# Patient Record
Sex: MALE | Race: OTHER | NOT HISPANIC OR LATINO | URBAN - METROPOLITAN AREA
[De-identification: names, ages, dates, MRNs, and addresses within clinical notes are randomized per-mention and may not be internally consistent; named-entity substitution may affect disease eponyms.]

---

## 2017-12-11 ENCOUNTER — EMERGENCY (EMERGENCY)
Facility: HOSPITAL | Age: 67
LOS: 1 days | Discharge: HOME | End: 2017-12-11
Attending: EMERGENCY MEDICINE | Admitting: EMERGENCY MEDICINE
Payer: COMMERCIAL

## 2017-12-11 VITALS
WEIGHT: 179.9 LBS | RESPIRATION RATE: 16 BRPM | TEMPERATURE: 98 F | OXYGEN SATURATION: 100 % | HEART RATE: 88 BPM | DIASTOLIC BLOOD PRESSURE: 91 MMHG | SYSTOLIC BLOOD PRESSURE: 200 MMHG

## 2017-12-11 VITALS
HEART RATE: 78 BPM | OXYGEN SATURATION: 100 % | SYSTOLIC BLOOD PRESSURE: 174 MMHG | RESPIRATION RATE: 16 BRPM | TEMPERATURE: 98 F | DIASTOLIC BLOOD PRESSURE: 88 MMHG

## 2017-12-11 DIAGNOSIS — I10 ESSENTIAL (PRIMARY) HYPERTENSION: ICD-10-CM

## 2017-12-11 DIAGNOSIS — E78.5 HYPERLIPIDEMIA, UNSPECIFIED: ICD-10-CM

## 2017-12-11 DIAGNOSIS — E78.00 PURE HYPERCHOLESTEROLEMIA, UNSPECIFIED: ICD-10-CM

## 2017-12-11 DIAGNOSIS — E11.649 TYPE 2 DIABETES MELLITUS WITH HYPOGLYCEMIA WITHOUT COMA: ICD-10-CM

## 2017-12-11 DIAGNOSIS — Z79.899 OTHER LONG TERM (CURRENT) DRUG THERAPY: ICD-10-CM

## 2017-12-11 DIAGNOSIS — Z79.82 LONG TERM (CURRENT) USE OF ASPIRIN: ICD-10-CM

## 2017-12-11 LAB
ALBUMIN SERPL ELPH-MCNC: 4.2 G/DL — SIGNIFICANT CHANGE UP (ref 3.3–5)
ALP SERPL-CCNC: 70 U/L — SIGNIFICANT CHANGE UP (ref 40–120)
ALT FLD-CCNC: 16 U/L — SIGNIFICANT CHANGE UP (ref 10–45)
ANION GAP SERPL CALC-SCNC: 12 MMOL/L — SIGNIFICANT CHANGE UP (ref 5–17)
APPEARANCE UR: CLEAR — SIGNIFICANT CHANGE UP
AST SERPL-CCNC: 18 U/L — SIGNIFICANT CHANGE UP (ref 10–40)
B-OH-BUTYR SERPL-SCNC: 0 MMOL/L — SIGNIFICANT CHANGE UP
BASOPHILS NFR BLD AUTO: 0.5 % — SIGNIFICANT CHANGE UP (ref 0–2)
BILIRUB SERPL-MCNC: 0.3 MG/DL — SIGNIFICANT CHANGE UP (ref 0.2–1.2)
BILIRUB UR-MCNC: NEGATIVE — SIGNIFICANT CHANGE UP
BUN SERPL-MCNC: 16 MG/DL — SIGNIFICANT CHANGE UP (ref 7–23)
CALCIUM SERPL-MCNC: 9.5 MG/DL — SIGNIFICANT CHANGE UP (ref 8.4–10.5)
CHLORIDE SERPL-SCNC: 103 MMOL/L — SIGNIFICANT CHANGE UP (ref 96–108)
CO2 SERPL-SCNC: 25 MMOL/L — SIGNIFICANT CHANGE UP (ref 22–31)
COLOR SPEC: YELLOW — SIGNIFICANT CHANGE UP
CREAT SERPL-MCNC: 0.66 MG/DL — SIGNIFICANT CHANGE UP (ref 0.5–1.3)
DIFF PNL FLD: (no result)
EOSINOPHIL NFR BLD AUTO: 1.9 % — SIGNIFICANT CHANGE UP (ref 0–6)
GLUCOSE SERPL-MCNC: 141 MG/DL — HIGH (ref 70–99)
GLUCOSE UR QL: 250
HCT VFR BLD CALC: 41.8 % — SIGNIFICANT CHANGE UP (ref 39–50)
HGB BLD-MCNC: 13.7 G/DL — SIGNIFICANT CHANGE UP (ref 13–17)
KETONES UR-MCNC: NEGATIVE — SIGNIFICANT CHANGE UP
LEUKOCYTE ESTERASE UR-ACNC: NEGATIVE — SIGNIFICANT CHANGE UP
LYMPHOCYTES # BLD AUTO: 28 % — SIGNIFICANT CHANGE UP (ref 13–44)
MCHC RBC-ENTMCNC: 26.7 PG — LOW (ref 27–34)
MCHC RBC-ENTMCNC: 32.8 G/DL — SIGNIFICANT CHANGE UP (ref 32–36)
MCV RBC AUTO: 81.3 FL — SIGNIFICANT CHANGE UP (ref 80–100)
MONOCYTES NFR BLD AUTO: 6.9 % — SIGNIFICANT CHANGE UP (ref 2–14)
NEUTROPHILS NFR BLD AUTO: 62.7 % — SIGNIFICANT CHANGE UP (ref 43–77)
NITRITE UR-MCNC: NEGATIVE — SIGNIFICANT CHANGE UP
PH UR: 6 — SIGNIFICANT CHANGE UP (ref 5–8)
PLATELET # BLD AUTO: 175 K/UL — SIGNIFICANT CHANGE UP (ref 150–400)
POTASSIUM SERPL-MCNC: 4.1 MMOL/L — SIGNIFICANT CHANGE UP (ref 3.5–5.3)
POTASSIUM SERPL-SCNC: 4.1 MMOL/L — SIGNIFICANT CHANGE UP (ref 3.5–5.3)
PROT SERPL-MCNC: 8.3 G/DL — SIGNIFICANT CHANGE UP (ref 6–8.3)
PROT UR-MCNC: (no result) MG/DL
RBC # BLD: 5.14 M/UL — SIGNIFICANT CHANGE UP (ref 4.2–5.8)
RBC # FLD: 13.4 % — SIGNIFICANT CHANGE UP (ref 10.3–16.9)
SODIUM SERPL-SCNC: 140 MMOL/L — SIGNIFICANT CHANGE UP (ref 135–145)
SP GR SPEC: 1.01 — SIGNIFICANT CHANGE UP (ref 1–1.03)
UROBILINOGEN FLD QL: 0.2 E.U./DL — SIGNIFICANT CHANGE UP
WBC # BLD: 4.2 K/UL — SIGNIFICANT CHANGE UP (ref 3.8–10.5)
WBC # FLD AUTO: 4.2 K/UL — SIGNIFICANT CHANGE UP (ref 3.8–10.5)

## 2017-12-11 PROCEDURE — 99284 EMERGENCY DEPT VISIT MOD MDM: CPT | Mod: 25

## 2017-12-11 PROCEDURE — 93010 ELECTROCARDIOGRAM REPORT: CPT

## 2017-12-11 PROCEDURE — 87086 URINE CULTURE/COLONY COUNT: CPT

## 2017-12-11 PROCEDURE — 99285 EMERGENCY DEPT VISIT HI MDM: CPT | Mod: 25

## 2017-12-11 PROCEDURE — 80053 COMPREHEN METABOLIC PANEL: CPT

## 2017-12-11 PROCEDURE — 85025 COMPLETE CBC W/AUTO DIFF WBC: CPT

## 2017-12-11 PROCEDURE — 82010 KETONE BODYS QUAN: CPT

## 2017-12-11 PROCEDURE — 81001 URINALYSIS AUTO W/SCOPE: CPT

## 2017-12-11 PROCEDURE — 70450 CT HEAD/BRAIN W/O DYE: CPT | Mod: 26

## 2017-12-11 PROCEDURE — 93005 ELECTROCARDIOGRAM TRACING: CPT

## 2017-12-11 PROCEDURE — 70450 CT HEAD/BRAIN W/O DYE: CPT

## 2017-12-11 PROCEDURE — 82962 GLUCOSE BLOOD TEST: CPT

## 2017-12-11 RX ORDER — SODIUM CHLORIDE 9 MG/ML
1000 INJECTION INTRAMUSCULAR; INTRAVENOUS; SUBCUTANEOUS
Qty: 0 | Refills: 0 | Status: DISCONTINUED | OUTPATIENT
Start: 2017-12-11 | End: 2017-12-15

## 2017-12-11 RX ADMIN — SODIUM CHLORIDE 125 MILLILITER(S): 9 INJECTION INTRAMUSCULAR; INTRAVENOUS; SUBCUTANEOUS at 07:56

## 2017-12-11 NOTE — ED PROVIDER NOTE - MEDICAL DECISION MAKING DETAILS
Patient with nl labs, ekg and ct scan with a hypoglycemic event. Well appearing, nAD and VSS. Recommend stop metformin based on FS and f/u with pmd for medication adjustment.

## 2017-12-11 NOTE — ED ADULT TRIAGE NOTE - CHIEF COMPLAINT QUOTE
my BP is high; my digital BP machine has been reading elevated results (211/100s yesterday, this morning 179/101);   also I woke up this morning my Glucose reading was 23  had some sugar and repeated  test (196);  I feel tingling in lip, discomfort on my neck

## 2017-12-11 NOTE — ED ADULT NURSE NOTE - OBJECTIVE STATEMENT
Pt with history of hypertension, DM, with lip numbness and headache.  Pt took antihypertensive medications at around 6 am this morning presents hypertensive at 7:30 am.  Pt denies chest pain or shortness of breath.  No vision disturbance.

## 2017-12-11 NOTE — ED PROVIDER NOTE - OBJECTIVE STATEMENT
66 yo M w/ hx of HTN (taking Atenolol 50 mg, Verapamil 120 mg, & Lisinopril 20 mg) & type 2 DM presents to the ED for hypoglycemia & high BP x2 days. Pt reports pre-prandial BG of 23 this morning, which improved to 196 after drinking coffee w/ sugar in it, and BP of 179/101. He states that these levels were similar yesterday, but prior to this his levels were stable. Pt reports intermittent dizziness & lip tinging since yesterday afternoon, and posterior head "pressure." He states that the dizziness has currently resolved, and the lip tingling has improved, but is still present. Pt explains that he was diagnosed w/ DM by his PCP in January 2017, and was prescribed Metformin 1000 mg, but did not start taking it because he wanted to try to control it with diet & weight loss. Pt reports that he lost weight & was successfully controlling his DM without meds up until recently, so he self-started taking his Metformin prescription 2 weeks ago. He has not been seen by his PCP since January. Pt denie any numbness, weakness, vision changes, or recent illness. 68 yo M w/ hx of HTN (taking Atenolol 50 mg, Verapamil 120 mg, & Lisinopril 20 mg) type 2 DM presents to the ED for hypoglycemia & high BP x2 days. Pt reports pre-prandial BG of 23 this morning, which improved to 196 after drinking coffee w/ sugar  and BP of 179/101. He states that these levels were similar yesterday, but prior to this his levels were stable. Pt reports intermittent dizziness & lip tinging since yesterday afternoon, and posterior head "pressure." He states that the dizziness has currently resolved, and the lip tingling has improved, but is still present. Pt explains that he was diagnosed w/ DM by his PCP in January 2017, and was prescribed Metformin 1000 mg, but did not start taking it because he wanted to try to control it with diet & weight loss. Pt reports that he lost weight & was successfully controlling his DM without meds up until recently, so he self-started taking his Metformin prescription 2 weeks ago. He has not been seen by his PCP since January. FS have been under 200 range but he has been taking Metformin 1000mg. Pt denies chest pain, sob,n, v, numbness, weakness, vision changes, or recent illness.

## 2017-12-11 NOTE — ED PROVIDER NOTE - ATTENDING CONTRIBUTION TO CARE
66yo M hx of DM on oral meds, HTN, HLD, here w/ complaint of 2 days of hypoglycemic events and hypertension. +parasthesias around lips and occipital pressure. FS 23 this am, drank coffee with sugar, came up to 120s. was not taking meds until 2 weeks ago. pt has log with him. has also lost significant weight. regimen was for before, likely needs less meds. CT head done to make sure no tumor/mass effect. no concern for SAH, meningitis. Pt to call his doctor today to manage medications.   currently just on metformin 1000mg. Did not take this am. can be observed in ED but no sulfonylureas on board. meds confirmed with pts list. , on labs 141. anticipate dc home w/ holding the metformin, continue to check sugars, and f/u PCP about medication needs.

## 2017-12-11 NOTE — ED ADULT NURSE REASSESSMENT NOTE - NS ED NURSE REASSESS COMMENT FT1
Transfer of care acknowledged with bedside rounding. Patient appears comfortable with family at bedside. Patient states "I had some numbness in my face the past few days" but denies any numbness or tingling at this time.

## 2017-12-12 LAB
CULTURE RESULTS: NO GROWTH — SIGNIFICANT CHANGE UP
SPECIMEN SOURCE: SIGNIFICANT CHANGE UP

## 2023-01-19 ENCOUNTER — EMERGENCY (EMERGENCY)
Facility: HOSPITAL | Age: 73
LOS: 1 days | Discharge: HOME | End: 2023-01-19
Attending: STUDENT IN AN ORGANIZED HEALTH CARE EDUCATION/TRAINING PROGRAM | Admitting: STUDENT IN AN ORGANIZED HEALTH CARE EDUCATION/TRAINING PROGRAM
Payer: COMMERCIAL

## 2023-01-19 VITALS
HEIGHT: 68 IN | WEIGHT: 199.96 LBS | HEART RATE: 110 BPM | TEMPERATURE: 99 F | SYSTOLIC BLOOD PRESSURE: 165 MMHG | DIASTOLIC BLOOD PRESSURE: 89 MMHG | RESPIRATION RATE: 16 BRPM | OXYGEN SATURATION: 97 %

## 2023-01-19 VITALS
HEART RATE: 88 BPM | RESPIRATION RATE: 16 BRPM | TEMPERATURE: 98 F | DIASTOLIC BLOOD PRESSURE: 85 MMHG | SYSTOLIC BLOOD PRESSURE: 171 MMHG | OXYGEN SATURATION: 97 %

## 2023-01-19 LAB
ALBUMIN SERPL ELPH-MCNC: 3.8 G/DL — SIGNIFICANT CHANGE UP (ref 3.3–5)
ALP SERPL-CCNC: 83 U/L — SIGNIFICANT CHANGE UP (ref 40–120)
ALT FLD-CCNC: 64 U/L — HIGH (ref 10–45)
ANION GAP SERPL CALC-SCNC: 8 MMOL/L — SIGNIFICANT CHANGE UP (ref 5–17)
APPEARANCE UR: CLEAR — SIGNIFICANT CHANGE UP
AST SERPL-CCNC: 118 U/L — HIGH (ref 10–40)
BACTERIA # UR AUTO: PRESENT /HPF
BASOPHILS # BLD AUTO: 0.01 K/UL — SIGNIFICANT CHANGE UP (ref 0–0.2)
BASOPHILS NFR BLD AUTO: 0.2 % — SIGNIFICANT CHANGE UP (ref 0–2)
BILIRUB SERPL-MCNC: 0.2 MG/DL — SIGNIFICANT CHANGE UP (ref 0.2–1.2)
BILIRUB UR-MCNC: ABNORMAL
BUN SERPL-MCNC: 10 MG/DL — SIGNIFICANT CHANGE UP (ref 7–23)
CALCIUM SERPL-MCNC: 9 MG/DL — SIGNIFICANT CHANGE UP (ref 8.4–10.5)
CHLORIDE SERPL-SCNC: 107 MMOL/L — SIGNIFICANT CHANGE UP (ref 96–108)
CK SERPL-CCNC: 3097 U/L — HIGH (ref 30–200)
CO2 SERPL-SCNC: 27 MMOL/L — SIGNIFICANT CHANGE UP (ref 22–31)
COLOR SPEC: YELLOW — SIGNIFICANT CHANGE UP
COMMENT - URINE: SIGNIFICANT CHANGE UP
CREAT SERPL-MCNC: 0.56 MG/DL — SIGNIFICANT CHANGE UP (ref 0.5–1.3)
DIFF PNL FLD: ABNORMAL
EGFR: 105 ML/MIN/1.73M2 — SIGNIFICANT CHANGE UP
EOSINOPHIL # BLD AUTO: 0.04 K/UL — SIGNIFICANT CHANGE UP (ref 0–0.5)
EOSINOPHIL NFR BLD AUTO: 0.7 % — SIGNIFICANT CHANGE UP (ref 0–6)
EPI CELLS # UR: SIGNIFICANT CHANGE UP /HPF (ref 0–5)
GLUCOSE SERPL-MCNC: 101 MG/DL — HIGH (ref 70–99)
GLUCOSE UR QL: NEGATIVE — SIGNIFICANT CHANGE UP
GRAN CASTS # UR COMP ASSIST: ABNORMAL /LPF
HCT VFR BLD CALC: 43.1 % — SIGNIFICANT CHANGE UP (ref 39–50)
HGB BLD-MCNC: 13.5 G/DL — SIGNIFICANT CHANGE UP (ref 13–17)
HYALINE CASTS # UR AUTO: SIGNIFICANT CHANGE UP /LPF (ref 0–2)
IMM GRANULOCYTES NFR BLD AUTO: 0.3 % — SIGNIFICANT CHANGE UP (ref 0–0.9)
KETONES UR-MCNC: NEGATIVE — SIGNIFICANT CHANGE UP
LEUKOCYTE ESTERASE UR-ACNC: NEGATIVE — SIGNIFICANT CHANGE UP
LYMPHOCYTES # BLD AUTO: 0.86 K/UL — LOW (ref 1–3.3)
LYMPHOCYTES # BLD AUTO: 15 % — SIGNIFICANT CHANGE UP (ref 13–44)
MCHC RBC-ENTMCNC: 27.1 PG — SIGNIFICANT CHANGE UP (ref 27–34)
MCHC RBC-ENTMCNC: 31.3 GM/DL — LOW (ref 32–36)
MCV RBC AUTO: 86.4 FL — SIGNIFICANT CHANGE UP (ref 80–100)
MONOCYTES # BLD AUTO: 0.78 K/UL — SIGNIFICANT CHANGE UP (ref 0–0.9)
MONOCYTES NFR BLD AUTO: 13.6 % — SIGNIFICANT CHANGE UP (ref 2–14)
NEUTROPHILS # BLD AUTO: 4.02 K/UL — SIGNIFICANT CHANGE UP (ref 1.8–7.4)
NEUTROPHILS NFR BLD AUTO: 70.2 % — SIGNIFICANT CHANGE UP (ref 43–77)
NITRITE UR-MCNC: NEGATIVE — SIGNIFICANT CHANGE UP
NRBC # BLD: 0 /100 WBCS — SIGNIFICANT CHANGE UP (ref 0–0)
PH UR: 6 — SIGNIFICANT CHANGE UP (ref 5–8)
PLATELET # BLD AUTO: 302 K/UL — SIGNIFICANT CHANGE UP (ref 150–400)
POTASSIUM SERPL-MCNC: 4.2 MMOL/L — SIGNIFICANT CHANGE UP (ref 3.5–5.3)
POTASSIUM SERPL-SCNC: 4.2 MMOL/L — SIGNIFICANT CHANGE UP (ref 3.5–5.3)
PROT SERPL-MCNC: 7.1 G/DL — SIGNIFICANT CHANGE UP (ref 6–8.3)
PROT UR-MCNC: 30 MG/DL
RBC # BLD: 4.99 M/UL — SIGNIFICANT CHANGE UP (ref 4.2–5.8)
RBC # FLD: 13.4 % — SIGNIFICANT CHANGE UP (ref 10.3–14.5)
RBC CASTS # UR COMP ASSIST: < 5 /HPF — SIGNIFICANT CHANGE UP
SARS-COV-2 RNA SPEC QL NAA+PROBE: NEGATIVE — SIGNIFICANT CHANGE UP
SODIUM SERPL-SCNC: 142 MMOL/L — SIGNIFICANT CHANGE UP (ref 135–145)
SP GR SPEC: >=1.03 — SIGNIFICANT CHANGE UP (ref 1–1.03)
UROBILINOGEN FLD QL: 0.2 E.U./DL — SIGNIFICANT CHANGE UP
WBC # BLD: 5.73 K/UL — SIGNIFICANT CHANGE UP (ref 3.8–10.5)
WBC # FLD AUTO: 5.73 K/UL — SIGNIFICANT CHANGE UP (ref 3.8–10.5)
WBC UR QL: < 5 /HPF — SIGNIFICANT CHANGE UP

## 2023-01-19 PROCEDURE — 99284 EMERGENCY DEPT VISIT MOD MDM: CPT

## 2023-01-19 PROCEDURE — 81001 URINALYSIS AUTO W/SCOPE: CPT

## 2023-01-19 PROCEDURE — 87635 SARS-COV-2 COVID-19 AMP PRB: CPT

## 2023-01-19 PROCEDURE — 36415 COLL VENOUS BLD VENIPUNCTURE: CPT

## 2023-01-19 PROCEDURE — 99283 EMERGENCY DEPT VISIT LOW MDM: CPT

## 2023-01-19 PROCEDURE — 80053 COMPREHEN METABOLIC PANEL: CPT

## 2023-01-19 PROCEDURE — 82550 ASSAY OF CK (CPK): CPT

## 2023-01-19 PROCEDURE — 85025 COMPLETE CBC W/AUTO DIFF WBC: CPT

## 2023-01-19 RX ORDER — SODIUM CHLORIDE 9 MG/ML
1000 INJECTION, SOLUTION INTRAVENOUS ONCE
Refills: 0 | Status: COMPLETED | OUTPATIENT
Start: 2023-01-19 | End: 2023-01-19

## 2023-01-19 RX ADMIN — SODIUM CHLORIDE 1000 MILLILITER(S): 9 INJECTION, SOLUTION INTRAVENOUS at 22:48

## 2023-01-19 NOTE — ED PROVIDER NOTE - CLINICAL SUMMARY MEDICAL DECISION MAKING FREE TEXT BOX
71 yo male with a hx of HTN, DM p/w difficulty getting up after falls. Clinical picture concerning for polymyositis vs dermatomyositis?  No CVA based on normal neuro exam. No pain anywhere and no ttp on exam- no concern for any traumatic injuries. Will check CPK. Will hold off ESR/CRP since confirmed elevated as outpatient. If CPK high, plan to discharge home on prednisone and with rheumatology follow up.

## 2023-01-19 NOTE — ED ADULT NURSE NOTE - OBJECTIVE STATEMENT
Pt presenting s/p mechanical fall. States he was picking something up when he lost his balance and fell backwards. States he has been feeling weak for 2 weeks (B/L upper and lower extremities) and when he fell he was unable to pick himself up. States he was on the floor for 45 minutes before someone was able to help him up. Pt also endorsing a rash x2 months. Pt states he is taking oral prednisone for the rash. Denies LOC/pain from fall. No trauma/injury noted. No SOB/CP.

## 2023-01-19 NOTE — ED PROVIDER NOTE - PROGRESS NOTE DETAILS
Transaminitis AST>ALT, elevated CPK >3000. Normal kidney function and able to walk without any body pains. Unlikely rhabdomyolysis and more likely autoimmune disease. Will give IV hydration and discharge home on prednisone with rheumatology follow up. Transaminitis AST>ALT, elevated CPK >3000. Normal kidney function and able to walk without any body pains. Unlikely exertional rhabdomyolysis and more likely autoimmune disease. Will give IV hydration and discharge home on prednisone with rheumatology follow up.

## 2023-01-19 NOTE — ED PROVIDER NOTE - PHYSICAL EXAMINATION
VITAL SIGNS: I have reviewed nursing notes and confirm.  CONSTITUTIONAL: Well appearing, in no acute distress.   SKIN:  warm and dry, erythematous rash diffuse over face and chest  HEAD:  normocephalic, atraumatic.  EYES: EOM intact; conjunctiva and sclera clear.  ENT: No nasal discharge; airway clear.   NECK: Supple; non tender.  CARD: S1, S2 normal; no murmurs, gallops, or rubs. Regular rate and rhythm.   RESP:  Clear to auscultation b/l, no wheezes, rales or rhonchi.  ABD: Normal bowel sounds; soft; non-distended; non-tender; no guarding/ rebound.  EXT: Normal ROM. No clubbing, cyanosis or edema. 2+ pulses to b/l ue/le.  NEURO: Alert, oriented, strength 5/5 in all extremities, no sensory deficits, CNs intact, normal gait and coordination   PSYCH: Cooperative, mood and affect appropriate.

## 2023-01-19 NOTE — ED ADULT NURSE NOTE - NSSUHOSCREENINGYN_ED_ALL_ED
Addended by: Pradeep Leon on: 4/2/2019 12:43 PM     Modules accepted: Orders Yes - the patient is able to be screened

## 2023-01-19 NOTE — ED PROVIDER NOTE - OBJECTIVE STATEMENT
73 yo male with a hx of HTN, DM p/w difficulty getting up after falls. The patient is receiving an outpatient work up for a diffuse rash with dermatology- reports he had elevated ESR and CRP and was referred to immunology. Denies weight loss, numbness, tingling, head trauma, syncope, palpitations, chest pain, sob, abdominal pain, n/v/d/c, gait problem. Denies any joint pains. Says he tripped after bending down to grab something and fell, landed on bottom at low impact. Denies pelvic pain. Says he had trouble getting himself up due to weakness in his shoulder. Otherwise no weakness in the arm. Did not hit his head. No hx of CVA. Not on blood thinners. Says this happened to him several times over the past month.

## 2023-01-19 NOTE — ED PROVIDER NOTE - PATIENT PORTAL LINK FT
You can access the FollowMyHealth Patient Portal offered by [facility] by registering at the following website: [email or web address]. By joining [employer]’s FollowMyHealth portal, you will also be able to view your health information using other applications (apps) compatible with our system.

## 2023-01-19 NOTE — ED ADULT TRIAGE NOTE - CHIEF COMPLAINT QUOTE
x 2 mos of facial, bilateral arm swelling and redness. Pt is currently being worked up for an autoimmune disease by Agustina SANCEHZ. PT reports falling this morning, was unable to get off the ground without daughter help. reports bilateral arm weakness. did not hit head.

## 2023-01-19 NOTE — ED ADULT NURSE NOTE - CHIEF COMPLAINT QUOTE
x 2 mos of facial, bilateral arm swelling and redness. Pt is currently being worked up for an autoimmune disease by Agustina SANCHEZ. PT reports falling this morning, was unable to get off the ground without daughter help. reports bilateral arm weakness. did not hit head.

## 2023-01-19 NOTE — ED PROVIDER NOTE - NSFOLLOWUPINSTRUCTIONS_ED_ALL_ED_FT
[facility]                                                                                                     Dermatomyositis      Dermatomyositis is a rare muscle disease that causes muscle weakness and a rash. It usually affects muscles that are closest to the trunk of your body (torso). You may have a combination of muscle weakness, pain, and swelling. This condition can make it hard to rise from a sitting position, climb stairs, lift objects, or reach over your head.      What are the causes?    The cause of this condition is not known.      What increases the risk?    You are more likely to develop this condition if:  •You are female.      •You have cancer.      •You have lung disease.        What are the signs or symptoms?    Symptoms of this condition may include:•Skin changes, such as:  •A bluish-purple rash that may be itchy. This often develops before the muscle weakness. It appears on the face, neck, shoulders, upper chest, elbows, knees, knuckles, and back.      •Hardened bumps of calcium deposits under the skin (especially in children).        •Muscle weakness, swelling, and pain.      •Swelling of the face and eyelids.      •Fatigue.      •Weight loss.      •Low fever.      •Heartburn due to changes in muscles of the esophagus.      •Trouble swallowing, talking, and breathing.        How is this diagnosed?    This condition may be diagnosed based on:  •Blood tests.      •A test to check whether your muscles are responding properly to electrical signals from your nerves (electromyogram).      •Removal of a small tissue sample from a weakened muscle to be examined under a microscope (muscle biopsy).      •Removal of a small skin sample from an area with a rash to be examined under a microscope (skin biopsy).      •Chest X-ray.      •MRI.        How is this treated?    This condition may be treated with:  •Immunosuppressants such as steroids. These are medicines that weaken and decrease the activity of your body's disease-fighting system (immune system) and reduce inflammation.      •Medicine containing antibodies to help your body fight infections (immunoglobulin).      •Physical therapy and exercise to prevent muscle loss.        Follow these instructions at home:  A person sprays sunscreen on the arm.   •Wear protective clothing and high-protection sunscreen when you go outside. Areas that are affected by a rash are more sensitive to the sun.      •Use skin creams or ointments as told by your health care provider.      •Take over-the-counter and prescription medicines only as told by your health care provider.      •If physical therapy was prescribed, do exercises as told.      • Do not eat right before bedtime. Raise (elevate) the head of your bed to decrease heartburn.      •If told by your health care provider, eat more protein in your diet.        Where to find more information    •National Organization for Rare Disorders: rarediseases.org        Contact a health care provider if:    •Your condition gets worse.      •You have unexplained muscle weakness.      •You develop a new rash.      •You have a fever and cough.        Get help right away if:    •You have difficulty swallowing.      •You develop breathing problems.      These symptoms may be an emergency. Get help right away. Call 911.   • Do not wait to see if the symptoms will go away.        • Do not drive yourself to the hospital.         Summary    •Dermatomyositis is a rare muscle disease that causes muscle weakness and a rash.      •You may have a combination of muscle weakness, pain, and swelling.      •Treatment may include medicines and physical therapy.      •Wear protective clothing and high-protection sunscreen when you go outside. Areas that are affected by a rash are more sensitive to the sun.      This information is not intended to replace advice given to you by your health care provider. Make sure you discuss any questions you have with your health care provider.      Document Revised: 06/23/2022 Document Reviewed: 06/23/2022    Elsevier Patient Education © 2022 [employer]

## 2023-01-19 NOTE — ED PROVIDER NOTE - NSFOLLOWUPCLINICS_GEN_ALL_ED_FT
A Rheumatologist  Rheumatology  .  [place]   Phone:   Fax:     [facility]  Rheumatology  [address]  [place] [address]  Phone: [phone]  Fax:     [facility]  Rheumatology  [address] [place] [address]  Phone: [phone]  Fax: [phone]

## 2023-01-20 PROBLEM — E78.00 PURE HYPERCHOLESTEROLEMIA, UNSPECIFIED: Chronic | Status: ACTIVE | Noted: 2017-12-11

## 2023-01-20 PROBLEM — E11.9 TYPE 2 DIABETES MELLITUS WITHOUT COMPLICATIONS: Chronic | Status: ACTIVE | Noted: 2017-12-11

## 2023-01-20 PROBLEM — Z00.00 ENCOUNTER FOR PREVENTIVE HEALTH EXAMINATION: Status: ACTIVE | Noted: 2023-01-20

## 2023-01-20 PROBLEM — I10 ESSENTIAL (PRIMARY) HYPERTENSION: Chronic | Status: ACTIVE | Noted: 2017-12-11

## 2023-01-23 ENCOUNTER — LABORATORY RESULT (OUTPATIENT)
Age: 73
End: 2023-01-23

## 2023-01-23 ENCOUNTER — APPOINTMENT (OUTPATIENT)
Dept: RADIOLOGY | Facility: CLINIC | Age: 73
End: 2023-01-23
Payer: COMMERCIAL

## 2023-01-23 ENCOUNTER — OUTPATIENT (OUTPATIENT)
Dept: OUTPATIENT SERVICES | Facility: HOSPITAL | Age: 73
LOS: 1 days | End: 2023-01-23

## 2023-01-23 ENCOUNTER — APPOINTMENT (OUTPATIENT)
Dept: RHEUMATOLOGY | Facility: CLINIC | Age: 73
End: 2023-01-23
Payer: COMMERCIAL

## 2023-01-23 VITALS
SYSTOLIC BLOOD PRESSURE: 148 MMHG | DIASTOLIC BLOOD PRESSURE: 77 MMHG | HEART RATE: 118 BPM | TEMPERATURE: 98.5 F | BODY MASS INDEX: 33.15 KG/M2 | OXYGEN SATURATION: 96 % | WEIGHT: 199 LBS | HEIGHT: 65 IN

## 2023-01-23 DIAGNOSIS — R74.01 ELEVATION OF LEVELS OF LIVER TRANSAMINASE LEVELS: ICD-10-CM

## 2023-01-23 DIAGNOSIS — Z20.822 CONTACT WITH AND (SUSPECTED) EXPOSURE TO COVID-19: ICD-10-CM

## 2023-01-23 DIAGNOSIS — R74.8 ABNORMAL LEVELS OF OTHER SERUM ENZYMES: ICD-10-CM

## 2023-01-23 DIAGNOSIS — R29.6 REPEATED FALLS: ICD-10-CM

## 2023-01-23 DIAGNOSIS — R21 RASH AND OTHER NONSPECIFIC SKIN ERUPTION: ICD-10-CM
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
## 2023-01-23 DIAGNOSIS — Z79.82 LONG TERM (CURRENT) USE OF ASPIRIN: ICD-10-CM

## 2023-01-23 DIAGNOSIS — R70.0 ELEVATED ERYTHROCYTE SEDIMENTATION RATE: ICD-10-CM

## 2023-01-23 DIAGNOSIS — E11.9 TYPE 2 DIABETES MELLITUS WITHOUT COMPLICATIONS: ICD-10-CM

## 2023-01-23 DIAGNOSIS — R79.82 ELEVATED C-REACTIVE PROTEIN (CRP): ICD-10-CM

## 2023-01-23 DIAGNOSIS — I10 ESSENTIAL (PRIMARY) HYPERTENSION: ICD-10-CM

## 2023-01-23 DIAGNOSIS — Z79.84 LONG TERM (CURRENT) USE OF ORAL HYPOGLYCEMIC DRUGS: ICD-10-CM
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
## 2023-01-23 PROCEDURE — 36415 COLL VENOUS BLD VENIPUNCTURE: CPT

## 2023-01-23 PROCEDURE — 71046 X-RAY EXAM CHEST 2 VIEWS: CPT | Mod: 26

## 2023-01-23 PROCEDURE — 99205 OFFICE O/P NEW HI 60 MIN: CPT | Mod: 25

## 2023-01-24 RX ORDER — AMLODIPINE BESYLATE 10 MG/1
10 TABLET ORAL
Qty: 30 | Refills: 0 | Status: ACTIVE | COMMUNITY
Start: 2022-12-14

## 2023-01-24 RX ORDER — PREDNISONE 20 MG/1
20 TABLET ORAL
Qty: 21 | Refills: 0 | Status: COMPLETED | COMMUNITY
Start: 2023-01-17

## 2023-01-24 RX ORDER — VERAPAMIL HYDROCHLORIDE 180 MG/1
180 CAPSULE, DELAYED RELEASE PELLETS ORAL
Qty: 30 | Refills: 0 | Status: COMPLETED | COMMUNITY
Start: 2022-09-21

## 2023-01-24 RX ORDER — LISINOPRIL 20 MG/1
20 TABLET ORAL
Qty: 30 | Refills: 0 | Status: COMPLETED | COMMUNITY
Start: 2022-08-14

## 2023-01-24 RX ORDER — HYDROXYZINE HYDROCHLORIDE 25 MG/1
25 TABLET ORAL
Qty: 30 | Refills: 0 | Status: COMPLETED | COMMUNITY
Start: 2023-01-17

## 2023-01-24 RX ORDER — DOLUTEGRAVIR SODIUM AND LAMIVUDINE 50; 300 MG/1; MG/1
50-300 TABLET, FILM COATED ORAL
Qty: 30 | Refills: 0 | Status: COMPLETED | COMMUNITY
Start: 2022-07-07

## 2023-01-24 RX ORDER — ATORVASTATIN CALCIUM 20 MG/1
20 TABLET, FILM COATED ORAL
Qty: 30 | Refills: 0 | Status: COMPLETED | COMMUNITY
Start: 2022-08-14

## 2023-01-24 RX ORDER — DARUNAVIR, COBICISTAT, EMTRICITABINE, AND TENOFOVIR ALAFENAMIDE 800; 150; 200; 10 MG/1; MG/1; MG/1; MG/1
800-150-200-10 TABLET, FILM COATED ORAL
Qty: 30 | Refills: 0 | Status: COMPLETED | COMMUNITY
Start: 2022-10-11

## 2023-01-24 RX ORDER — FEXOFENADINE HYDROCHLORIDE 180 MG/1
180 TABLET ORAL
Qty: 30 | Refills: 0 | Status: COMPLETED | COMMUNITY
Start: 2022-12-21

## 2023-01-24 RX ORDER — DOLUTEGRAVIR SODIUM AND RILPIVIRINE HYDROCHLORIDE 50; 25 MG/1; MG/1
50-25 TABLET, FILM COATED ORAL
Qty: 30 | Refills: 0 | Status: ACTIVE | COMMUNITY
Start: 2022-11-30

## 2023-01-24 NOTE — ASSESSMENT
[FreeTextEntry1] : 72-year-old man with recent onset of rash and facial edema referred by emergency room for evaluation after recent visit for muscle weakness, noted to have elevated CPK at 3000.  Patient with heliotrope rash, shawl sign, as well as Butrans papules, with proximal weakness more predominant of the upper extremities bilaterally, with presentation most consistent with dermatomyositis.  Patient with recent biopsy of the skin by dermatologist, will reach out to dermatology for preliminary results.  Spoke with dermatologist, rash most consistent with dermatomyositis as well.  Will obtain labs today including CBC, CMP, ESR, CRP, dermatologist checked DANILO which was negative, biomarker panel, anti-Letitia 1, HMG CR antibody, SSA and SSB, SPEP, as well as rheumatoid factor and CCP antibody.  We will also check chest x-ray today.  Discussed need for age-appropriate malignancy work-up, patient will discuss with primary care doctor.  Will continue prednisone 60 mg daily for now, discussed possible IVIG treatment, further management pending results

## 2023-01-24 NOTE — DATA REVIEWED
[FreeTextEntry1] : 12/14/2022\par CBC: \par WBC 5.4\par hgb 13.6\par hct 41.8\par plt 248\par \par AST 86\par ALt 46\par Creatine 0.69\par \par ESR 34\par hep b sag neg\par Hep B s ab positive\par hep c nr\par hiv viral load neg\par \par

## 2023-01-24 NOTE — HISTORY OF PRESENT ILLNESS
[FreeTextEntry1] : 72-year-old man referred for rheumatology evaluation\par \par Patient was recently evaluated in the emergency room at [facility], noted to have a rash on the face and chest in addition to elevated CPK.\par \par Patient following with primary care provider, developed rash about 3 months ago\par Rash primarily on the face as well as chest wall and little bit on the neck\par Rash is red on the face associated with edema\par Patient has been started on prednisone for the rash with some mild improvement\par Review of pictures on patient's phone rashes not present in September 2022\par Over the past month has also felt increase in weakness of the upper extremities\par Was seen in the emergency room as well at home, and was unable to stand up from position on the floor for more than 1 hour, patient was worried and went to the emergency room\par Also about 1 month ago was traveling, and felt unable to lift case into the overhead compartment which was new\par \par Prednisone on and off for two months \par Helps with the rash\par \par No chest pain or shortness of breath\par No dysphagia\par \par In the emergency room, CPK 3000\par \par Patient is not currently taking a statin\par Changed haart medicine to se eif may have been side effect, no benefit with change in medication\par \par Was seen by dermatologist Dr. Gem ZAYAS, [phone]\par Had skin biopsy of the right forearm, discussed with dermatologist at time of visit, consistent with dermatomyositis\par Patient is currently taking \par amlodipine 10 mg \par Julluca \par Stopped statin, verapamil, dovato, and lisinopril\par \par \par \par \par \par

## 2023-01-24 NOTE — REVIEW OF SYSTEMS
[Feeling Poorly] : feeling poorly [Feeling Tired] : feeling tired [Eyes Itch] : itching of the eyes [Arthralgias] : arthralgias [Skin Lesions] : skin lesion [Itching] : itching [Dry Skin] : dry skin [Negative] : Heme/Lymph [FreeTextEntry9] : weakness

## 2023-01-24 NOTE — PHYSICAL EXAM
[General Appearance - Alert] : alert [General Appearance - In No Acute Distress] : in no acute distress [Outer Ear] : the ears and nose were normal in appearance [Examination Of The Oral Cavity] : the lips and gums were normal [Oropharynx] : the oropharynx was normal [] : no respiratory distress [Respiration, Rhythm And Depth] : normal respiratory rhythm and effort [Exaggerated Use Of Accessory Muscles For Inspiration] : no accessory muscle use [Auscultation Breath Sounds / Voice Sounds] : lungs were clear to auscultation bilaterally [Abnormal Walk] : normal gait [Oriented To Time, Place, And Person] : oriented to person, place, and time [Impaired Insight] : insight and judgment were intact [Affect] : the affect was normal [FreeTextEntry1] : hyperpigmented erythematous maculopapular skin lesions of the chest wall and back of the neck., arms improving.

## 2023-01-25 ENCOUNTER — NON-APPOINTMENT (OUTPATIENT)
Age: 73
End: 2023-01-25

## 2023-01-25 LAB
ALBUMIN MFR SERPL ELPH: 53.8 %
ALBUMIN SERPL ELPH-MCNC: 3.8 G/DL
ALBUMIN SERPL-MCNC: 3.6 G/DL
ALBUMIN/GLOB SERPL: 1.2 RATIO
ALP BLD-CCNC: 87 U/L
ALPHA1 GLOB MFR SERPL ELPH: 4.4 %
ALPHA1 GLOB SERPL ELPH-MCNC: 0.3 G/DL
ALPHA2 GLOB MFR SERPL ELPH: 15.1 %
ALPHA2 GLOB SERPL ELPH-MCNC: 1 G/DL
ALT SERPL-CCNC: 91 U/L
ANION GAP SERPL CALC-SCNC: 14 MMOL/L
AST SERPL-CCNC: 153 U/L
B-GLOBULIN MFR SERPL ELPH: 11.1 %
B-GLOBULIN SERPL ELPH-MCNC: 0.7 G/DL
BASOPHILS # BLD AUTO: 0.02 K/UL
BASOPHILS NFR BLD AUTO: 0.2 %
BILIRUB SERPL-MCNC: 0.2 MG/DL
BUN SERPL-MCNC: 12 MG/DL
CALCIUM SERPL-MCNC: 9.8 MG/DL
CCP AB SER IA-ACNC: <8 UNITS
CHLORIDE SERPL-SCNC: 102 MMOL/L
CK SERPL-CCNC: 3599 U/L
CO2 SERPL-SCNC: 24 MMOL/L
CREAT SERPL-MCNC: 0.55 MG/DL
CRP SERPL-MCNC: <3 MG/L
EGFR: 106 ML/MIN/1.73M2
ENA JO1 AB SER IA-ACNC: <0.2 AL
ENA RNP AB SER IA-ACNC: <0.2 AL
ENA SM AB SER IA-ACNC: <0.2 AL
ENA SS-A AB SER IA-ACNC: <0.2 AL
ENA SS-B AB SER IA-ACNC: <0.2 AL
EOSINOPHIL # BLD AUTO: 0 K/UL
EOSINOPHIL NFR BLD AUTO: 0 %
ERYTHROCYTE [SEDIMENTATION RATE] IN BLOOD BY WESTERGREN METHOD: 97 MM/HR
GAMMA GLOB FLD ELPH-MCNC: 1 G/DL
GAMMA GLOB MFR SERPL ELPH: 15.6 %
GLUCOSE SERPL-MCNC: 178 MG/DL
HCT VFR BLD CALC: 46.3 %
HGB BLD-MCNC: 14.4 G/DL
IGA SER QL IEP: 162 MG/DL
IMM GRANULOCYTES NFR BLD AUTO: 0.6 %
INTERPRETATION SERPL IEP-IMP: NORMAL
LYMPHOCYTES # BLD AUTO: 0.37 K/UL
LYMPHOCYTES NFR BLD AUTO: 4.3 %
MAN DIFF?: NORMAL
MCHC RBC-ENTMCNC: 26.7 PG
MCHC RBC-ENTMCNC: 31.1 GM/DL
MCV RBC AUTO: 85.7 FL
MONOCYTES # BLD AUTO: 0.19 K/UL
MONOCYTES NFR BLD AUTO: 2.2 %
NEUTROPHILS # BLD AUTO: 7.94 K/UL
NEUTROPHILS NFR BLD AUTO: 92.7 %
PLATELET # BLD AUTO: 348 K/UL
POTASSIUM SERPL-SCNC: 4.4 MMOL/L
PROT SERPL-MCNC: 6.7 G/DL
RBC # BLD: 5.4 M/UL
RBC # FLD: 13.6 %
RF+CCP IGG SER-IMP: NEGATIVE
RHEUMATOID FACT SER QL: <10 IU/ML
SODIUM SERPL-SCNC: 139 MMOL/L
WBC # FLD AUTO: 8.57 K/UL

## 2023-01-28 LAB — HMGCR ANTIBODY, IGG: <3 UNITS

## 2023-02-01 ENCOUNTER — TRANSCRIPTION ENCOUNTER (OUTPATIENT)
Age: 73
End: 2023-02-01

## 2023-02-01 ENCOUNTER — INPATIENT (INPATIENT)
Facility: HOSPITAL | Age: 73
LOS: 4 days | Discharge: HOME | DRG: 501 | End: 2023-02-06
Attending: PSYCHIATRY & NEUROLOGY | Admitting: PSYCHIATRY & NEUROLOGY
Payer: COMMERCIAL

## 2023-02-01 VITALS
SYSTOLIC BLOOD PRESSURE: 165 MMHG | HEART RATE: 106 BPM | HEIGHT: 68 IN | WEIGHT: 197.98 LBS | TEMPERATURE: 98 F | OXYGEN SATURATION: 97 % | DIASTOLIC BLOOD PRESSURE: 84 MMHG | RESPIRATION RATE: 18 BRPM

## 2023-02-01 LAB
ALBUMIN SERPL ELPH-MCNC: 3.9 G/DL — SIGNIFICANT CHANGE UP (ref 3.3–5)
ALP SERPL-CCNC: 72 U/L — SIGNIFICANT CHANGE UP (ref 40–120)
ALT FLD-CCNC: 94 U/L — HIGH (ref 10–45)
ANION GAP SERPL CALC-SCNC: 10 MMOL/L — SIGNIFICANT CHANGE UP (ref 5–17)
APPEARANCE UR: CLEAR — SIGNIFICANT CHANGE UP
AST SERPL-CCNC: 181 U/L — HIGH (ref 10–40)
BACTERIA # UR AUTO: PRESENT /HPF
BASE EXCESS BLDV CALC-SCNC: 4 MMOL/L — HIGH (ref -2–3)
BASOPHILS # BLD AUTO: 0 K/UL — SIGNIFICANT CHANGE UP (ref 0–0.2)
BASOPHILS NFR BLD AUTO: 0 % — SIGNIFICANT CHANGE UP (ref 0–2)
BILIRUB SERPL-MCNC: 0.2 MG/DL — SIGNIFICANT CHANGE UP (ref 0.2–1.2)
BILIRUB UR-MCNC: NEGATIVE — SIGNIFICANT CHANGE UP
BUN SERPL-MCNC: 14 MG/DL — SIGNIFICANT CHANGE UP (ref 7–23)
CA-I SERPL-SCNC: 1.23 MMOL/L — SIGNIFICANT CHANGE UP (ref 1.15–1.33)
CALCIUM SERPL-MCNC: 9.5 MG/DL — SIGNIFICANT CHANGE UP (ref 8.4–10.5)
CHLORIDE SERPL-SCNC: 101 MMOL/L — SIGNIFICANT CHANGE UP (ref 96–108)
CK SERPL-CCNC: 3539 U/L — HIGH (ref 30–200)
CO2 BLDV-SCNC: 31.2 MMOL/L — HIGH (ref 22–26)
CO2 SERPL-SCNC: 27 MMOL/L — SIGNIFICANT CHANGE UP (ref 22–31)
COLOR SPEC: YELLOW — SIGNIFICANT CHANGE UP
COMMENT - URINE: SIGNIFICANT CHANGE UP
CREAT SERPL-MCNC: 0.52 MG/DL — SIGNIFICANT CHANGE UP (ref 0.5–1.3)
DIFF PNL FLD: ABNORMAL
EGFR: 108 ML/MIN/1.73M2 — SIGNIFICANT CHANGE UP
EOSINOPHIL # BLD AUTO: 0 K/UL — SIGNIFICANT CHANGE UP (ref 0–0.5)
EOSINOPHIL NFR BLD AUTO: 0 % — SIGNIFICANT CHANGE UP (ref 0–6)
EPI CELLS # UR: SIGNIFICANT CHANGE UP /HPF (ref 0–5)
GAS PNL BLDV: 135 MMOL/L — LOW (ref 136–145)
GAS PNL BLDV: SIGNIFICANT CHANGE UP
GAS PNL BLDV: SIGNIFICANT CHANGE UP
GLUCOSE SERPL-MCNC: 143 MG/DL — HIGH (ref 70–99)
GLUCOSE UR QL: NEGATIVE — SIGNIFICANT CHANGE UP
HCO3 BLDV-SCNC: 30 MMOL/L — HIGH (ref 22–29)
HCT VFR BLD CALC: 44.7 % — SIGNIFICANT CHANGE UP (ref 39–50)
HGB BLD-MCNC: 14 G/DL — SIGNIFICANT CHANGE UP (ref 13–17)
IMM GRANULOCYTES NFR BLD AUTO: 0.5 % — SIGNIFICANT CHANGE UP (ref 0–0.9)
KETONES UR-MCNC: NEGATIVE — SIGNIFICANT CHANGE UP
LEUKOCYTE ESTERASE UR-ACNC: NEGATIVE — SIGNIFICANT CHANGE UP
LYMPHOCYTES # BLD AUTO: 0.64 K/UL — LOW (ref 1–3.3)
LYMPHOCYTES # BLD AUTO: 6.7 % — LOW (ref 13–44)
MAGNESIUM SERPL-MCNC: 2.3 MG/DL — SIGNIFICANT CHANGE UP (ref 1.6–2.6)
MCHC RBC-ENTMCNC: 26.9 PG — LOW (ref 27–34)
MCHC RBC-ENTMCNC: 31.3 GM/DL — LOW (ref 32–36)
MCV RBC AUTO: 86 FL — SIGNIFICANT CHANGE UP (ref 80–100)
MONOCYTES # BLD AUTO: 0.6 K/UL — SIGNIFICANT CHANGE UP (ref 0–0.9)
MONOCYTES NFR BLD AUTO: 6.3 % — SIGNIFICANT CHANGE UP (ref 2–14)
NEUTROPHILS # BLD AUTO: 8.2 K/UL — HIGH (ref 1.8–7.4)
NEUTROPHILS NFR BLD AUTO: 86.5 % — HIGH (ref 43–77)
NITRITE UR-MCNC: NEGATIVE — SIGNIFICANT CHANGE UP
NRBC # BLD: 0 /100 WBCS — SIGNIFICANT CHANGE UP (ref 0–0)
PCO2 BLDV: 48 MMHG — SIGNIFICANT CHANGE UP (ref 42–55)
PH BLDV: 7.4 — SIGNIFICANT CHANGE UP (ref 7.32–7.43)
PH UR: 5.5 — SIGNIFICANT CHANGE UP (ref 5–8)
PLATELET # BLD AUTO: 271 K/UL — SIGNIFICANT CHANGE UP (ref 150–400)
PO2 BLDV: <33 MMHG — SIGNIFICANT CHANGE UP (ref 25–45)
POTASSIUM BLDV-SCNC: 4.6 MMOL/L — SIGNIFICANT CHANGE UP (ref 3.5–5.1)
POTASSIUM SERPL-MCNC: 4.6 MMOL/L — SIGNIFICANT CHANGE UP (ref 3.5–5.3)
POTASSIUM SERPL-SCNC: 4.6 MMOL/L — SIGNIFICANT CHANGE UP (ref 3.5–5.3)
PROT SERPL-MCNC: 7 G/DL — SIGNIFICANT CHANGE UP (ref 6–8.3)
PROT UR-MCNC: 100 MG/DL
RBC # BLD: 5.2 M/UL — SIGNIFICANT CHANGE UP (ref 4.2–5.8)
RBC # FLD: 13.5 % — SIGNIFICANT CHANGE UP (ref 10.3–14.5)
RBC CASTS # UR COMP ASSIST: < 5 /HPF — SIGNIFICANT CHANGE UP
SAO2 % BLDV: 49.6 % — LOW (ref 67–88)
SARS-COV-2 RNA SPEC QL NAA+PROBE: NEGATIVE — SIGNIFICANT CHANGE UP
SODIUM SERPL-SCNC: 138 MMOL/L — SIGNIFICANT CHANGE UP (ref 135–145)
SP GR SPEC: >=1.03 — SIGNIFICANT CHANGE UP (ref 1–1.03)
UROBILINOGEN FLD QL: 0.2 E.U./DL — SIGNIFICANT CHANGE UP
WBC # BLD: 9.49 K/UL — SIGNIFICANT CHANGE UP (ref 3.8–10.5)
WBC # FLD AUTO: 9.49 K/UL — SIGNIFICANT CHANGE UP (ref 3.8–10.5)
WBC UR QL: < 5 /HPF — SIGNIFICANT CHANGE UP

## 2023-02-01 PROCEDURE — 70450 CT HEAD/BRAIN W/O DYE: CPT | Mod: 26,MA

## 2023-02-01 PROCEDURE — 99285 EMERGENCY DEPT VISIT HI MDM: CPT

## 2023-02-01 NOTE — ED PROVIDER NOTE - NS ED ROS FT
Constitutional: No fever or chills.   Eyes: No pain, blurry vision, or discharge.  ENMT: No hearing changes, pain, discharge or infections. No neck pain or stiffness.  Cardiac: No chest pain, SOB or edema. No chest pain with exertion.  Respiratory: No cough or respiratory distress. No hemoptysis. No history of asthma or RAD.  GI: No nausea, vomiting, diarrhea or abdominal pain.  : No dysuria, frequency or burning.  MS: See HPI  Neuro: No headache. No LOC.  Skin: See HPI  Endocrine: No history of thyroid disease or diabetes.  Except as documented in the HPI, all other systems are negative.

## 2023-02-01 NOTE — ED PROVIDER NOTE - CLINICAL SUMMARY MEDICAL DECISION MAKING FREE TEXT BOX
Pt present for stable to worsening? (given dysphagia) muscle weakness in the setting of likely dermatomyositis, referred to neurology, but not yet seen. Proximal mm weakness more common in DM / PM and likely the cause. No acutely progressive sx to suggest GBM. NO SOB. Check labs, CT, neuro consult. Dispo pending neuro recommendations.

## 2023-02-01 NOTE — ED ADULT NURSE NOTE - CHIEF COMPLAINT QUOTE
73 y/o male c/o bilateral arm weakness, recent diagnosis of dermatomyositis, pt reports his symptoms have not improved since last visit, and has a far appointment with rheumatologist.

## 2023-02-01 NOTE — ED PROVIDER NOTE - AXIS
----- Message from PAULIE Crawford CNP sent at 9/22/2020  8:09 PM EDT -----  Inform patient lumbar xray is good does not show any disc space narrowing or stenosis. Negative lumbar spine series. How is she doing since taking steroid? Also how is bp with med changes?
Pt pharmacy [facility] [email or web address].     Referral pending
Normal

## 2023-02-01 NOTE — ED PROVIDER NOTE - OBJECTIVE STATEMENT
Pt w/ PMHx HTN, DM whom presented to  ED on 1/19 w/ weakness and rash. At that time pt reported he is receiving an outpatient work up for a diffuse rash with dermatology- reports he had elevated ESR and CRP and was referred to immunology. During last ED visit, pt was found to have elevated CPK in addition to rash, and felt to be likely dermatomyositis / polymyositis and was started on steroids (Prednisone) Pt w/ PMHx HTN, DM whom presented to  ED on 1/19 w/ weakness and rash. At that time pt reported he is receiving an outpatient work up for a diffuse rash with dermatology- reports he had elevated ESR and CRP and was referred to immunology. During last ED visit, pt was found to have elevated CPK in addition to rash, and felt to be likely dermatomyositis / polymyositis and was started on steroids (Prednisone) 50 mg QD, which he is still taking. The rash is improved, but the weakness is the same, no better, no worse. In the past 2-3 days he notes difficulty swallowing solids only. He reports some discomfort and feels like he needs to push it, but no regurgitation nor vomiting. He saw a rheumatologist, had labs, and was told to see a neurologist. Given this difficulty swallowing, he decided to come to the ED. No SOB, HA, neck pain, vision changes, numbness/tingling, confusion, difficulty speaking. No neck / back pain. No f/c. Pt w/ PMHx HTN, DM whom presented to  ED on 1/19 w/ weakness and rash. At that time pt reported he is receiving an outpatient work up for a diffuse rash with dermatology- reports he had elevated ESR and CRP and was referred to immunology. During last ED visit, pt was found to have elevated CPK in addition to rash, and felt to be likely dermatomyositis / polymyositis and was started on steroids (Prednisone) 50 mg QD, which he is still taking. The rash is improved, but the weakness is the same, no better, no worse. He notes the weakness more proximally than distally, arms more than legs, and L > R. In the past 2-3 days he notes difficulty swallowing solids only. He reports some discomfort and feels like he needs to push it, but no regurgitation nor vomiting. He saw a rheumatologist, had labs, and was told to see a neurologist. Given this difficulty swallowing, he decided to come to the ED. No SOB, HA, neck pain, vision changes, numbness/tingling, confusion, difficulty speaking. No neck / back pain. No f/c.

## 2023-02-01 NOTE — ED PROVIDER NOTE - PROGRESS NOTE DETAILS
Neuro consulted and will see the pt Pt seen and examined by Neuro, d/w attending - admit to gen neuro, regional bed, Dr Jimenez, for further w/u and eval. No change in steroid dosing at this time.

## 2023-02-01 NOTE — ED ADULT TRIAGE NOTE - CHIEF COMPLAINT QUOTE
71 y/o male c/o bilateral arm weakness, recent diagnosis of dermatomyositis, pt reports his symptoms have not improved since last visit, and has a far appointment with rheumatologist.

## 2023-02-01 NOTE — ED PROVIDER NOTE - PHYSICAL EXAMINATION
Constitutional: Well appearing, awake, alert, oriented to person, place, time/situation and in no apparent distress.  ENMT: Airway patent. Normal MM  Eyes: Clear bilaterally  Cardiac: Normal rate, regular rhythm.  Heart sounds S1, S2.  No murmurs, rubs or gallops.  Respiratory: Breaths sounds equal and clear b/l. No increased WOB, tachypnea, hypoxia, or accessory mm use. Pt speaks in full sentences.   Gastrointestinal: Abd soft, NT, ND, NABS. No guarding, rebound, or rigidity. No pulsatile abdominal masses.   Musculoskeletal: Range of motion is not limited  Neuro: Alert and oriented x 3, face symmetric and speech fluent. Normal sensation, no pronator drift. dec strength in b/l UE, triceps > biceps, L > R, 5/- in quads b/l. no dysdidokinesia or dysmetria.   Skin: Skin normal color for race, warm, dry and intact. scattered violaceous plaques to hands, erythematous rash to face - - pt reports all improved  Psych: Alert and oriented to person, place, time/situation. normal mood and affect. no apparent risk to self or others.

## 2023-02-01 NOTE — ED ADULT NURSE NOTE - NSFALLRSKASSESSDT_ED_ALL_ED
"Chief Complaint   Patient presents with     Physical     Initial Visit Vitals: /87   Pulse 70   Temp 97.7  F (36.5  C) (Temporal)   Resp 18   Ht 1.84 m (6' 0.44\")   Wt 71.4 kg (157 lb 6.4 oz)   SpO2 96%   BMI 21.09 kg/m   Estimated body mass index is 21.09 kg/m  as calculated from the following:    Height as of this encounter: 1.84 m (6' 0.44\").    Weight as of this encounter: 71.4 kg (157 lb 6.4 oz).  BP completed using cuff size: regular.       Health Maintenance that is potentially due pending provider review:  NONE    n/a    Keira Obando RN    " 01-Feb-2023 23:39

## 2023-02-01 NOTE — ED ADULT NURSE NOTE - OBJECTIVE STATEMENT
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 Had followed-up with rheumatologist and referred to neurologist, scheduled for an appointment "but it's too far away". 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 Had followed-up with rheumatologist and referred to neurologist, scheduled for an appointment "but it's too far away". Patient states was informed to return if had difficulty swallowing, patient states "it feels uncomfortable sometimes". IV initiated, labs collected and sent; tolerated well.

## 2023-02-02 ENCOUNTER — TRANSCRIPTION ENCOUNTER (OUTPATIENT)
Age: 73
End: 2023-02-02

## 2023-02-02 DIAGNOSIS — I10 ESSENTIAL (PRIMARY) HYPERTENSION: ICD-10-CM

## 2023-02-02 DIAGNOSIS — R00.0 TACHYCARDIA, UNSPECIFIED: ICD-10-CM

## 2023-02-02 DIAGNOSIS — E78.00 PURE HYPERCHOLESTEROLEMIA, UNSPECIFIED: ICD-10-CM

## 2023-02-02 DIAGNOSIS — Z29.9 ENCOUNTER FOR PROPHYLACTIC MEASURES, UNSPECIFIED: ICD-10-CM

## 2023-02-02 DIAGNOSIS — M33.90 DERMATOPOLYMYOSITIS, UNSPECIFIED, ORGAN INVOLVEMENT UNSPECIFIED: ICD-10-CM

## 2023-02-02 DIAGNOSIS — E11.9 TYPE 2 DIABETES MELLITUS WITHOUT COMPLICATIONS: ICD-10-CM

## 2023-02-02 DIAGNOSIS — B20 HUMAN IMMUNODEFICIENCY VIRUS [HIV] DISEASE: ICD-10-CM

## 2023-02-02 LAB
A1C WITH ESTIMATED AVERAGE GLUCOSE RESULT: 7 % — HIGH (ref 4–5.6)
ALBUMIN SERPL ELPH-MCNC: 2.9 G/DL — LOW (ref 3.3–5)
ALP SERPL-CCNC: 60 U/L — SIGNIFICANT CHANGE UP (ref 40–120)
ALT FLD-CCNC: 83 U/L — HIGH (ref 10–45)
ANION GAP SERPL CALC-SCNC: 10 MMOL/L — SIGNIFICANT CHANGE UP (ref 5–17)
APTT BLD: 28.8 SEC — SIGNIFICANT CHANGE UP (ref 27.5–35.5)
AST SERPL-CCNC: 170 U/L — HIGH (ref 10–40)
BASOPHILS # BLD AUTO: 0.03 K/UL — SIGNIFICANT CHANGE UP (ref 0–0.2)
BASOPHILS NFR BLD AUTO: 0.4 % — SIGNIFICANT CHANGE UP (ref 0–2)
BILIRUB DIRECT SERPL-MCNC: <0.2 MG/DL — SIGNIFICANT CHANGE UP (ref 0–0.3)
BILIRUB INDIRECT FLD-MCNC: SIGNIFICANT CHANGE UP MG/DL (ref 0.2–1)
BILIRUB SERPL-MCNC: 0.3 MG/DL — SIGNIFICANT CHANGE UP (ref 0.2–1.2)
BUN SERPL-MCNC: 14 MG/DL — SIGNIFICANT CHANGE UP (ref 7–23)
CALCIUM SERPL-MCNC: 9 MG/DL — SIGNIFICANT CHANGE UP (ref 8.4–10.5)
CHLORIDE SERPL-SCNC: 104 MMOL/L — SIGNIFICANT CHANGE UP (ref 96–108)
CHOLEST SERPL-MCNC: 250 MG/DL — HIGH
CK MB CFR SERPL CALC: 208.4 NG/ML — HIGH (ref 0–6.7)
CK SERPL-CCNC: 3485 U/L — HIGH (ref 30–200)
CK SERPL-CCNC: 4079 U/L — HIGH (ref 30–200)
CO2 SERPL-SCNC: 26 MMOL/L — SIGNIFICANT CHANGE UP (ref 22–31)
CREAT SERPL-MCNC: 0.64 MG/DL — SIGNIFICANT CHANGE UP (ref 0.5–1.3)
CRP SERPL-MCNC: 3.5 MG/L — SIGNIFICANT CHANGE UP (ref 0–4)
EGFR: 101 ML/MIN/1.73M2 — SIGNIFICANT CHANGE UP
EOSINOPHIL # BLD AUTO: 0.07 K/UL — SIGNIFICANT CHANGE UP (ref 0–0.5)
EOSINOPHIL NFR BLD AUTO: 0.8 % — SIGNIFICANT CHANGE UP (ref 0–6)
ERYTHROCYTE [SEDIMENTATION RATE] IN BLOOD: 25 MM/HR — HIGH
ESTIMATED AVERAGE GLUCOSE: 154 MG/DL — HIGH (ref 68–114)
GLUCOSE BLDC GLUCOMTR-MCNC: 100 MG/DL — HIGH (ref 70–99)
GLUCOSE BLDC GLUCOMTR-MCNC: 206 MG/DL — HIGH (ref 70–99)
GLUCOSE SERPL-MCNC: 99 MG/DL — SIGNIFICANT CHANGE UP (ref 70–99)
HCT VFR BLD CALC: 41.3 % — SIGNIFICANT CHANGE UP (ref 39–50)
HCV AB S/CO SERPL IA: 0.04 S/CO — SIGNIFICANT CHANGE UP
HCV AB SERPL-IMP: SIGNIFICANT CHANGE UP
HDLC SERPL-MCNC: 49 MG/DL — SIGNIFICANT CHANGE UP
HGB BLD-MCNC: 12.8 G/DL — LOW (ref 13–17)
IMM GRANULOCYTES NFR BLD AUTO: 0.8 % — SIGNIFICANT CHANGE UP (ref 0–0.9)
INR BLD: 1.06 — SIGNIFICANT CHANGE UP (ref 0.88–1.16)
LDH SERPL L TO P-CCNC: 561 U/L — HIGH (ref 50–242)
LIPID PNL WITH DIRECT LDL SERPL: 161 MG/DL — HIGH
LYMPHOCYTES # BLD AUTO: 0.87 K/UL — LOW (ref 1–3.3)
LYMPHOCYTES # BLD AUTO: 10.2 % — LOW (ref 13–44)
MAGNESIUM SERPL-MCNC: 2 MG/DL — SIGNIFICANT CHANGE UP (ref 1.6–2.6)
MCHC RBC-ENTMCNC: 26.8 PG — LOW (ref 27–34)
MCHC RBC-ENTMCNC: 31 GM/DL — LOW (ref 32–36)
MCV RBC AUTO: 86.4 FL — SIGNIFICANT CHANGE UP (ref 80–100)
MONOCYTES # BLD AUTO: 0.59 K/UL — SIGNIFICANT CHANGE UP (ref 0–0.9)
MONOCYTES NFR BLD AUTO: 6.9 % — SIGNIFICANT CHANGE UP (ref 2–14)
NEUTROPHILS # BLD AUTO: 6.89 K/UL — SIGNIFICANT CHANGE UP (ref 1.8–7.4)
NEUTROPHILS NFR BLD AUTO: 80.9 % — HIGH (ref 43–77)
NON HDL CHOLESTEROL: 201 MG/DL — HIGH
NRBC # BLD: 0 /100 WBCS — SIGNIFICANT CHANGE UP (ref 0–0)
PHOSPHATE SERPL-MCNC: 4 MG/DL — SIGNIFICANT CHANGE UP (ref 2.5–4.5)
PLATELET # BLD AUTO: 220 K/UL — SIGNIFICANT CHANGE UP (ref 150–400)
POTASSIUM SERPL-MCNC: 3.9 MMOL/L — SIGNIFICANT CHANGE UP (ref 3.5–5.3)
POTASSIUM SERPL-SCNC: 3.9 MMOL/L — SIGNIFICANT CHANGE UP (ref 3.5–5.3)
PROT SERPL-MCNC: 6.2 G/DL — SIGNIFICANT CHANGE UP (ref 6–8.3)
PROTHROM AB SERPL-ACNC: 12.6 SEC — SIGNIFICANT CHANGE UP (ref 10.5–13.4)
RBC # BLD: 4.78 M/UL — SIGNIFICANT CHANGE UP (ref 4.2–5.8)
RBC # FLD: 13.5 % — SIGNIFICANT CHANGE UP (ref 10.3–14.5)
SARS-COV-2 RNA SPEC QL NAA+PROBE: NEGATIVE — SIGNIFICANT CHANGE UP
SODIUM SERPL-SCNC: 140 MMOL/L — SIGNIFICANT CHANGE UP (ref 135–145)
TRIGL SERPL-MCNC: 198 MG/DL — HIGH
TROPONIN T SERPL-MCNC: 0.4 NG/ML — CRITICAL HIGH (ref 0–0.01)
TROPONIN T SERPL-MCNC: 0.5 NG/ML — CRITICAL HIGH (ref 0–0.01)
TSH SERPL-MCNC: 1.51 UIU/ML — SIGNIFICANT CHANGE UP (ref 0.27–4.2)
VIT B12 SERPL-MCNC: 401 PG/ML — SIGNIFICANT CHANGE UP (ref 232–1245)
WBC # BLD: 8.52 K/UL — SIGNIFICANT CHANGE UP (ref 3.8–10.5)
WBC # FLD AUTO: 8.52 K/UL — SIGNIFICANT CHANGE UP (ref 3.8–10.5)

## 2023-02-02 PROCEDURE — 93010 ELECTROCARDIOGRAM REPORT: CPT

## 2023-02-02 PROCEDURE — 93306 TTE W/DOPPLER COMPLETE: CPT | Mod: 26

## 2023-02-02 PROCEDURE — 71260 CT THORAX DX C+: CPT | Mod: 26

## 2023-02-02 PROCEDURE — 74177 CT ABD & PELVIS W/CONTRAST: CPT | Mod: 26

## 2023-02-02 PROCEDURE — 99221 1ST HOSP IP/OBS SF/LOW 40: CPT

## 2023-02-02 RX ORDER — DEXTROSE 50 % IN WATER 50 %
12.5 SYRINGE (ML) INTRAVENOUS ONCE
Refills: 0 | Status: DISCONTINUED | OUTPATIENT
Start: 2023-02-02 | End: 2023-02-06

## 2023-02-02 RX ORDER — DIATRIZOATE MEGLUMINE 180 MG/ML
30 INJECTION, SOLUTION INTRAVESICAL ONCE
Refills: 0 | Status: COMPLETED | OUTPATIENT
Start: 2023-02-02 | End: 2023-02-02

## 2023-02-02 RX ORDER — ATENOLOL 25 MG/1
1 TABLET ORAL
Qty: 0 | Refills: 0 | DISCHARGE

## 2023-02-02 RX ORDER — DEXTROSE 50 % IN WATER 50 %
25 SYRINGE (ML) INTRAVENOUS ONCE
Refills: 0 | Status: DISCONTINUED | OUTPATIENT
Start: 2023-02-02 | End: 2023-02-06

## 2023-02-02 RX ORDER — ASPIRIN/CALCIUM CARB/MAGNESIUM 324 MG
1 TABLET ORAL
Qty: 0 | Refills: 0 | DISCHARGE

## 2023-02-02 RX ORDER — IOHEXOL 300 MG/ML
30 INJECTION, SOLUTION INTRAVENOUS ONCE
Refills: 0 | Status: DISCONTINUED | OUTPATIENT
Start: 2023-02-02 | End: 2023-02-02

## 2023-02-02 RX ORDER — SODIUM CHLORIDE 9 MG/ML
1000 INJECTION, SOLUTION INTRAVENOUS
Refills: 0 | Status: DISCONTINUED | OUTPATIENT
Start: 2023-02-02 | End: 2023-02-06

## 2023-02-02 RX ORDER — VERAPAMIL HCL 240 MG
1 CAPSULE, EXTENDED RELEASE PELLETS 24 HR ORAL
Qty: 0 | Refills: 0 | DISCHARGE

## 2023-02-02 RX ORDER — RILPIVIRINE HYDROCHLORIDE 25 MG/1
25 TABLET, FILM COATED ORAL
Refills: 0 | Status: DISCONTINUED | OUTPATIENT
Start: 2023-02-03 | End: 2023-02-06

## 2023-02-02 RX ORDER — LISINOPRIL 2.5 MG/1
1 TABLET ORAL
Qty: 0 | Refills: 0 | DISCHARGE

## 2023-02-02 RX ORDER — ENOXAPARIN SODIUM 100 MG/ML
40 INJECTION SUBCUTANEOUS EVERY 24 HOURS
Refills: 0 | Status: DISCONTINUED | OUTPATIENT
Start: 2023-02-02 | End: 2023-02-05

## 2023-02-02 RX ORDER — DEXTROSE 50 % IN WATER 50 %
15 SYRINGE (ML) INTRAVENOUS ONCE
Refills: 0 | Status: DISCONTINUED | OUTPATIENT
Start: 2023-02-02 | End: 2023-02-06

## 2023-02-02 RX ORDER — AMLODIPINE BESYLATE 2.5 MG/1
0 TABLET ORAL
Qty: 0 | Refills: 0 | DISCHARGE

## 2023-02-02 RX ORDER — METFORMIN HYDROCHLORIDE 850 MG/1
1 TABLET ORAL
Qty: 0 | Refills: 0 | DISCHARGE

## 2023-02-02 RX ORDER — GLUCAGON INJECTION, SOLUTION 0.5 MG/.1ML
1 INJECTION, SOLUTION SUBCUTANEOUS ONCE
Refills: 0 | Status: DISCONTINUED | OUTPATIENT
Start: 2023-02-02 | End: 2023-02-06

## 2023-02-02 RX ORDER — DOLUTEGRAVIR SODIUM 25 MG/1
50 TABLET, FILM COATED ORAL DAILY
Refills: 0 | Status: DISCONTINUED | OUTPATIENT
Start: 2023-02-03 | End: 2023-02-06

## 2023-02-02 RX ORDER — INSULIN LISPRO 100/ML
VIAL (ML) SUBCUTANEOUS EVERY 6 HOURS
Refills: 0 | Status: DISCONTINUED | OUTPATIENT
Start: 2023-02-02 | End: 2023-02-06

## 2023-02-02 RX ORDER — ATORVASTATIN CALCIUM 80 MG/1
1 TABLET, FILM COATED ORAL
Qty: 0 | Refills: 0 | DISCHARGE

## 2023-02-02 RX ORDER — AMLODIPINE BESYLATE 2.5 MG/1
10 TABLET ORAL DAILY
Refills: 0 | Status: DISCONTINUED | OUTPATIENT
Start: 2023-02-02 | End: 2023-02-06

## 2023-02-02 RX ADMIN — ENOXAPARIN SODIUM 40 MILLIGRAM(S): 100 INJECTION SUBCUTANEOUS at 05:44

## 2023-02-02 RX ADMIN — DIATRIZOATE MEGLUMINE 30 MILLILITER(S): 180 INJECTION, SOLUTION INTRAVESICAL at 13:47

## 2023-02-02 RX ADMIN — AMLODIPINE BESYLATE 10 MILLIGRAM(S): 2.5 TABLET ORAL at 05:44

## 2023-02-02 RX ADMIN — Medication 50 MILLIGRAM(S): at 05:44

## 2023-02-02 NOTE — SWALLOW BEDSIDE ASSESSMENT ADULT - SWALLOW EVAL: DIAGNOSIS
Pt presented with a functional oropharyngeal swallow. Aspiration precautions in place given prior symptoms of dysphagia, now resolved, in setting of dermatomyositis, with education provided to pt. No further SLP intervention is warranted; please re-consult with any concerns or change in status.

## 2023-02-02 NOTE — CONSULT NOTE ADULT - ASSESSMENT
per Internal Medicine     73yo M w/ findings of Dermatomyositis - heliotrope rash, Gluttons' papule, prox muscle weakness currently being w/u by rheumatology and pending EMG 02/07 presented for progression of his disease now  2 episodes of dysphagia to solids and no improvement w/ current dosage of steroid therapy. Will admit work up etiology for Myopathy vs Dermatomyositis    DDx  drug induce (MEANS)  HIV  malignancy  Autoimmune     Problem/Plan - 1:  ·  Problem: Dermatomyositis.   ·  Plan: c/w Prednisone 50mg po QD  Barium Swallow - keep NPO  Aspiration precaution  Consult SLP  CT Chest / Abd/ Pelvis   Consider Muscle Bx  Pending EMG/NCV w/ Dr. Krishna.    Problem/Plan - 2:  ·  Problem: HIV disease.   ·  Plan: Hold Dovato for now  Consider consulting ID or Outpatient HIV specialist  HIV, CD4, VL.    Problem/Plan - 3:  ·  Problem: HTN (hypertension).   ·  Plan: c/w Amlodipine 10mg po QD.    Problem/Plan - 4:  ·  Problem: Hypercholesteremia.   ·  Plan: hold statin for now.    Problem/Plan - 5:  ·  Problem: Diabetes.   ·  Plan: NPO for now - will reinstate POCT with active diet order  POCT AC/HS  Sliding Scale.    Problem/Plan - 6:  ·  Problem: Sinus tachycardia.   ·  Plan: TV/MV region murmur - follows closely w/ outpatient cardiologist  Close monitor.    Problem/Plan - 7:  ·  Problem: Preventive measure.   ·  Plan: DVT ppx: Enoxaparin 40mg SC QD  Diet: NPO for Barium swallow  Activity: OOBw/A.

## 2023-02-02 NOTE — H&P ADULT - HISTORY OF PRESENT ILLNESS
Mr. Hess is a 73yo M w/ HIV (HAART), POA w/ proximal weakness UE > LE, rash and dysphagia.     He reported that around 09/2022 he experienced redness and edema of the face around the time he started Dovato. He was treated over 2 weeks w/ Prednisone 20mg tab which improves his rash. 12/2022 he noticed weakness when trying to lift his luggage to the overhead during his flight as well as difficult sitting up in bed from supine position. He described 1 episode when he fell backwards (no head trauma) but could not get to his feet w/out support. During this time his PCP stopped his Lisinopril, statin, Verapamil as well as make changes to his HAART however, no improvement of weakness. He visited the ED 1/19/23 and was started on Prednisone 50mg QD still with no improvement. He had visited a dermatology s/p skin biopsy and visited a Rheumatologist for further w/u of myositis. Today he c/o 2 episode 1 yesterday and 1 today in which he had discomfort swallowing a piece of bagel.     He is pending EMG w/ Neurologist Dr. Krishna 02/07.     He denies muscle pain/tenderness, and difficulty speaking. He denies h/o cancer or family h/o myositis.

## 2023-02-02 NOTE — H&P ADULT - PROBLEM SELECTOR PLAN 2
c/w   HIV, CD4, VDL Hold Dovato for now  Consider consulting ID or Outpatient HIV specialist  HIV, CD4, VL

## 2023-02-02 NOTE — DISCHARGE NOTE PROVIDER - NSDCFUADDINST_GEN_ALL_CORE_FT
Please continue your current dose of Prednisone 60mg once a day until your follow-up with Dr. Matteo Krishna on February 7, he will instruct you how to adjust your dose.  Keep your arm in a sling for one week.

## 2023-02-02 NOTE — CONSULT NOTE ADULT - SUBJECTIVE AND OBJECTIVE BOX
Cardiology Consult Note    INTERVAL EVENTS:  TELEMETRY:    PAST MEDICAL & SURGICAL HISTORY:  HTN (hypertension)    Diabetes    Hypercholesteremia    HIV disease    No significant past surgical history        MEDICATIONS  (STANDING):  amLODIPine   Tablet 10 milliGRAM(s) Oral daily  dextrose 5%. 1000 milliLiter(s) (50 mL/Hr) IV Continuous <Continuous>  dextrose 5%. 1000 milliLiter(s) (100 mL/Hr) IV Continuous <Continuous>  dextrose 50% Injectable 25 Gram(s) IV Push once  dextrose 50% Injectable 12.5 Gram(s) IV Push once  dextrose 50% Injectable 25 Gram(s) IV Push once  enoxaparin Injectable 40 milliGRAM(s) SubCutaneous every 24 hours  glucagon  Injectable 1 milliGRAM(s) IntraMuscular once  insulin lispro (ADMELOG) corrective regimen sliding scale   SubCutaneous every 6 hours  predniSONE   Tablet 50 milliGRAM(s) Oral daily    MEDICATIONS  (PRN):  dextrose Oral Gel 15 Gram(s) Oral once PRN Blood Glucose LESS THAN 70 milliGRAM(s)/deciliter    Vital Signs Last 24 Hrs  T(C): 37.5 (02 Feb 2023 11:42), Max: 37.5 (02 Feb 2023 11:42)  T(F): 99.5 (02 Feb 2023 11:42), Max: 99.5 (02 Feb 2023 11:42)  HR: 100 (02 Feb 2023 11:42) (87 - 107)  BP: 134/75 (02 Feb 2023 11:42) (134/75 - 165/84)  BP(mean): --  RR: 18 (02 Feb 2023 11:42) (18 - 18)  SpO2: 96% (02 Feb 2023 11:42) (96% - 97%)    Parameters below as of 02 Feb 2023 11:42  Patient On (Oxygen Delivery Method): room air        PHYSICAL EXAM:  GEN: Awake, alert. NAD.   HEENT: NCAT, PERRL, EOMI. Mucosa moist. No JVD.  RESP: CTA b/l  CV: RRR. Normal S1/S2. No m/r/g.  ABD: Soft. NT/ND. BS+  EXT: Warm. No edema, clubbing, or cyanosis.   NEURO: AAOx3. No focal deficits.     LABS:                        12.8   8.52  )-----------( 220      ( 02 Feb 2023 05:30 )             41.3     02-02    140  |  104  |  14  ----------------------------<  99  3.9   |  26  |  0.64    Ca    9.0      02 Feb 2023 05:30  Phos  4.0     02-02  Mg     2.0     02-02    TPro  6.2  /  Alb  2.9<L>  /  TBili  0.3  /  DBili  <0.2  /  AST  170<H>  /  ALT  83<H>  /  AlkPhos  60  02-02    CARDIAC MARKERS ( 02 Feb 2023 11:03 )  x     / 0.40 ng/mL / 4079 U/L / x     / 208.4 ng/mL  CARDIAC MARKERS ( 02 Feb 2023 05:30 )  x     / 0.50 ng/mL / 3485 U/L / x     / x      CARDIAC MARKERS ( 01 Feb 2023 17:51 )  x     / x     / 3539 U/L / x     / x          PT/INR - ( 02 Feb 2023 05:30 )   PT: 12.6 sec;   INR: 1.06          PTT - ( 02 Feb 2023 05:30 )  PTT:28.8 sec  Urinalysis Basic - ( 01 Feb 2023 18:25 )    Color: Yellow / Appearance: Clear / SG: >=1.030 / pH: x  Gluc: x / Ketone: NEGATIVE  / Bili: Negative / Urobili: 0.2 E.U./dL   Blood: x / Protein: 100 mg/dL / Nitrite: NEGATIVE   Leuk Esterase: NEGATIVE / RBC: < 5 /HPF / WBC < 5 /HPF   Sq Epi: x / Non Sq Epi: 0-5 /HPF / Bacteria: Present /HPF      I&O's Summary    RADIOLOGY & ADDITIONAL STUDIES:    EKG:         Cardiology Consult Note    HPI: 72M PMH HIV presented with weakness and difficulty swallowing. Of ntoe patient had recent rash and recently told had dermatomyositis- brought to [facility] for furhter work up of myopathy vs dermatomyositis. Of note patient states he had a work up with cardiologist in [place]. Recently told by PCP to stop verapamil and atenolol. Cardiology consulted due to a troponin elevation.  HE states he had a murmur and was started on these medications. Denies chest pain, no SOB, no palpitations, no LE edema.     PAST MEDICAL & SURGICAL HISTORY:  HTN (hypertension)    Diabetes    Hypercholesteremia    HIV disease    No significant past surgical history        MEDICATIONS  (STANDING):  amLODIPine   Tablet 10 milliGRAM(s) Oral daily  dextrose 5%. 1000 milliLiter(s) (50 mL/Hr) IV Continuous <Continuous>  dextrose 5%. 1000 milliLiter(s) (100 mL/Hr) IV Continuous <Continuous>  dextrose 50% Injectable 25 Gram(s) IV Push once  dextrose 50% Injectable 12.5 Gram(s) IV Push once  dextrose 50% Injectable 25 Gram(s) IV Push once  enoxaparin Injectable 40 milliGRAM(s) SubCutaneous every 24 hours  glucagon  Injectable 1 milliGRAM(s) IntraMuscular once  insulin lispro (ADMELOG) corrective regimen sliding scale   SubCutaneous every 6 hours  predniSONE   Tablet 50 milliGRAM(s) Oral daily    MEDICATIONS  (PRN):  dextrose Oral Gel 15 Gram(s) Oral once PRN Blood Glucose LESS THAN 70 milliGRAM(s)/deciliter    Vital Signs Last 24 Hrs  T(C): 37.5 (02 Feb 2023 11:42), Max: 37.5 (02 Feb 2023 11:42)  T(F): 99.5 (02 Feb 2023 11:42), Max: 99.5 (02 Feb 2023 11:42)  HR: 100 (02 Feb 2023 11:42) (87 - 107)  BP: 134/75 (02 Feb 2023 11:42) (134/75 - 165/84)  BP(mean): --  RR: 18 (02 Feb 2023 11:42) (18 - 18)  SpO2: 96% (02 Feb 2023 11:42) (96% - 97%)    Parameters below as of 02 Feb 2023 11:42  Patient On (Oxygen Delivery Method): room air        PHYSICAL EXAM:  GEN: Awake, alert. NAD.   HEENT: JVP normal  RESP: CTA b/l  CV: RRR. 3/6 systolic murmur along LLSB  EXT: Warm. No edema  NEURO: AAOx3. No focal deficits.     LABS:                        12.8   8.52  )-----------( 220      ( 02 Feb 2023 05:30 )             41.3     02-02    140  |  104  |  14  ----------------------------<  99  3.9   |  26  |  0.64    Ca    9.0      02 Feb 2023 05:30  Phos  4.0     02-02  Mg     2.0     02-02    TPro  6.2  /  Alb  2.9<L>  /  TBili  0.3  /  DBili  <0.2  /  AST  170<H>  /  ALT  83<H>  /  AlkPhos  60  02-02    CARDIAC MARKERS ( 02 Feb 2023 11:03 )  x     / 0.40 ng/mL / 4079 U/L / x     / 208.4 ng/mL  CARDIAC MARKERS ( 02 Feb 2023 05:30 )  x     / 0.50 ng/mL / 3485 U/L / x     / x      CARDIAC MARKERS ( 01 Feb 2023 17:51 )  x     / x     / 3539 U/L / x     / x          PT/INR - ( 02 Feb 2023 05:30 )   PT: 12.6 sec;   INR: 1.06          PTT - ( 02 Feb 2023 05:30 )  PTT:28.8 sec  Urinalysis Basic - ( 01 Feb 2023 18:25 )    Color: Yellow / Appearance: Clear / SG: >=1.030 / pH: x  Gluc: x / Ketone: NEGATIVE  / Bili: Negative / Urobili: 0.2 E.U./dL   Blood: x / Protein: 100 mg/dL / Nitrite: NEGATIVE   Leuk Esterase: NEGATIVE / RBC: < 5 /HPF / WBC < 5 /HPF   Sq Epi: x / Non Sq Epi: 0-5 /HPF / Bacteria: Present /HPF      I&O's Summary    RADIOLOGY & ADDITIONAL STUDIES:    EKG:

## 2023-02-02 NOTE — CONSULT NOTE ADULT - SUBJECTIVE AND OBJECTIVE BOX
HISTORY OF PRESENT ILLNESS:   71 y/o M with pmh HIV on HAART presents with progressive weakness in his b/l upper and lower extremities since 12/2023 and  c/o occasional difficulty swallowing x 2 days. Patient reports that the weakness is worst in his left upper extremity. Patient also had a facial rash in September and was since seen by a dermatologist. Biopsy of the rash showed dermatomyositis. Patient is on 50mg prednisone daily. Patient denies extremity numbness or tingling and gait instability. Neurosurgery consulted for left deltoid muscle biopsy.    PAST MEDICAL & SURGICAL HISTORY:  HTN (hypertension)      Diabetes      Hypercholesteremia      HIV disease      No significant past surgical history        FAMILY HISTORY:  No pertinent family history in first degree relatives        SOCIAL HISTORY:  unknown    Allergies    No Known Allergies    Intolerances        REVIEW OF SYSTEMS      General:	no recent illnesses, no recent wt gain/loss    Skin/Breast:  intact  	  Ophthalmologic:  negative  	  ENMT:	negative    Respiratory and Thorax: no coughing, wheezing, recent URI  	  Cardiovascular: no chest pain, RYAN    Gastrointestinal:	soft, non tender    Genitourinary: no frequency, dysuria    Musculoskeletal:	negative    Neurological:	see HPI    Psychiatric:	negative    Hematology/Lymphatics:	negative    Endocrine:  	negative    Allergic/Immunologic:  Negative      MEDICATIONS:  Antibiotics:    Neuro:    Anticoagulation:  enoxaparin Injectable 40 milliGRAM(s) SubCutaneous every 24 hours    OTHER:  amLODIPine   Tablet 10 milliGRAM(s) Oral daily  dextrose 50% Injectable 25 Gram(s) IV Push once  dextrose 50% Injectable 12.5 Gram(s) IV Push once  dextrose 50% Injectable 25 Gram(s) IV Push once  dextrose Oral Gel 15 Gram(s) Oral once PRN  glucagon  Injectable 1 milliGRAM(s) IntraMuscular once  insulin lispro (ADMELOG) corrective regimen sliding scale   SubCutaneous every 6 hours  predniSONE   Tablet 50 milliGRAM(s) Oral daily    IVF:  dextrose 5%. 1000 milliLiter(s) IV Continuous <Continuous>  dextrose 5%. 1000 milliLiter(s) IV Continuous <Continuous>      Vital Signs Last 24 Hrs  T(C): 37.5 (02 Feb 2023 11:42), Max: 37.5 (02 Feb 2023 11:42)  T(F): 99.5 (02 Feb 2023 11:42), Max: 99.5 (02 Feb 2023 11:42)  HR: 100 (02 Feb 2023 11:42) (87 - 107)  BP: 134/75 (02 Feb 2023 11:42) (134/75 - 165/84)  BP(mean): --  RR: 18 (02 Feb 2023 11:42) (18 - 18)  SpO2: 96% (02 Feb 2023 11:42) (96% - 97%)    Parameters below as of 02 Feb 2023 11:42  Patient On (Oxygen Delivery Method): room air        PHYSICAL EXAM:  General: patient seen laying supine in bed in NAD  Neuro: AAOx3, follows commands, opens eyes spontaneously, speech clear and fluent, CNII-XI grossly intact, face symmetric, no pronator drift,  strength 5/5 b/l upper extremities and lower extremities, sensation intact to light touch throughout  HEENT: PERRL, EOMI  Neck: supple  Cardiac: RRR, S1S2  Pulmonary: chest rise symmetric  Abdomen: soft, nontender, nondistended  Ext: perfusing well  Skin: warm, dry        LABS:                        12.8   8.52  )-----------( 220      ( 02 Feb 2023 05:30 )             41.3     02-02    140  |  104  |  14  ----------------------------<  99  3.9   |  26  |  0.64    Ca    9.0      02 Feb 2023 05:30  Phos  4.0     02-02  Mg     2.0     02-02    TPro  6.2  /  Alb  2.9<L>  /  TBili  0.3  /  DBili  <0.2  /  AST  170<H>  /  ALT  83<H>  /  AlkPhos  60  02-02    PT/INR - ( 02 Feb 2023 05:30 )   PT: 12.6 sec;   INR: 1.06          PTT - ( 02 Feb 2023 05:30 )  PTT:28.8 sec  Urinalysis Basic - ( 01 Feb 2023 18:25 )    Color: Yellow / Appearance: Clear / SG: >=1.030 / pH: x  Gluc: x / Ketone: NEGATIVE  / Bili: Negative / Urobili: 0.2 E.U./dL   Blood: x / Protein: 100 mg/dL / Nitrite: NEGATIVE   Leuk Esterase: NEGATIVE / RBC: < 5 /HPF / WBC < 5 /HPF   Sq Epi: x / Non Sq Epi: 0-5 /HPF / Bacteria: Present /HPF        Assessment:  71 y/o M with pmh HIV on HAART presents with progressive weakness in his b/l upper and lower extremities since 12/2023 and c/o occasional difficulty swallowing x 2 days. Patient reports that the weakness is worst in his left upper extremity. Patient also had a facial rash in September biopsy of the rash showed dermatomyositis.     Plan:  - Preop for left deltoid muscle biopsy on Monday 2/6 with Dr. Moody.  - Please obtain medical clearance.    D/w Dr. Moody

## 2023-02-02 NOTE — DISCHARGE NOTE PROVIDER - HOSPITAL COURSE
#Discharge: do not delete    Patient is __ yo M/F with past medical history of _____ presented with _____, found to have _____ (one liner)    Hospital course (by problem):     Patient was discharged to: (home/[facility]/acute rehab/hospice, etc, and with what services – home health PT/RN? Home O2?)    New medications:   Changes to old medications:  Medications that were stopped:    Items to follow up as outpatient:    Physical exam at the time of discharge:       #Discharge: do not delete    This is a 71yo M w/ findings of Dermatomyositis - heliotrope rash, Gottrons papules, proximal muscle weakness, currently being w/u by rheumatology and pending EMG 02/07 presented for progression of his disease now  2 episodes of dysphagia to solids and no improvement w/ current dosage of steroid therapy. Admitted for workup of etiology, Myopathy vs Dermatomyositis.     Problem/Plan - 1:  ·  Problem: Dermatomyositis.   ·  Plan: Increased Prednisone to 60mg po QD starting 2/3/23.   Barium Swallow performed and resulted as Nasopharyngeal reflux. Mild esophageal dysmotility  Aspiration precaution  SLP eval completed.   CT Chest / Abd/ Pelvis, resulted as: 12 mm air-filled cyst in the right middle lobe, 2.7 cm cyst in segment two of   liver. Subcentimeter hypodensities segment two and six of liver too   small to characterize but likely cysts or biliary hamartomas. Subtle questionable liver contour nodularity. 11 mm radiodensity seen in gallbladder neck could represent calculus or polyp. Nodular thickening bilaterally of the adrenals. Right measures 1.0 x 1.6x 2.2 cm. Left measures 1.5 x 2.1 x 2.1 cm,  1.4 cm cortical cyst interpolar region right kidney. A few bilateral subcentimeter renal cortical hypodensities are too small to characterize    Pending EMG/NCV w/ Dr. Krishna  Neurosurgery biopsied L deltoid muscle on 6/2/23, follow results  PT/OT/PMNR  Monitor CPK  Anti Mi2 Ab, paraneoplastic panel, free cortisol pending  Will need GI outpatient follow up given CTT/A/P findings, and r/o underlying GI malignancy given correlation with Dermatomyositis.     Problem/Plan - 2:  ·  Problem: HIV disease.   ·  Plan: continued home [place]     Problem/Plan - 3:  ·  Problem: HTN (hypertension).   ·  Plan: c/w Amlodipine 10mg po QD.     Problem/Plan - 4:  ·  Problem: Hypercholesteremia.   ·  Plan: hold statin for now.     Problem/Plan - 5:  ·  Problem: Diabetes.   ·  Plan: on consistent carb diet  POCT AC/HS  Sliding Scale.     Problem/Plan - 6:  ·  Problem: Sinus tachycardia.   ·  Plan: TV/MV region murmur - follows closely w/ outpatient cardiologist  Close monitor  Trending troponin, elevated  cardiology consulted:   - re, troponin: Likely related to underlying myopathy. Low suspicion for an acute ACS event.  -consider toprol 25 mg qd  -Will follow with cardiology outpatient.    Patient was discharged to: Home    Physical exam at the time of discharge:  Mental status: Awake, alert and oriented x3.  Recent and remote memory intact.  Naming, repetition and comprehension intact.  Attention/concentration intact.  No dysarthria, no aphasia.  Fund of knowledge appropriate.    Cranial nerves: Pupils equally round and reactive to light, visual fields full, no nystagmus, extraocular muscles intact, V1 through V3 intact bilaterally and symmetric, face symmetric, hearing intact to finger rub, palate elevation symmetric, tongue was midline.  Motor:  MRC grading B/L triceps, deltoid weakness 3+/5. Strength 5/5 in B/L LE   strength 5/5.  Normal tone and bulk.  No abnormal movements.    Sensation: Intact to light touch and vibration  Coordination: No dysmetria on finger-to-nose   Reflexes: 2+ in bilateral UE/LE

## 2023-02-02 NOTE — H&P ADULT - PROBLEM SELECTOR PLAN 6
DVT ppx: Enoxaparin 40mg SC QD  Diet: NPO for Barium swallow  Activity: OOBw/A TV/MV region murmur - follows closely w/ outpatient cardiologist  Close monitor TV/MV region murmur - follows closely w/ outpatient cardiologist  Close monitor  Trend troponin  Consult cardiology given elevated troponin, likely due to increased muscle breakdown however there is a chance for myocarditis.

## 2023-02-02 NOTE — H&P ADULT - NSHPPHYSICALEXAM_GEN_ALL_CORE
Physical exam:  Constitutional: No acute distress, conversant  Eyes: Anicteric sclerae, moist conjunctivae, see below for CNs  Neck: trachea midline, FROM, supple,   Cardiovascular: Tachycardic. Tricuspid and Mitral region murmur. No rubs, or gallops. No carotid bruits.   Pulmonary: posterior breath sounds clear bilaterally, no crackles or wheezing. No use of accessory muscles  GI: Abdomen soft, distended, non-tender  Extremities: Radial and DP pulses +2, no edema  Skin: Erythematous face, neck, shoulder and chest, Heliotropic rash and Gottron's papules     Neurologic:  -Mental status: Awake, alert, oriented to person, place, and time. Speech is fluent , no dysarthria.   -Cranial nerves:   II: Visual fields are full to confrontation.  III, IV, VI: Extraocular movements are intact without nystagmus. Pupils equally round and reactive to light  V:  Facial sensation V1-V3 equal and intact   VII: Face is symmetric with normal eye closure and smile  VIII: Hearing is bilaterally intact to finger rub  IX, X: Uvula is midline and soft palate rises symmetrically  XI: Head turning and shoulder shrug are intact.  XII: Tongue protrudes midline  Motor: Normal tone. decrease muscle bulk of UE/LE. 4-/5 Deltoid, biceps, triceps. 5/5 brachioradialis, wrist flex/ext, finger abductors/adductors. 4+/5 hip flexion, 5/5 knee felx/ext, dorsiflex/plantarflex   Sensation: Intact to light touch. pp, VIB bilaterally. No neglect or extinction on double simultaneous testing.  Coordination: No dysmetria on finger-to-nose however, limited shoulder ROM  Reflexes: Downgoing toes bilaterally, 2+DTR UE/LE R=L  Gait: Narrow gait and steady, Romberg Neg

## 2023-02-02 NOTE — H&P ADULT - PROBLEM SELECTOR PLAN 5
POCT AC/HS  Sliding Scale NPO for now - will reinstate POCT with active diet order  POCT AC/HS  Sliding Scale

## 2023-02-02 NOTE — H&P ADULT - NSHPREVIEWOFSYSTEMS_GEN_ALL_CORE
CONSTITUTIONAL: Febrile a few days ago. UE/LE weakness. No chills  EYES/ENT: No visual changes;  No vertigo or throat pain   NECK: No pain or stiffness  RESPIRATORY: No cough, wheezing, hemoptysis; No shortness of breath  CARDIOVASCULAR: No chest pain or palpitations  GASTROINTESTINAL: No abdominal or epigastric pain. No nausea, vomiting, or hematemesis; No diarrhea or constipation. No melena or hematochezia.  GENITOURINARY: No dysuria, frequency or hematuria  NEUROLOGICAL: No numbness   SKIN: pos itching, burning, rashes, lesions   All other review of systems is negative unless indicated above.

## 2023-02-02 NOTE — H&P ADULT - ASSESSMENT
This is a 73yo M w/ findings of Dermatomyositis - heliotrope rash, Gluttons' papule, prox muscle weakness currently being w/u by rheumatology and pending EMG 02/07 presented for progression of his disease now  2 episodes of dysphagia to solids and no improvement w/ current dosage of steroid therapy. Will admit work up etiology for Dermatomyositis    DDx  drug induce (MEANS)  HIV  malignancy  Autoimmune  This is a 71yo M w/ findings of Dermatomyositis - heliotrope rash, Gluttons' papule, prox muscle weakness currently being w/u by rheumatology and pending EMG 02/07 presented for progression of his disease now  2 episodes of dysphagia to solids and no improvement w/ current dosage of steroid therapy. Will admit work up etiology for Myopathy vs Dermatomyositis    DDx  drug induce (MEANS)  HIV  malignancy  Autoimmune  This is a 71yo M w/ findings of Dermatomyositis - heliotrope rash, Gluttons' papule, prox muscle weakness currently being w/u by rheumatology and pending EMG 02/07 presented for progression of his disease now  2 episodes of dysphagia to solids and no improvement w/ current dosage of steroid therapy. Will admit work up etiology for Myopathy vs Dermatomyositis:

## 2023-02-02 NOTE — DISCHARGE NOTE PROVIDER - CARE PROVIDER_API CALL
Erick Berrios  [facility] [address], [address]th [place] [address]  Phone: [phone]  Fax: (   )    -  Scheduled Appointment: 02/16/2023 02:00 PM   Alpesh Moody)  Neurosurgery  [address], [address] [place] [address]  Phone: [phone]  Fax: [phone]  Established Patient  Follow Up Time: 2 weeks    Erick Berrios  [facility] [address], [address]th [place] [address]  Phone: [phone]  Fax: (   )    -  Scheduled Appointment: 02/16/2023 02:00 PM    Amandepe Montanez)  Cardiovascular Disease; Nuclear Cardiology  [address], [address] [place] [address]  Phone: [phone]  Fax: [phone]  Established Patient  Follow Up Time: 1 month

## 2023-02-02 NOTE — H&P ADULT - PROBLEM SELECTOR PLAN 1
c/w Prednisone 50mg po QD  Barium Swallow - keep NPO  Aspiration precaution  Consult SLP  CT Chest / Abd/ Pelvis   Consider Muscle Bx c/w Prednisone 50mg po QD  Barium Swallow - keep NPO  Aspiration precaution  Consult SLP  CT Chest / Abd/ Pelvis   Consider Muscle Bx  Pending EMG/NCV w/ Dr. Krishna c/w Prednisone 50mg po QD  Barium Swallow - keep NPO  Aspiration precaution  Consult SLP  CT Chest / Abd/ Pelvis   Consider Muscle Bx  Pending EMG/NCV w/ Dr. Krishna  Consult hem/onc, r/o underlying malignancy, PET scan and CT T,ab, pelvic, Stool occult blood  Consult Dr. Alpesh Moody from neurosurgery to biopsy L deltoid muscle  PT/OT/PMNR  Daily CPK  Anti Mi2 Ab

## 2023-02-02 NOTE — PATIENT PROFILE ADULT - FALL HARM RISK - RISK INTERVENTIONS

## 2023-02-02 NOTE — DISCHARGE NOTE PROVIDER - NSDCCPCAREPLAN_GEN_ALL_CORE_FT
PRINCIPAL DISCHARGE DIAGNOSIS  Diagnosis: Dermatomyositis  Assessment and Plan of Treatment: Dermatomyositis (DM) is a muscle disease that causes inflammation and a skin rash. What causes DM? The cause of DM may not be known. It may be caused by a virus. Problems with your immune system may cause this disease. DM may cause your immune system to attack your muscle tissues. What are the signs and symptoms of DM? Muscle weakness is one of the 2 most common symptoms of DM. It develops in your hips, thighs, upper arms, shoulders, and neck and slowly gets worse. You may have trouble standing up after sitting in a chair. You may also have trouble climbing stairs, lifting objects, and combing your hair. A rash is the other most common sign of DM. A dark red or violet rash develops on your face and eyelids. You may also have a rash on your chest, back, elbows, knuckles, and knees. The rash will often get worse after exposure to the sun. Other symptoms that may develop include trouble swallowing, muscle pain, and hard bumps under the skin. How is DM diagnosed? A biopsy may be done to collect a sample of your muscle and skin. These are sent to a lab for tests. Blood tests will show the level of damage to your muscles. An electromyography (EMG) is used to test the function of your muscles and the nerves that control them. Wires are placed on the area of muscle being tested. Needles that enter your skin may be attached to the electrodes. The electrical activity of your muscles and nerves is measured by a machine attached to the electrodes. Your muscles are tested at rest and with activity. How is DM treated? You may need medicines to decrease pain, rash, swelling, and itching. You may also need medicine to slow the attack on your muscles by your immune system.  When should I contact my healthcare provider? You have a fever. You are depressed. You are weaker than usual. You have questions or concerns about your condition or care. When should I seek immediate care or call 911? You cannot move your arm or leg. You have severe pain. You have trouble breathing.       PRINCIPAL DISCHARGE DIAGNOSIS  Diagnosis: Dermatomyositis  Assessment and Plan of Treatment: Dermatomyositis (DM) is a muscle disease that causes inflammation and a skin rash. What causes DM? The cause of DM may not be known. It may be caused by a virus. Problems with your immune system may cause this disease. DM may cause your immune system to attack your muscle tissues. What are the signs and symptoms of DM? Muscle weakness is one of the 2 most common symptoms of DM. It develops in your hips, thighs, upper arms, shoulders, and neck and slowly gets worse. You may have trouble standing up after sitting in a chair. You may also have trouble climbing stairs, lifting objects, and combing your hair. A rash is the other most common sign of DM. A dark red or violet rash develops on your face and eyelids. You may also have a rash on your chest, back, elbows, knuckles, and knees. The rash will often get worse after exposure to the sun. Other symptoms that may develop include trouble swallowing, muscle pain, and hard bumps under the skin. How is DM diagnosed? A biopsy may be done to collect a sample of your muscle and skin. These are sent to a lab for tests. Blood tests will show the level of damage to your muscles. An electromyography (EMG) is used to test the function of your muscles and the nerves that control them. Wires are placed on the area of muscle being tested. Needles that enter your skin may be attached to the electrodes. The electrical activity of your muscles and nerves is measured by a machine attached to the electrodes. Your muscles are tested at rest and with activity. How is DM treated? You may need medicines to decrease pain, rash, swelling, and itching. When should I seek immediate care or call 911? You cannot move your arm or leg. You have severe pain. You have trouble breathing.  Please continue your current dose of Prednisone 60mg once a day until your follow-up with Dr. Matteo Krishna on February 7, he will instruct you how to adjust your dose.  Keep your arm in a sling for one week.

## 2023-02-02 NOTE — DISCHARGE NOTE PROVIDER - PROVIDER TOKENS
FREE:[LAST:[Agustina],FIRST:[Erick],PHONE:[phone]],FAX:[(   )    -],ADDRESS:[[address], [address] [place] [address]],SCHEDULEDAPPT:[02/16/2023],SCHEDULEDAPPTTIME:[02:00 PM]] PROVIDER:[TOKEN:[[id number]],FOLLOWUP:[2 weeks],ESTABLISHEDPATIENT:[T]],FREE:[LAST:[Agustina],FIRST:[Erick],PHONE:[phone]],FAX:[(   )    -],ADDRESS:[[address] [place] [address]],SCHEDULEDAPPT:[02/16/2023],SCHEDULEDAPPTTIME:[02:00 PM]],PROVIDER:[TOKEN:[[id number]],FOLLOWUP:[1 month],ESTABLISHEDPATIENT:[T]]

## 2023-02-02 NOTE — DISCHARGE NOTE PROVIDER - NSDCMRMEDTOKEN_GEN_ALL_CORE_FT
AMLODIPINE BESYLATE 10 MG TAB: 1 tab(s) orally once a day  Juluca 50 mg-25 mg oral tablet: 1 tab(s) orally once a day  predniSONE 50 mg oral tablet: 1 tab(s) orally once a day    AMLODIPINE BESYLATE 10 MG TAB: 1 tab(s) orally once a day  Juluca 50 mg-25 mg oral tablet: 1 tab(s) orally once a day  predniSONE 20 mg oral tablet: 3 tab(s) orally once a day

## 2023-02-02 NOTE — CONSULT NOTE ADULT - ASSESSMENT
ASSESSMENT:  72M PMH HIV presented with weakness and difficulty swallowing. Of ntoe patient had recent rash and recently told had dermatomyositis- brought to [facility] for furhter work up of myopathy vs dermatomyositis. Cardiology consulted for elevated troponin.    Cardiac Studies:   EKG: NSR HR 82, No ischemic changes  TTE (2/2/23): LV hyperdynamic, G2DD, BLESSING noted with LVOT obstruction of 64mmHg, PASP 43mmHg, mildly dilated LA      PLAN:  Troponin elevation  Likely related to underlying myopathy. Low suspicion for an acute ACS event.   Noted to have murmur on exam. TTE with BLESSING, evidence of LVOT obstruction, previously on atenolol and verapamil  - Recommend resuming verapamil 120mg as tolerated  - Can also resume beta blocker  - Avoid hypovolemia  - Assure patient has follow up with his cardiologist upon discharge    LVOT obstruction  Noted to have LVOT obstruction with gradient 64mmHg off his home beta blocker and verapamil. These need to be resumed.   - Assure patient has follow up with his cardiologist    Discussed with DR. Grijalva, consult attending.

## 2023-02-02 NOTE — CONSULT NOTE ADULT - SUBJECTIVE AND OBJECTIVE BOX
Patient is a 72y old  Male who presents with a chief complaint of Dermatomyositis (02 Feb 2023 02:17)        HPI:  Mr. Hess is a 71yo M w/ HIV (HAART), POA w/ proximal weakness UE > LE, rash and dysphagia.     He reported that around 09/2022 he experienced redness and edema of the face around the time he started Dovato. He was treated over 2 weeks w/ Prednisone 20mg tab which improves his rash. 12/2022 he noticed weakness when trying to lift his luggage to the overhead during his flight as well as difficult sitting up in bed from supine position. He described 1 episode when he fell backwards (no head trauma) but could not get to his feet w/out support. During this time his PCP stopped his Lisinopril, statin, Verapamil as well as make changes to his HAART however, no improvement of weakness. He visited the ED 1/19/23 and was started on Prednisone 50mg QD still with no improvement. He had visited a dermatology s/p skin biopsy and visited a Rheumatologist for further w/u of myositis. Today he c/o 2 episode 1 yesterday and 1 today in which he had discomfort swallowing a piece of bagel.     He is pending EMG w/ Neurologist Dr. Krishna 02/07.     He denies muscle pain/tenderness, and difficulty speaking. He denies h/o cancer or family h/o myositis.  (02 Feb 2023 02:17)      PAST MEDICAL & SURGICAL HISTORY:  HTN (hypertension)      Diabetes      Hypercholesteremia      HIV disease      No significant past surgical history          MEDICATIONS  (STANDING):  amLODIPine   Tablet 10 milliGRAM(s) Oral daily  diatrizoate meglumine/diatrizoate sodium. 30 milliLiter(s) Oral once  enoxaparin Injectable 40 milliGRAM(s) SubCutaneous every 24 hours  predniSONE   Tablet 50 milliGRAM(s) Oral daily    MEDICATIONS  (PRN):           FAMILY HISTORY:  No pertinent family history in first degree relatives      CBC Full  -  ( 02 Feb 2023 05:30 )  WBC Count : 8.52 K/uL  RBC Count : 4.78 M/uL  Hemoglobin : 12.8 g/dL  Hematocrit : 41.3 %  Platelet Count - Automated : 220 K/uL  Mean Cell Volume : 86.4 fl  Mean Cell Hemoglobin : 26.8 pg  Mean Cell Hemoglobin Concentration : 31.0 gm/dL  Auto Neutrophil # : 6.89 K/uL  Auto Lymphocyte # : 0.87 K/uL  Auto Monocyte # : 0.59 K/uL  Auto Eosinophil # : 0.07 K/uL  Auto Basophil # : 0.03 K/uL  Auto Neutrophil % : 80.9 %  Auto Lymphocyte % : 10.2 %  Auto Monocyte % : 6.9 %  Auto Eosinophil % : 0.8 %  Auto Basophil % : 0.4 %      02-02    140  |  104  |  14  ----------------------------<  99  3.9   |  26  |  0.64    Ca    9.0      02 Feb 2023 05:30  Phos  4.0     02-02  Mg     2.0     02-02    TPro  6.2  /  Alb  2.9<L>  /  TBili  0.3  /  DBili  <0.2  /  AST  170<H>  /  ALT  83<H>  /  AlkPhos  60  02-02      Urinalysis Basic - ( 01 Feb 2023 18:25 )    Color: Yellow / Appearance: Clear / SG: >=1.030 / pH: x  Gluc: x / Ketone: NEGATIVE  / Bili: Negative / Urobili: 0.2 E.U./dL   Blood: x / Protein: 100 mg/dL / Nitrite: NEGATIVE   Leuk Esterase: NEGATIVE / RBC: < 5 /HPF / WBC < 5 /HPF   Sq Epi: x / Non Sq Epi: 0-5 /HPF / Bacteria: Present /HPF          Radiology :     < from: CT Head No Cont (02.01.23 @ 21:39) >  ACC: [id number] EXAM:  CT BRAIN   ORDERED BY: CHARMAINE JAMA     PROCEDURE DATE:  02/01/2023          INTERPRETATION:  Maya LAND MD, have reviewed the images and the report   and agree with the findings.     INDICATIONS: Weakness left greaterthan right, proximal greater than   distal    TECHNIQUE:  Serial axial images were obtained from the skull base to the   vertex without the use of intravenous contrast. Coronal and sagittal   reformatted images were obtained.    COMPARISON EXAMINATION: Head CT 12/11/2017    FINDINGS:  VENTRICLES AND SULCI: The ventricles and sulci are within normal limits.  INTRA-AXIAL: No intracranial mass, acute hemorrhage, or midline shift is   present. There is preservation of the gray-white matter differentiation   without evidence of acute transcortical infarction.  EXTRA-AXIAL: No extra-axial fluid collection is present.  VISUALIZED SINUSES: No air-fluid levels are identified.  VISUALIZED MASTOIDS:  Clear.  CALVARIUM:  Normal.      IMPRESSION:  No acute intracranial hemorrhage or transcortical infarction.              Vital Signs Last 24 Hrs  T(C): 37 (02 Feb 2023 05:49), Max: 37.4 (01 Feb 2023 18:22)  T(F): 98.6 (02 Feb 2023 05:49), Max: 99.3 (01 Feb 2023 18:22)  HR: 87 (02 Feb 2023 05:49) (87 - 107)  BP: 144/70 (02 Feb 2023 05:49) (137/81 - 165/84)  BP(mean): --  RR: 18 (02 Feb 2023 05:49) (18 - 18)  SpO2: 96% (02 Feb 2023 05:49) (96% - 97%)    Parameters below as of 02 Feb 2023 05:49  Patient On (Oxygen Delivery Method): room air            REVIEW OF SYSTEMS:  per hpi           Physical Exam :  72 y o man lying comfortably in semi Chamberlain's position , awake , alert , no new complaints     Head : normocephalic , atraumatic    Eyes : PERRLA , EOMI , no nystagmus , sclera anicteric    ENT : nasal discharge , uvula midline , no oropharyngeal erythema / exudate    Neck : supple , negative JVD , negative carotid bruits , no thyromegaly    Chest : CTA bilaterally     Cardiovascular : regular rate and rhythm ,  II/VI systolic murmur     Abdomen : soft , non distended , non tender to palpation in all 4 quadrants , normal bowel sounds     Extremities : WWP , neg cyanosis /clubbing / edema        Skin :  erythematous face, neck, shoulder and chest       :     Neurologic Exam :    Alert and oriented x 4      Cranial Nerves:     II :                         pupils equal , round and reactive to light , visual fields intact   III/ IV/VI :              extraocular movements intact , neg nystagmus , neg ptosis  V :                        facial sensation intact , V1-3 normal  VII :                      face symmetric , no droop , normal eye closure and smile  VIII :                     hearing intact to finger rub bilaterally  IX and X :             no hoarseness , gag intact , palate/ uvula rise symmetrically  XI :                       SCM / trapezius strength intact bilateral  XII :                      no tongue deviation    Motor Exam:          Right UE:                5/5 :     5/5 :   wrist extensors/ flexors  4/5 :   biceps  5/5 :   triceps  4-/5 :   deltoid    negative pronator drift                               Left UE:         5/5 :     5/5 :   wrist extensors/ flexors  5/5 :   biceps  5/5 :   triceps  4-/5 :   deltoid    negative pronator drift        Right LE:                5/5 : dorsiflexors   5/5 : plantar flexors  5/5 : quadriceps  5/5 : hamstrings  5/5 : hip flexors      Left LE:                   5/5 : dorsiflexors   5/5 : plantar flexors  5/5 : quadriceps  5/5 : hamstrings  5/5 : hip flexors          Sensation :         intact to light touch x 4 extremities                                                 DTR :                     biceps/brachioradialis : equal                                              patella/ankle : equal           Gait :  not tested        PM&R Impression : admitted for c/o weakness/ w/up for myositis v dermatomyositis    1) deconditioned    2) no focal weakness      Recommendations / Plan :     1) Physical / Occupational therapy focusing on therapeutic exercises , equipment evaluation , bed mobility/transfer out of bed evaluation , progressive ambulation with assistive devices prn .    2) Current disposition plan recommendation  :   pending functional progress

## 2023-02-02 NOTE — CONSULT NOTE ADULT - ATTENDING COMMENTS
Initial attending contact date  2/2/23    . See fellow note written above for details. I reviewed the fellow documentation. I have personally seen and examined this patient. I reviewed vitals, labs, medications, cardiac studies, and additional imaging. I agree with the above fellow's findings and plans as written above with the following additions/statements.    73 y/o M with pmh HIV on HAART presents with progressive weakness in his b/l upper and lower extremities , diagnosed with dermatomyositis  -Cardiology consulted for elevated trop .5 -.4 in setting of cpk [phone]  -EKG nonischemic, pt without anginal equivalents  -Mild trop elevation considered non MI trop elevation in setting of rhabdo  -ECHO with normal LVEF, evidence for HOCM with BLESSING  -Recommend toprol 25 mg qd upon dc  -Will need cardiology fu upon dc, can fu with Dr Montanez [phone]

## 2023-02-02 NOTE — SWALLOW BEDSIDE ASSESSMENT ADULT - SLP PERTINENT HISTORY OF CURRENT PROBLEM
This is a 73yo M w/ findings of Dermatomyositis - heliotrope rash, Gluttons' papule, prox muscle weakness currently being w/u by rheumatology and pending EMG 02/07 presented for progression of his disease now  2 episodes of dysphagia to solids and no improvement w/ current dosage of steroid therapy. Will admit work up etiology for Myopathy vs Dermatomyositis:

## 2023-02-02 NOTE — DISCHARGE NOTE PROVIDER - NSDCFUADDAPPT_GEN_ALL_CORE_FT
Please reach out to Dr. Alpesh Moody ([phone]) with Neurosurgery to schedule a follow-up appointment in 2 weeks and to receive biopsy results from your left arm biopsy.    Please reach out to Dr. Amandeep Montanez ([phone]) with Cardiology to schedule a follow-up appointment.

## 2023-02-02 NOTE — SWALLOW BEDSIDE ASSESSMENT ADULT - COMMENTS
Per pt, 2 instances (2 days ago) of difficulty swallowing a bagel, felt like he had to "force it down". No symptoms of dysphagia prior or since then. Pt denied odynophagia. No weight loss, no hx of PNA.

## 2023-02-02 NOTE — H&P ADULT - NSHPSOCIALHISTORY_GEN_ALL_CORE
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 Non illicit drugs or marijuana. Social drinker

## 2023-02-02 NOTE — H&P ADULT - NSHPLABSRESULTS_GEN_ALL_CORE
14.0   9.49  )-----------( 271      ( 01 Feb 2023 17:51 )             44.7     02-01    138  |  101  |  14  ----------------------------<  143<H>  4.6   |  27  |  0.52    Ca    9.5      01 Feb 2023 17:51  Mg     2.3     02-01    TPro  7.0  /  Alb  3.9  /  TBili  0.2  /  DBili  x   /  AST  181<H>  /  ALT  94<H>  /  AlkPhos  72  02-01  Creatine Kinase, Serum: 3539 U/L (02.01.23 @ 17:51); Creatine Kinase, Serum: 3097 U/L (01.19.23 @ 21:52)    CT Head No Cont (02.01.23 @ 21:39) > IMPRESSION: No acute intracranial hemorrhage or transcortical infarction.    Skin Bx: Vacuolar interface alteration, increase dermal mucin and a perivascular lymphocytic infiltrate 14.0   9.49  )-----------( 271      ( 01 Feb 2023 17:51 )             44.7     02-01    138  |  101  |  14  ----------------------------<  143<H>  4.6   |  27  |  0.52    Ca    9.5      01 Feb 2023 17:51  Mg     2.3     02-01    TPro  7.0  /  Alb  3.9  /  TBili  0.2  /  DBili  x   /  AST  181<H>  /  ALT  94<H>  /  AlkPhos  72  02-01  Creatine Kinase, Serum: 3539 U/L (02.01.23 @ 17:51); Creatine Kinase, Serum: 3097 U/L (01.19.23 @ 21:52)    SPEP, RF, Anti Zaidi Ab, SS-A/SS-B, Jo1, CCP Ab, HMGCR ab - wnl  Last HIV VL undetectable  Hep B sAb pos     CT Head No Cont (02.01.23 @ 21:39) > IMPRESSION: No acute intracranial hemorrhage or transcortical infarction.    Skin Bx: Vacuolar interface alteration, increase dermal mucin and a perivascular lymphocytic infiltrate

## 2023-02-02 NOTE — DISCHARGE NOTE PROVIDER - NSDCFUSCHEDAPPT_GEN_ALL_CORE_FT
[facility]  NEUROLOGY [address]  Scheduled Appointment: 02/07/2023     Matteo Krishna  [place] Physician [facility]  NEUROLOGY [address]  Scheduled Appointment: 02/07/2023    Octavia Adams  [place] Physician [facility]  RHEUM [address]  Scheduled Appointment: 02/09/2023     Octavia Adams  [place] Physician [facility]  RHEUM [address]  Scheduled Appointment: 03/10/2023    Matteo Krishna  [facility] Physician [facility]  NEUROLOGY [address]  Scheduled Appointment: 03/13/2023

## 2023-02-02 NOTE — DISCHARGE NOTE PROVIDER - CARE PROVIDERS DIRECT ADDRESSES
,DirectAddress_Unknown ,[email or web address],DirectAddress_Unknown,[email or web address]

## 2023-02-03 LAB
ALBUMIN SERPL ELPH-MCNC: 3.2 G/DL — LOW (ref 3.3–5)
ALP SERPL-CCNC: 59 U/L — SIGNIFICANT CHANGE UP (ref 40–120)
ALT FLD-CCNC: 84 U/L — HIGH (ref 10–45)
ANION GAP SERPL CALC-SCNC: 11 MMOL/L — SIGNIFICANT CHANGE UP (ref 5–17)
AST SERPL-CCNC: 174 U/L — HIGH (ref 10–40)
BASOPHILS # BLD AUTO: 0.03 K/UL — SIGNIFICANT CHANGE UP (ref 0–0.2)
BASOPHILS NFR BLD AUTO: 0.4 % — SIGNIFICANT CHANGE UP (ref 0–2)
BILIRUB SERPL-MCNC: 0.4 MG/DL — SIGNIFICANT CHANGE UP (ref 0.2–1.2)
BUN SERPL-MCNC: 17 MG/DL — SIGNIFICANT CHANGE UP (ref 7–23)
CALCIUM SERPL-MCNC: 9 MG/DL — SIGNIFICANT CHANGE UP (ref 8.4–10.5)
CHLORIDE SERPL-SCNC: 103 MMOL/L — SIGNIFICANT CHANGE UP (ref 96–108)
CK SERPL-CCNC: 3554 U/L — HIGH (ref 30–200)
CO2 SERPL-SCNC: 26 MMOL/L — SIGNIFICANT CHANGE UP (ref 22–31)
CREAT SERPL-MCNC: 0.58 MG/DL — SIGNIFICANT CHANGE UP (ref 0.5–1.3)
EGFR: 104 ML/MIN/1.73M2 — SIGNIFICANT CHANGE UP
EOSINOPHIL # BLD AUTO: 0.11 K/UL — SIGNIFICANT CHANGE UP (ref 0–0.5)
EOSINOPHIL NFR BLD AUTO: 1.4 % — SIGNIFICANT CHANGE UP (ref 0–6)
GLUCOSE BLDC GLUCOMTR-MCNC: 118 MG/DL — HIGH (ref 70–99)
GLUCOSE BLDC GLUCOMTR-MCNC: 175 MG/DL — HIGH (ref 70–99)
GLUCOSE BLDC GLUCOMTR-MCNC: 85 MG/DL — SIGNIFICANT CHANGE UP (ref 70–99)
GLUCOSE BLDC GLUCOMTR-MCNC: 94 MG/DL — SIGNIFICANT CHANGE UP (ref 70–99)
GLUCOSE SERPL-MCNC: 92 MG/DL — SIGNIFICANT CHANGE UP (ref 70–99)
HCT VFR BLD CALC: 42.2 % — SIGNIFICANT CHANGE UP (ref 39–50)
HGB BLD-MCNC: 13.3 G/DL — SIGNIFICANT CHANGE UP (ref 13–17)
HIV-1 VIRAL LOAD RESULT: SIGNIFICANT CHANGE UP
HIV1 RNA # SERPL NAA+PROBE: SIGNIFICANT CHANGE UP COPIES/ML
HIV1 RNA SER-IMP: SIGNIFICANT CHANGE UP
HIV1 RNA SERPL NAA+PROBE-ACNC: SIGNIFICANT CHANGE UP
HIV1 RNA SERPL NAA+PROBE-LOG#: SIGNIFICANT CHANGE UP LG COP/ML
IMM GRANULOCYTES NFR BLD AUTO: 0.9 % — SIGNIFICANT CHANGE UP (ref 0–0.9)
LYMPHOCYTES # BLD AUTO: 0.81 K/UL — LOW (ref 1–3.3)
LYMPHOCYTES # BLD AUTO: 10.6 % — LOW (ref 13–44)
MAGNESIUM SERPL-MCNC: 2.1 MG/DL — SIGNIFICANT CHANGE UP (ref 1.6–2.6)
MCHC RBC-ENTMCNC: 26.8 PG — LOW (ref 27–34)
MCHC RBC-ENTMCNC: 31.5 GM/DL — LOW (ref 32–36)
MCV RBC AUTO: 84.9 FL — SIGNIFICANT CHANGE UP (ref 80–100)
MONOCYTES # BLD AUTO: 0.6 K/UL — SIGNIFICANT CHANGE UP (ref 0–0.9)
MONOCYTES NFR BLD AUTO: 7.8 % — SIGNIFICANT CHANGE UP (ref 2–14)
NEUTROPHILS # BLD AUTO: 6.03 K/UL — SIGNIFICANT CHANGE UP (ref 1.8–7.4)
NEUTROPHILS NFR BLD AUTO: 78.9 % — HIGH (ref 43–77)
NRBC # BLD: 0 /100 WBCS — SIGNIFICANT CHANGE UP (ref 0–0)
PHOSPHATE SERPL-MCNC: 3.9 MG/DL — SIGNIFICANT CHANGE UP (ref 2.5–4.5)
PLATELET # BLD AUTO: 214 K/UL — SIGNIFICANT CHANGE UP (ref 150–400)
POTASSIUM SERPL-MCNC: 4 MMOL/L — SIGNIFICANT CHANGE UP (ref 3.5–5.3)
POTASSIUM SERPL-SCNC: 4 MMOL/L — SIGNIFICANT CHANGE UP (ref 3.5–5.3)
PROT SERPL-MCNC: 6.3 G/DL — SIGNIFICANT CHANGE UP (ref 6–8.3)
RBC # BLD: 4.97 M/UL — SIGNIFICANT CHANGE UP (ref 4.2–5.8)
RBC # FLD: 13.6 % — SIGNIFICANT CHANGE UP (ref 10.3–14.5)
SODIUM SERPL-SCNC: 140 MMOL/L — SIGNIFICANT CHANGE UP (ref 135–145)
TROPONIN T SERPL-MCNC: 0.55 NG/ML — CRITICAL HIGH (ref 0–0.01)
WBC # BLD: 7.65 K/UL — SIGNIFICANT CHANGE UP (ref 3.8–10.5)
WBC # FLD AUTO: 7.65 K/UL — SIGNIFICANT CHANGE UP (ref 3.8–10.5)

## 2023-02-03 PROCEDURE — 74220 X-RAY XM ESOPHAGUS 1CNTRST: CPT | Mod: 26

## 2023-02-03 PROCEDURE — 93010 ELECTROCARDIOGRAM REPORT: CPT

## 2023-02-03 PROCEDURE — 99222 1ST HOSP IP/OBS MODERATE 55: CPT

## 2023-02-03 RX ADMIN — AMLODIPINE BESYLATE 10 MILLIGRAM(S): 2.5 TABLET ORAL at 06:13

## 2023-02-03 RX ADMIN — RILPIVIRINE HYDROCHLORIDE 25 MILLIGRAM(S): 25 TABLET, FILM COATED ORAL at 18:27

## 2023-02-03 RX ADMIN — DOLUTEGRAVIR SODIUM 50 MILLIGRAM(S): 25 TABLET, FILM COATED ORAL at 11:24

## 2023-02-03 RX ADMIN — Medication 50 MILLIGRAM(S): at 06:16

## 2023-02-03 RX ADMIN — ENOXAPARIN SODIUM 40 MILLIGRAM(S): 100 INJECTION SUBCUTANEOUS at 06:19

## 2023-02-03 NOTE — OCCUPATIONAL THERAPY INITIAL EVALUATION ADULT - LEVEL OF INDEPENDENCE: DRESS LOWER BODY, OT EVAL
to don socks, CGA to doff using figure four method/contact guard/moderate assist (50% patients effort)

## 2023-02-03 NOTE — PHYSICAL THERAPY INITIAL EVALUATION ADULT - PERTINENT HX OF CURRENT PROBLEM, REHAB EVAL
71yo M w/ findings of Dermatomyositis - heliotrope rash, Gluttons' papule, prox muscle weakness currently being w/u by rheumatology and pending EMG 02/07 presented for progression of his disease now  2 episodes of dysphagia to solids and no improvement w/ current dosage of steroid therapy. Will admit work up etiology for Myopathy vs Dermatomyositis:

## 2023-02-03 NOTE — PROGRESS NOTE ADULT - ASSESSMENT
This is a 71yo M w/ findings of Dermatomyositis - heliotrope rash, Gluttons' papule, prox muscle weakness currently being w/u by rheumatology and pending EMG 02/07 presented for progression of his disease now  2 episodes of dysphagia to solids and no improvement w/ current dosage of steroid therapy. Admitted for workup of etiology, Myopathy vs Dermatomyositis   This is a 71yo M w/ findings of Dermatomyositis - heliotrope rash, Gluttons' papule, prox muscle weakness currently being w/u by rheumatology and pending EMG 02/07 presented for progression of his disease now  2 episodes of dysphagia to solids and no improvement w/ current dosage of steroid therapy. Admitted for workup of etiology, Myopathy vs Dermatomyositis.   On Increased Prednisone Pt showed general improvement

## 2023-02-03 NOTE — PHYSICAL THERAPY INITIAL EVALUATION ADULT - PLANNED THERAPY INTERVENTIONS, PT EVAL
bed mobility training/ROM/strengthening bed mobility training/joint mobilization/manual therapy techniques/ROM/strengthening

## 2023-02-03 NOTE — OCCUPATIONAL THERAPY INITIAL EVALUATION ADULT - PERTINENT HX OF CURRENT PROBLEM, REHAB EVAL
Mr. Hess is a 73yo M w/ HIV (HAART), POA w/ proximal weakness UE > LE, rash and dysphagia.   He reported that around 09/2022 he experienced redness and edema of the face around the time he started Dovato. He was treated over 2 weeks w/ Prednisone 20mg tab which improves his rash. 12/2022 he noticed weakness when trying to lift his luggage to the overhead during his flight as well as difficult sitting up in bed from supine position. He described 1 episode when he fell backwards (no head trauma) but could not get to his feet w/out support.

## 2023-02-03 NOTE — OCCUPATIONAL THERAPY INITIAL EVALUATION ADULT - ADDITIONAL COMMENTS
Pt. lives in a [facility] with his wife ( who is currently [place]). Pt.  reports being I in ADLs and functional mobility and transfers with no AD or AE. 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 BR with tub/shower, standard toilet

## 2023-02-03 NOTE — CONSULT NOTE ADULT - SUBJECTIVE AND OBJECTIVE BOX
MARCUS IRWIN  72y  Male      Patient is a 72y old  Male who presents with a chief complaint of Dermatomyositis (03 Feb 2023 13:32)    Mr. Irwin is a 72 yr old man with a PMH of HTN, HLD, DM, HIV, and recently diagnosed dermatomyositis who presented 2/1 with dysphagia and muscle weakness, admitted to neurology for further evaluation. Medicine consulted for preop clearance for deltoid muscle biopsy.    Pt reports he recently developed facial rash, puffiness, and burning, followed by muscle weakness in his arms and upper torso. He had a recent fall backwards (no head trauma), was seen in [facility] ED 1/19/23 where noted to have elevated CPK to 3097, given IV hydration and discharged home on prednisone 50mg daily with rheumatology follow up. He then saw Dr. Adams and his prednisone was increased to 60mg daily which improved his symptoms, but he is still having muscle weakness.     Pt reports he can walk 4-5 blocks at slow pace without stopping, as well as climb a flight of stairs comfortably. No recent chest pain, palpitations, SOB, or cough. No dizziness or lightheadedness.    ROS:  General: no fevers, chills, or sweats; no dizziness or lightheadedness  CV: no chest pain or palpitations  Pulm: no SOB or cough  GI: no abd pain, no n/v/d/c  : no dysuria  MSK: (+) muscle pain predominantly in proximal arms, back, and neck as well as L pinky PIP pain (longstanding); no other myalgias or arthralgias  Derm: (+) rash over chest and arms    PAST MEDICAL HISTORY:  HTN (hypertension)  HLD  Diabetes  HIV   Myositis    PAST SURGICAL HISTORY:  None    HOME MEDICATIONS (per EHR):  - prednisone  - Juluca  - amlodipine    SOCIAL:  - social alcohol use  - former smoker  - no drugs  - born in [facility]    FAMILY HISTORY:  - no known family history of cancer or autoimmune disease    ALLERGIES:  - none known to food or medications    T(C): 37 (02-03-23 @ 11:29), Max: 37.3 (02-02-23 @ 20:40)  HR: 108 (02-03-23 @ 11:52) (82 - 110)  BP: 157/83 (02-03-23 @ 11:52) (133/68 - 157/83)  RR: 18 (02-03-23 @ 11:29) (18 - 18)  SpO2: 97% (02-03-23 @ 11:52) (95% - 98%)  Wt(kg): --Vital Signs Last 24 Hrs  T(C): 37 (03 Feb 2023 11:29), Max: 37.3 (02 Feb 2023 20:40)  T(F): 98.6 (03 Feb 2023 11:29), Max: 99.1 (02 Feb 2023 20:40)  HR: 108 (03 Feb 2023 11:52) (82 - 110)  BP: 157/83 (03 Feb 2023 11:52) (133/68 - 157/83)  BP(mean): --  RR: 18 (03 Feb 2023 11:29) (18 - 18)  SpO2: 97% (03 Feb 2023 11:52) (95% - 98%)    Parameters below as of 03 Feb 2023 11:29  Patient On (Oxygen Delivery Method): room air    PHYSICAL EXAM:    Constitutional: resting comfortably in bed; NAD  HEENT: NC/AT, EOMI, anicteric sclera, no nasal discharge   Respiratory: CTA B/L; no W/R/R, no retractions  Cardiac: loud holosystolic murmur best heard at cardiac apex, radiates to carotids  Gastrointestinal: abdomen soft, NT/ND  Extremities: legs WWP, no edema  Dermatologic: skin warm, dry and intact; erythematous pigmentation over chest, face, and arms  Neurologic: alert and oriented, responds appropriately, no focal deficits noted  Psychiatric: affect and characteristics of appearance, verbalizations, behaviors are appropriate      Consultant(s) Notes Reviewed:  [x ] YES  [ ] NO  Care Discussed with Consultants/Other Providers [ x] YES  [ ] NO    LABS:  CBC   02-03-23 @ 06:36  Hematcorit 42.2  Hemoglobin 13.3  Mean Cell Hemoglobin 26.8  Platelet Count-Automated 214  RBC Count 4.97  Red Cell Distrib Width 13.6  Wbc Count 7.65      BMP  02-03-23 @ 06:36  Anion Gap. Serum 11  Blood Urea Nitrogen,Serm 17  Calcium, Total Serum 9.0  Carbon Dioxide, Serum 26  Chloride, Serum 103  Creatinine, Serum 0.58  eGFR in  --  eGFR in Non Afican American --  Gloucose, serum 92  Potassium, Serum 4.0  Sodium, Serum 140              02-02-23 @ 05:30  Anion Gap. Serum 10  Blood Urea Nitrogen,Serm 14  Calcium, Total Serum 9.0  Carbon Dioxide, Serum 26  Chloride, Serum 104  Creatinine, Serum 0.64  eGFR in  --  eGFR in Non Afican American --  Gloucose, serum 99  Potassium, Serum 3.9  Sodium, Serum 140              02-01-23 @ 17:51  Anion Gap. Serum 10  Blood Urea Nitrogen,Serm 14  Calcium, Total Serum 9.5  Carbon Dioxide, Serum 27  Chloride, Serum 101  Creatinine, Serum 0.52  eGFR in  --  eGFR in Non Afican American --  Gloucose, serum 143  Potassium, Serum 4.6  Sodium, Serum 138                  CMP  02-03-23 @ 06:36  Crystal Aminotransferase(ALT/SGPT)84  Albumin, Serum 3.2  Alkaline Phosphatase, Serum 59  Anion Gap, Serum 11  Aspartate Aminotransferase (AST/SGOT)174  Bilirubin Total, Serum 0.4  Blood Urea Nitrogen, Serum 17  Calcium,Total Serum 9.0  Carbon Dioxide, Serum 26  Chloride, Serum 103  Creatinine, Serum 0.58  eGFR if  --  eGFR if Non African American --  Glucose, Serum 92  Potassium, Serum 4.0  Protein Total, Serum 6.3  Sodium, Serum 140                      02-02-23 @ 05:30  Crystal Aminotransferase(ALT/SGPT)83  Albumin, Serum 2.9  Alkaline Phosphatase, Serum 60  Anion Gap, Serum 10  Aspartate Aminotransferase (AST/SGOT)170  Bilirubin Total, Serum 0.3  Blood Urea Nitrogen, Serum 14  Calcium,Total Serum 9.0  Carbon Dioxide, Serum 26  Chloride, Serum 104  Creatinine, Serum 0.64  eGFR if  --  eGFR if Non African American --  Glucose, Serum 99  Potassium, Serum 3.9  Protein Total, Serum 6.2  Sodium, Serum 140                      02-01-23 @ 17:51  Crystal Aminotransferase(ALT/SGPT)94  Albumin, Serum 3.9  Alkaline Phosphatase, Serum 72  Anion Gap, Serum 10  Aspartate Aminotransferase (AST/SGOT)181  Bilirubin Total, Serum 0.2  Blood Urea Nitrogen, Serum 14  Calcium,Total Serum 9.5  Carbon Dioxide, Serum 27  Chloride, Serum 101  Creatinine, Serum 0.52  eGFR if  --  eGFR if Non African American --  Glucose, Serum 143  Potassium, Serum 4.6  Protein Total, Serum 7.0  Sodium, Serum 138                          PT/INR  PT/INR  02-02-23 @ 05:30  INR 1.06  Prothrombin Time Comment --  Prothrobin Time, [id number]      Amylase/Lipase            RADIOLOGY & ADDITIONAL TESTS:    Imaging Personally Reviewed:  [ ] YES  [ ] NO

## 2023-02-03 NOTE — PROGRESS NOTE ADULT - PROBLEM SELECTOR PLAN 1
c/w Prednisone 50mg po QD  Barium Swallow - keep NPO  Aspiration precaution  Consult SLP  CT Chest / Abd/ Pelvis   Consider Muscle Bx  Pending EMG/NCV w/ Dr. Krishna  PET scan showed no evidence of malignancy  Neurosurgery to biopsy L deltoid muscle on 6/2/23  PT/OT/PMNR  Daily CPK  Anti Mi2 Ab Increased Prednisone to 60mg po QD starting 2/3/23.   Barium Swallow - keep NPO the night before  Aspiration precaution  SLP eval completed.   CT Chest / Abd/ Pelvis, resulted negative for malignancy.    Considering Muscle Bx  Pending EMG/NCV w/ Dr. Krishna  PET scan showed no evidence of malignancy  Neurosurgery to biopsy L deltoid muscle on 6/2/23  PT/OT/PMNR  Daily CPK  Anti Mi2 Ab Increased Prednisone to 60mg po QD starting 2/3/23.   Barium Swallow performed, pending official report.   Aspiration precaution  SLP eval completed.   CT Chest / Abd/ Pelvis, resulted negative for malignancy.    Considering Muscle Bx  Pending EMG/NCV w/ Dr. Krishna  PET scan showed no evidence of malignancy  Neurosurgery to biopsy L deltoid muscle on 6/2/23  PT/OT/PMNR  Daily CPK  Anti Mi2 Ab Increased Prednisone to 60mg po QD starting 2/3/23.   Barium Swallow performed, pending official report.   Aspiration precaution  SLP eval completed.   CT Chest / Abd/ Pelvis, resulted negative for malignancy.    Considering Muscle Bx  Pending EMG/NCV w/ Dr. Krishna  Hem/onc contacted, recommended given normal labs and imaging, would recommend GI work up.   Neurosurgery to biopsy L deltoid muscle on 6/2/23  PT/OT/PMNR  Daily CPK  Anti Mi2 Ab Increased Prednisone to 60mg po QD starting 2/3/23.   Barium Swallow performed and resulted as Nasopharyngeal reflux. Mild esophageal dysmotility  Aspiration precaution  SLP eval completed.   CT Chest / Abd/ Pelvis, resulted as: 12 mm air-filled cyst in the right middle lobe, 2.7 cm cyst in segment two of   liver. Subcentimeter hypodensities segment two and six of liver too   small to characterize but likely cysts or biliary hamartomas. Subtle questionable liver contour nodularity. 11 mm radiodensity seen in gallbladder neck could represent calculus or polyp. Nodular thickening bilaterally of the adrenals. Right measures 1.0 x 1.6x 2.2 cm. Left measures 1.5 x 2.1 x 2.1 cm,  1.4 cm cortical cyst interpolar region right kidney. A few bilateral subcentimeter renal cortical hypodensities are too small to characterize    Pending EMG/NCV w/ Dr. Krishna  Hem/onc contacted, recommended given normal labs and imaging, would recommend GI work up.   Neurosurgery to biopsy L deltoid muscle on 6/2/23  PT/OT/PMNR  Daily CPK  Anti Mi2 Ab  GI consulted given CTT/A/P findings, and r/o underlying GI malignancy given correlation with Dermatomyositis.

## 2023-02-03 NOTE — PHYSICAL THERAPY INITIAL EVALUATION ADULT - GENERAL OBSERVATIONS, REHAB EVAL
PT IE Completed. Pt received semi-supine in bed, A&Ox4, +RA,+heplock, in NAD and agreeable to work with PT, ANJU [place] notified.Pt admitted to [facility] with findings of dermatomyositis resulting in difficulty lifting and 1 fall with inability to recover. PT exam shows difficulty performing bed mobility and decreased bilateral UE strength and A/PROM. Pt completed bed mob, transfers, gait and stair climbing; requiring 1 person assist for bed mob.  Pt left seated in bedside chair, +bed alarm, +call mcdaniel within reach, ANJU [place] notified. Pt can benefit from PT in order to improve quality of life by increasing strength/endurance/balance with functional mobility and ADLS.

## 2023-02-03 NOTE — CONSULT NOTE ADULT - ASSESSMENT
72M PMH HTN, HLD, DM, HIV, and recently diagnosed dermatomyositis who presented 2/1 with dysphagia and muscle weakness, admitted to neurology for further evaluation. Medicine consulted for preop clearance for deltoid muscle biopsy.    #Preop clearance  METS likely at least 4  No cardiopulmonary symptoms  RCRI 0 points class I risk ie 3.9% 30-day risk of death, MI, or cardiac arrest  Casiano score ~0.1 % risk of myocardial infarction or cardiac arrest, intraoperatively or up to 30 days post-op  No contraindications to proceed with surgery  However, given pt's loud murmur and CT findings showing possible HOCM (Asymmetric thickening of the   interventricular septum up to 2.0 cm thickness as compared to the lateral wall left ventricle), as well as TTE showing EF>75%, grade II LV diastolic dysfunction and PASP 43, would recommend cardiology follow up for further evaluation and management.    #DM  a1c 7% on 2/2/23.  Glucose well controlled in patient  - continue ISS    #Low vitB12   B12 401  Borderline low  - check MMA and homocysteine  - recommend vitB12 supplementation after above labs    #Troponinemia  Troponins .5 -> .4 -> .55 (2/3)  No chest pain or palpitations  EKG 2/1 showed NSR with no ischemic changes  Suspect elevated due to cross-reactivity of troponin T test with skeletal muscle troponin   - repeat EKG if any chest pain    #Gallbladder lesion  CT notable for gallbladder cyst vs. polyp measured at 11mm.  - recommend GI f/u - polyps > 10 mm typically warrant resection, especially in pt with dermatomyositis at elevated risk for malignancy    #?Hematuria  UA 2/1 with moderate blood and no RBCs.  Possibly 2/2 myoglobin from muscle breakdown vs. hemolysis (though not anemic and had elevated LDH but can also be elev. as acute phase reactant)  - repeat UA in future to r/o microscopic hematuria    #Abnormal CT findings  Multiple abnormal CT findings including: lung cyst, liver cysts, adrenal nodular thickening, bilateral inguinal hernias  - would advise pt to bring CT findings to next PCP appointment for further follow up     72M PMH HTN, HLD, DM, HIV, and recently diagnosed dermatomyositis who presented 2/1 with dysphagia and muscle weakness, admitted to neurology for further evaluation. Medicine consulted for preop clearance for deltoid muscle biopsy.    #Preop clearance  METS likely at least 4  No cardiopulmonary symptoms  RCRI 0 points class I risk ie 3.9% 30-day risk of death, MI, or cardiac arrest  Casiano score ~0.1 % risk of myocardial infarction or cardiac arrest, intraoperatively or up to 30 days post-op  No contraindications to proceed with surgery  However, given pt's loud murmur and CT findings showing possible HOCM (Asymmetric thickening of the   interventricular septum up to 2.0 cm thickness as compared to the lateral wall left ventricle), as well as TTE showing EF>75%, grade II LV diastolic dysfunction and PASP 43, would recommend cardiology follow up for further evaluation and management.    #DM  a1c 7% on 2/2/23.  Glucose well controlled in patient  - continue ISS    #Low vitB12   B12 401  Borderline low  - check MMA and homocysteine  - recommend vitB12 supplementation after above labs    #Troponinemia  Troponins .5 -> .4 -> .55 (2/3)  No chest pain or palpitations  EKG 2/1 showed NSR with no ischemic changes  Suspect elevated due to cross-reactivity of troponin T test with skeletal muscle troponin   - repeat EKG if any chest pain    #Gallbladder lesion  CT notable for gallbladder cyst vs. polyp measured at 11mm.  - recommend GI f/u - polyps > 10 mm may warrant resection, especially in pt with dermatomyositis at elevated risk for malignancy    #Transaminitis   AST//84.  Likely i/s/o myositis  - continue to monitor CMPs  - would avoid statins for now    #?Hematuria  UA 2/1 with moderate blood and no RBCs.  Possibly 2/2 myoglobin from muscle breakdown vs. hemolysis (though not anemic and had elevated LDH but can also be elev. as acute phase reactant)  - repeat UA in future to r/o microscopic hematuria    #Abnormal CT findings  Multiple abnormal CT findings including: lung cyst, liver cysts, adrenal nodular thickening, bilateral inguinal hernias  - would advise pt to bring CT findings to next PCP appointment for further follow up

## 2023-02-03 NOTE — OCCUPATIONAL THERAPY INITIAL EVALUATION ADULT - DIAGNOSIS, OT EVAL
Pt. admitted to [facility] w/ dx of dermatomyositis and progressive weakness. Pt. presents w. increased weakness in b/l UEs ( shoulder > forearm), as well as slight impairments in activity tolerance and balance impacting his ability to perform ADLs at his PLOF.

## 2023-02-03 NOTE — CONSULT NOTE ADULT - ATTENDING COMMENTS
72M PMH HIV, HTN, HLD, DM, and recently diagnosed Dermatomyositis who presented 2/1 with dysphagia and muscle weakness, admitted to neurology for further evaluation. Medicine consulted for preop clearance for deltoid muscle biopsy.    #Preop clearance  # Troponemia   # Murmurs  - no CV symptoms   - no ischemic changes on ECG   - CT findings showing possible HOCM (Asymmetric thickening of the interventricular septum up to 2.0 cm thickness as compared to the lateral wall left ventricle)  - TTE showing EF>75%, grade II LV diastolic dysfunction and PASP 43 mmHg  - cardiology evaluation ( can be outpt) for further evaluation and management.  - METS at least 4  - no contraindication for low risk procedure     # Transaminitis   # elevated CPK  # Dermatomyositis   - elevated AST/ALT likely 2/2 Dermatomyositis, HCV NR, avoid statin   - CPK<5000, no need for IVF hydration   - awaiting Deltoid biopsy  - noted gallbladder cyst Vs polyp 11mm, GI eval given Dermatomyositis can be paraneoplastic syndrome. increased risk of malignancy     #Vit B12 deficiency  - P19=415 (Borderline low)  - check MMA and homocysteine,and replete VIt B12 preferred 1000mg IM daily inpatient, may send home on Vit B 12 1000mg po daily     rest per Dr. Santillan's detailed note    Thank you for the consult, Med consult team continues to follow with you.

## 2023-02-03 NOTE — PROGRESS NOTE ADULT - SUBJECTIVE AND OBJECTIVE BOX
OVERNIGHT EVENTS:  None.    SUBJECTIVE / INTERVAL HPI: Patient seen and examined at bedside.  In no distress, endorsing no chest pain, SOB, or other discomfort.    VITAL SIGNS:  Vital Signs Last 24 Hrs  T(C): 36.8 (03 Feb 2023 05:12), Max: 37.5 (02 Feb 2023 11:42)  T(F): 98.2 (03 Feb 2023 05:12), Max: 99.5 (02 Feb 2023 11:42)  HR: 82 (03 Feb 2023 05:12) (82 - 103)  BP: 144/70 (03 Feb 2023 05:12) (133/68 - 144/70)  BP(mean): --  RR: 18 (02 Feb 2023 20:40) (18 - 18)  SpO2: 95% (03 Feb 2023 05:12) (95% - 98%)    Parameters below as of 03 Feb 2023 05:12  Patient On (Oxygen Delivery Method): room air      I&O's Summary      PHYSICAL EXAM:  Constitutional: No acute distress, conversant  Eyes: Anicteric sclerae, moist conjunctivae, see below for CNs  Neck: trachea midline, FROM, supple,   Cardiovascular: Tachycardic. Tricuspid and Mitral region murmur. No rubs, or gallops. No carotid bruits.   Pulmonary: posterior breath sounds clear bilaterally, no crackles or wheezing. No use of accessory muscles  GI: Abdomen soft, distended, non-tender  Extremities: Radial and DP pulses +2, no edema  Skin: Erythematous face, neck, shoulder and chest, Heliotropic rash and Gottron's papules   Neurologic:  -Mental status: Awake, alert, oriented to person, place, and time. Speech is fluent , no dysarthria.   -Cranial nerves:   II: Visual fields are full to confrontation.  III, IV, VI: Extraocular movements are intact without nystagmus. Pupils equally round and reactive to light  V:  Facial sensation V1-V3 equal and intact   VII: Face is symmetric with normal eye closure and smile  VIII: Hearing is bilaterally intact to finger rub  IX, X: Uvula is midline and soft palate rises symmetrically  XI: Head turning and shoulder shrug are intact.  XII: Tongue protrudes midline  Motor: Normal tone. decrease muscle bulk of UE/LE. 4-/5 Deltoid, biceps, triceps. 5/5 brachioradialis, wrist flex/ext, finger abductors/adductors. 4+/5 hip flexion, 5/5 knee felx/ext, dorsiflex/plantarflex   Sensation: Intact to light touch. pp, VIB bilaterally. No neglect or extinction on double simultaneous testing.  Coordination: No dysmetria on finger-to-nose however, limited shoulder ROM  Reflexes: Downgoing toes bilaterally, 2+DTR UE/LE R=L  Gait: Narrow gait and steady, Romberg Neg    MEDICATIONS:  MEDICATIONS  (STANDING):  amLODIPine   Tablet 10 milliGRAM(s) Oral daily  dextrose 5%. 1000 milliLiter(s) (50 mL/Hr) IV Continuous <Continuous>  dextrose 5%. 1000 milliLiter(s) (100 mL/Hr) IV Continuous <Continuous>  dextrose 50% Injectable 25 Gram(s) IV Push once  dextrose 50% Injectable 12.5 Gram(s) IV Push once  dextrose 50% Injectable 25 Gram(s) IV Push once  dolutegravir 50 milliGRAM(s) Oral daily  enoxaparin Injectable 40 milliGRAM(s) SubCutaneous every 24 hours  glucagon  Injectable 1 milliGRAM(s) IntraMuscular once  insulin lispro (ADMELOG) corrective regimen sliding scale   SubCutaneous every 6 hours  predniSONE   Tablet 50 milliGRAM(s) Oral daily  rilpivirine 25 milliGRAM(s) Oral with dinner    MEDICATIONS  (PRN):  dextrose Oral Gel 15 Gram(s) Oral once PRN Blood Glucose LESS THAN 70 milliGRAM(s)/deciliter      ALLERGIES:  Allergies    No Known Allergies    Intolerances        LABS:                        13.3   7.65  )-----------( 214      ( 03 Feb 2023 06:36 )             42.2     02-03    140  |  103  |  17  ----------------------------<  92  4.0   |  26  |  0.58    Ca    9.0      03 Feb 2023 06:36  Phos  3.9     02-03  Mg     2.1     02-03    TPro  6.3  /  Alb  3.2<L>  /  TBili  0.4  /  DBili  x   /  AST  174<H>  /  ALT  84<H>  /  AlkPhos  59  02-03    PT/INR - ( 02 Feb 2023 05:30 )   PT: 12.6 sec;   INR: 1.06          PTT - ( 02 Feb 2023 05:30 )  PTT:28.8 sec  Urinalysis Basic - ( 01 Feb 2023 18:25 )    Color: Yellow / Appearance: Clear / SG: >=1.030 / pH: x  Gluc: x / Ketone: NEGATIVE  / Bili: Negative / Urobili: 0.2 E.U./dL   Blood: x / Protein: 100 mg/dL / Nitrite: NEGATIVE   Leuk Esterase: NEGATIVE / RBC: < 5 /HPF / WBC < 5 /HPF   Sq Epi: x / Non Sq Epi: 0-5 /HPF / Bacteria: Present /HPF      CAPILLARY BLOOD GLUCOSE      POCT Blood Glucose.: 85 mg/dL (03 Feb 2023 04:20)      RADIOLOGY & ADDITIONAL TESTS: Reviewed. OVERNIGHT EVENTS:  None.    SUBJECTIVE / INTERVAL HPI: Patient seen and examined at bedside.  In no distress, endorsing no chest pain, SOB, or other discomfort.    VITAL SIGNS:  Vital Signs Last 24 Hrs  T(C): 36.8 (03 Feb 2023 05:12), Max: 37.5 (02 Feb 2023 11:42)  T(F): 98.2 (03 Feb 2023 05:12), Max: 99.5 (02 Feb 2023 11:42)  HR: 82 (03 Feb 2023 05:12) (82 - 103)  BP: 144/70 (03 Feb 2023 05:12) (133/68 - 144/70)  BP(mean): --  RR: 18 (02 Feb 2023 20:40) (18 - 18)  SpO2: 95% (03 Feb 2023 05:12) (95% - 98%)    Parameters below as of 03 Feb 2023 05:12  Patient On (Oxygen Delivery Method): room air      I&O's Summary      PHYSICAL EXAM:  Constitutional: No acute distress, conversant  Eyes: Anicteric sclerae, moist conjunctivae, see below for CNs  Neck: trachea midline, FROM, supple,   Cardiovascular: Tachycardic. Tricuspid and Mitral region murmur. No rubs, or gallops. No carotid bruits.   Pulmonary: posterior breath sounds clear bilaterally, no crackles or wheezing. No use of accessory muscles  GI: Abdomen soft, distended, non-tender  Extremities: Radial and DP pulses +2, no edema  Skin: Erythematous face, neck, shoulder and chest, Heliotropic rash and Gottron's papules   Neurologic:  -Mental status: Awake, alert, oriented to person, place, and time. Speech is fluent , no dysarthria.   -Cranial nerves:   II: Visual fields are full to confrontation.  III, IV, VI: Extraocular movements are intact without nystagmus. Pupils equally round and reactive to light  V:  Facial sensation V1-V3 equal and intact   VII: Face is symmetric with normal eye closure and smile  VIII: Hearing is bilaterally intact to finger rub  IX, X: Uvula is midline and soft palate rises symmetrically  XI: Head turning and shoulder shrug are intact.  XII: Tongue protrudes midline  Motor: Normal tone. decrease muscle bulk of UE/LE. 4-/5 Deltoid, biceps, triceps. 5/5 brachioradialis, wrist flex/ext, finger abductors/adductors. 4+/5 hip flexion, 5/5 knee felx/ext, dorsiflex/plantarflex   Sensation: Intact to light touch. pp, VIB bilaterally. No neglect or extinction on double simultaneous testing.  Coordination: No dysmetria on finger-to-nose however, limited shoulder ROM  Reflexes: Downgoing toes bilaterally, 2+DTR UE/LE R=L  Gait: Narrow gait and steady, Romberg Neg  ADLs improved, out of bed with less difficulty.     MEDICATIONS:  MEDICATIONS  (STANDING):  amLODIPine   Tablet 10 milliGRAM(s) Oral daily  dextrose 5%. 1000 milliLiter(s) (50 mL/Hr) IV Continuous <Continuous>  dextrose 5%. 1000 milliLiter(s) (100 mL/Hr) IV Continuous <Continuous>  dextrose 50% Injectable 25 Gram(s) IV Push once  dextrose 50% Injectable 12.5 Gram(s) IV Push once  dextrose 50% Injectable 25 Gram(s) IV Push once  dolutegravir 50 milliGRAM(s) Oral daily  enoxaparin Injectable 40 milliGRAM(s) SubCutaneous every 24 hours  glucagon  Injectable 1 milliGRAM(s) IntraMuscular once  insulin lispro (ADMELOG) corrective regimen sliding scale   SubCutaneous every 6 hours  predniSONE   Tablet 50 milliGRAM(s) Oral daily  rilpivirine 25 milliGRAM(s) Oral with dinner    MEDICATIONS  (PRN):  dextrose Oral Gel 15 Gram(s) Oral once PRN Blood Glucose LESS THAN 70 milliGRAM(s)/deciliter      ALLERGIES:  Allergies    No Known Allergies    Intolerances        LABS:                        13.3   7.65  )-----------( 214      ( 03 Feb 2023 06:36 )             42.2     02-03    140  |  103  |  17  ----------------------------<  92  4.0   |  26  |  0.58    Ca    9.0      03 Feb 2023 06:36  Phos  3.9     02-03  Mg     2.1     02-03    TPro  6.3  /  Alb  3.2<L>  /  TBili  0.4  /  DBili  x   /  AST  174<H>  /  ALT  84<H>  /  AlkPhos  59  02-03    PT/INR - ( 02 Feb 2023 05:30 )   PT: 12.6 sec;   INR: 1.06          PTT - ( 02 Feb 2023 05:30 )  PTT:28.8 sec  Urinalysis Basic - ( 01 Feb 2023 18:25 )    Color: Yellow / Appearance: Clear / SG: >=1.030 / pH: x  Gluc: x / Ketone: NEGATIVE  / Bili: Negative / Urobili: 0.2 E.U./dL   Blood: x / Protein: 100 mg/dL / Nitrite: NEGATIVE   Leuk Esterase: NEGATIVE / RBC: < 5 /HPF / WBC < 5 /HPF   Sq Epi: x / Non Sq Epi: 0-5 /HPF / Bacteria: Present /HPF      CAPILLARY BLOOD GLUCOSE      POCT Blood Glucose.: 85 mg/dL (03 Feb 2023 04:20)      RADIOLOGY & ADDITIONAL TESTS: Reviewed.

## 2023-02-03 NOTE — CONSULT NOTE ADULT - CONSULT REASON
Rash, Proximal Weakness UE > LE w/ difficulty swallowing
muscle biopsy
Preop clearance
PM&R evaluation
Troponin elevation

## 2023-02-03 NOTE — OCCUPATIONAL THERAPY INITIAL EVALUATION ADULT - GENERAL OBSERVATIONS, REHAB EVAL
ANJU Canada and MD Stevenson clearing pt. for OOB. PT Maria Luz present. Pt. received semi-supine in bed, + heplock in NAD agreeable to therapy session.

## 2023-02-04 LAB
ALBUMIN SERPL ELPH-MCNC: 3.1 G/DL — LOW (ref 3.3–5)
ALP SERPL-CCNC: 60 U/L — SIGNIFICANT CHANGE UP (ref 40–120)
ALT FLD-CCNC: 86 U/L — HIGH (ref 10–45)
ANION GAP SERPL CALC-SCNC: 8 MMOL/L — SIGNIFICANT CHANGE UP (ref 5–17)
APPEARANCE UR: CLEAR — SIGNIFICANT CHANGE UP
AST SERPL-CCNC: 167 U/L — HIGH (ref 10–40)
BACTERIA # UR AUTO: PRESENT /HPF
BASOPHILS # BLD AUTO: 0.03 K/UL — SIGNIFICANT CHANGE UP (ref 0–0.2)
BASOPHILS NFR BLD AUTO: 0.4 % — SIGNIFICANT CHANGE UP (ref 0–2)
BILIRUB SERPL-MCNC: 0.3 MG/DL — SIGNIFICANT CHANGE UP (ref 0.2–1.2)
BILIRUB UR-MCNC: NEGATIVE — SIGNIFICANT CHANGE UP
BLD GP AB SCN SERPL QL: NEGATIVE — SIGNIFICANT CHANGE UP
BUN SERPL-MCNC: 16 MG/DL — SIGNIFICANT CHANGE UP (ref 7–23)
CALCIUM SERPL-MCNC: 8.8 MG/DL — SIGNIFICANT CHANGE UP (ref 8.4–10.5)
CHLORIDE SERPL-SCNC: 100 MMOL/L — SIGNIFICANT CHANGE UP (ref 96–108)
CO2 SERPL-SCNC: 27 MMOL/L — SIGNIFICANT CHANGE UP (ref 22–31)
COLOR SPEC: YELLOW — SIGNIFICANT CHANGE UP
COMMENT - URINE: SIGNIFICANT CHANGE UP
CREAT SERPL-MCNC: 0.63 MG/DL — SIGNIFICANT CHANGE UP (ref 0.5–1.3)
DIFF PNL FLD: ABNORMAL
EGFR: 102 ML/MIN/1.73M2 — SIGNIFICANT CHANGE UP
EOSINOPHIL # BLD AUTO: 0.1 K/UL — SIGNIFICANT CHANGE UP (ref 0–0.5)
EOSINOPHIL NFR BLD AUTO: 1.3 % — SIGNIFICANT CHANGE UP (ref 0–6)
EPI CELLS # UR: SIGNIFICANT CHANGE UP /HPF (ref 0–5)
GLUCOSE BLDC GLUCOMTR-MCNC: 112 MG/DL — HIGH (ref 70–99)
GLUCOSE BLDC GLUCOMTR-MCNC: 151 MG/DL — HIGH (ref 70–99)
GLUCOSE BLDC GLUCOMTR-MCNC: 224 MG/DL — HIGH (ref 70–99)
GLUCOSE BLDC GLUCOMTR-MCNC: 92 MG/DL — SIGNIFICANT CHANGE UP (ref 70–99)
GLUCOSE SERPL-MCNC: 109 MG/DL — HIGH (ref 70–99)
GLUCOSE UR QL: NEGATIVE — SIGNIFICANT CHANGE UP
HCT VFR BLD CALC: 43.8 % — SIGNIFICANT CHANGE UP (ref 39–50)
HGB BLD-MCNC: 13.6 G/DL — SIGNIFICANT CHANGE UP (ref 13–17)
HYALINE CASTS # UR AUTO: SIGNIFICANT CHANGE UP /LPF (ref 0–2)
IMM GRANULOCYTES NFR BLD AUTO: 0.5 % — SIGNIFICANT CHANGE UP (ref 0–0.9)
KETONES UR-MCNC: ABNORMAL MG/DL
LEUKOCYTE ESTERASE UR-ACNC: NEGATIVE — SIGNIFICANT CHANGE UP
LYMPHOCYTES # BLD AUTO: 0.79 K/UL — LOW (ref 1–3.3)
LYMPHOCYTES # BLD AUTO: 10.2 % — LOW (ref 13–44)
MAGNESIUM SERPL-MCNC: 2.1 MG/DL — SIGNIFICANT CHANGE UP (ref 1.6–2.6)
MCHC RBC-ENTMCNC: 26.6 PG — LOW (ref 27–34)
MCHC RBC-ENTMCNC: 31.1 GM/DL — LOW (ref 32–36)
MCV RBC AUTO: 85.7 FL — SIGNIFICANT CHANGE UP (ref 80–100)
MONOCYTES # BLD AUTO: 0.64 K/UL — SIGNIFICANT CHANGE UP (ref 0–0.9)
MONOCYTES NFR BLD AUTO: 8.3 % — SIGNIFICANT CHANGE UP (ref 2–14)
NEUTROPHILS # BLD AUTO: 6.12 K/UL — SIGNIFICANT CHANGE UP (ref 1.8–7.4)
NEUTROPHILS NFR BLD AUTO: 79.3 % — HIGH (ref 43–77)
NITRITE UR-MCNC: NEGATIVE — SIGNIFICANT CHANGE UP
NRBC # BLD: 0 /100 WBCS — SIGNIFICANT CHANGE UP (ref 0–0)
PH UR: 5.5 — SIGNIFICANT CHANGE UP (ref 5–8)
PHOSPHATE SERPL-MCNC: 3.9 MG/DL — SIGNIFICANT CHANGE UP (ref 2.5–4.5)
PLATELET # BLD AUTO: 225 K/UL — SIGNIFICANT CHANGE UP (ref 150–400)
POTASSIUM SERPL-MCNC: 4 MMOL/L — SIGNIFICANT CHANGE UP (ref 3.5–5.3)
POTASSIUM SERPL-SCNC: 4 MMOL/L — SIGNIFICANT CHANGE UP (ref 3.5–5.3)
PROT SERPL-MCNC: 6 G/DL — SIGNIFICANT CHANGE UP (ref 6–8.3)
PROT UR-MCNC: 100 MG/DL
RBC # BLD: 5.11 M/UL — SIGNIFICANT CHANGE UP (ref 4.2–5.8)
RBC # FLD: 13.4 % — SIGNIFICANT CHANGE UP (ref 10.3–14.5)
RBC CASTS # UR COMP ASSIST: < 5 /HPF — SIGNIFICANT CHANGE UP
RH IG SCN BLD-IMP: POSITIVE — SIGNIFICANT CHANGE UP
SODIUM SERPL-SCNC: 135 MMOL/L — SIGNIFICANT CHANGE UP (ref 135–145)
SP GR SPEC: >=1.03 — SIGNIFICANT CHANGE UP (ref 1–1.03)
TROPONIN T SERPL-MCNC: 0.56 NG/ML — CRITICAL HIGH (ref 0–0.01)
UROBILINOGEN FLD QL: 1 E.U./DL — SIGNIFICANT CHANGE UP
WBC # BLD: 7.72 K/UL — SIGNIFICANT CHANGE UP (ref 3.8–10.5)
WBC # FLD AUTO: 7.72 K/UL — SIGNIFICANT CHANGE UP (ref 3.8–10.5)
WBC UR QL: < 5 /HPF — SIGNIFICANT CHANGE UP

## 2023-02-04 PROCEDURE — 99233 SBSQ HOSP IP/OBS HIGH 50: CPT

## 2023-02-04 PROCEDURE — 99232 SBSQ HOSP IP/OBS MODERATE 35: CPT

## 2023-02-04 RX ORDER — FAMOTIDINE 10 MG/ML
20 INJECTION INTRAVENOUS DAILY
Refills: 0 | Status: DISCONTINUED | OUTPATIENT
Start: 2023-02-04 | End: 2023-02-06

## 2023-02-04 RX ORDER — PREGABALIN 225 MG/1
1000 CAPSULE ORAL DAILY
Refills: 0 | Status: DISCONTINUED | OUTPATIENT
Start: 2023-02-04 | End: 2023-02-04

## 2023-02-04 RX ORDER — PREGABALIN 225 MG/1
1000 CAPSULE ORAL DAILY
Refills: 0 | Status: DISCONTINUED | OUTPATIENT
Start: 2023-02-04 | End: 2023-02-06

## 2023-02-04 RX ADMIN — Medication 60 MILLIGRAM(S): at 06:04

## 2023-02-04 RX ADMIN — PREGABALIN 1000 MICROGRAM(S): 225 CAPSULE ORAL at 19:54

## 2023-02-04 RX ADMIN — DOLUTEGRAVIR SODIUM 50 MILLIGRAM(S): 25 TABLET, FILM COATED ORAL at 12:39

## 2023-02-04 RX ADMIN — ENOXAPARIN SODIUM 40 MILLIGRAM(S): 100 INJECTION SUBCUTANEOUS at 06:04

## 2023-02-04 RX ADMIN — AMLODIPINE BESYLATE 10 MILLIGRAM(S): 2.5 TABLET ORAL at 06:04

## 2023-02-04 RX ADMIN — RILPIVIRINE HYDROCHLORIDE 25 MILLIGRAM(S): 25 TABLET, FILM COATED ORAL at 19:08

## 2023-02-04 NOTE — PROGRESS NOTE ADULT - PROBLEM SELECTOR PLAN 1
Increased Prednisone to 60mg po QD starting 2/3/23.   Barium Swallow performed and resulted as Nasopharyngeal reflux. Mild esophageal dysmotility  Aspiration precaution  SLP eval completed.   CT Chest / Abd/ Pelvis, resulted as: 12 mm air-filled cyst in the right middle lobe, 2.7 cm cyst in segment two of   liver. Subcentimeter hypodensities segment two and six of liver too   small to characterize but likely cysts or biliary hamartomas. Subtle questionable liver contour nodularity. 11 mm radiodensity seen in gallbladder neck could represent calculus or polyp. Nodular thickening bilaterally of the adrenals. Right measures 1.0 x 1.6x 2.2 cm. Left measures 1.5 x 2.1 x 2.1 cm,  1.4 cm cortical cyst interpolar region right kidney. A few bilateral subcentimeter renal cortical hypodensities are too small to characterize    Pending EMG/NCV w/ Dr. Krishna  Hem/onc contacted, recommended given normal labs and imaging, would recommend GI work up.   Neurosurgery to biopsy L deltoid muscle on 6/2/23  PT/OT/PMNR  Daily CPK  Anti Mi2 Ab  GI consulted given CTT/A/P findings, and r/o underlying GI malignancy given correlation with Dermatomyositis. Increased Prednisone to 60mg po QD starting 2/3/23.   Barium Swallow performed and resulted as Nasopharyngeal reflux. Mild esophageal dysmotility  Aspiration precaution  SLP eval completed.   CT Chest / Abd/ Pelvis, resulted as: 12 mm air-filled cyst in the right middle lobe, 2.7 cm cyst in segment two of   liver. Subcentimeter hypodensities segment two and six of liver too   small to characterize but likely cysts or biliary hamartomas. Subtle questionable liver contour nodularity. 11 mm radiodensity seen in gallbladder neck could represent calculus or polyp. Nodular thickening bilaterally of the adrenals. Right measures 1.0 x 1.6x 2.2 cm. Left measures 1.5 x 2.1 x 2.1 cm,  1.4 cm cortical cyst interpolar region right kidney. A few bilateral subcentimeter renal cortical hypodensities are too small to characterize    Pending EMG/NCV w/ Dr. Krishna  Hem/onc contacted, recommended given normal labs and imaging, would recommend GI work up.   Neurosurgery to biopsy L deltoid muscle on 6/2/23  PT/OT/PMNR  Monitor CPK  Anti Mi2 Ab, paraneoplatic panel, free cortisol pending  Will need GI outpatient follow up given CTT/A/P findings, and r/o underlying GI malignancy given correlation with Dermatomyositis.

## 2023-02-04 NOTE — PROGRESS NOTE ADULT - SUBJECTIVE AND OBJECTIVE BOX
INTERVAL HPI/OVERNIGHT EVENTS:  O/N: xray esophagram performed showing nasopharyngeal reflux, mild esophageal dysmotility  This morning: Patient was seen and examined at bedside. Feeling fine overall. WEak in arms but much better. Difficulty holding up back.     VITAL SIGNS:  T(F): 99 (02-04-23 @ 12:30)  HR: 95 (02-04-23 @ 12:30)  BP: 149/76 (02-04-23 @ 12:30)  RR: 18 (02-04-23 @ 12:30)  SpO2: 96% (02-04-23 @ 12:30)  Wt(kg): --    PHYSICAL EXAM:    Constitutional: resting comfortably in bed; NAD  HEENT: NC/AT, EOMI, anicteric sclera, no nasal discharge   Respiratory: breathing comfortably on RA  Dermatologic: erythematous pigmentation over chest, face, and arms; (+) facial puffiness  Neurologic: alert and oriented, responds appropriately, no focal deficits noted  Psychiatric: affect and characteristics of appearance, verbalizations, behaviors are appropriate      MEDICATIONS  (STANDING):  amLODIPine   Tablet 10 milliGRAM(s) Oral daily  dextrose 5%. 1000 milliLiter(s) (100 mL/Hr) IV Continuous <Continuous>  dextrose 5%. 1000 milliLiter(s) (50 mL/Hr) IV Continuous <Continuous>  dextrose 50% Injectable 25 Gram(s) IV Push once  dextrose 50% Injectable 12.5 Gram(s) IV Push once  dextrose 50% Injectable 25 Gram(s) IV Push once  dolutegravir 50 milliGRAM(s) Oral daily  enoxaparin Injectable 40 milliGRAM(s) SubCutaneous every 24 hours  famotidine    Tablet 20 milliGRAM(s) Oral daily  glucagon  Injectable 1 milliGRAM(s) IntraMuscular once  insulin lispro (ADMELOG) corrective regimen sliding scale   SubCutaneous every 6 hours  predniSONE   Tablet 60 milliGRAM(s) Oral daily  rilpivirine 25 milliGRAM(s) Oral with dinner    MEDICATIONS  (PRN):  dextrose Oral Gel 15 Gram(s) Oral once PRN Blood Glucose LESS THAN 70 milliGRAM(s)/deciliter      Allergies    No Known Allergies    Intolerances        LABS:                        13.6   7.72  )-----------( 225      ( 04 Feb 2023 07:48 )             43.8     02-04    135  |  100  |  16  ----------------------------<  109<H>  4.0   |  27  |  0.63    Ca    8.8      04 Feb 2023 07:48  Phos  3.9     02-04  Mg     2.1     02-04    TPro  6.0  /  Alb  3.1<L>  /  TBili  0.3  /  DBili  x   /  AST  167<H>  /  ALT  86<H>  /  AlkPhos  60  02-04      Urinalysis Basic - ( 04 Feb 2023 01:30 )    Color: Yellow / Appearance: Clear / SG: >=1.030 / pH: x  Gluc: x / Ketone: Trace mg/dL  / Bili: Negative / Urobili: 1.0 E.U./dL   Blood: x / Protein: 100 mg/dL / Nitrite: NEGATIVE   Leuk Esterase: NEGATIVE / RBC: < 5 /HPF / WBC < 5 /HPF   Sq Epi: x / Non Sq Epi: 0-5 /HPF / Bacteria: Present /HPF              RADIOLOGY & ADDITIONAL TESTS:  Reviewed

## 2023-02-04 NOTE — PROGRESS NOTE ADULT - SUBJECTIVE AND OBJECTIVE BOX
Neurology Progress Note    Interval History:  No acute events overnight. Patient states he is feeling better today. Still has some weakness benji in the right upper ext but otherwise is feeling okay.     PAST MEDICAL & SURGICAL HISTORY:  HTN (hypertension)    Diabetes    Hypercholesteremia    HIV disease    No significant past surgical history      Medications:  amLODIPine   Tablet 10 milliGRAM(s) Oral daily  dextrose 5%. 1000 milliLiter(s) IV Continuous <Continuous>  dextrose 5%. 1000 milliLiter(s) IV Continuous <Continuous>  dextrose 50% Injectable 25 Gram(s) IV Push once  dextrose 50% Injectable 12.5 Gram(s) IV Push once  dextrose 50% Injectable 25 Gram(s) IV Push once  dextrose Oral Gel 15 Gram(s) Oral once PRN  dolutegravir 50 milliGRAM(s) Oral daily  enoxaparin Injectable 40 milliGRAM(s) SubCutaneous every 24 hours  glucagon  Injectable 1 milliGRAM(s) IntraMuscular once  insulin lispro (ADMELOG) corrective regimen sliding scale   SubCutaneous every 6 hours  predniSONE   Tablet 60 milliGRAM(s) Oral daily  rilpivirine 25 milliGRAM(s) Oral with dinner      Vital Signs Last 24 Hrs  T(C): 36.8 (04 Feb 2023 05:31), Max: 37.1 (03 Feb 2023 21:10)  T(F): 98.3 (04 Feb 2023 05:31), Max: 98.8 (03 Feb 2023 21:10)  HR: 81 (04 Feb 2023 05:31) (81 - 94)  BP: 144/69 (04 Feb 2023 05:31) (144/69 - 152/81)  BP(mean): --  RR: 18 (03 Feb 2023 21:10) (18 - 18)  SpO2: 95% (04 Feb 2023 05:31) (95% - 97%)    Parameters below as of 04 Feb 2023 05:31  Patient On (Oxygen Delivery Method): room air        Neurological Exam:   Mental status: Awake, alert and oriented x3.  Recent and remote memory intact.  Naming, repetition and comprehension intact.  Attention/concentration intact.  No dysarthria, no aphasia.  Fund of knowledge appropriate.    Cranial nerves: Pupils equally round and reactive to light, visual fields full, no nystagmus, extraocular muscles intact, V1 through V3 intact bilaterally and symmetric, face symmetric, hearing intact to finger rub, palate elevation symmetric, tongue was midline.  Motor:  MRC grading 5/5 b/l UE/LE.   strength 5/5.  Normal tone and bulk.  No abnormal movements.    Sensation: Intact to light touch and vibration  Coordination: No dysmetria on finger-to-nose   Reflexes: 2+ in bilateral UE/LE  Gait: Narrow and steady. No ataxia    Labs:  CBC Full  -  ( 04 Feb 2023 07:48 )  WBC Count : 7.72 K/uL  RBC Count : 5.11 M/uL  Hemoglobin : 13.6 g/dL  Hematocrit : 43.8 %  Platelet Count - Automated : 225 K/uL  Mean Cell Volume : 85.7 fl  Mean Cell Hemoglobin : 26.6 pg  Mean Cell Hemoglobin Concentration : 31.1 gm/dL  Auto Neutrophil # : 6.12 K/uL  Auto Lymphocyte # : 0.79 K/uL  Auto Monocyte # : 0.64 K/uL  Auto Eosinophil # : 0.10 K/uL  Auto Basophil # : 0.03 K/uL  Auto Neutrophil % : 79.3 %  Auto Lymphocyte % : 10.2 %  Auto Monocyte % : 8.3 %  Auto Eosinophil % : 1.3 %  Auto Basophil % : 0.4 %    02-04    135  |  100  |  16  ----------------------------<  109<H>  4.0   |  27  |  0.63    Ca    8.8      04 Feb 2023 07:48  Phos  3.9     02-04  Mg     2.1     02-04    TPro  6.0  /  Alb  3.1<L>  /  TBili  0.3  /  DBili  x   /  AST  167<H>  /  ALT  86<H>  /  AlkPhos  60  02-04    LIVER FUNCTIONS - ( 04 Feb 2023 07:48 )  Alb: 3.1 g/dL / Pro: 6.0 g/dL / ALK PHOS: 60 U/L / ALT: 86 U/L / AST: 167 U/L / GGT: x             Urinalysis Basic - ( 04 Feb 2023 01:30 )    Color: Yellow / Appearance: Clear / SG: >=1.030 / pH: x  Gluc: x / Ketone: Trace mg/dL  / Bili: Negative / Urobili: 1.0 E.U./dL   Blood: x / Protein: 100 mg/dL / Nitrite: NEGATIVE   Leuk Esterase: NEGATIVE / RBC: < 5 /HPF / WBC < 5 /HPF   Sq Epi: x / Non Sq Epi: 0-5 /HPF / Bacteria: Present /HPF        Assessment:  This is a 72y Male w/ h/o     Plan: Neurology Progress Note    Interval History:  No acute events overnight. Patient states he is feeling better today. Still has some weakness benji in the right upper ext but otherwise is feeling okay.     PAST MEDICAL & SURGICAL HISTORY:  HTN (hypertension)    Diabetes    Hypercholesteremia    HIV disease    No significant past surgical history      Medications:  amLODIPine   Tablet 10 milliGRAM(s) Oral daily  dextrose 5%. 1000 milliLiter(s) IV Continuous <Continuous>  dextrose 5%. 1000 milliLiter(s) IV Continuous <Continuous>  dextrose 50% Injectable 25 Gram(s) IV Push once  dextrose 50% Injectable 12.5 Gram(s) IV Push once  dextrose 50% Injectable 25 Gram(s) IV Push once  dextrose Oral Gel 15 Gram(s) Oral once PRN  dolutegravir 50 milliGRAM(s) Oral daily  enoxaparin Injectable 40 milliGRAM(s) SubCutaneous every 24 hours  glucagon  Injectable 1 milliGRAM(s) IntraMuscular once  insulin lispro (ADMELOG) corrective regimen sliding scale   SubCutaneous every 6 hours  predniSONE   Tablet 60 milliGRAM(s) Oral daily  rilpivirine 25 milliGRAM(s) Oral with dinner      Vital Signs Last 24 Hrs  T(C): 36.8 (04 Feb 2023 05:31), Max: 37.1 (03 Feb 2023 21:10)  T(F): 98.3 (04 Feb 2023 05:31), Max: 98.8 (03 Feb 2023 21:10)  HR: 81 (04 Feb 2023 05:31) (81 - 94)  BP: 144/69 (04 Feb 2023 05:31) (144/69 - 152/81)  BP(mean): --  RR: 18 (03 Feb 2023 21:10) (18 - 18)  SpO2: 95% (04 Feb 2023 05:31) (95% - 97%)    Parameters below as of 04 Feb 2023 05:31  Patient On (Oxygen Delivery Method): room air        Neurological Exam:   Mental status: Awake, alert and oriented x3.  Recent and remote memory intact.  Naming, repetition and comprehension intact.  Attention/concentration intact.  No dysarthria, no aphasia.  Fund of knowledge appropriate.    Cranial nerves: Pupils equally round and reactive to light, visual fields full, no nystagmus, extraocular muscles intact, V1 through V3 intact bilaterally and symmetric, face symmetric, hearing intact to finger rub, palate elevation symmetric, tongue was midline.  Motor:  MRC grading B/L triceps, deltoid weakness 3+/5. Strength 5/5 in B/L LE   strength 5/5.  Normal tone and bulk.  No abnormal movements.    Sensation: Intact to light touch and vibration  Coordination: No dysmetria on finger-to-nose   Reflexes: 2+ in bilateral UE/LE  Gait: Narrow and steady. No ataxia    Labs:  CBC Full  -  ( 04 Feb 2023 07:48 )  WBC Count : 7.72 K/uL  RBC Count : 5.11 M/uL  Hemoglobin : 13.6 g/dL  Hematocrit : 43.8 %  Platelet Count - Automated : 225 K/uL  Mean Cell Volume : 85.7 fl  Mean Cell Hemoglobin : 26.6 pg  Mean Cell Hemoglobin Concentration : 31.1 gm/dL  Auto Neutrophil # : 6.12 K/uL  Auto Lymphocyte # : 0.79 K/uL  Auto Monocyte # : 0.64 K/uL  Auto Eosinophil # : 0.10 K/uL  Auto Basophil # : 0.03 K/uL  Auto Neutrophil % : 79.3 %  Auto Lymphocyte % : 10.2 %  Auto Monocyte % : 8.3 %  Auto Eosinophil % : 1.3 %  Auto Basophil % : 0.4 %    02-04    135  |  100  |  16  ----------------------------<  109<H>  4.0   |  27  |  0.63    Ca    8.8      04 Feb 2023 07:48  Phos  3.9     02-04  Mg     2.1     02-04    TPro  6.0  /  Alb  3.1<L>  /  TBili  0.3  /  DBili  x   /  AST  167<H>  /  ALT  86<H>  /  AlkPhos  60  02-04    LIVER FUNCTIONS - ( 04 Feb 2023 07:48 )  Alb: 3.1 g/dL / Pro: 6.0 g/dL / ALK PHOS: 60 U/L / ALT: 86 U/L / AST: 167 U/L / GGT: x             Urinalysis Basic - ( 04 Feb 2023 01:30 )    Color: Yellow / Appearance: Clear / SG: >=1.030 / pH: x  Gluc: x / Ketone: Trace mg/dL  / Bili: Negative / Urobili: 1.0 E.U./dL   Blood: x / Protein: 100 mg/dL / Nitrite: NEGATIVE   Leuk Esterase: NEGATIVE / RBC: < 5 /HPF / WBC < 5 /HPF   Sq Epi: x / Non Sq Epi: 0-5 /HPF / Bacteria: Present /HPF

## 2023-02-04 NOTE — PROGRESS NOTE ADULT - ASSESSMENT
72M PMH HTN, HLD, DM, HIV, and recently diagnosed dermatomyositis who presented 2/1 with dysphagia and muscle weakness, admitted to neurology for further evaluation. Medicine consulted for preop clearance for deltoid muscle biopsy.    #Preop clearance  METS likely at least 4  No cardiopulmonary symptoms  RCRI 0 points class I risk ie 3.9% 30-day risk of death, MI, or cardiac arrest  Casiano score ~0.1 % risk of myocardial infarction or cardiac arrest, intraoperatively or up to 30 days post-op  No contraindications to proceed with surgery  However, given pt's loud murmur and CT findings showing possible HOCM (Asymmetric thickening of the   interventricular septum up to 2.0 cm thickness as compared to the lateral wall left ventricle), as well as TTE showing EF>75%, grade II LV diastolic dysfunction and PASP 43, would recommend cardiology follow up for further evaluation and management.    #Dermatomyositis  - recommend starting famotidine 20mg qD for GI ppx while on prednisone  - would start bactrim DS 1 tab daily for PCP ppx if will be on prednisone 20mg or greater for 4+ weeks, especially given hx of HIV (already on prednisone 50-60 for ~2.5 weeks since 1/19/23)    #DM  a1c 7% on 2/2/23.  Glucose well controlled in patient  - continue ISS    #Low vitB12   B12 401, borderline low  - f/u MMA and homocysteine  - start daily vitB12 supplementation - can give IM initially then transition to PO    #Troponinemia  Troponins .5 -> .4 -> .55 (2/3)  No chest pain or palpitations  EKG 2/1 showed NSR with no ischemic changes  Suspect elevated due to cross-reactivity of troponin T test with skeletal muscle troponin   - repeat EKG if any chest pain    #HTN  On amlodipine 10 at home and here.  BPs 130s-150s. UA with proteinuria.  - continue amlodipine 10mg daily  - repeat UA in future to monitor kidneys for any worsening proteinuria    #Gallbladder lesion  CT notable for gallbladder cyst vs. polyp measured at 11mm.  - recommend GI f/u - polyps > 10 mm may warrant resection, especially in pt with dermatomyositis at elevated risk for malignancy    #Transaminitis   AST//84. HCV negative.  Likely i/s/o myositis  - continue to monitor CMPs  - would avoid statins for now  - check hepB panel (HepB surface antigen, HBsAb, HBcAb)    #?Hematuria  UA 2/1, 2/4 with moderate blood and no RBCs.  Possibly 2/2 myoglobin from muscle breakdown vs. hemolysis (though not anemic and had elevated LDH but can also be elev. as acute phase reactant)    #Abnormal CT findings  Multiple abnormal CT findings including: lung cyst, liver cysts, adrenal nodular thickening, bilateral inguinal hernias  - would advise pt to bring CT findings to next PCP appointment for further follow up

## 2023-02-04 NOTE — PROGRESS NOTE ADULT - SUBJECTIVE AND OBJECTIVE BOX
Physical Medicine and Rehabilitation Progress Note :       Patient is a 72y old  Male who presents with a chief complaint of Dermatomyositis (04 Feb 2023 13:14)      HPI:  Mr. Hess is a 73yo M w/ HIV (HAART), POA w/ proximal weakness UE > LE, rash and dysphagia.     He reported that around 09/2022 he experienced redness and edema of the face around the time he started Dovato. He was treated over 2 weeks w/ Prednisone 20mg tab which improves his rash. 12/2022 he noticed weakness when trying to lift his luggage to the overhead during his flight as well as difficult sitting up in bed from supine position. He described 1 episode when he fell backwards (no head trauma) but could not get to his feet w/out support. During this time his PCP stopped his Lisinopril, statin, Verapamil as well as make changes to his HAART however, no improvement of weakness. He visited the ED 1/19/23 and was started on Prednisone 50mg QD still with no improvement. He had visited a dermatology s/p skin biopsy and visited a Rheumatologist for further w/u of myositis. Today he c/o 2 episode 1 yesterday and 1 today in which he had discomfort swallowing a piece of bagel.     He is pending EMG w/ Neurologist Dr. Krishna 02/07.     He denies muscle pain/tenderness, and difficulty speaking. He denies h/o cancer or family h/o myositis.  (02 Feb 2023 02:17)                            13.6   7.72  )-----------( 225      ( 04 Feb 2023 07:48 )             43.8       02-04    135  |  100  |  16  ----------------------------<  109<H>  4.0   |  27  |  0.63    Ca    8.8      04 Feb 2023 07:48  Phos  3.9     02-04  Mg     2.1     02-04    TPro  6.0  /  Alb  3.1<L>  /  TBili  0.3  /  DBili  x   /  AST  167<H>  /  ALT  86<H>  /  AlkPhos  60  02-04    Vital Signs Last 24 Hrs  T(C): 37.2 (04 Feb 2023 12:30), Max: 37.2 (04 Feb 2023 12:30)  T(F): 99 (04 Feb 2023 12:30), Max: 99 (04 Feb 2023 12:30)  HR: 95 (04 Feb 2023 12:30) (81 - 95)  BP: 149/76 (04 Feb 2023 12:30) (144/69 - 152/81)  BP(mean): --  RR: 18 (04 Feb 2023 12:30) (18 - 18)  SpO2: 96% (04 Feb 2023 12:30) (95% - 97%)    Parameters below as of 04 Feb 2023 12:30  Patient On (Oxygen Delivery Method): room air        MEDICATIONS  (STANDING):  amLODIPine   Tablet 10 milliGRAM(s) Oral daily  dextrose 5%. 1000 milliLiter(s) (50 mL/Hr) IV Continuous <Continuous>  dextrose 5%. 1000 milliLiter(s) (100 mL/Hr) IV Continuous <Continuous>  dextrose 50% Injectable 25 Gram(s) IV Push once  dextrose 50% Injectable 12.5 Gram(s) IV Push once  dextrose 50% Injectable 25 Gram(s) IV Push once  dolutegravir 50 milliGRAM(s) Oral daily  enoxaparin Injectable 40 milliGRAM(s) SubCutaneous every 24 hours  glucagon  Injectable 1 milliGRAM(s) IntraMuscular once  insulin lispro (ADMELOG) corrective regimen sliding scale   SubCutaneous every 6 hours  predniSONE   Tablet 60 milliGRAM(s) Oral daily  rilpivirine 25 milliGRAM(s) Oral with dinner    MEDICATIONS  (PRN):  dextrose Oral Gel 15 Gram(s) Oral once PRN Blood Glucose LESS THAN 70 milliGRAM(s)/deciliter        Initial Physical / Occupational Therapy Functional Status Assessment :         PM&R Impression : as above    Current Disposition Plan Recommendations :   d/c home , outpatient PT/OT

## 2023-02-04 NOTE — PROGRESS NOTE ADULT - ASSESSMENT
This is a 73yo M w/ findings of Dermatomyositis - heliotrope rash, Gluttons' papule, prox muscle weakness currently being w/u by rheumatology and pending EMG 02/07 presented for progression of his disease now  2 episodes of dysphagia to solids and no improvement w/ current dosage of steroid therapy. Admitted for workup of etiology, Myopathy vs Dermatomyositis.   On Increased Prednisone Pt showed general improvement    This is a 73yo M w/ findings of Dermatomyositis - heliotrope rash, Gluttons' papule, prox muscle weakness currently being w/u by rheumatology and pending EMG 02/07 presented for progression of his disease now  2 episodes of dysphagia to solids and no improvement w/ current dosage of steroid therapy. Admitted for workup of etiology, Myopathy vs Dermatomyositis.

## 2023-02-04 NOTE — PROGRESS NOTE ADULT - ASSESSMENT
per Internal Medicine    72 y o M PMH HIV, HTN, HLD, DM, and recently diagnosed Dermatomyositis who presented 2/1 with dysphagia and muscle weakness, admitted to neurology for further evaluation. Medicine consulted for preop clearance for deltoid muscle biopsy.    #Preop clearance  # Troponemia   # Murmurs  - no CV symptoms   - no ischemic changes on ECG   - CT findings showing possible HOCM (Asymmetric thickening of the interventricular septum up to 2.0 cm thickness as compared to the lateral wall left ventricle)  - TTE showing EF>75%, grade II LV diastolic dysfunction and PASP 43 mmHg  - cardiology evaluation ( can be outpt) for further evaluation and management   - METS at least 4  - no contraindication for low risk procedure: Deltoid biopsy     # Transaminitis   # elevated CPK  # Dermatomyositis   - elevated AST/ALT likely 2/2 Dermatomyositis, HCV NR, avoid statin   - CPK<5000, no need for IVF hydration   - awaiting Deltoid biopsy on Monday 2/6  - c/w Prednisone 50mg-> 60mg po daily ( 2/4-)   - will add H2B i.e. Famotidine 20mg daily for GI ppx   - will add Bactrim DS 1 tab daily for PCP ppx if pt required prolonged steroid ( i.e. Prednisone 20mg daily for more than 4 weeks)   - noted gallbladder cyst Vs polyp 11mm, GI eval given Dermatomyositis can be paraneoplastic syndrome. increased risk of malignancy     #Vit B12 deficiency  - Q54=479 (Borderline low)  - check MMA and homocysteine,and replete VIt B12 preferred 1000mg IM daily inpatient, may send home on Vit B 12 1000mg po daily

## 2023-02-05 ENCOUNTER — TRANSCRIPTION ENCOUNTER (OUTPATIENT)
Age: 73
End: 2023-02-05

## 2023-02-05 LAB
ALBUMIN SERPL ELPH-MCNC: 3.1 G/DL — LOW (ref 3.3–5)
ALP SERPL-CCNC: 59 U/L — SIGNIFICANT CHANGE UP (ref 40–120)
ALT FLD-CCNC: 92 U/L — HIGH (ref 10–45)
ANION GAP SERPL CALC-SCNC: 9 MMOL/L — SIGNIFICANT CHANGE UP (ref 5–17)
AST SERPL-CCNC: 167 U/L — HIGH (ref 10–40)
BASOPHILS # BLD AUTO: 0.03 K/UL — SIGNIFICANT CHANGE UP (ref 0–0.2)
BASOPHILS NFR BLD AUTO: 0.4 % — SIGNIFICANT CHANGE UP (ref 0–2)
BILIRUB SERPL-MCNC: 0.3 MG/DL — SIGNIFICANT CHANGE UP (ref 0.2–1.2)
BUN SERPL-MCNC: 16 MG/DL — SIGNIFICANT CHANGE UP (ref 7–23)
CALCIUM SERPL-MCNC: 8.7 MG/DL — SIGNIFICANT CHANGE UP (ref 8.4–10.5)
CHLORIDE SERPL-SCNC: 104 MMOL/L — SIGNIFICANT CHANGE UP (ref 96–108)
CO2 SERPL-SCNC: 26 MMOL/L — SIGNIFICANT CHANGE UP (ref 22–31)
CREAT SERPL-MCNC: 0.66 MG/DL — SIGNIFICANT CHANGE UP (ref 0.5–1.3)
EGFR: 100 ML/MIN/1.73M2 — SIGNIFICANT CHANGE UP
EOSINOPHIL # BLD AUTO: 0.12 K/UL — SIGNIFICANT CHANGE UP (ref 0–0.5)
EOSINOPHIL NFR BLD AUTO: 1.7 % — SIGNIFICANT CHANGE UP (ref 0–6)
GLUCOSE BLDC GLUCOMTR-MCNC: 107 MG/DL — HIGH (ref 70–99)
GLUCOSE BLDC GLUCOMTR-MCNC: 108 MG/DL — HIGH (ref 70–99)
GLUCOSE BLDC GLUCOMTR-MCNC: 122 MG/DL — HIGH (ref 70–99)
GLUCOSE BLDC GLUCOMTR-MCNC: 87 MG/DL — SIGNIFICANT CHANGE UP (ref 70–99)
GLUCOSE SERPL-MCNC: 106 MG/DL — HIGH (ref 70–99)
HBV CORE AB SER-ACNC: SIGNIFICANT CHANGE UP
HBV SURFACE AB SER-ACNC: REACTIVE
HBV SURFACE AG SER-ACNC: SIGNIFICANT CHANGE UP
HCT VFR BLD CALC: 42.5 % — SIGNIFICANT CHANGE UP (ref 39–50)
HGB BLD-MCNC: 13 G/DL — SIGNIFICANT CHANGE UP (ref 13–17)
IMM GRANULOCYTES NFR BLD AUTO: 0.7 % — SIGNIFICANT CHANGE UP (ref 0–0.9)
LYMPHOCYTES # BLD AUTO: 0.71 K/UL — LOW (ref 1–3.3)
LYMPHOCYTES # BLD AUTO: 9.9 % — LOW (ref 13–44)
MAGNESIUM SERPL-MCNC: 2.1 MG/DL — SIGNIFICANT CHANGE UP (ref 1.6–2.6)
MCHC RBC-ENTMCNC: 26.5 PG — LOW (ref 27–34)
MCHC RBC-ENTMCNC: 30.6 GM/DL — LOW (ref 32–36)
MCV RBC AUTO: 86.6 FL — SIGNIFICANT CHANGE UP (ref 80–100)
MONOCYTES # BLD AUTO: 0.6 K/UL — SIGNIFICANT CHANGE UP (ref 0–0.9)
MONOCYTES NFR BLD AUTO: 8.4 % — SIGNIFICANT CHANGE UP (ref 2–14)
NEUTROPHILS # BLD AUTO: 5.66 K/UL — SIGNIFICANT CHANGE UP (ref 1.8–7.4)
NEUTROPHILS NFR BLD AUTO: 78.9 % — HIGH (ref 43–77)
NRBC # BLD: 0 /100 WBCS — SIGNIFICANT CHANGE UP (ref 0–0)
PHOSPHATE SERPL-MCNC: 3.9 MG/DL — SIGNIFICANT CHANGE UP (ref 2.5–4.5)
PLATELET # BLD AUTO: 195 K/UL — SIGNIFICANT CHANGE UP (ref 150–400)
POTASSIUM SERPL-MCNC: 4.5 MMOL/L — SIGNIFICANT CHANGE UP (ref 3.5–5.3)
POTASSIUM SERPL-SCNC: 4.5 MMOL/L — SIGNIFICANT CHANGE UP (ref 3.5–5.3)
PROT SERPL-MCNC: 6.2 G/DL — SIGNIFICANT CHANGE UP (ref 6–8.3)
RBC # BLD: 4.91 M/UL — SIGNIFICANT CHANGE UP (ref 4.2–5.8)
RBC # FLD: 13.3 % — SIGNIFICANT CHANGE UP (ref 10.3–14.5)
SARS-COV-2 RNA SPEC QL NAA+PROBE: SIGNIFICANT CHANGE UP
SODIUM SERPL-SCNC: 139 MMOL/L — SIGNIFICANT CHANGE UP (ref 135–145)
WBC # BLD: 7.17 K/UL — SIGNIFICANT CHANGE UP (ref 3.8–10.5)
WBC # FLD AUTO: 7.17 K/UL — SIGNIFICANT CHANGE UP (ref 3.8–10.5)

## 2023-02-05 PROCEDURE — 99232 SBSQ HOSP IP/OBS MODERATE 35: CPT

## 2023-02-05 RX ORDER — POVIDONE-IODINE 5 %
1 AEROSOL (ML) TOPICAL ONCE
Refills: 0 | Status: COMPLETED | OUTPATIENT
Start: 2023-02-06 | End: 2023-02-06

## 2023-02-05 RX ORDER — CHLORHEXIDINE GLUCONATE 213 G/1000ML
1 SOLUTION TOPICAL EVERY 12 HOURS
Refills: 0 | Status: COMPLETED | OUTPATIENT
Start: 2023-02-05 | End: 2023-02-06

## 2023-02-05 RX ADMIN — ENOXAPARIN SODIUM 40 MILLIGRAM(S): 100 INJECTION SUBCUTANEOUS at 05:53

## 2023-02-05 RX ADMIN — RILPIVIRINE HYDROCHLORIDE 25 MILLIGRAM(S): 25 TABLET, FILM COATED ORAL at 18:04

## 2023-02-05 RX ADMIN — Medication 60 MILLIGRAM(S): at 05:53

## 2023-02-05 RX ADMIN — PREGABALIN 1000 MICROGRAM(S): 225 CAPSULE ORAL at 11:59

## 2023-02-05 RX ADMIN — Medication 1 TABLET(S): at 11:58

## 2023-02-05 RX ADMIN — DOLUTEGRAVIR SODIUM 50 MILLIGRAM(S): 25 TABLET, FILM COATED ORAL at 11:58

## 2023-02-05 RX ADMIN — CHLORHEXIDINE GLUCONATE 1 APPLICATION(S): 213 SOLUTION TOPICAL at 18:04

## 2023-02-05 RX ADMIN — AMLODIPINE BESYLATE 10 MILLIGRAM(S): 2.5 TABLET ORAL at 05:54

## 2023-02-05 NOTE — PROGRESS NOTE ADULT - SUBJECTIVE AND OBJECTIVE BOX
Neurology Progress Note    Interval History:        Medications:  amLODIPine   Tablet 10 milliGRAM(s) Oral daily  cyanocobalamin Injectable 1000 MICROGram(s) IntraMuscular daily  dextrose 5%. 1000 milliLiter(s) IV Continuous <Continuous>  dextrose 5%. 1000 milliLiter(s) IV Continuous <Continuous>  dextrose 50% Injectable 25 Gram(s) IV Push once  dextrose 50% Injectable 12.5 Gram(s) IV Push once  dextrose 50% Injectable 25 Gram(s) IV Push once  dextrose Oral Gel 15 Gram(s) Oral once PRN  dolutegravir 50 milliGRAM(s) Oral daily  enoxaparin Injectable 40 milliGRAM(s) SubCutaneous every 24 hours  famotidine    Tablet 20 milliGRAM(s) Oral daily  glucagon  Injectable 1 milliGRAM(s) IntraMuscular once  insulin lispro (ADMELOG) corrective regimen sliding scale   SubCutaneous every 6 hours  predniSONE   Tablet 60 milliGRAM(s) Oral daily  rilpivirine 25 milliGRAM(s) Oral with dinner  trimethoprim  160 mG/sulfamethoxazole 800 mG 1 Tablet(s) Oral daily      Vital Signs Last 24 Hrs  T(C): 37.2 (05 Feb 2023 05:20), Max: 37.2 (04 Feb 2023 12:30)  T(F): 98.9 (05 Feb 2023 05:20), Max: 99 (04 Feb 2023 12:30)  HR: 81 (05 Feb 2023 05:20) (81 - 95)  BP: 133/74 (05 Feb 2023 05:20) (133/74 - 149/76)  BP(mean): --  RR: 19 (05 Feb 2023 05:20) (18 - 19)  SpO2: 95% (05 Feb 2023 05:20) (95% - 96%)    Parameters below as of 05 Feb 2023 05:20  Patient On (Oxygen Delivery Method): room air        Neurological Examination:  General:  Appearance is consistent with chronologic age.  No abnormal facies.  Gross skin survey within normal limits.    Cognitive/Language:  Awake, alert, and oriented to person, place, time and date.  Recent and remote memory intact.  Fund of knowledge is appropriate.  Naming, repetition and comprehension intact.   Nondysarthric.    Cranial Nerves  - Eyes: Visual acuity intact, Visual fields full.  EOMI w/o nystagmus, skew or reported double vision.  PERRL.  No ptosis/weakness of eyelid closure.    - Face:  Facial sensation normal V1 - 3, no facial asymmetry.    - Ears/Nose/Throat:  Hearing grossly intact b/l to finger rub.  Palate elevates midline.  Tongue and uvula midline.   Motor examination:  Upper Extremities: L 5/5, R 5/5; Lower extremities: L 5/5, R 5/5.  No observable drift. Normal tone and bulk. No tenderness, twitching, tremors or involuntary movements.  Sensory examination:   Intact to light touch and pinprick, pain, temperature and proprioception and vibration in all extremities.  Reflexes:   2+ b/l biceps, triceps, brachioradialis, patella and achilles.  Plantar response downgoing b/l.  Jaw jerk, [place], clonus absent.  Cerebellum:   FTN/HKS intact.  No dysmetria or dysdiadokinesia.  Gait narrow based and normal.    Labs:  CBC Full  -  ( 05 Feb 2023 06:58 )  WBC Count : 7.17 K/uL  RBC Count : 4.91 M/uL  Hemoglobin : 13.0 g/dL  Hematocrit : 42.5 %  Platelet Count - Automated : 195 K/uL  Mean Cell Volume : 86.6 fl  Mean Cell Hemoglobin : 26.5 pg  Mean Cell Hemoglobin Concentration : 30.6 gm/dL  Auto Neutrophil # : 5.66 K/uL  Auto Lymphocyte # : 0.71 K/uL  Auto Monocyte # : 0.60 K/uL  Auto Eosinophil # : 0.12 K/uL  Auto Basophil # : 0.03 K/uL  Auto Neutrophil % : 78.9 %  Auto Lymphocyte % : 9.9 %  Auto Monocyte % : 8.4 %  Auto Eosinophil % : 1.7 %  Auto Basophil % : 0.4 %    02-05    139  |  104  |  16  ----------------------------<  106<H>  4.5   |  26  |  0.66    Ca    8.7      05 Feb 2023 06:58  Phos  3.9     02-05  Mg     2.1     02-05    TPro  6.2  /  Alb  3.1<L>  /  TBili  0.3  /  DBili  x   /  AST  167<H>  /  ALT  92<H>  /  AlkPhos  59  02-05    LIVER FUNCTIONS - ( 05 Feb 2023 06:58 )  Alb: 3.1 g/dL / Pro: 6.2 g/dL / ALK PHOS: 59 U/L / ALT: 92 U/L / AST: 167 U/L / GGT: x             Urinalysis Basic - ( 04 Feb 2023 01:30 )    Color: Yellow / Appearance: Clear / SG: >=1.030 / pH: x  Gluc: x / Ketone: Trace mg/dL  / Bili: Negative / Urobili: 1.0 E.U./dL   Blood: x / Protein: 100 mg/dL / Nitrite: NEGATIVE   Leuk Esterase: NEGATIVE / RBC: < 5 /HPF / WBC < 5 /HPF   Sq Epi: x / Non Sq Epi: 0-5 /HPF / Bacteria: Present /HPF       Neurology Progress Note    Interval History:  Patient was resting comfortably in bed and did not appear in distress. He did mention that his rash over arms and chest have almost disappeared but c/o facial swelling ealry am when he wakes up. He denies any chest pain, abd pain, N/V/C/D.       Medications:  amLODIPine   Tablet 10 milliGRAM(s) Oral daily  cyanocobalamin Injectable 1000 MICROGram(s) IntraMuscular daily  dextrose 5%. 1000 milliLiter(s) IV Continuous <Continuous>  dextrose 5%. 1000 milliLiter(s) IV Continuous <Continuous>  dextrose 50% Injectable 25 Gram(s) IV Push once  dextrose 50% Injectable 12.5 Gram(s) IV Push once  dextrose 50% Injectable 25 Gram(s) IV Push once  dextrose Oral Gel 15 Gram(s) Oral once PRN  dolutegravir 50 milliGRAM(s) Oral daily  enoxaparin Injectable 40 milliGRAM(s) SubCutaneous every 24 hours  famotidine    Tablet 20 milliGRAM(s) Oral daily  glucagon  Injectable 1 milliGRAM(s) IntraMuscular once  insulin lispro (ADMELOG) corrective regimen sliding scale   SubCutaneous every 6 hours  predniSONE   Tablet 60 milliGRAM(s) Oral daily  rilpivirine 25 milliGRAM(s) Oral with dinner  trimethoprim  160 mG/sulfamethoxazole 800 mG 1 Tablet(s) Oral daily      Vital Signs Last 24 Hrs  T(C): 37.2 (05 Feb 2023 05:20), Max: 37.2 (04 Feb 2023 12:30)  T(F): 98.9 (05 Feb 2023 05:20), Max: 99 (04 Feb 2023 12:30)  HR: 81 (05 Feb 2023 05:20) (81 - 95)  BP: 133/74 (05 Feb 2023 05:20) (133/74 - 149/76)  BP(mean): --  RR: 19 (05 Feb 2023 05:20) (18 - 19)  SpO2: 95% (05 Feb 2023 05:20) (95% - 96%)    Parameters below as of 05 Feb 2023 05:20  Patient On (Oxygen Delivery Method): room air        Neurological Exam:   Mental status: Awake, alert and oriented x3.  Recent and remote memory intact.  Naming, repetition and comprehension intact.  Attention/concentration intact.  No dysarthria, no aphasia.  Fund of knowledge appropriate.    Cranial nerves: Pupils equally round and reactive to light, visual fields full, no nystagmus, extraocular muscles intact, V1 through V3 intact bilaterally and symmetric, face symmetric, hearing intact to finger rub, palate elevation symmetric, tongue was midline.  Motor:  MRC grading B/L triceps, deltoid weakness 3+/5. Strength 5/5 in B/L LE   strength 5/5.  Normal tone and bulk.  No abnormal movements.    Sensation: Intact to light touch and vibration  Coordination: No dysmetria on finger-to-nose   Reflexes: 2+ in bilateral UE/LE  Gait: Narrow and steady. No ataxia      Labs:  CBC Full  -  ( 05 Feb 2023 06:58 )  WBC Count : 7.17 K/uL  RBC Count : 4.91 M/uL  Hemoglobin : 13.0 g/dL  Hematocrit : 42.5 %  Platelet Count - Automated : 195 K/uL  Mean Cell Volume : 86.6 fl  Mean Cell Hemoglobin : 26.5 pg  Mean Cell Hemoglobin Concentration : 30.6 gm/dL  Auto Neutrophil # : 5.66 K/uL  Auto Lymphocyte # : 0.71 K/uL  Auto Monocyte # : 0.60 K/uL  Auto Eosinophil # : 0.12 K/uL  Auto Basophil # : 0.03 K/uL  Auto Neutrophil % : 78.9 %  Auto Lymphocyte % : 9.9 %  Auto Monocyte % : 8.4 %  Auto Eosinophil % : 1.7 %  Auto Basophil % : 0.4 %    02-05    139  |  104  |  16  ----------------------------<  106<H>  4.5   |  26  |  0.66    Ca    8.7      05 Feb 2023 06:58  Phos  3.9     02-05  Mg     2.1     02-05    TPro  6.2  /  Alb  3.1<L>  /  TBili  0.3  /  DBili  x   /  AST  167<H>  /  ALT  92<H>  /  AlkPhos  59  02-05    LIVER FUNCTIONS - ( 05 Feb 2023 06:58 )  Alb: 3.1 g/dL / Pro: 6.2 g/dL / ALK PHOS: 59 U/L / ALT: 92 U/L / AST: 167 U/L / GGT: x             Urinalysis Basic - ( 04 Feb 2023 01:30 )    Color: Yellow / Appearance: Clear / SG: >=1.030 / pH: x  Gluc: x / Ketone: Trace mg/dL  / Bili: Negative / Urobili: 1.0 E.U./dL   Blood: x / Protein: 100 mg/dL / Nitrite: NEGATIVE   Leuk Esterase: NEGATIVE / RBC: < 5 /HPF / WBC < 5 /HPF   Sq Epi: x / Non Sq Epi: 0-5 /HPF / Bacteria: Present /HPF

## 2023-02-05 NOTE — PROGRESS NOTE ADULT - ASSESSMENT
72M PMH HIV, HTN, HLD, DM, and recently diagnosed Dermatomyositis who presented 2/1 with dysphagia and muscle weakness, admitted to neurology for further evaluation. Medicine consulted for preop clearance for deltoid muscle biopsy.    #Preop clearance  # Troponemia   # Murmurs  - no CV symptoms   - no ischemic changes on ECG   - CT findings showing possible HOCM (Asymmetric thickening of the interventricular septum up to 2.0 cm thickness as compared to the lateral wall left ventricle)  - TTE showing EF>75%, grade II LV diastolic dysfunction and PASP 43 mmHg, aortic sclerosis, mild MR   - cardiology evaluation ( can be outpt) for further evaluation and management   - METS at least 4  - no contraindication for low risk procedure: Deltoid biopsy by NSGY scheduled on Monday 2/6 in OR   - NPO except meds after MN/Preop labs per NSGY, hold VTE ppx     # Transaminitis   # elevated CPK  # Dermatomyositis   - elevated AST/ALT likely 2/2 Dermatomyositis, HCV NR, avoid statin   - CPK<5000, no need for IVF hydration   - awaiting Deltoid biopsy on Monday 2/6  - c/w Prednisone 50mg-> 60mg po daily ( 2/4-)   - added Famotidine 20mg daily for GI ppx   - added Bactrim DS 1 tab daily for PCP ppx if pt required prolonged steroid ( i.e. Prednisone 20mg daily for more than 4 weeks benji being at high risk group i.e. HIV and Dermatomyositis, may d/c PCP ppx when Prednisone dose was decreased to 15mg per day or less)   - noted gallbladder cyst Vs polyp 11mm, GI eval given Dermatomyositis can be paraneoplastic syndrome. increased risk of malignancy     #Vit B12 deficiency  - J13=159 (Borderline low)  - check MMA and homocysteine,and replete VIt B12 preferred 1000mg IM daily inpatient, may send home on Vit B 12 1000mg po daily       Thank you for the consult, Med consult team continues to follow with you.    POC as d/w [facility] Neuro team

## 2023-02-05 NOTE — PROGRESS NOTE ADULT - ASSESSMENT
This is a 71yo M w/ findings of Dermatomyositis - heliotrope rash, Gluttons' papule, prox muscle weakness currently being w/u by rheumatology and pending EMG 02/07 presented for progression of his disease now  2 episodes of dysphagia to solids and no improvement w/ current dosage of steroid therapy. Admitted for workup of etiology, Myopathy vs Dermatomyositis.

## 2023-02-05 NOTE — PROGRESS NOTE ADULT - TIME BILLING
evaluation, coordination of care, counseling
evaluation, coordination of care, counseling. all q's answered.

## 2023-02-05 NOTE — PROGRESS NOTE ADULT - SUBJECTIVE AND OBJECTIVE BOX
Surgery: L deltoid muscle biopsy  Consent: Signed by patient     Representative Consent: [ x ] Signed by patient vs HCP                                                 [  ] N/A -> only for cerebral angiogram    No Known Allergies        T(C): 37.3 (02-05-23 @ 11:45), Max: 37.3 (02-05-23 @ 11:45)  HR: 90 (02-05-23 @ 11:45) (81 - 95)  BP: 133/80 (02-05-23 @ 11:45) (133/74 - 149/76)  RR: 18 (02-05-23 @ 11:45) (18 - 19)  SpO2: 95% (02-05-23 @ 11:45) (95% - 96%)  Wt(kg): --    Neurological Exam:   Mental status: Awake, alert and oriented x3.  Recent and remote memory intact.  Naming, repetition and comprehension intact.  Attention/concentration intact.  No dysarthria, no aphasia.  Fund of knowledge appropriate.    Cranial nerves: Pupils equally round and reactive to light, visual fields full, no nystagmus, extraocular muscles intact, V1 through V3 intact bilaterally and symmetric, face symmetric, hearing intact to finger rub, palate elevation symmetric, tongue was midline.  Motor: deltoid weakness 3/5. Strength 5/5 in B/L LE   strength 5/5.  Normal tone and bulk.  No abnormal movements.    Sensation: Intact to light touch and vibration  Coordination: No dysmetria on finger-to-nose   Reflexes: 2+ in bilateral UE/LE          02-05    139  |  104  |  16  ----------------------------<  106<H>  4.5   |  26  |  0.66    Ca    8.7      05 Feb 2023 06:58  Phos  3.9     02-05  Mg     2.1     02-05    TPro  6.2  /  Alb  3.1<L>  /  TBili  0.3  /  DBili  x   /  AST  167<H>  /  ALT  92<H>  /  AlkPhos  59  02-05    CBC Full  -  ( 05 Feb 2023 06:58 )  WBC Count : 7.17 K/uL  RBC Count : 4.91 M/uL  Hemoglobin : 13.0 g/dL  Hematocrit : 42.5 %  Platelet Count - Automated : 195 K/uL  Mean Cell Volume : 86.6 fl  Mean Cell Hemoglobin : 26.5 pg  Mean Cell Hemoglobin Concentration : 30.6 gm/dL  Auto Neutrophil # : 5.66 K/uL  Auto Lymphocyte # : 0.71 K/uL  Auto Monocyte # : 0.60 K/uL  Auto Eosinophil # : 0.12 K/uL  Auto Basophil # : 0.03 K/uL  Auto Neutrophil % : 78.9 %  Auto Lymphocyte % : 9.9 %  Auto Monocyte % : 8.4 %  Auto Eosinophil % : 1.7 %  Auto Basophil % : 0.4 %        Pregnancy test (serum hcg for any female under 56, must be resulted day before OR): [ ] Negative Result  [ ] Positive Result  [ x] N/A : male or female>57 y/o  Type & Screen (in past 72hrs):     2 Type & Screen within 72 hours if anticipate blood need in OR:  x Y _ N     Blood ordered and on hold for OR:   [ ] No need     [x ] 1u pRBC on hold      [ ] 2u pRBC on hold    COVID swab (in past 48hrs): xY  _N    CXR: no abnormalities  EKG: no ischemic changes  ECHO: 2/2     CONCLUSIONS:     1. Hyperdynamic left ventricular systolic function, LVEF>75%.   2. Grade II left ventricular diastolic dysfunction.   3. Normal right ventricular size and systolic function.   4. Mildly dilated left atrium.   5. Aortic sclerosis without significant stenosis.   6. Systolic anterior motion of the mitral valve resulting in LVOT   obstruction with an LVOT gradient of 64 mmHg.   7. Mild mitral regurgitation.   8. Pulmonary hypertension present, pulmonary artery systolic pressure is   43 mmHg.   9. No pericardial effusion.  10. No prior echo is available for comparison.    Medical Clearances: Dr Barahona on 2/4  Other Clearances: n/a    Last dose of antiplatelet/anticoagulation drug: SQL 40mg on 2/4    Implanted Devices (pacemaker, drug pump...etc):  []YES   [x] NO                  If yes --> EPS consulted to interrogate device: [ ] YES  [ ] NO                            If yes -->  EPS called to let them know patient going for surgery: [ ] device needs to be turned off                                                                                                                                                 [ ] magnet needs to be placed for surgery                                                                                                                                                [ ] nothing to do per EP, may proceed with cautery use in OR                                       3M nasal swab ordered?  xY  _N    Cranial surgery: Order written for hair to be shampooed night before surgery and morning before surgery  [] yes   [x]no  Chlorhexidine Wipes ordered for Neck Down? x Y  _ N  (twice a day if 1 day before surgery, daily for 3 days if 3 days prior, daily if in ICU)                 Assessment:   72M PMH HTN, HLD, DM, HIV, and recently diagnosed dermatomyositis who presented 2/1 with dysphagia and muscle weakness, admitted to neurology for further evaluation. Preop for L deltoid muscle biopsy on 2/6.    Plan:  - for OR tomorrow  - consents and vendor consents signed and in chart  - cleared by medicine  - NPO at midnight, IVF while NPO  - CHG/3M ordered  - active T&S (rpt in AM pending) --> 1 u PRBC on hold  - coags and labs reviewed, rpt coags pending for AM  - COVID ordered  - hold DVT chemoppx    D/W Dr. Moody

## 2023-02-05 NOTE — PROGRESS NOTE ADULT - SUBJECTIVE AND OBJECTIVE BOX
Patient is a 72y old  Male who presents with a chief complaint of Dermatomyositis (05 Feb 2023 12:14)    INTERVAL EVENTS: NAEON     SUBJECTIVE:  Patient was seen and examined at bedside. Pt continues to improve his strength benji upper extremities, still has difficulty in getting up from bed or chair from proximal weakness over hips.     Review of systems: No fever, chills, dizziness, HA, Changes in vision, CP, dyspnea, nausea or vomiting, dysuria, changes in bowel movements, LE edema. Rest of 12 point Review of systems negative unless otherwise documented elsewhere in note.     Diet, NPO after Midnight:      NPO Start Date: 05-Feb-2023,   NPO Start Time: 23:59  Except Medications (02-05-23 @ 11:55) [Active]  Diet, Consistent Carbohydrate w/Evening Snack (02-02-23 @ 10:07) [Active]      MEDICATIONS:  MEDICATIONS  (STANDING):  amLODIPine   Tablet 10 milliGRAM(s) Oral daily  chlorhexidine 2% Cloths 1 Application(s) Topical every 12 hours  cyanocobalamin Injectable 1000 MICROGram(s) IntraMuscular daily  dextrose 5%. 1000 milliLiter(s) (100 mL/Hr) IV Continuous <Continuous>  dextrose 5%. 1000 milliLiter(s) (50 mL/Hr) IV Continuous <Continuous>  dextrose 50% Injectable 25 Gram(s) IV Push once  dextrose 50% Injectable 12.5 Gram(s) IV Push once  dextrose 50% Injectable 25 Gram(s) IV Push once  dolutegravir 50 milliGRAM(s) Oral daily  famotidine    Tablet 20 milliGRAM(s) Oral daily  glucagon  Injectable 1 milliGRAM(s) IntraMuscular once  insulin lispro (ADMELOG) corrective regimen sliding scale   SubCutaneous every 6 hours  predniSONE   Tablet 60 milliGRAM(s) Oral daily  rilpivirine 25 milliGRAM(s) Oral with dinner  trimethoprim  160 mG/sulfamethoxazole 800 mG 1 Tablet(s) Oral daily    MEDICATIONS  (PRN):  dextrose Oral Gel 15 Gram(s) Oral once PRN Blood Glucose LESS THAN 70 milliGRAM(s)/deciliter      Allergies    No Known Allergies    Intolerances        OBJECTIVE:  Vital Signs Last 24 Hrs  T(C): 37.3 (05 Feb 2023 11:45), Max: 37.3 (05 Feb 2023 11:45)  T(F): 99.2 (05 Feb 2023 11:45), Max: 99.2 (05 Feb 2023 11:45)  HR: 90 (05 Feb 2023 11:45) (81 - 90)  BP: 133/80 (05 Feb 2023 11:45) (133/74 - 141/78)  BP(mean): --  RR: 18 (05 Feb 2023 11:45) (18 - 19)  SpO2: 95% (05 Feb 2023 11:45) (95% - 96%)    Parameters below as of 05 Feb 2023 11:45  Patient On (Oxygen Delivery Method): room air      I&O's Summary      PHYSICAL EXAM:  Gen: Reclining in bed at time of exam, appears stated age  HEENT: NCAT, MMM, clear OP  Neck: supple, trachea at midline  CV: RRR, +S1/S2  Pulm: adequate respiratory effort, no increase in work of breathing  Abd: soft, NTND  Skin: warm and dry,   Ext: WWP, no LE edema  Neuro: AOx3, no gross focal neurological deficits  Psych: affect and behavior appropriate, pleasant at time of interview    LABS:                        13.0   7.17  )-----------( 195      ( 05 Feb 2023 06:58 )             42.5     02-05    139  |  104  |  16  ----------------------------<  106<H>  4.5   |  26  |  0.66    Ca    8.7      05 Feb 2023 06:58  Phos  3.9     02-05  Mg     2.1     02-05    TPro  6.2  /  Alb  3.1<L>  /  TBili  0.3  /  DBili  x   /  AST  167<H>  /  ALT  92<H>  /  AlkPhos  59  02-05    LIVER FUNCTIONS - ( 05 Feb 2023 06:58 )  Alb: 3.1 g/dL / Pro: 6.2 g/dL / ALK PHOS: 59 U/L / ALT: 92 U/L / AST: 167 U/L / GGT: x             CAPILLARY BLOOD GLUCOSE      POCT Blood Glucose.: 122 mg/dL (05 Feb 2023 09:25)  POCT Blood Glucose.: 87 mg/dL (05 Feb 2023 04:01)  POCT Blood Glucose.: 112 mg/dL (04 Feb 2023 22:29)  POCT Blood Glucose.: 151 mg/dL (04 Feb 2023 16:15)    Urinalysis Basic - ( 04 Feb 2023 01:30 )    Color: Yellow / Appearance: Clear / SG: >=1.030 / pH: x  Gluc: x / Ketone: Trace mg/dL  / Bili: Negative / Urobili: 1.0 E.U./dL   Blood: x / Protein: 100 mg/dL / Nitrite: NEGATIVE   Leuk Esterase: NEGATIVE / RBC: < 5 /HPF / WBC < 5 /HPF   Sq Epi: x / Non Sq Epi: 0-5 /HPF / Bacteria: Present /HPF        MICRODATA:      RADIOLOGY/OTHER STUDIES:

## 2023-02-05 NOTE — PROGRESS NOTE ADULT - PROBLEM SELECTOR PLAN 1
Increased Prednisone to 60mg po QD starting 2/3/23.   Barium Swallow performed and resulted as Nasopharyngeal reflux. Mild esophageal dysmotility  Aspiration precaution  SLP eval completed.   CT Chest / Abd/ Pelvis, resulted as: 12 mm air-filled cyst in the right middle lobe, 2.7 cm cyst in segment two of   liver. Subcentimeter hypodensities segment two and six of liver too   small to characterize but likely cysts or biliary hamartomas. Subtle questionable liver contour nodularity. 11 mm radiodensity seen in gallbladder neck could represent calculus or polyp. Nodular thickening bilaterally of the adrenals. Right measures 1.0 x 1.6x 2.2 cm. Left measures 1.5 x 2.1 x 2.1 cm,  1.4 cm cortical cyst interpolar region right kidney. A few bilateral subcentimeter renal cortical hypodensities are too small to characterize    Pending EMG/NCV w/ Dr. Krishna  Hem/onc contacted, recommended given normal labs and imaging, would recommend GI work up.   Neurosurgery to biopsy L deltoid muscle on 6/2/23, NPO TONIGHT   PT/OT/PMNR  Monitor CPK  Anti Mi2 Ab, paraneoplatic panel, free cortisol pending  Will need GI outpatient follow up given CTT/A/P findings, and r/o underlying GI malignancy given correlation with Dermatomyositis.

## 2023-02-06 ENCOUNTER — TRANSCRIPTION ENCOUNTER (OUTPATIENT)
Age: 73
End: 2023-02-06

## 2023-02-06 ENCOUNTER — RESULT REVIEW (OUTPATIENT)
Age: 73
End: 2023-02-06

## 2023-02-06 VITALS
DIASTOLIC BLOOD PRESSURE: 79 MMHG | TEMPERATURE: 100 F | HEART RATE: 95 BPM | RESPIRATION RATE: 18 BRPM | OXYGEN SATURATION: 98 % | SYSTOLIC BLOOD PRESSURE: 143 MMHG

## 2023-02-06 LAB
GLUCOSE BLDC GLUCOMTR-MCNC: 102 MG/DL — HIGH (ref 70–99)
GLUCOSE BLDC GLUCOMTR-MCNC: 85 MG/DL — SIGNIFICANT CHANGE UP (ref 70–99)

## 2023-02-06 PROCEDURE — 85730 THROMBOPLASTIN TIME PARTIAL: CPT

## 2023-02-06 PROCEDURE — 70450 CT HEAD/BRAIN W/O DYE: CPT | Mod: MA

## 2023-02-06 PROCEDURE — 97161 PT EVAL LOW COMPLEX 20 MIN: CPT

## 2023-02-06 PROCEDURE — 82550 ASSAY OF CK (CPK): CPT

## 2023-02-06 PROCEDURE — 83789 MASS SPECTROMETRY QUAL/QUAN: CPT

## 2023-02-06 PROCEDURE — 86900 BLOOD TYPING SEROLOGIC ABO: CPT

## 2023-02-06 PROCEDURE — 88305 TISSUE EXAM BY PATHOLOGIST: CPT | Mod: 26

## 2023-02-06 PROCEDURE — 85025 COMPLETE CBC W/AUTO DIFF WBC: CPT

## 2023-02-06 PROCEDURE — 83921 ORGANIC ACID SINGLE QUANT: CPT

## 2023-02-06 PROCEDURE — 99232 SBSQ HOSP IP/OBS MODERATE 35: CPT

## 2023-02-06 PROCEDURE — 99232 SBSQ HOSP IP/OBS MODERATE 35: CPT | Mod: GC

## 2023-02-06 PROCEDURE — 92610 EVALUATE SWALLOWING FUNCTION: CPT

## 2023-02-06 PROCEDURE — 82533 TOTAL CORTISOL: CPT

## 2023-02-06 PROCEDURE — 86140 C-REACTIVE PROTEIN: CPT

## 2023-02-06 PROCEDURE — 36415 COLL VENOUS BLD VENIPUNCTURE: CPT

## 2023-02-06 PROCEDURE — 84484 ASSAY OF TROPONIN QUANT: CPT

## 2023-02-06 PROCEDURE — 84100 ASSAY OF PHOSPHORUS: CPT

## 2023-02-06 PROCEDURE — 85610 PROTHROMBIN TIME: CPT

## 2023-02-06 PROCEDURE — 93005 ELECTROCARDIOGRAM TRACING: CPT

## 2023-02-06 PROCEDURE — 82962 GLUCOSE BLOOD TEST: CPT

## 2023-02-06 PROCEDURE — 74220 X-RAY XM ESOPHAGUS 1CNTRST: CPT

## 2023-02-06 PROCEDURE — 80076 HEPATIC FUNCTION PANEL: CPT

## 2023-02-06 PROCEDURE — 86706 HEP B SURFACE ANTIBODY: CPT

## 2023-02-06 PROCEDURE — 82553 CREATINE MB FRACTION: CPT

## 2023-02-06 PROCEDURE — 83735 ASSAY OF MAGNESIUM: CPT

## 2023-02-06 PROCEDURE — 85652 RBC SED RATE AUTOMATED: CPT

## 2023-02-06 PROCEDURE — 87340 HEPATITIS B SURFACE AG IA: CPT

## 2023-02-06 PROCEDURE — 83516 IMMUNOASSAY NONANTIBODY: CPT

## 2023-02-06 PROCEDURE — 86850 RBC ANTIBODY SCREEN: CPT

## 2023-02-06 PROCEDURE — 80061 LIPID PANEL: CPT

## 2023-02-06 PROCEDURE — C1889: CPT

## 2023-02-06 PROCEDURE — 97165 OT EVAL LOW COMPLEX 30 MIN: CPT

## 2023-02-06 PROCEDURE — 81001 URINALYSIS AUTO W/SCOPE: CPT

## 2023-02-06 PROCEDURE — 71260 CT THORAX DX C+: CPT

## 2023-02-06 PROCEDURE — 82607 VITAMIN B-12: CPT

## 2023-02-06 PROCEDURE — 86901 BLOOD TYPING SEROLOGIC RH(D): CPT

## 2023-02-06 PROCEDURE — U0005: CPT

## 2023-02-06 PROCEDURE — 83036 HEMOGLOBIN GLYCOSYLATED A1C: CPT

## 2023-02-06 PROCEDURE — 74177 CT ABD & PELVIS W/CONTRAST: CPT

## 2023-02-06 PROCEDURE — 20205 DEEP MUSCLE BIOPSY: CPT | Mod: LT

## 2023-02-06 PROCEDURE — 87536 HIV-1 QUANT&REVRSE TRNSCRPJ: CPT

## 2023-02-06 PROCEDURE — 86704 HEP B CORE ANTIBODY TOTAL: CPT

## 2023-02-06 PROCEDURE — C8929: CPT

## 2023-02-06 PROCEDURE — 86803 HEPATITIS C AB TEST: CPT

## 2023-02-06 PROCEDURE — 83090 ASSAY OF HOMOCYSTEINE: CPT

## 2023-02-06 PROCEDURE — 80048 BASIC METABOLIC PNL TOTAL CA: CPT

## 2023-02-06 PROCEDURE — 83615 LACTATE (LD) (LDH) ENZYME: CPT

## 2023-02-06 PROCEDURE — 84443 ASSAY THYROID STIM HORMONE: CPT

## 2023-02-06 PROCEDURE — 99285 EMERGENCY DEPT VISIT HI MDM: CPT | Mod: 25

## 2023-02-06 PROCEDURE — 87635 SARS-COV-2 COVID-19 AMP PRB: CPT

## 2023-02-06 PROCEDURE — U0003: CPT

## 2023-02-06 PROCEDURE — 80053 COMPREHEN METABOLIC PANEL: CPT

## 2023-02-06 PROCEDURE — 88305 TISSUE EXAM BY PATHOLOGIST: CPT

## 2023-02-06 DEVICE — SURGIFLO HEMOSTATIC MATRIX KIT: Type: IMPLANTABLE DEVICE | Site: LEFT | Status: FUNCTIONAL

## 2023-02-06 RX ORDER — DEXTROSE 10 % IN WATER 10 %
125 INTRAVENOUS SOLUTION INTRAVENOUS
Refills: 0 | Status: DISCONTINUED | OUTPATIENT
Start: 2023-02-06 | End: 2023-02-06

## 2023-02-06 RX ORDER — ACETAMINOPHEN 500 MG
650 TABLET ORAL EVERY 6 HOURS
Refills: 0 | Status: DISCONTINUED | OUTPATIENT
Start: 2023-02-06 | End: 2023-02-06

## 2023-02-06 RX ORDER — POLYETHYLENE GLYCOL 3350 17 G/17G
17 POWDER, FOR SOLUTION ORAL DAILY
Refills: 0 | Status: DISCONTINUED | OUTPATIENT
Start: 2023-02-06 | End: 2023-02-06

## 2023-02-06 RX ORDER — PANTOPRAZOLE SODIUM 20 MG/1
40 TABLET, DELAYED RELEASE ORAL
Refills: 0 | Status: DISCONTINUED | OUTPATIENT
Start: 2023-02-06 | End: 2023-02-06

## 2023-02-06 RX ORDER — CEFAZOLIN SODIUM 1 G
2000 VIAL (EA) INJECTION EVERY 8 HOURS
Refills: 0 | Status: COMPLETED | OUTPATIENT
Start: 2023-02-06 | End: 2023-02-06

## 2023-02-06 RX ORDER — TRAMADOL HYDROCHLORIDE 50 MG/1
50 TABLET ORAL EVERY 4 HOURS
Refills: 0 | Status: DISCONTINUED | OUTPATIENT
Start: 2023-02-06 | End: 2023-02-06

## 2023-02-06 RX ORDER — SODIUM CHLORIDE 9 MG/ML
1000 INJECTION, SOLUTION INTRAVENOUS
Refills: 0 | Status: DISCONTINUED | OUTPATIENT
Start: 2023-02-06 | End: 2023-02-06

## 2023-02-06 RX ORDER — SENNA PLUS 8.6 MG/1
2 TABLET ORAL AT BEDTIME
Refills: 0 | Status: DISCONTINUED | OUTPATIENT
Start: 2023-02-06 | End: 2023-02-06

## 2023-02-06 RX ADMIN — Medication 1 APPLICATION(S): at 06:27

## 2023-02-06 RX ADMIN — AMLODIPINE BESYLATE 10 MILLIGRAM(S): 2.5 TABLET ORAL at 06:01

## 2023-02-06 RX ADMIN — PANTOPRAZOLE SODIUM 40 MILLIGRAM(S): 20 TABLET, DELAYED RELEASE ORAL at 11:27

## 2023-02-06 RX ADMIN — CHLORHEXIDINE GLUCONATE 1 APPLICATION(S): 213 SOLUTION TOPICAL at 06:02

## 2023-02-06 RX ADMIN — Medication 100 MILLIGRAM(S): at 14:02

## 2023-02-06 RX ADMIN — Medication 250 MILLILITER(S): at 05:00

## 2023-02-06 RX ADMIN — SODIUM CHLORIDE 75 MILLILITER(S): 9 INJECTION, SOLUTION INTRAVENOUS at 11:27

## 2023-02-06 RX ADMIN — Medication 60 MILLIGRAM(S): at 06:01

## 2023-02-06 NOTE — PRE-ANESTHESIA EVALUATION ADULT - NSANTHOSAYNRD_GEN_A_CORE
No. CAROSN screening performed.  STOP BANG Legend: 0-2 = LOW Risk; 3-4 = INTERMEDIATE Risk; 5-8 = HIGH Risk

## 2023-02-06 NOTE — PROGRESS NOTE ADULT - REASON FOR ADMISSION
Dermatomyositis

## 2023-02-06 NOTE — PROGRESS NOTE ADULT - PROBLEM SELECTOR PLAN 5
on consistent carb diet  POCT AC/HS  Sliding Scale

## 2023-02-06 NOTE — DISCHARGE NOTE NURSING/CASE MANAGEMENT/SOCIAL WORK - PATIENT PORTAL LINK FT
You can access the FollowMyHealth Patient Portal offered by [facility] by registering at the following website: [email or web address]. By joining [facility]’s FollowMyHealth portal, you will also be able to view your health information using other applications (apps) compatible with our system.

## 2023-02-06 NOTE — PROGRESS NOTE ADULT - PROBLEM SELECTOR PLAN 7
DVT ppx: Enoxaparin 40mg SC QD  Diet: consistent carb  Activity: OOBw/A DVT ppx: Enoxaparin 40mg SC QD  Diet: consistent carb  Activity: OOBw/A    Dispo: Patient can be discharged home with PT/OT as outpatient  Follow up with neurosurgery in 2 weeks for wound follow up if wanted, otherwise can follow up with PCP regarding the wound care. Should keep the LUE sling for a week at least.   Follow up with GI as outpatient for GI work up and colonoscopy if recommended by them.   Follow up with PCP or Endocrinologist, since history of well-controled DM, but on higher dose of steroids now.   Continue with current Prednisone dose of 60mg qd for at least 3 weeks then taper slowly, or if his outpatient. neurologist Dr. Krishna or Rheumatologist may recommend to adjust the dose sooner.   Follow up biopsy results with Dr. Krishna and restarting Statin is pending the results.

## 2023-02-06 NOTE — PROGRESS NOTE ADULT - PROBLEM SELECTOR PLAN 2
restarted home Abena  HIV, CD4, VL

## 2023-02-06 NOTE — PROGRESS NOTE ADULT - SUBJECTIVE AND OBJECTIVE BOX
NEUROSURGERY POST OP NOTE:    POD# 0 S/P L deltoid muscle biopsy    S: Seen and examined post op in PACU      T(C): 36.3 (02-06-23 @ 08:56), Max: 37.3 (02-05-23 @ 11:45)  HR: 79 (02-06-23 @ 09:36) (75 - 90)  BP: 120/65 (02-06-23 @ 09:36) (108/62 - 143/76)  RR: 20 (02-06-23 @ 09:36) (16 - 20)  SpO2: 99% (02-06-23 @ 09:36) (93% - 99%)        acetaminophen     Tablet .. 650 milliGRAM(s) Oral every 6 hours PRN  ceFAZolin   IVPB 2000 milliGRAM(s) IV Intermittent every 8 hours  dextrose 10%. 125 milliLiter(s) IV Continuous <Continuous>  lactated ringers. 1000 milliLiter(s) IV Continuous <Continuous>  pantoprazole    Tablet 40 milliGRAM(s) Oral before breakfast  polyethylene glycol 3350 17 Gram(s) Oral daily  senna 2 Tablet(s) Oral at bedtime      RADIOLOGY:   Neurological Exam:   Mental status: Awake, alert and oriented x3.  Recent and remote memory intact.  Naming, repetition and comprehension intact.  Attention/concentration intact.  No dysarthria, no aphasia.  Fund of knowledge appropriate.    Cranial nerves: Pupils equally round and reactive to light, visual fields full, no nystagmus, extraocular muscles intact, V1 through V3 intact bilaterally and symmetric, face symmetric, hearing intact to finger rub, palate elevation symmetric, tongue was midline.  Motor: deltoid weakness 3/5. Strength 5/5 in B/L LE   strength 5/5.  Normal tone and bulk.  No abnormal movements.    Sensation: Intact to light touch and vibration  Coordination: No dysmetria on finger-to-nose   Reflexes: 2+ in bilateral UE/LE  L arm with dressing c/d/i      Assessment: 72yMale s/p L deltoid muscle biopsy    Plan  - pain control PRN  - activity as tolerated  - has absorbable sutures in place  - primary team to resume care  - may follow up with Dr. Moody in two weeks for wound check    Discussed with Dr. Moody     NEUROSURGERY POST OP NOTE:    POD# 0 S/P L deltoid muscle biopsy    S: Seen and examined post op in PACU      T(C): 36.3 (02-06-23 @ 08:56), Max: 37.3 (02-05-23 @ 11:45)  HR: 79 (02-06-23 @ 09:36) (75 - 90)  BP: 120/65 (02-06-23 @ 09:36) (108/62 - 143/76)  RR: 20 (02-06-23 @ 09:36) (16 - 20)  SpO2: 99% (02-06-23 @ 09:36) (93% - 99%)        acetaminophen     Tablet .. 650 milliGRAM(s) Oral every 6 hours PRN  ceFAZolin   IVPB 2000 milliGRAM(s) IV Intermittent every 8 hours  dextrose 10%. 125 milliLiter(s) IV Continuous <Continuous>  lactated ringers. 1000 milliLiter(s) IV Continuous <Continuous>  pantoprazole    Tablet 40 milliGRAM(s) Oral before breakfast  polyethylene glycol 3350 17 Gram(s) Oral daily  senna 2 Tablet(s) Oral at bedtime      RADIOLOGY:   Neurological Exam:   Mental status: Awake, alert and oriented x3.  Recent and remote memory intact.  Naming, repetition and comprehension intact.  Attention/concentration intact.  No dysarthria, no aphasia.  Fund of knowledge appropriate.    Cranial nerves: Pupils equally round and reactive to light, visual fields full, no nystagmus, extraocular muscles intact, V1 through V3 intact bilaterally and symmetric, face symmetric, hearing intact to finger rub, palate elevation symmetric, tongue was midline.  Motor: deltoid weakness 3/5. Strength 5/5 in B/L LE   strength 5/5.  Normal tone and bulk.  No abnormal movements.    Sensation: Intact to light touch and vibration  Coordination: No dysmetria on finger-to-nose   Reflexes: 2+ in bilateral UE/LE  L arm with dressing c/d/i      Assessment: 72yMale s/p L deltoid muscle biopsy    Plan  - cont Ancef x 2 doses post op  - pain control PRN  - activity as tolerated  - has absorbable sutures in place  - primary team to resume care  - may follow up with Dr. Moody in two weeks for wound check    Discussed with Dr. Moody     NEUROSURGERY POST OP NOTE:    POD# 0 S/P L deltoid muscle biopsy    S: Seen and examined post op in PACU      T(C): 36.3 (02-06-23 @ 08:56), Max: 37.3 (02-05-23 @ 11:45)  HR: 79 (02-06-23 @ 09:36) (75 - 90)  BP: 120/65 (02-06-23 @ 09:36) (108/62 - 143/76)  RR: 20 (02-06-23 @ 09:36) (16 - 20)  SpO2: 99% (02-06-23 @ 09:36) (93% - 99%)        acetaminophen     Tablet .. 650 milliGRAM(s) Oral every 6 hours PRN  ceFAZolin   IVPB 2000 milliGRAM(s) IV Intermittent every 8 hours  dextrose 10%. 125 milliLiter(s) IV Continuous <Continuous>  lactated ringers. 1000 milliLiter(s) IV Continuous <Continuous>  pantoprazole    Tablet 40 milliGRAM(s) Oral before breakfast  polyethylene glycol 3350 17 Gram(s) Oral daily  senna 2 Tablet(s) Oral at bedtime      RADIOLOGY:   Neurological Exam:   Mental status: Awake, alert and oriented x3.  Recent and remote memory intact.  Naming, repetition and comprehension intact.  Attention/concentration intact.  No dysarthria, no aphasia.  Fund of knowledge appropriate.    Cranial nerves: Pupils equally round and reactive to light, visual fields full, no nystagmus, extraocular muscles intact, V1 through V3 intact bilaterally and symmetric, face symmetric, hearing intact to finger rub, palate elevation symmetric, tongue was midline.  Motor: deltoid weakness 3/5. Strength 5/5 in B/L LE   strength 5/5.  Normal tone and bulk.  No abnormal movements.    Sensation: Intact to light touch and vibration  Coordination: No dysmetria on finger-to-nose   Reflexes: 2+ in bilateral UE/LE  L arm with dressing c/d/i      Assessment: 72yMale s/p L deltoid muscle biopsy    Plan  - cont Ancef x 2 doses post op  - pain control PRN  - activity as tolerated  - arm sling for 1 week  - has absorbable sutures in place  - primary team to resume care  - may follow up with Dr. Moody in two weeks for wound check    Discussed with Dr. Moody

## 2023-02-06 NOTE — PROGRESS NOTE ADULT - PROBLEM SELECTOR PLAN 1
Increased Prednisone to 60mg po QD starting 2/3/23.   Barium Swallow performed and resulted as Nasopharyngeal reflux. Mild esophageal dysmotility  Aspiration precaution  SLP eval completed.   CT Chest / Abd/ Pelvis, resulted as: 12 mm air-filled cyst in the right middle lobe, 2.7 cm cyst in segment two of   liver. Subcentimeter hypodensities segment two and six of liver too   small to characterize but likely cysts or biliary hamartomas. Subtle questionable liver contour nodularity. 11 mm radiodensity seen in gallbladder neck could represent calculus or polyp. Nodular thickening bilaterally of the adrenals. Right measures 1.0 x 1.6x 2.2 cm. Left measures 1.5 x 2.1 x 2.1 cm,  1.4 cm cortical cyst interpolar region right kidney. A few bilateral subcentimeter renal cortical hypodensities are too small to characterize    Pending EMG/NCV w/ Dr. Krishna  Neurosurgery biopsied L deltoid muscle on 6/2/23, follow results  PT/OT/PMNR  Monitor CPK  Anti Mi2 Ab, paraneoplastic panel, free cortisol pending  Will need GI outpatient follow up given CTT/A/P findings, and r/o underlying GI malignancy given correlation with Dermatomyositis.

## 2023-02-06 NOTE — PRE-ANESTHESIA EVALUATION ADULT - NSANTHPMHFT_GEN_ALL_CORE
72M PMH HIV, HTN, HLD, DM, Dermatomyositis presented with dysphagia and muscle weakness  Troponemia   Murmur  Transaminitis   Vit B12 deficiency  elevated CPK

## 2023-02-06 NOTE — PRE-ANESTHESIA EVALUATION ADULT - NSRADCARDRESULTSFT_GEN_ALL_CORE
- CT findings showing possible HOCM (Asymmetric thickening of the interventricular septum up to 2.0 cm thickness as compared to the lateral wall left ventricle)  - TTE showing EF>75%, grade II LV diastolic dysfunction and PASP 43 mmHg, aortic sclerosis, mild MR   - METS at least 4

## 2023-02-06 NOTE — PROGRESS NOTE ADULT - ATTENDING COMMENTS
72M PMH HIV, HTN, HLD, DM, and recently diagnosed Dermatomyositis who presented 2/1 with dysphagia and muscle weakness, admitted to neurology for further evaluation. Medicine consulted for preop clearance for deltoid muscle biopsy.    #Preop clearance  # Troponemia   # Murmurs  - no CV symptoms   - no ischemic changes on ECG   - CT findings showing possible HOCM (Asymmetric thickening of the interventricular septum up to 2.0 cm thickness as compared to the lateral wall left ventricle)  - TTE showing EF>75%, grade II LV diastolic dysfunction and PASP 43 mmHg  - cardiology evaluation ( can be outpt) for further evaluation and management   - METS at least 4  - no contraindication for low risk procedure: Deltoid biopsy     # Transaminitis   # elevated CPK  # Dermatomyositis   - elevated AST/ALT likely 2/2 Dermatomyositis, HCV NR, avoid statin   - CPK<5000, no need for IVF hydration   - awaiting Deltoid biopsy on Monday 2/6  - c/w Prednisone 50mg-> 60mg po daily ( 2/4-)   - will add H2B i.e. Famotidine 20mg daily for GI ppx   - will add Bactrim DS 1 tab daily for PCP ppx if pt required prolonged steroid ( i.e. Prednisone 20mg daily for more than 4 weeks)   - noted gallbladder cyst Vs polyp 11mm, GI eval given Dermatomyositis can be paraneoplastic syndrome. increased risk of malignancy     #Vit B12 deficiency  - N05=265 (Borderline low)  - check MMA and homocysteine,and replete VIt B12 preferred 1000mg IM daily inpatient, may send home on Vit B 12 1000mg po daily     rest per Dr. Santillan's detailed note    Thank you for the consult, Med consult team continues to follow with you.
I was physically present for the key portions of the evaluation and managemnent (E/M) service provided.  I agree with the above history, physical, and plan which I have reviewed and edited where appropriate, with the exceptions as per my note.    pt seen and examined.    he is walking well.    plan for bx tomorrow. all questions answered.
likely dermatomyositis responding to steroids.  now s/p biopsy.  strength improved and he is ambulatory.    continue current prednisone 60mg daily  he is following up closely with rheumatology and neurology after discharge to manage immunotherapy and f/u results  plan for discharge home today
I was physically present for the key portions of the evaluation and managemnent (E/M) service provided.  I agree with the above history, physical, and plan which I have reviewed and edited where appropriate, with the exceptions as per my note.    pt reports improvement with prednisone.    appreciate hospitalist recs.    neuro exam with proximal UE>LE weakness.    plan for bx on monday and EMG tuesday.

## 2023-02-06 NOTE — BRIEF OPERATIVE NOTE - OPERATION/FINDINGS
-Left deltoid muscle biopsy under local anesthesia  -Uneventful procedure  -Skin closed with monocryl and dermabond

## 2023-02-06 NOTE — DISCHARGE NOTE NURSING/CASE MANAGEMENT/SOCIAL WORK - NSDCFUADDAPPT_GEN_ALL_CORE_FT
Please reach out to Dr. Alpesh Moody ([phone]) with Neurosurgery to schedule a follow-up appointment in 2 weeks and to receive biopsy results from your left arm biopsy.    Please reach out to Dr. Amandeep Montanez ([phone]) with Cardiology to schedule a follow-up appointment.

## 2023-02-06 NOTE — PROGRESS NOTE ADULT - PROBLEM SELECTOR PLAN 3
c/w Amlodipine 10mg po QD

## 2023-02-06 NOTE — PROGRESS NOTE ADULT - SUBJECTIVE AND OBJECTIVE BOX
Patient is a 72y old  Male who presents with a chief complaint of Dermatomyositis (06 Feb 2023 10:16)    INTERVAL EVENTS: NAEON    SUBJECTIVE:  Patient was seen and examined at bedside with . s/p L deltoid muscle biopsy with local anesthetic and absorbable sutures placed by NSGY this am. Pt tolerated procedure, denies pain. Pt reports increasing muscle strength, reports facial swelling but denies throat tightness/dysphagia,etc. Tolerating diet.     Review of systems: No fever, chills, dizziness, HA, Changes in vision, CP, dyspnea, nausea or vomiting, dysuria, changes in bowel movements, LE edema. Rest of 12 point Review of systems negative unless otherwise documented elsewhere in note.     Diet, Consistent Carbohydrate w/Evening Snack (02-06-23 @ 11:00) [Active]  Diet, Clear Liquid (02-06-23 @ 09:27) [Available for Activation]      MEDICATIONS:  MEDICATIONS  (STANDING):  ceFAZolin   IVPB 2000 milliGRAM(s) IV Intermittent every 8 hours  dextrose 10%. 125 milliLiter(s) (250 mL/Hr) IV Continuous <Continuous>  lactated ringers. 1000 milliLiter(s) (75 mL/Hr) IV Continuous <Continuous>  pantoprazole    Tablet 40 milliGRAM(s) Oral before breakfast  polyethylene glycol 3350 17 Gram(s) Oral daily  senna 2 Tablet(s) Oral at bedtime    MEDICATIONS  (PRN):  acetaminophen     Tablet .. 650 milliGRAM(s) Oral every 6 hours PRN Mild Pain (1 - 3)  traMADol 50 milliGRAM(s) Oral every 4 hours PRN Moderate Pain (4 - 6)      Allergies    No Known Allergies    Intolerances        OBJECTIVE:  Vital Signs Last 24 Hrs  T(C): 37.6 (06 Feb 2023 12:26), Max: 37.6 (06 Feb 2023 12:26)  T(F): 99.6 (06 Feb 2023 12:26), Max: 99.6 (06 Feb 2023 12:26)  HR: 95 (06 Feb 2023 12:26) (75 - 95)  BP: 143/79 (06 Feb 2023 12:26) (108/62 - 143/79)  BP(mean): 87 (06 Feb 2023 09:36) (80 - 94)  RR: 18 (06 Feb 2023 12:26) (16 - 20)  SpO2: 95% (06 Feb 2023 12:26) (93% - 99%)    Parameters below as of 06 Feb 2023 12:26  Patient On (Oxygen Delivery Method): room air      I&O's Summary      PHYSICAL EXAM:  Gen: Reclining in bed at time of exam, appears stated age, in good spirits  HEENT: NCAT, MMM, clear OP  Neck: supple, trachea at midline  CV: RRR, +S1/S2  Pulm: adequate respiratory effort, no increase in work of breathing  Abd: soft, NTND  Skin: warm and dry,   Ext: WWP, no LE edema, L arm sling in place   Neuro: AOx3, no gross focal neurological deficits  Psych: affect and behavior appropriate, pleasant at time of interview    LABS:                        13.0   7.17  )-----------( 195      ( 05 Feb 2023 06:58 )             42.5     02-05    139  |  104  |  16  ----------------------------<  106<H>  4.5   |  26  |  0.66    Ca    8.7      05 Feb 2023 06:58  Phos  3.9     02-05  Mg     2.1     02-05    TPro  6.2  /  Alb  3.1<L>  /  TBili  0.3  /  DBili  x   /  AST  167<H>  /  ALT  92<H>  /  AlkPhos  59  02-05    LIVER FUNCTIONS - ( 05 Feb 2023 06:58 )  Alb: 3.1 g/dL / Pro: 6.2 g/dL / ALK PHOS: 59 U/L / ALT: 92 U/L / AST: 167 U/L / GGT: x             CAPILLARY BLOOD GLUCOSE      POCT Blood Glucose.: 102 mg/dL (06 Feb 2023 06:18)  POCT Blood Glucose.: 85 mg/dL (06 Feb 2023 04:15)  POCT Blood Glucose.: 108 mg/dL (05 Feb 2023 21:32)  POCT Blood Glucose.: 107 mg/dL (05 Feb 2023 17:23)        MICRODATA:      RADIOLOGY/OTHER STUDIES:

## 2023-02-06 NOTE — PROGRESS NOTE ADULT - SUBJECTIVE AND OBJECTIVE BOX
OVERNIGHT EVENTS:  None.    SUBJECTIVE / INTERVAL HPI: Received muscle biopsy.    VITAL SIGNS:  Vital Signs Last 24 Hrs  T(C): 36.6 (06 Feb 2023 06:22), Max: 37.3 (05 Feb 2023 11:45)  T(F): 97.9 (06 Feb 2023 04:55), Max: 99.2 (05 Feb 2023 11:45)  HR: 75 (06 Feb 2023 06:22) (75 - 90)  BP: 140/71 (06 Feb 2023 06:22) (133/80 - 143/76)  BP(mean): --  RR: 19 (06 Feb 2023 06:22) (18 - 19)  SpO2: 96% (06 Feb 2023 06:22) (95% - 97%)    Parameters below as of 06 Feb 2023 04:55  Patient On (Oxygen Delivery Method): room air      I&O's Summary      PHYSICAL EXAM:  Mental status: Awake, alert and oriented x3.  Recent and remote memory intact.  Naming, repetition and comprehension intact.  Attention/concentration intact.  No dysarthria, no aphasia.  Fund of knowledge appropriate.    Cranial nerves: Pupils equally round and reactive to light, visual fields full, no nystagmus, extraocular muscles intact, V1 through V3 intact bilaterally and symmetric, face symmetric, hearing intact to finger rub, palate elevation symmetric, tongue was midline.  Motor:  MRC grading B/L triceps, deltoid weakness 3+/5. Strength 5/5 in B/L LE   strength 5/5.  Normal tone and bulk.  No abnormal movements.    Sensation: Intact to light touch and vibration  Coordination: No dysmetria on finger-to-nose   Reflexes: 2+ in bilateral UE/LE    MEDICATIONS:  MEDICATIONS  (STANDING):  dextrose 10%. 125 milliLiter(s) (250 mL/Hr) IV Continuous <Continuous>    MEDICATIONS  (PRN):      ALLERGIES:  Allergies    No Known Allergies    Intolerances        LABS:                        13.0   7.17  )-----------( 195      ( 05 Feb 2023 06:58 )             42.5     02-05    139  |  104  |  16  ----------------------------<  106<H>  4.5   |  26  |  0.66    Ca    8.7      05 Feb 2023 06:58  Phos  3.9     02-05  Mg     2.1     02-05    TPro  6.2  /  Alb  3.1<L>  /  TBili  0.3  /  DBili  x   /  AST  167<H>  /  ALT  92<H>  /  AlkPhos  59  02-05        CAPILLARY BLOOD GLUCOSE      POCT Blood Glucose.: 102 mg/dL (06 Feb 2023 06:18)      RADIOLOGY & ADDITIONAL TESTS: Reviewed.

## 2023-02-06 NOTE — PROGRESS NOTE ADULT - PROBLEM SELECTOR PLAN 6
TV/MV region murmur - follows closely w/ outpatient cardiologist  Close monitor  Trending troponin, elevated  cardiology consulted:   - re, troponin: Likely related to underlying myopathy. Low suspicion for an acute ACS event.  -Recommend toprol 25 mg qd upon dc  -Will need cardiology fu upon dc, can fu with Dr Montanez [phone].
TV/MV region murmur - follows closely w/ outpatient cardiologist  Close monitor  Trending troponin, elevated  cardiology consulted:   - re, troponin: Likely related to underlying myopathy. Low suspicion for an acute ACS event.  -Recommend toprol 25 mg qd upon dc  -Will need cardiology fu upon dc, can fu with Dr Montanez [phone].
TV/MV region murmur - follows closely w/ outpatient cardiologist  Close monitor  Trending troponin, elevated  cardiology consulted:   - re, troponin: Likely related to underlying myopathy. Low suspicion for an acute ACS event.  -Recommend toprol 25 mg qd upon dc  -Will need cardiology fu upon dc, can fu with Dr Montanez [phone].
TV/MV region murmur - follows closely w/ outpatient cardiologist  Close monitor  Trending troponin, elevated  cardiology consulted:   - re, troponin: Likely related to underlying myopathy. Low suspicion for an acute ACS event.  -Recommend toprol 25 mg qd upon dc  -Will need cardiology fu upon dc, can fu with Dr Montanez [phone].

## 2023-02-06 NOTE — PROGRESS NOTE ADULT - ASSESSMENT
This is a 73yo M w/ findings of Dermatomyositis - heliotrope rash, Gluttons' papule, prox muscle weakness currently being w/u by rheumatology and pending EMG 02/07 presented for progression of his disease now  2 episodes of dysphagia to solids and no improvement w/ current dosage of steroid therapy. Admitted for workup of etiology, Myopathy vs Dermatomyositis.     This is a 73yo M w/ findings of Dermatomyositis - heliotrope rash, Gluttons' papule, prox muscle weakness currently being w/u by rheumatology and pending EMG 02/07 presented for progression of his disease now  2 episodes of dysphagia to solids and no improvement w/ current dosage of steroid therapy. Admitted for workup of etiology, Dermatomyositis vs other inflammatory myopathy

## 2023-02-07 ENCOUNTER — APPOINTMENT (OUTPATIENT)
Dept: NEUROLOGY | Facility: CLINIC | Age: 73
End: 2023-02-07
Payer: COMMERCIAL

## 2023-02-07 VITALS
HEART RATE: 109 BPM | WEIGHT: 200 LBS | TEMPERATURE: 98.5 F | OXYGEN SATURATION: 98 % | HEIGHT: 65 IN | BODY MASS INDEX: 33.32 KG/M2 | DIASTOLIC BLOOD PRESSURE: 76 MMHG | SYSTOLIC BLOOD PRESSURE: 143 MMHG

## 2023-02-07 PROCEDURE — 99215 OFFICE O/P EST HI 40 MIN: CPT | Mod: 25

## 2023-02-07 PROCEDURE — 95885 MUSC TST DONE W/NERV TST LIM: CPT

## 2023-02-07 PROCEDURE — 95910 NRV CNDJ TEST 7-8 STUDIES: CPT

## 2023-02-09 ENCOUNTER — APPOINTMENT (OUTPATIENT)
Dept: RHEUMATOLOGY | Facility: CLINIC | Age: 73
End: 2023-02-09
Payer: COMMERCIAL

## 2023-02-09 ENCOUNTER — LABORATORY RESULT (OUTPATIENT)
Age: 73
End: 2023-02-09

## 2023-02-09 VITALS
HEIGHT: 65 IN | WEIGHT: 188 LBS | OXYGEN SATURATION: 96 % | BODY MASS INDEX: 31.32 KG/M2 | DIASTOLIC BLOOD PRESSURE: 91 MMHG | HEART RATE: 120 BPM | SYSTOLIC BLOOD PRESSURE: 163 MMHG | TEMPERATURE: 100.3 F

## 2023-02-09 PROBLEM — B20 HUMAN IMMUNODEFICIENCY VIRUS [HIV] DISEASE: Chronic | Status: ACTIVE | Noted: 2023-02-02

## 2023-02-09 LAB
EJ AB SER QL: NEGATIVE
ENA JO1 AB SER IA-ACNC: <20 UNITS
ENA PM/SCL AB SER-ACNC: <20 UNITS
ENA SM+RNP AB SER IA-ACNC: <20 UNITS
ENA SS-A IGG SER QL: <20 UNITS
FIBRILLARIN AB SER QL: NEGATIVE
HOMOCYSTEINE LEVEL: 9.5 UMOL/L — SIGNIFICANT CHANGE UP
KU AB SER QL: ABNORMAL
MDA-5 (P140)(CADM-140): <20 UNITS
METHYLMALONIC ACID LEVEL: 88 NMOL/L — SIGNIFICANT CHANGE UP (ref 87–318)
MI2 AB SER QL: ABNORMAL
NXP-2 (P140): <20 UNITS
OJ AB SER QL: NEGATIVE
PL12 AB SER QL: NEGATIVE
PL7 AB SER QL: NEGATIVE
SRP AB SERPL QL: NEGATIVE
TIF GAMMA (P155/140): <20 UNITS
U2 SNRNP AB SER QL: NEGATIVE

## 2023-02-09 PROCEDURE — 99214 OFFICE O/P EST MOD 30 MIN: CPT | Mod: 25

## 2023-02-09 PROCEDURE — 36415 COLL VENOUS BLD VENIPUNCTURE: CPT

## 2023-02-09 NOTE — PHYSICAL EXAM
[FreeTextEntry1] : Gen: appears well, well-nourished, no acute distress\par \par MS: awake, alert, oriented, speech fluent, comprehension intact, good fund of knowledge, recent and remote memory intact, attention intact\par \par CN: PERRL, EOMI, visual fields full, facial strength and sensation intact and symmetric, palate elevation symmetric, tongue midline, no tongue atrophy or fasciculations\par \par Motor: neck flexion 4+, extension 5; deltoids 4-/5, biceps 5, triceps 4, finger extn 4+; hip flexion 4-, knee flex/ext and ankle dorsiflexion 5. \par \par Sensory: vibration moderately reduced at toes, mildly at ankles b/l \par \par Reflexes: 2+ symmetric throughout\par \par Coordination: no dysmetria on finger to nose, Romberg negative\par \par Gait: normal

## 2023-02-09 NOTE — PROCEDURE
[FreeTextEntry1] : \par Nerve Conduction and Electromyography Report\par  [FreeTextEntry3] : \par Electro Physiologic Findings:\par \par Limb temperature was monitored and maintained at approximately 30 – 34° C in the lower extremities, and 32 – 36° C in the upper extremities.\par \par The left radial, sural and superficial fibular sensory responses were normal. The left ulnar motor response was mildly slow across the elbow without conduction block. The left median, fibular and tibial motor responses were normal. The left tibial motor F-waves were mildly prolonged, while the left fibular, median and ulnar F-wave latencies were normal. \par \par Needle electromyography was performed on select left upper and lower extremity appendicular muscles. There was a moderate amount of spontaneous activity in the left biceps and triceps brachii, and a severe amount in the left iliopsoas. There were varying amounts of myopathic motor units with early / full recruitment pattern in most muscles tested. 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 However, some genetic and toxic myopathies may have similar findings.

## 2023-02-09 NOTE — REVIEW OF SYSTEMS
[Skin Lesions] : skin lesion [Negative] : Heme/Lymph [Shortness Of Breath] : no shortness of breath [SOB on Exertion] : no shortness of breath during exertion [FreeTextEntry3] : Swelling improving [FreeTextEntry4] : No dysphagia [FreeTextEntry5] : No chest pain or shortness of breath [FreeTextEntry9] : Muscle weakness

## 2023-02-09 NOTE — ASSESSMENT
[FreeTextEntry1] : 72-year-old man with recent onset of rash and facial edema referred by emergency room for evaluation after recent visit for muscle weakness, noted to have elevated CPK at 3000.  Patient with heliotrope rash, shawl sign, as well as gottrens papules, with proximal weakness more predominant of the upper extremities bilaterally, with presentation most consistent with dermatomyositis.  Patient with recent biopsy of the skin by dermatologist, as well as muscle biopsy February 6, 2023.  We will continue prednisone 60 mg daily.  Will start methotrexate 10 mg weekly with folic acid 1 mg daily.  Discussed risks and benefits of methotrexate.  Discussed possible addition of IVIG pending response and lab results today.  We will repeat CPK today in office.  Anti-Jo1 negative, HMG CR antibody negative, awaiting myomarker panel results, awaiting muscle biopsy results as well.  Discussed need for age-appropriate malignancy work-up, patient will discuss with primary care doctor.  Will continue prednisone 60 mg daily for now, follow up in tow to three weeks or sooner as needed.\par

## 2023-02-09 NOTE — PHYSICAL EXAM
[General Appearance - Alert] : alert [General Appearance - In No Acute Distress] : in no acute distress [General Appearance - Well Nourished] : well nourished [General Appearance - Well Developed] : well developed [Sclera] : the sclera and conjunctiva were normal [Oropharynx] : the oropharynx was normal [] : no respiratory distress [Respiration, Rhythm And Depth] : normal respiratory rhythm and effort [Exaggerated Use Of Accessory Muscles For Inspiration] : no accessory muscle use [Oriented To Time, Place, And Person] : oriented to person, place, and time [Impaired Insight] : insight and judgment were intact [Affect] : the affect was normal [FreeTextEntry1] : Go trans papules.  Improved periorbital erythema.  Improved facial edema.  Improved rash around the chest wall

## 2023-02-09 NOTE — HISTORY OF PRESENT ILLNESS
[FreeTextEntry1] : Referred by Dr. Adams for myopathy / myositis\par Weakness started about 2 months ago, rash on the face, neck, chest and arms about 3 months ago \par Initially diagnosed with allergic reaction to medication, stopped lisinopril, verapamil and atorvastatin without improvement\par He saw Dr. Adams, CK and ESR elevated, diagnosed with dermatomyositis and started on prednisone with improvement in rash, however weakness did not improve\par He was admitted to [facility], had muscle biopsy yesterday, prednisone increased from 50 to 60mg and weakness started to improve \par \par Reviewed:\par Rheumatology note\par Labs - ESR 97, CK 3599, Letitia-1 negative, HMGCR negative\par CT C/A/P - no evidence of malignancy or ILD

## 2023-02-09 NOTE — HISTORY OF PRESENT ILLNESS
[FreeTextEntry1] : Referred by Dr. Adams for myopathy / myositis\par Weakness started about 2 months ago, rash on the face, neck, chest and arms about 3 months ago \par Initially diagnosed with allergic reaction to medication, stopped lisinopril, verapamil and atorvastatin without improvement\par He saw Dr. Adams, CK and ESR elevated, diagnosed with dermatomyositis and started on prednisone with improvement in rash, however weakness did not improve\par He was admitted to [facility], had muscle biopsy yesterday, prednisone increased from 50 to 60mg and weakness started to improve \par \par Reviewed:\par Rheumatology note\par Labs - ESR 97, CK 3599, Letitia-1 negative, HMGCR negative\par CT C/A/P - no evidence of malignancy or ILD

## 2023-02-09 NOTE — ASSESSMENT
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

## 2023-02-09 NOTE — DATA REVIEWED
[FreeTextEntry1] : 12/14/2022\par CBC: \par WBC 5.4\par hgb 13.6\par hct 41.8\par plt 248\par \par AST 86\par ALt 46\par Creatine 0.69\par \par ESR 34\par hep b sag neg\par Hep B s ab positive\par hep c nr\par hiv viral load neg\par \par EMG February 7, 2023\par EMG demonstrate a moderate predominantly proximal myopathy with muscle membrane instability, which is consistent with an inflammatory/autoimmune myositis.  However some genetic and toxic myopathies may have similar findings.\par

## 2023-02-09 NOTE — HISTORY OF PRESENT ILLNESS
[FreeTextEntry1] : 72-year-old man referred for rheumatology evaluation\par \par Patient was recently evaluated in the emergency room at [facility], noted to have a rash on the face and chest in addition to elevated CPK.\par \par Patient following with primary care provider, developed rash about 3 months ago\par Rash primarily on the face as well as chest wall and little bit on the neck\par Rash is red on the face associated with edema\par Patient has been started on prednisone for the rash with some mild improvement\par Review of pictures on patient's phone rashes not present in September 2022\par Over the past month has also felt increase in weakness of the upper extremities\par Was seen in the emergency room as well at home, and was unable to stand up from position on the floor for more than 1 hour, patient was worried and went to the emergency room\par Also about 1 month ago was traveling, and felt unable to lift case into the overhead compartment which was new\par \par Prednisone on and off for two months \par Helps with the rash\par \par No chest pain or shortness of breath\par No dysphagia\par \par In the emergency room, CPK 3000\par \par Patient is not currently taking a statin\par Changed haart medicine to se eif may have been side effect, no benefit with change in medication\par \par Was seen by dermatologist Dr. Gem ZAYAS, [phone]\par Had skin biopsy of the right forearm, discussed with dermatologist at time of visit, consistent with dermatomyositis\par Patient is currently taking \par amlodipine 10 mg \par Julluca \par Stopped statin, verapamil, dovato, and lisinopril\par \par February 9, 2023\par Patient returns for follow-up\par Since last visit, patient admitted to hospital for muscle biopsy completed February 6, 2023\par Was seen on February 7 by neurology as outpatient for follow-up\par EMG\par Continues on prednisone 60 mg qday\par Feels some improvement in the arms but quite limited, hip weakness\par Maybe some neck involvement \par Breathing ok\par Feels rashes are improved, facial edema improving\par Discussed treatment options with patient's including methotrexate, azathioprine, as well as IVIG\par Discussed side effects of treatment options with patient as well\par No side effects noted with prednisone for now\par Glucose at home is ok\par Reviewed previous labs, Letitia 1 negative HMG CR antibody negative, \par \par \par \par \par \par

## 2023-02-10 LAB
ALBUMIN SERPL ELPH-MCNC: 3.8 G/DL
ALP BLD-CCNC: 68 U/L
ALT SERPL-CCNC: 117 U/L
ANION GAP SERPL CALC-SCNC: 16 MMOL/L
AST SERPL-CCNC: 226 U/L
BILIRUB SERPL-MCNC: 0.3 MG/DL
BUN SERPL-MCNC: 20 MG/DL
CALCIUM SERPL-MCNC: 9.6 MG/DL
CHLORIDE SERPL-SCNC: 102 MMOL/L
CK SERPL-CCNC: 4104 U/L
CO2 SERPL-SCNC: 21 MMOL/L
CREAT SERPL-MCNC: 0.58 MG/DL
EGFR: 104 ML/MIN/1.73M2
GLUCOSE SERPL-MCNC: 171 MG/DL
POTASSIUM SERPL-SCNC: 4.7 MMOL/L
PROT SERPL-MCNC: 6.4 G/DL
SODIUM SERPL-SCNC: 139 MMOL/L

## 2023-02-11 LAB
CORTICOSTEROID BINDING GLOBULIN RESULT: 1 MG/DL — LOW
CORTIS F/TOTAL MFR SERPL: 7.5 % — SIGNIFICANT CHANGE UP
CORTIS SERPL-MCNC: 2.6 UG/DL — LOW
CORTISOL, FREE RESULT: 0.19 UG/DL — LOW

## 2023-02-12 LAB
BASOPHILS # BLD AUTO: 0.03 K/UL
BASOPHILS NFR BLD AUTO: 0.3 %
EOSINOPHIL # BLD AUTO: 0 K/UL
EOSINOPHIL NFR BLD AUTO: 0 %
HCT VFR BLD CALC: 47.2 %
HGB BLD-MCNC: 14.4 G/DL
IMM GRANULOCYTES NFR BLD AUTO: 0.4 %
LYMPHOCYTES # BLD AUTO: 0.28 K/UL
LYMPHOCYTES NFR BLD AUTO: 2.5 %
MAN DIFF?: NORMAL
MCHC RBC-ENTMCNC: 26.6 PG
MCHC RBC-ENTMCNC: 30.5 GM/DL
MCV RBC AUTO: 87.1 FL
MONOCYTES # BLD AUTO: 0.29 K/UL
MONOCYTES NFR BLD AUTO: 2.6 %
NEUTROPHILS # BLD AUTO: 10.59 K/UL
NEUTROPHILS NFR BLD AUTO: 94.2 %
PLATELET # BLD AUTO: 234 K/UL
RBC # BLD: 5.42 M/UL
RBC # FLD: 13.9 %
WBC # FLD AUTO: 11.23 K/UL

## 2023-02-15 ENCOUNTER — NON-APPOINTMENT (OUTPATIENT)
Age: 73
End: 2023-02-15

## 2023-02-16 DIAGNOSIS — R74.01 ELEVATION OF LEVELS OF LIVER TRANSAMINASE LEVELS: ICD-10-CM

## 2023-02-16 DIAGNOSIS — M33.12 OTHER DERMATOMYOSITIS WITH MYOPATHY: ICD-10-CM

## 2023-02-16 DIAGNOSIS — Z79.82 LONG TERM (CURRENT) USE OF ASPIRIN: ICD-10-CM

## 2023-02-16 DIAGNOSIS — K21.9 GASTRO-ESOPHAGEAL REFLUX DISEASE WITHOUT ESOPHAGITIS: ICD-10-CM

## 2023-02-16 DIAGNOSIS — E78.00 PURE HYPERCHOLESTEROLEMIA, UNSPECIFIED: ICD-10-CM

## 2023-02-16 DIAGNOSIS — E11.9 TYPE 2 DIABETES MELLITUS WITHOUT COMPLICATIONS: ICD-10-CM

## 2023-02-16 DIAGNOSIS — I10 ESSENTIAL (PRIMARY) HYPERTENSION: ICD-10-CM

## 2023-02-16 DIAGNOSIS — Z79.84 LONG TERM (CURRENT) USE OF ORAL HYPOGLYCEMIC DRUGS: ICD-10-CM

## 2023-02-16 DIAGNOSIS — M33.13 OTHER DERMATOMYOSITIS WITHOUT MYOPATHY: ICD-10-CM

## 2023-02-16 DIAGNOSIS — R00.0 TACHYCARDIA, UNSPECIFIED: ICD-10-CM

## 2023-02-16 DIAGNOSIS — E53.8 DEFICIENCY OF OTHER SPECIFIED B GROUP VITAMINS: ICD-10-CM

## 2023-02-16 DIAGNOSIS — R79.89 OTHER SPECIFIED ABNORMAL FINDINGS OF BLOOD CHEMISTRY: ICD-10-CM

## 2023-02-16 DIAGNOSIS — B20 HUMAN IMMUNODEFICIENCY VIRUS [HIV] DISEASE: ICD-10-CM

## 2023-02-20 ENCOUNTER — NON-APPOINTMENT (OUTPATIENT)
Age: 73
End: 2023-02-20

## 2023-02-22 LAB — MI2 AB SER QL: ABNORMAL

## 2023-02-28 ENCOUNTER — APPOINTMENT (OUTPATIENT)
Dept: NEUROSURGERY | Facility: CLINIC | Age: 73
End: 2023-02-28
Payer: COMMERCIAL

## 2023-02-28 DIAGNOSIS — Z09 ENCOUNTER FOR FOLLOW-UP EXAMINATION AFTER COMPLETED TREATMENT FOR CONDITIONS OTHER THAN MALIGNANT NEOPLASM: ICD-10-CM

## 2023-02-28 PROCEDURE — 99212 OFFICE O/P EST SF 10 MIN: CPT | Mod: 95

## 2023-02-28 NOTE — HISTORY OF PRESENT ILLNESS
[FreeTextEntry1] : 72 year old man who presented with upper extremity weakness and muscle atrophy, worse in the proximal left arm. Muscle biopsy was done as requested by neurology for diagnostic workup.\par \par He underwent left deep deltoid muscle biopsy on 2/6/23 with Dr. Moody.\par \par Neuropathology specimen consistent with dermatomyositis. Results discussed with neurology, Dr. Krishna and rheumatology, Dr. Adams. \par Denies fever, chills, incisional drainage. \par \par He has follow up scheduled with Dr. Krishna and Dr. Adams next week.

## 2023-02-28 NOTE — ASSESSMENT
[FreeTextEntry1] : Shower daily. Left deltoid Incision healing well with no s/s infection.\par Report all s/s infection including drainage, redness, warmth, fever, weakness, chills.\par Follow-up with Dr. Moody prn if concerns re: incision\par No tub baths/pools for 8 weeks.\par Keep incision covered and protected from sun for 3-6 months following surgery.\par \par Patient verbalizes agreement and understanding with plan.\par \par I, Dr. Moody, personally performed the evaluation and management (E/M) services for this established patient who presents today with (a) new problem(s)/exacerbation of (an) existing condition(s).  That E/M includes conducting the examination, assessing all new/exacerbated conditions, and establishing a new plan of care.  Today, my ACP, Becky Breaux, was here to observe my evaluation and management services for this new problem/exacerbated condition to be followed going forward.\par \par \par

## 2023-03-10 ENCOUNTER — APPOINTMENT (OUTPATIENT)
Dept: RHEUMATOLOGY | Facility: CLINIC | Age: 73
End: 2023-03-10
Payer: COMMERCIAL

## 2023-03-10 VITALS
HEART RATE: 105 BPM | SYSTOLIC BLOOD PRESSURE: 137 MMHG | BODY MASS INDEX: 31.32 KG/M2 | DIASTOLIC BLOOD PRESSURE: 72 MMHG | WEIGHT: 188 LBS | TEMPERATURE: 98.4 F | OXYGEN SATURATION: 97 % | HEIGHT: 65 IN

## 2023-03-10 DIAGNOSIS — R73.09 OTHER ABNORMAL GLUCOSE: ICD-10-CM

## 2023-03-10 PROCEDURE — 99214 OFFICE O/P EST MOD 30 MIN: CPT | Mod: 25

## 2023-03-10 PROCEDURE — 36415 COLL VENOUS BLD VENIPUNCTURE: CPT

## 2023-03-10 RX ORDER — PREDNISONE 50 MG/1
50 TABLET ORAL
Qty: 14 | Refills: 0 | Status: DISCONTINUED | COMMUNITY
Start: 2023-01-19 | End: 2023-03-10

## 2023-03-12 NOTE — ASSESSMENT
[FreeTextEntry1] : 72-year-old man with recent onset of rash and facial edema referred by emergency room for evaluation after recent visit for muscle weakness, noted to have elevated CPK at 3000.  Patient with heliotrope rash, shawl sign, as well as gottrens papules, with proximal weakness more predominant of the upper extremities bilaterally, with presentation most consistent with dermatomyositis.  Patient with recent biopsy of the skin by dermatologist, as well as muscle biopsy February 6, 2023, consistent with dermatomyositis, biomarker panel with positive MI 2 antibody.  Patient currently taking Medrol 48 mg daily times, given polyuria, will decrease Medrol to 40 mg daily, will restart Bactrim 1 tab three days per week, check hemoglobin A1c today.  Will increase methotrexate to 15 mg weekly with folic acid 1 mg daily.  Discussed risks and benefits of methotrexate.  Discussed addition of IVIG, consent obtained and orders sent to [facility] infusion center today. Will repeat CPK, hemoglobin A1c, lipids, and G6PD today in office.  Anti-Jo1 negative, HMG CR antibody negative.  Discussed need for age-appropriate malignancy work-up, patient will discuss with primary care doctor.  Will repeat labs today in office.  follow up in one month or sooner as needed. \par \par

## 2023-03-12 NOTE — PHYSICAL EXAM
[General Appearance - Alert] : alert [General Appearance - In No Acute Distress] : in no acute distress [Sclera] : the sclera and conjunctiva were normal [Examination Of The Oral Cavity] : the lips and gums were normal [Oropharynx] : the oropharynx was normal [] : no respiratory distress [Respiration, Rhythm And Depth] : normal respiratory rhythm and effort [Exaggerated Use Of Accessory Muscles For Inspiration] : no accessory muscle use [Abnormal Walk] : normal gait [Oriented To Time, Place, And Person] : oriented to person, place, and time [Impaired Insight] : insight and judgment were intact [Affect] : the affect was normal [Mood] : the mood was normal [FreeTextEntry1] : Improved rash on the bilateral upper extremities.  Edema and erythema of the face, improving but still present.  Chest wall erythema much improved.

## 2023-03-12 NOTE — HISTORY OF PRESENT ILLNESS
[FreeTextEntry1] : 72-year-old man referred for rheumatology evaluation\par \par Patient was recently evaluated in the emergency room at [facility], noted to have a rash on the face and chest in addition to elevated CPK.\par \par Patient following with primary care provider, developed rash about 3 months ago\par Rash primarily on the face as well as chest wall and little bit on the neck\par Rash is red on the face associated with edema\par Patient has been started on prednisone for the rash with some mild improvement\par Review of pictures on patient's phone rashes not present in September 2022\par Over the past month has also felt increase in weakness of the upper extremities\par Was seen in the emergency room as well at home, and was unable to stand up from position on the floor for more than 1 hour, patient was worried and went to the emergency room\par Also about 1 month ago was traveling, and felt unable to lift case into the overhead compartment which was new\par \par Prednisone on and off for two months \par Helps with the rash\par \par No chest pain or shortness of breath\par No dysphagia\par \par In the emergency room, CPK 3000\par \par Patient is not currently taking a statin\par Changed haart medicine to se eif may have been side effect, no benefit with change in medication\par \par Was seen by dermatologist Dr. Gem ZAYAS, [phone]\par Had skin biopsy of the right forearm, discussed with dermatologist at time of visit, consistent with dermatomyositis\par Patient is currently taking \par amlodipine 10 mg \par Julluca \par Stopped statin, verapamil, dovato, and lisinopril\par \par February 9, 2023\par Patient returns for follow-up\par Since last visit, patient admitted to hospital for muscle biopsy completed February 6, 2023\par Was seen on February 7 by neurology as outpatient for follow-up\par EMG\par Continues on prednisone 60 mg qday\par Feels some improvement in the arms but quite limited, hip weakness\par Maybe some neck involvement \par Breathing ok\par Feels rashes are improved, facial edema improving\par Discussed treatment options with patient's including methotrexate, azathioprine, as well as IVIG\par Discussed side effects of treatment options with patient as well\par No side effects noted with prednisone for now\par Glucose at home is ok\par Reviewed previous labs, Letitia 1 negative HMG CR antibody negative, \par \par March 10, 2023\par Patient returns for follow up\par Medrol 16 mg tid\par MTX 10 mg qweek\par Folic acid 1 mg qday\par Labs from the hospital:\par Reviewed labs\par Myomarker panel + for +Mi2\par Muscle biopsy consistent with dermatomyositis\par PT twice per week\par exercises on own\par Limited range of motion of the upper extremities, lower extremities improving\par Discussed treatment options\par Discussed IvIg in addition to current regimen\par Feels increase in urination, discussed possible hyperglycemia, will obtain a1c today\par rashes improving

## 2023-03-13 ENCOUNTER — TRANSCRIPTION ENCOUNTER (OUTPATIENT)
Age: 73
End: 2023-03-13

## 2023-03-13 ENCOUNTER — APPOINTMENT (OUTPATIENT)
Dept: NEUROLOGY | Facility: CLINIC | Age: 73
End: 2023-03-13
Payer: COMMERCIAL

## 2023-03-13 VITALS
SYSTOLIC BLOOD PRESSURE: 164 MMHG | OXYGEN SATURATION: 98 % | HEART RATE: 114 BPM | DIASTOLIC BLOOD PRESSURE: 71 MMHG | BODY MASS INDEX: 27.99 KG/M2 | WEIGHT: 168 LBS | RESPIRATION RATE: 16 BRPM | HEIGHT: 65 IN | TEMPERATURE: 98.3 F

## 2023-03-13 PROCEDURE — 99214 OFFICE O/P EST MOD 30 MIN: CPT

## 2023-03-14 ENCOUNTER — TRANSCRIPTION ENCOUNTER (OUTPATIENT)
Age: 73
End: 2023-03-14

## 2023-03-15 LAB
CHOLEST SERPL-MCNC: 319 MG/DL
CK SERPL-CCNC: 1976 U/L
ESTIMATED AVERAGE GLUCOSE: 171 MG/DL
HBA1C MFR BLD HPLC: 7.6 %
HDLC SERPL-MCNC: 68 MG/DL
LDLC SERPL CALC-MCNC: NORMAL MG/DL
NONHDLC SERPL-MCNC: 251 MG/DL
TRIGL SERPL-MCNC: 486 MG/DL

## 2023-03-15 NOTE — PHYSICAL EXAM
[FreeTextEntry1] : CN: PERRL, EOMI, visual fields full, facial strength and sensation intact and symmetric, palate elevation symmetric, tongue midline, no tongue atrophy or fasciculations\par \par Motor: neck flexion 4-, extension 4-; deltoids 2/5, biceps 4+, triceps 4-, finger extn and abduction 4+; hip flexion 4-, knee flex/ext and ankle dorsiflexion 5. \par \par Reflexes: 1+ symmetric throughout\par \par Coordination: no dysmetria on finger to nose, Romberg negative\par \par Gait: normal.

## 2023-03-15 NOTE — ASSESSMENT
[FreeTextEntry1] : CK is down however his weakness has progressed\par Discussed with patient, wife and Dr. Adams - will proceed with outpatient IVIG next week, if he gets worse prior to that consider admission \par Continue medrol, methotrexate per rheumatology\par f/u 6 weeks

## 2023-03-15 NOTE — HISTORY OF PRESENT ILLNESS
[FreeTextEntry1] : About 2 weeks ago he started feeling weaker - has trouble lifting his head up \par He has no appetite and has been losing weight\par He started methotrexate and medrol lowered to 40mg daily \par Legs feel stronger but still has trouble getting up from the toilet \par \par Reviewed:\par rheumatology note \par labs below

## 2023-03-17 ENCOUNTER — TRANSCRIPTION ENCOUNTER (OUTPATIENT)
Age: 73
End: 2023-03-17

## 2023-03-17 LAB — G6PD SER-CCNC: 10.6 U/G HGB

## 2023-03-23 ENCOUNTER — APPOINTMENT (OUTPATIENT)
Dept: INFUSION THERAPY | Facility: CLINIC | Age: 73
End: 2023-03-23

## 2023-03-24 ENCOUNTER — APPOINTMENT (OUTPATIENT)
Dept: INFUSION THERAPY | Facility: CLINIC | Age: 73
End: 2023-03-24

## 2023-03-27 ENCOUNTER — APPOINTMENT (OUTPATIENT)
Dept: INFUSION THERAPY | Facility: CLINIC | Age: 73
End: 2023-03-27

## 2023-03-27 ENCOUNTER — OUTPATIENT (OUTPATIENT)
Dept: OUTPATIENT SERVICES | Facility: HOSPITAL | Age: 73
LOS: 1 days | End: 2023-03-27
Payer: COMMERCIAL

## 2023-03-27 VITALS
HEIGHT: 68 IN | WEIGHT: 167.99 LBS | HEART RATE: 78 BPM | TEMPERATURE: 98 F | OXYGEN SATURATION: 98 % | DIASTOLIC BLOOD PRESSURE: 72 MMHG | RESPIRATION RATE: 16 BRPM | SYSTOLIC BLOOD PRESSURE: 122 MMHG

## 2023-03-27 VITALS
RESPIRATION RATE: 15 BRPM | OXYGEN SATURATION: 99 % | SYSTOLIC BLOOD PRESSURE: 124 MMHG | HEART RATE: 74 BPM | DIASTOLIC BLOOD PRESSURE: 74 MMHG | TEMPERATURE: 98 F

## 2023-03-27 DIAGNOSIS — M33.90 DERMATOPOLYMYOSITIS, UNSPECIFIED, ORGAN INVOLVEMENT UNSPECIFIED: ICD-10-CM

## 2023-03-27 PROCEDURE — 96365 THER/PROPH/DIAG IV INF INIT: CPT

## 2023-03-27 PROCEDURE — 96366 THER/PROPH/DIAG IV INF ADDON: CPT

## 2023-03-27 PROCEDURE — 96375 TX/PRO/DX INJ NEW DRUG ADDON: CPT

## 2023-03-27 RX ORDER — ACETAMINOPHEN 500 MG
325 TABLET ORAL ONCE
Refills: 0 | Status: COMPLETED | OUTPATIENT
Start: 2023-03-27 | End: 2023-03-27

## 2023-03-27 RX ORDER — IMMUNE GLOBULIN (HUMAN) 10 G/100ML
80 INJECTION INTRAVENOUS; SUBCUTANEOUS ONCE
Refills: 0 | Status: COMPLETED | OUTPATIENT
Start: 2023-03-27 | End: 2023-03-27

## 2023-03-27 RX ORDER — DIPHENHYDRAMINE HCL 50 MG
25 CAPSULE ORAL ONCE
Refills: 0 | Status: COMPLETED | OUTPATIENT
Start: 2023-03-27 | End: 2023-03-27

## 2023-03-27 RX ADMIN — IMMUNE GLOBULIN (HUMAN) 80 GRAM(S): 10 INJECTION INTRAVENOUS; SUBCUTANEOUS at 13:15

## 2023-03-27 RX ADMIN — IMMUNE GLOBULIN (HUMAN) 200 GRAM(S): 10 INJECTION INTRAVENOUS; SUBCUTANEOUS at 09:15

## 2023-03-27 RX ADMIN — Medication 200 MILLIGRAM(S): at 09:00

## 2023-03-27 RX ADMIN — Medication 25 MILLIGRAM(S): at 09:05

## 2023-03-27 RX ADMIN — Medication 325 MILLIGRAM(S): at 09:05

## 2023-03-27 RX ADMIN — Medication 60 MILLIGRAM(S): at 09:14

## 2023-03-27 RX ADMIN — Medication 325 MILLIGRAM(S): at 13:12

## 2023-03-28 ENCOUNTER — NON-APPOINTMENT (OUTPATIENT)
Age: 73
End: 2023-03-28

## 2023-03-28 ENCOUNTER — OUTPATIENT (OUTPATIENT)
Dept: OUTPATIENT SERVICES | Facility: HOSPITAL | Age: 73
LOS: 1 days | End: 2023-03-28
Payer: COMMERCIAL

## 2023-03-28 ENCOUNTER — APPOINTMENT (OUTPATIENT)
Dept: INFUSION THERAPY | Facility: CLINIC | Age: 73
End: 2023-03-28

## 2023-03-28 VITALS
OXYGEN SATURATION: 96 % | RESPIRATION RATE: 18 BRPM | TEMPERATURE: 98 F | HEART RATE: 79 BPM | SYSTOLIC BLOOD PRESSURE: 139 MMHG | DIASTOLIC BLOOD PRESSURE: 75 MMHG

## 2023-03-28 VITALS
WEIGHT: 167.99 LBS | TEMPERATURE: 98 F | HEIGHT: 68 IN | RESPIRATION RATE: 18 BRPM | DIASTOLIC BLOOD PRESSURE: 74 MMHG | SYSTOLIC BLOOD PRESSURE: 137 MMHG | OXYGEN SATURATION: 99 % | HEART RATE: 75 BPM

## 2023-03-28 DIAGNOSIS — M33.90 DERMATOPOLYMYOSITIS, UNSPECIFIED, ORGAN INVOLVEMENT UNSPECIFIED: ICD-10-CM

## 2023-03-28 PROCEDURE — 96365 THER/PROPH/DIAG IV INF INIT: CPT

## 2023-03-28 PROCEDURE — 96375 TX/PRO/DX INJ NEW DRUG ADDON: CPT

## 2023-03-28 PROCEDURE — 96366 THER/PROPH/DIAG IV INF ADDON: CPT

## 2023-03-28 RX ORDER — DIPHENHYDRAMINE HCL 50 MG
25 CAPSULE ORAL ONCE
Refills: 0 | Status: COMPLETED | OUTPATIENT
Start: 2023-03-28 | End: 2023-03-28

## 2023-03-28 RX ORDER — ACETAMINOPHEN 500 MG
325 TABLET ORAL ONCE
Refills: 0 | Status: COMPLETED | OUTPATIENT
Start: 2023-03-28 | End: 2023-03-28

## 2023-03-28 RX ORDER — IMMUNE GLOBULIN (HUMAN) 10 G/100ML
80 INJECTION INTRAVENOUS; SUBCUTANEOUS ONCE
Refills: 0 | Status: COMPLETED | OUTPATIENT
Start: 2023-03-28 | End: 2023-03-28

## 2023-03-28 RX ADMIN — Medication 25 MILLIGRAM(S): at 08:45

## 2023-03-28 RX ADMIN — Medication 60 MILLIGRAM(S): at 09:00

## 2023-03-28 RX ADMIN — IMMUNE GLOBULIN (HUMAN) 200 GRAM(S): 10 INJECTION INTRAVENOUS; SUBCUTANEOUS at 09:00

## 2023-03-28 RX ADMIN — IMMUNE GLOBULIN (HUMAN) 80 GRAM(S): 10 INJECTION INTRAVENOUS; SUBCUTANEOUS at 14:00

## 2023-03-28 RX ADMIN — Medication 200 MILLIGRAM(S): at 08:45

## 2023-03-28 RX ADMIN — Medication 325 MILLIGRAM(S): at 09:15

## 2023-03-28 RX ADMIN — Medication 325 MILLIGRAM(S): at 08:45

## 2023-04-03 ENCOUNTER — NON-APPOINTMENT (OUTPATIENT)
Age: 73
End: 2023-04-03

## 2023-04-03 VITALS — BODY MASS INDEX: 27.99 KG/M2 | HEIGHT: 65 IN | WEIGHT: 168 LBS

## 2023-04-03 DIAGNOSIS — Z87.891 PERSONAL HISTORY OF NICOTINE DEPENDENCE: ICD-10-CM

## 2023-04-03 NOTE — REASON FOR VISIT
[Home] : at home, [unfilled] , at the time of the visit. [Medical Office: ([facility])___] : at the medical office located in  [Verbal consent obtained from patient] : the patient, [unfilled] [Initial Evaluation] : an initial evaluation visit [Review of Eligibility] : review of eligibility [Low-Dose CT Screening Discussion] : low-dose CT lung cancer screening discussion [Virtual Visit] : virtual visit

## 2023-04-03 NOTE — PLAN
[Smoking Cessation] : smoking cessation [Regular follow-up with healthcare provider] : regular follow-up with healthcare provider [FreeTextEntry1] : Plan:\par -Low Dose CT chest for lung cancer screening\par -Follow up with patient and his referring provider after his LDCT results have been reviewed by the multi-disciplinary clinical team\par -Encouraged continued smoking abstinence\par \par \par Engaged in shared decision making with Forrest MARCUS IRWIN . Answered all questions. He verbalized understanding and agreement. He knows to call back with any questions or concerns\par

## 2023-04-04 ENCOUNTER — TRANSCRIPTION ENCOUNTER (OUTPATIENT)
Age: 73
End: 2023-04-04

## 2023-04-05 ENCOUNTER — TRANSCRIPTION ENCOUNTER (OUTPATIENT)
Age: 73
End: 2023-04-05

## 2023-04-06 ENCOUNTER — TRANSCRIPTION ENCOUNTER (OUTPATIENT)
Age: 73
End: 2023-04-06

## 2023-04-13 ENCOUNTER — RESULT REVIEW (OUTPATIENT)
Age: 73
End: 2023-04-13

## 2023-04-13 ENCOUNTER — TRANSCRIPTION ENCOUNTER (OUTPATIENT)
Age: 73
End: 2023-04-13

## 2023-04-13 DIAGNOSIS — R63.4 ABNORMAL WEIGHT LOSS: ICD-10-CM

## 2023-04-17 ENCOUNTER — APPOINTMENT (OUTPATIENT)
Dept: CT IMAGING | Facility: HOSPITAL | Age: 73
End: 2023-04-17
Payer: COMMERCIAL

## 2023-04-17 ENCOUNTER — TRANSCRIPTION ENCOUNTER (OUTPATIENT)
Age: 73
End: 2023-04-17

## 2023-04-17 ENCOUNTER — OUTPATIENT (OUTPATIENT)
Dept: OUTPATIENT SERVICES | Facility: HOSPITAL | Age: 73
LOS: 1 days | End: 2023-04-17
Payer: COMMERCIAL

## 2023-04-17 LAB — POCT ISTAT CREATININE: 0.5 MG/DL — SIGNIFICANT CHANGE UP (ref 0.5–1.3)

## 2023-04-17 PROCEDURE — 74177 CT ABD & PELVIS W/CONTRAST: CPT | Mod: 26

## 2023-04-17 PROCEDURE — 71260 CT THORAX DX C+: CPT | Mod: 26

## 2023-04-17 PROCEDURE — 74177 CT ABD & PELVIS W/CONTRAST: CPT

## 2023-04-17 PROCEDURE — 82565 ASSAY OF CREATININE: CPT

## 2023-04-17 PROCEDURE — 71260 CT THORAX DX C+: CPT

## 2023-04-21 ENCOUNTER — TRANSCRIPTION ENCOUNTER (OUTPATIENT)
Age: 73
End: 2023-04-21

## 2023-04-25 ENCOUNTER — NON-APPOINTMENT (OUTPATIENT)
Age: 73
End: 2023-04-25

## 2023-04-25 RX ORDER — METHOTREXATE 2.5 MG/1
2.5 TABLET ORAL
Qty: 24 | Refills: 2 | Status: DISCONTINUED | COMMUNITY
Start: 2023-02-10 | End: 2023-04-25

## 2023-04-27 ENCOUNTER — OUTPATIENT (OUTPATIENT)
Dept: OUTPATIENT SERVICES | Facility: HOSPITAL | Age: 73
LOS: 1 days | End: 2023-04-27
Payer: COMMERCIAL

## 2023-04-27 ENCOUNTER — APPOINTMENT (OUTPATIENT)
Dept: INFUSION THERAPY | Facility: CLINIC | Age: 73
End: 2023-04-27

## 2023-04-27 VITALS
OXYGEN SATURATION: 99 % | WEIGHT: 171.96 LBS | HEIGHT: 68 IN | RESPIRATION RATE: 18 BRPM | TEMPERATURE: 99 F | HEART RATE: 90 BPM | DIASTOLIC BLOOD PRESSURE: 78 MMHG | SYSTOLIC BLOOD PRESSURE: 130 MMHG

## 2023-04-27 VITALS
DIASTOLIC BLOOD PRESSURE: 73 MMHG | RESPIRATION RATE: 18 BRPM | SYSTOLIC BLOOD PRESSURE: 119 MMHG | HEART RATE: 78 BPM | OXYGEN SATURATION: 97 % | TEMPERATURE: 98 F

## 2023-04-27 DIAGNOSIS — M33.90 DERMATOPOLYMYOSITIS, UNSPECIFIED, ORGAN INVOLVEMENT UNSPECIFIED: ICD-10-CM

## 2023-04-27 LAB
ALBUMIN SERPL ELPH-MCNC: 3.8 G/DL — SIGNIFICANT CHANGE UP (ref 3.3–5)
ALP SERPL-CCNC: 54 U/L — SIGNIFICANT CHANGE UP (ref 40–120)
ALT FLD-CCNC: 29 U/L — SIGNIFICANT CHANGE UP (ref 10–45)
ANION GAP SERPL CALC-SCNC: 7 MMOL/L — SIGNIFICANT CHANGE UP (ref 5–17)
AST SERPL-CCNC: 23 U/L — SIGNIFICANT CHANGE UP (ref 10–40)
BILIRUB SERPL-MCNC: 0.4 MG/DL — SIGNIFICANT CHANGE UP (ref 0.2–1.2)
BUN SERPL-MCNC: 16 MG/DL — SIGNIFICANT CHANGE UP (ref 7–23)
CALCIUM SERPL-MCNC: 9.7 MG/DL — SIGNIFICANT CHANGE UP (ref 8.4–10.5)
CHLORIDE SERPL-SCNC: 102 MMOL/L — SIGNIFICANT CHANGE UP (ref 96–108)
CK SERPL-CCNC: 129 U/L — SIGNIFICANT CHANGE UP (ref 30–200)
CO2 SERPL-SCNC: 31 MMOL/L — SIGNIFICANT CHANGE UP (ref 22–31)
CREAT SERPL-MCNC: 0.48 MG/DL — LOW (ref 0.5–1.3)
EGFR: 110 ML/MIN/1.73M2 — SIGNIFICANT CHANGE UP
GLUCOSE SERPL-MCNC: 136 MG/DL — HIGH (ref 70–99)
HCT VFR BLD CALC: 43.3 % — SIGNIFICANT CHANGE UP (ref 39–50)
HGB BLD-MCNC: 13.5 G/DL — SIGNIFICANT CHANGE UP (ref 13–17)
MCHC RBC-ENTMCNC: 27.3 PG — SIGNIFICANT CHANGE UP (ref 27–34)
MCHC RBC-ENTMCNC: 31.2 GM/DL — LOW (ref 32–36)
MCV RBC AUTO: 87.7 FL — SIGNIFICANT CHANGE UP (ref 80–100)
NRBC # BLD: 0 /100 WBCS — SIGNIFICANT CHANGE UP (ref 0–0)
PLATELET # BLD AUTO: 214 K/UL — SIGNIFICANT CHANGE UP (ref 150–400)
POTASSIUM SERPL-MCNC: 3.7 MMOL/L — SIGNIFICANT CHANGE UP (ref 3.5–5.3)
POTASSIUM SERPL-SCNC: 3.7 MMOL/L — SIGNIFICANT CHANGE UP (ref 3.5–5.3)
PROT SERPL-MCNC: 6.8 G/DL — SIGNIFICANT CHANGE UP (ref 6–8.3)
RBC # BLD: 4.94 M/UL — SIGNIFICANT CHANGE UP (ref 4.2–5.8)
RBC # FLD: 16.6 % — HIGH (ref 10.3–14.5)
SODIUM SERPL-SCNC: 140 MMOL/L — SIGNIFICANT CHANGE UP (ref 135–145)
WBC # BLD: 6.83 K/UL — SIGNIFICANT CHANGE UP (ref 3.8–10.5)
WBC # FLD AUTO: 6.83 K/UL — SIGNIFICANT CHANGE UP (ref 3.8–10.5)

## 2023-04-27 PROCEDURE — 96365 THER/PROPH/DIAG IV INF INIT: CPT

## 2023-04-27 PROCEDURE — 82550 ASSAY OF CK (CPK): CPT

## 2023-04-27 PROCEDURE — 36415 COLL VENOUS BLD VENIPUNCTURE: CPT

## 2023-04-27 PROCEDURE — 85027 COMPLETE CBC AUTOMATED: CPT

## 2023-04-27 PROCEDURE — 80053 COMPREHEN METABOLIC PANEL: CPT

## 2023-04-27 PROCEDURE — 96366 THER/PROPH/DIAG IV INF ADDON: CPT

## 2023-04-27 PROCEDURE — 96375 TX/PRO/DX INJ NEW DRUG ADDON: CPT

## 2023-04-27 RX ORDER — IMMUNE GLOBULIN (HUMAN) 10 G/100ML
80 INJECTION INTRAVENOUS; SUBCUTANEOUS ONCE
Refills: 0 | Status: COMPLETED | OUTPATIENT
Start: 2023-04-27 | End: 2023-04-27

## 2023-04-27 RX ORDER — DIPHENHYDRAMINE HCL 50 MG
25 CAPSULE ORAL ONCE
Refills: 0 | Status: COMPLETED | OUTPATIENT
Start: 2023-04-27 | End: 2023-04-27

## 2023-04-27 RX ORDER — ACETAMINOPHEN 500 MG
325 TABLET ORAL ONCE
Refills: 0 | Status: COMPLETED | OUTPATIENT
Start: 2023-04-27 | End: 2023-04-27

## 2023-04-27 RX ADMIN — IMMUNE GLOBULIN (HUMAN) 80 GRAM(S): 10 INJECTION INTRAVENOUS; SUBCUTANEOUS at 14:12

## 2023-04-27 RX ADMIN — Medication 325 MILLIGRAM(S): at 10:52

## 2023-04-27 RX ADMIN — Medication 325 MILLIGRAM(S): at 09:37

## 2023-04-27 RX ADMIN — Medication 25 MILLIGRAM(S): at 09:37

## 2023-04-27 RX ADMIN — IMMUNE GLOBULIN (HUMAN) 200 GRAM(S): 10 INJECTION INTRAVENOUS; SUBCUTANEOUS at 10:10

## 2023-04-27 RX ADMIN — Medication 204 MILLIGRAM(S): at 09:47

## 2023-04-27 RX ADMIN — Medication 40 MILLIGRAM(S): at 10:02

## 2023-04-28 ENCOUNTER — APPOINTMENT (OUTPATIENT)
Dept: INFUSION THERAPY | Facility: CLINIC | Age: 73
End: 2023-04-28

## 2023-04-28 ENCOUNTER — OUTPATIENT (OUTPATIENT)
Dept: OUTPATIENT SERVICES | Facility: HOSPITAL | Age: 73
LOS: 1 days | End: 2023-04-28
Payer: COMMERCIAL

## 2023-04-28 VITALS
SYSTOLIC BLOOD PRESSURE: 128 MMHG | DIASTOLIC BLOOD PRESSURE: 78 MMHG | TEMPERATURE: 98 F | HEART RATE: 78 BPM | OXYGEN SATURATION: 99 % | RESPIRATION RATE: 18 BRPM

## 2023-04-28 VITALS
SYSTOLIC BLOOD PRESSURE: 122 MMHG | HEART RATE: 80 BPM | OXYGEN SATURATION: 98 % | WEIGHT: 171.96 LBS | TEMPERATURE: 98 F | RESPIRATION RATE: 18 BRPM | DIASTOLIC BLOOD PRESSURE: 71 MMHG | HEIGHT: 68 IN

## 2023-04-28 DIAGNOSIS — M33.90 DERMATOPOLYMYOSITIS, UNSPECIFIED, ORGAN INVOLVEMENT UNSPECIFIED: ICD-10-CM

## 2023-04-28 PROCEDURE — 96375 TX/PRO/DX INJ NEW DRUG ADDON: CPT

## 2023-04-28 PROCEDURE — 96366 THER/PROPH/DIAG IV INF ADDON: CPT

## 2023-04-28 PROCEDURE — 96365 THER/PROPH/DIAG IV INF INIT: CPT

## 2023-04-28 RX ORDER — ACETAMINOPHEN 500 MG
325 TABLET ORAL ONCE
Refills: 0 | Status: COMPLETED | OUTPATIENT
Start: 2023-04-28 | End: 2023-04-28

## 2023-04-28 RX ORDER — DIPHENHYDRAMINE HCL 50 MG
25 CAPSULE ORAL ONCE
Refills: 0 | Status: COMPLETED | OUTPATIENT
Start: 2023-04-28 | End: 2023-04-28

## 2023-04-28 RX ORDER — IMMUNE GLOBULIN (HUMAN) 10 G/100ML
80 INJECTION INTRAVENOUS; SUBCUTANEOUS ONCE
Refills: 0 | Status: COMPLETED | OUTPATIENT
Start: 2023-04-28 | End: 2023-04-28

## 2023-04-28 RX ADMIN — IMMUNE GLOBULIN (HUMAN) 200 GRAM(S): 10 INJECTION INTRAVENOUS; SUBCUTANEOUS at 11:00

## 2023-04-28 RX ADMIN — Medication 325 MILLIGRAM(S): at 10:53

## 2023-04-28 RX ADMIN — Medication 204 MILLIGRAM(S): at 10:45

## 2023-04-28 RX ADMIN — Medication 25 MILLIGRAM(S): at 10:53

## 2023-04-28 RX ADMIN — Medication 40 MILLIGRAM(S): at 11:00

## 2023-04-28 RX ADMIN — IMMUNE GLOBULIN (HUMAN) 80 GRAM(S): 10 INJECTION INTRAVENOUS; SUBCUTANEOUS at 15:05

## 2023-05-19 ENCOUNTER — RX RENEWAL (OUTPATIENT)
Age: 73
End: 2023-05-19

## 2023-05-19 ENCOUNTER — APPOINTMENT (OUTPATIENT)
Dept: RHEUMATOLOGY | Facility: CLINIC | Age: 73
End: 2023-05-19
Payer: COMMERCIAL

## 2023-05-19 VITALS
HEIGHT: 65 IN | TEMPERATURE: 98.1 F | BODY MASS INDEX: 29.99 KG/M2 | DIASTOLIC BLOOD PRESSURE: 68 MMHG | WEIGHT: 180 LBS | OXYGEN SATURATION: 97 % | HEART RATE: 61 BPM | SYSTOLIC BLOOD PRESSURE: 122 MMHG

## 2023-05-19 DIAGNOSIS — R60.9 EDEMA, UNSPECIFIED: ICD-10-CM

## 2023-05-19 PROCEDURE — 99214 OFFICE O/P EST MOD 30 MIN: CPT | Mod: 25

## 2023-05-19 PROCEDURE — 36415 COLL VENOUS BLD VENIPUNCTURE: CPT

## 2023-05-19 RX ORDER — TESTOSTERONE 40.5 MG/2.5G
40.5 MG/2.5GM GEL TOPICAL
Qty: 75 | Refills: 0 | Status: DISCONTINUED | COMMUNITY
Start: 2022-12-19 | End: 2023-05-19

## 2023-05-19 RX ORDER — HYDROCORTISONE 25 MG/G
2.5 CREAM TOPICAL
Qty: 28 | Refills: 0 | Status: DISCONTINUED | COMMUNITY
Start: 2022-12-16 | End: 2023-05-19

## 2023-05-19 RX ORDER — BETAMETHASONE DIPROPIONATE 0.5 MG/G
0.05 CREAM TOPICAL
Qty: 45 | Refills: 0 | Status: DISCONTINUED | COMMUNITY
Start: 2022-11-03 | End: 2023-05-19

## 2023-05-19 RX ORDER — CLOTRIMAZOLE AND BETAMETHASONE DIPROPIONATE 10; .5 MG/G; MG/G
1-0.05 CREAM TOPICAL
Qty: 30 | Refills: 0 | Status: DISCONTINUED | COMMUNITY
Start: 2022-09-22 | End: 2023-05-19

## 2023-05-19 NOTE — REVIEW OF SYSTEMS
[Joint Stiffness] : joint stiffness [Skin Lesions] : skin lesion [Negative] : Heme/Lymph [FreeTextEntry9] : bilateral shoulders [de-identified] : hyperpigmentation

## 2023-05-19 NOTE — ASSESSMENT
[FreeTextEntry1] : 72-year-old man with recent onset of rash and facial edema referred by emergency room for evaluation after recent visit for muscle weakness, noted to have elevated CPK at 3000.  Patient with heliotrope rash, shawl sign, as well as gottrens papules, with proximal weakness more predominant of the upper extremities bilaterally, with presentation most consistent with dermatomyositis.  Patient with recent biopsy of the skin by dermatologist, as well as muscle biopsy February 6, 2023, consistent with dermatomyositis, myomarker panel with positive MI 2 antibody.  Patient currently taking Medrol 16 mg daily, will decrease to 12 mg qday, continues Bactrim 1 tab three days per week.  Previously unable to tolerate azathioprine and methotrexate, started on cellcept.  Patent overall doing well, although with decrease in CD4 count, may need to decrease cellcept dose, will continue to monitor as continues to taper steroids.  Will continue IvIg, next dose next week.   Discussed need for age-appropriate malignancy work-up, patient will discuss with primary care doctor.  Will repeat labs CPK in office, follow up in one month or sooner as needed. \par \par

## 2023-05-19 NOTE — PHYSICAL EXAM
[General Appearance - Alert] : alert [General Appearance - In No Acute Distress] : in no acute distress [General Appearance - Well-Appearing] : healthy appearing [Sclera] : the sclera and conjunctiva were normal [] : no respiratory distress [Respiration, Rhythm And Depth] : normal respiratory rhythm and effort [Exaggerated Use Of Accessory Muscles For Inspiration] : no accessory muscle use [No Spinal Tenderness] : no spinal tenderness [Oriented To Time, Place, And Person] : oriented to person, place, and time [Impaired Insight] : insight and judgment were intact [Affect] : the affect was normal [FreeTextEntry1] : rashes improving.  Hyperpigmentation of the face, active rashes of the chest wall

## 2023-05-19 NOTE — HISTORY OF PRESENT ILLNESS
[FreeTextEntry1] : 72-year-old man referred for rheumatology evaluation\par \par Patient was recently evaluated in the emergency room at [facility], noted to have a rash on the face and chest in addition to elevated CPK.\par \par Patient following with primary care provider, developed rash about 3 months ago\par Rash primarily on the face as well as chest wall and little bit on the neck\par Rash is red on the face associated with edema\par Patient has been started on prednisone for the rash with some mild improvement\par Review of pictures on patient's phone rashes not present in September 2022\par Over the past month has also felt increase in weakness of the upper extremities\par Was seen in the emergency room as well at home, and was unable to stand up from position on the floor for more than 1 hour, patient was worried and went to the emergency room\par Also about 1 month ago was traveling, and felt unable to lift case into the overhead compartment which was new\par \par Prednisone on and off for two months \par Helps with the rash\par \par No chest pain or shortness of breath\par No dysphagia\par \par In the emergency room, CPK 3000\par \par Patient is not currently taking a statin\par Changed haart medicine to se eif may have been side effect, no benefit with change in medication\par \par Was seen by dermatologist Dr. Gem ZAYAS, [phone]\par Had skin biopsy of the right forearm, discussed with dermatologist at time of visit, consistent with dermatomyositis\par Patient is currently taking \par amlodipine 10 mg \par Julluca \par Stopped statin, verapamil, dovato, and lisinopril\par \par February 9, 2023\par Patient returns for follow-up\par Since last visit, patient admitted to hospital for muscle biopsy completed February 6, 2023\par Was seen on February 7 by neurology as outpatient for follow-up\par EMG\par Continues on prednisone 60 mg qday\par Feels some improvement in the arms but quite limited, hip weakness\par Maybe some neck involvement \par Breathing ok\par Feels rashes are improved, facial edema improving\par Discussed treatment options with patient's including methotrexate, azathioprine, as well as IVIG\par Discussed side effects of treatment options with patient as well\par No side effects noted with prednisone for now\par Glucose at home is ok\par Reviewed previous labs, Letitia 1 negative HMG CR antibody negative, \par \par March 10, 2023\par Patient returns for follow up\par Medrol 16 mg tid\par MTX 10 mg qweek\par Folic acid 1 mg qday\par Labs from the hospital:\par Reviewed labs\par Myomarker panel + 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\par Muscle biopsy consistent with dermatomyositis\par PT twice per week\par exercises on own\par Limited range of motion of the upper extremities, lower extremities improving\par Discussed treatment options\par Discussed IvIg in addition to current regimen\par Feels increase in urination, discussed possible hyperglycemia, will obtain a1c today\par rashes improving\par \par May 19, 2023\par Patient returns for follow-up\par Patient status post IVIG April 2023\par Unable to tolerate methotrexate, now taking CellCept 1 gm twice daily\par Continues Bactrim 3 times weekly\par Medrol 16 mg qday\par Muscle biopsy consistent with dermatomyositis\par Continue continues physical therapy\par Feet are swollen and facial edema\par Will decrease medrol 12 \par Lump on neck \par +cologuard, colonoscopy pending\par Tests glucose at home always less than 130-140\par CD4 counts are low: May 2023 123, 2/2023 201, 11/2022 539\par Viral load negative\par No fevers, chills, signs of infection\par Continues bactrim tiw\par Patient was unable to tolerate azathioprine or methotrexate

## 2023-05-19 NOTE — DATA REVIEWED
[FreeTextEntry1] : 12/14/2022\par CBC: \par WBC 5.4\par hgb 13.6\par hct 41.8\par plt 248\par \par AST 86\par ALt 46\par Creatine 0.69\par \par ESR 34\par hep b sag neg\par Hep B s ab positive\par hep c nr\par hiv viral load neg\par \par EMG February 7, 2023\par EMG demonstrate a moderate predominantly proximal myopathy with muscle membrane instability, which is consistent with an inflammatory/autoimmune myositis.  However some genetic and toxic myopathies may have similar findings.\par \par Labs

## 2023-05-24 ENCOUNTER — TRANSCRIPTION ENCOUNTER (OUTPATIENT)
Age: 73
End: 2023-05-24

## 2023-05-24 LAB — CK SERPL-CCNC: 74 U/L

## 2023-05-30 ENCOUNTER — APPOINTMENT (OUTPATIENT)
Dept: INFUSION THERAPY | Facility: CLINIC | Age: 73
End: 2023-05-30

## 2023-05-30 ENCOUNTER — OUTPATIENT (OUTPATIENT)
Dept: OUTPATIENT SERVICES | Facility: HOSPITAL | Age: 73
LOS: 1 days | End: 2023-05-30
Payer: COMMERCIAL

## 2023-05-30 VITALS
OXYGEN SATURATION: 98 % | HEART RATE: 65 BPM | SYSTOLIC BLOOD PRESSURE: 124 MMHG | TEMPERATURE: 99 F | RESPIRATION RATE: 16 BRPM | DIASTOLIC BLOOD PRESSURE: 74 MMHG

## 2023-05-30 VITALS
SYSTOLIC BLOOD PRESSURE: 143 MMHG | OXYGEN SATURATION: 98 % | HEIGHT: 68 IN | TEMPERATURE: 99 F | RESPIRATION RATE: 18 BRPM | HEART RATE: 76 BPM | DIASTOLIC BLOOD PRESSURE: 74 MMHG | WEIGHT: 184.97 LBS

## 2023-05-30 DIAGNOSIS — M33.90 DERMATOPOLYMYOSITIS, UNSPECIFIED, ORGAN INVOLVEMENT UNSPECIFIED: ICD-10-CM

## 2023-05-30 LAB
ALBUMIN SERPL ELPH-MCNC: 3.4 G/DL — SIGNIFICANT CHANGE UP (ref 3.3–5)
ALP SERPL-CCNC: 62 U/L — SIGNIFICANT CHANGE UP (ref 40–120)
ALT FLD-CCNC: 58 U/L — HIGH (ref 10–45)
ANION GAP SERPL CALC-SCNC: 6 MMOL/L — SIGNIFICANT CHANGE UP (ref 5–17)
AST SERPL-CCNC: 21 U/L — SIGNIFICANT CHANGE UP (ref 10–40)
BILIRUB SERPL-MCNC: 0.2 MG/DL — SIGNIFICANT CHANGE UP (ref 0.2–1.2)
BUN SERPL-MCNC: 15 MG/DL — SIGNIFICANT CHANGE UP (ref 7–23)
CALCIUM SERPL-MCNC: 9.2 MG/DL — SIGNIFICANT CHANGE UP (ref 8.4–10.5)
CHLORIDE SERPL-SCNC: 104 MMOL/L — SIGNIFICANT CHANGE UP (ref 96–108)
CK SERPL-CCNC: 77 U/L — SIGNIFICANT CHANGE UP (ref 30–200)
CO2 SERPL-SCNC: 28 MMOL/L — SIGNIFICANT CHANGE UP (ref 22–31)
CREAT SERPL-MCNC: 0.48 MG/DL — LOW (ref 0.5–1.3)
EGFR: 110 ML/MIN/1.73M2 — SIGNIFICANT CHANGE UP
GLUCOSE SERPL-MCNC: 147 MG/DL — HIGH (ref 70–99)
HCT VFR BLD CALC: 39.1 % — SIGNIFICANT CHANGE UP (ref 39–50)
HGB BLD-MCNC: 12 G/DL — LOW (ref 13–17)
MCHC RBC-ENTMCNC: 27.6 PG — SIGNIFICANT CHANGE UP (ref 27–34)
MCHC RBC-ENTMCNC: 30.7 GM/DL — LOW (ref 32–36)
MCV RBC AUTO: 89.9 FL — SIGNIFICANT CHANGE UP (ref 80–100)
NRBC # BLD: 0 /100 WBCS — SIGNIFICANT CHANGE UP (ref 0–0)
PLATELET # BLD AUTO: 216 K/UL — SIGNIFICANT CHANGE UP (ref 150–400)
POTASSIUM SERPL-MCNC: 3.6 MMOL/L — SIGNIFICANT CHANGE UP (ref 3.5–5.3)
POTASSIUM SERPL-SCNC: 3.6 MMOL/L — SIGNIFICANT CHANGE UP (ref 3.5–5.3)
PROT SERPL-MCNC: 6.6 G/DL — SIGNIFICANT CHANGE UP (ref 6–8.3)
RBC # BLD: 4.35 M/UL — SIGNIFICANT CHANGE UP (ref 4.2–5.8)
RBC # FLD: 15.4 % — HIGH (ref 10.3–14.5)
SODIUM SERPL-SCNC: 138 MMOL/L — SIGNIFICANT CHANGE UP (ref 135–145)
WBC # BLD: 6.91 K/UL — SIGNIFICANT CHANGE UP (ref 3.8–10.5)
WBC # FLD AUTO: 6.91 K/UL — SIGNIFICANT CHANGE UP (ref 3.8–10.5)

## 2023-05-30 PROCEDURE — 80053 COMPREHEN METABOLIC PANEL: CPT

## 2023-05-30 PROCEDURE — 85027 COMPLETE CBC AUTOMATED: CPT

## 2023-05-30 PROCEDURE — 36415 COLL VENOUS BLD VENIPUNCTURE: CPT

## 2023-05-30 PROCEDURE — 82550 ASSAY OF CK (CPK): CPT

## 2023-05-30 PROCEDURE — 96365 THER/PROPH/DIAG IV INF INIT: CPT

## 2023-05-30 PROCEDURE — 96366 THER/PROPH/DIAG IV INF ADDON: CPT

## 2023-05-30 RX ORDER — ACETAMINOPHEN 500 MG
650 TABLET ORAL ONCE
Refills: 0 | Status: COMPLETED | OUTPATIENT
Start: 2023-05-30 | End: 2023-05-30

## 2023-05-30 RX ORDER — IMMUNE GLOBULIN (HUMAN) 10 G/100ML
80 INJECTION INTRAVENOUS; SUBCUTANEOUS ONCE
Refills: 0 | Status: COMPLETED | OUTPATIENT
Start: 2023-05-30 | End: 2023-05-30

## 2023-05-30 RX ORDER — DIPHENHYDRAMINE HCL 50 MG
25 CAPSULE ORAL ONCE
Refills: 0 | Status: COMPLETED | OUTPATIENT
Start: 2023-05-30 | End: 2023-05-30

## 2023-05-30 RX ADMIN — Medication 25 MILLIGRAM(S): at 11:40

## 2023-05-30 RX ADMIN — Medication 650 MILLIGRAM(S): at 12:40

## 2023-05-30 RX ADMIN — IMMUNE GLOBULIN (HUMAN) 80 GRAM(S): 10 INJECTION INTRAVENOUS; SUBCUTANEOUS at 11:44

## 2023-05-30 RX ADMIN — Medication 650 MILLIGRAM(S): at 11:40

## 2023-05-30 RX ADMIN — IMMUNE GLOBULIN (HUMAN) 80 GRAM(S): 10 INJECTION INTRAVENOUS; SUBCUTANEOUS at 15:55

## 2023-05-31 ENCOUNTER — OUTPATIENT (OUTPATIENT)
Dept: OUTPATIENT SERVICES | Facility: HOSPITAL | Age: 73
LOS: 1 days | End: 2023-05-31
Payer: COMMERCIAL

## 2023-05-31 ENCOUNTER — APPOINTMENT (OUTPATIENT)
Dept: INFUSION THERAPY | Facility: CLINIC | Age: 73
End: 2023-05-31

## 2023-05-31 VITALS
RESPIRATION RATE: 16 BRPM | HEART RATE: 64 BPM | OXYGEN SATURATION: 98 % | DIASTOLIC BLOOD PRESSURE: 78 MMHG | TEMPERATURE: 98 F | SYSTOLIC BLOOD PRESSURE: 128 MMHG

## 2023-05-31 VITALS
HEART RATE: 68 BPM | RESPIRATION RATE: 17 BRPM | SYSTOLIC BLOOD PRESSURE: 132 MMHG | DIASTOLIC BLOOD PRESSURE: 76 MMHG | TEMPERATURE: 99 F | OXYGEN SATURATION: 99 %

## 2023-05-31 DIAGNOSIS — M33.90 DERMATOPOLYMYOSITIS, UNSPECIFIED, ORGAN INVOLVEMENT UNSPECIFIED: ICD-10-CM

## 2023-05-31 PROCEDURE — 96366 THER/PROPH/DIAG IV INF ADDON: CPT

## 2023-05-31 PROCEDURE — 96365 THER/PROPH/DIAG IV INF INIT: CPT

## 2023-05-31 RX ORDER — ACETAMINOPHEN 500 MG
650 TABLET ORAL ONCE
Refills: 0 | Status: COMPLETED | OUTPATIENT
Start: 2023-05-31 | End: 2023-05-31

## 2023-05-31 RX ORDER — DIPHENHYDRAMINE HCL 50 MG
25 CAPSULE ORAL ONCE
Refills: 0 | Status: COMPLETED | OUTPATIENT
Start: 2023-05-31 | End: 2023-05-31

## 2023-05-31 RX ORDER — IMMUNE GLOBULIN (HUMAN) 10 G/100ML
80 INJECTION INTRAVENOUS; SUBCUTANEOUS ONCE
Refills: 0 | Status: COMPLETED | OUTPATIENT
Start: 2023-05-31 | End: 2023-05-31

## 2023-05-31 RX ADMIN — Medication 25 MILLIGRAM(S): at 11:45

## 2023-05-31 RX ADMIN — Medication 650 MILLIGRAM(S): at 11:45

## 2023-05-31 RX ADMIN — Medication 650 MILLIGRAM(S): at 12:20

## 2023-05-31 RX ADMIN — IMMUNE GLOBULIN (HUMAN) 200 GRAM(S): 10 INJECTION INTRAVENOUS; SUBCUTANEOUS at 11:55

## 2023-05-31 RX ADMIN — IMMUNE GLOBULIN (HUMAN) 80 GRAM(S): 10 INJECTION INTRAVENOUS; SUBCUTANEOUS at 15:55

## 2023-06-16 ENCOUNTER — APPOINTMENT (OUTPATIENT)
Dept: NEUROLOGY | Facility: CLINIC | Age: 73
End: 2023-06-16

## 2023-06-21 NOTE — PATIENT PROFILE ADULT - NSPROGENBLOODRESTRICT_GEN_A_NUR
Soolantra Pregnancy And Lactation Text: This medication is Pregnancy Category C. This medication is considered safe during breast feeding. none

## 2023-06-23 ENCOUNTER — RX RENEWAL (OUTPATIENT)
Age: 73
End: 2023-06-23

## 2023-06-27 ENCOUNTER — APPOINTMENT (OUTPATIENT)
Dept: INFUSION THERAPY | Facility: CLINIC | Age: 73
End: 2023-06-27

## 2023-06-27 ENCOUNTER — OUTPATIENT (OUTPATIENT)
Dept: OUTPATIENT SERVICES | Facility: HOSPITAL | Age: 73
LOS: 1 days | End: 2023-06-27
Payer: COMMERCIAL

## 2023-06-27 VITALS
OXYGEN SATURATION: 99 % | SYSTOLIC BLOOD PRESSURE: 120 MMHG | HEART RATE: 74 BPM | RESPIRATION RATE: 18 BRPM | DIASTOLIC BLOOD PRESSURE: 74 MMHG | TEMPERATURE: 98 F

## 2023-06-27 VITALS
WEIGHT: 184.97 LBS | SYSTOLIC BLOOD PRESSURE: 120 MMHG | RESPIRATION RATE: 18 BRPM | DIASTOLIC BLOOD PRESSURE: 74 MMHG | OXYGEN SATURATION: 99 % | HEART RATE: 70 BPM | TEMPERATURE: 98 F | HEIGHT: 68 IN

## 2023-06-27 DIAGNOSIS — M33.90 DERMATOPOLYMYOSITIS, UNSPECIFIED, ORGAN INVOLVEMENT UNSPECIFIED: ICD-10-CM

## 2023-06-27 LAB
ALBUMIN SERPL ELPH-MCNC: 3.4 G/DL — SIGNIFICANT CHANGE UP (ref 3.3–5)
ALP SERPL-CCNC: 63 U/L — SIGNIFICANT CHANGE UP (ref 40–120)
ALT FLD-CCNC: 16 U/L — SIGNIFICANT CHANGE UP (ref 10–45)
ANION GAP SERPL CALC-SCNC: 10 MMOL/L — SIGNIFICANT CHANGE UP (ref 5–17)
AST SERPL-CCNC: 22 U/L — SIGNIFICANT CHANGE UP (ref 10–40)
BILIRUB SERPL-MCNC: 0.2 MG/DL — SIGNIFICANT CHANGE UP (ref 0.2–1.2)
BUN SERPL-MCNC: 16 MG/DL — SIGNIFICANT CHANGE UP (ref 7–23)
CALCIUM SERPL-MCNC: 9.5 MG/DL — SIGNIFICANT CHANGE UP (ref 8.4–10.5)
CHLORIDE SERPL-SCNC: 106 MMOL/L — SIGNIFICANT CHANGE UP (ref 96–108)
CO2 SERPL-SCNC: 26 MMOL/L — SIGNIFICANT CHANGE UP (ref 22–31)
CREAT SERPL-MCNC: 0.64 MG/DL — SIGNIFICANT CHANGE UP (ref 0.5–1.3)
CRP SERPL-MCNC: 31.8 MG/L — HIGH (ref 0–4)
EGFR: 101 ML/MIN/1.73M2 — SIGNIFICANT CHANGE UP
GLUCOSE SERPL-MCNC: 150 MG/DL — HIGH (ref 70–99)
HCT VFR BLD CALC: 42.4 % — SIGNIFICANT CHANGE UP (ref 39–50)
HGB BLD-MCNC: 12.8 G/DL — LOW (ref 13–17)
MCHC RBC-ENTMCNC: 26.9 PG — LOW (ref 27–34)
MCHC RBC-ENTMCNC: 30.2 GM/DL — LOW (ref 32–36)
MCV RBC AUTO: 89.3 FL — SIGNIFICANT CHANGE UP (ref 80–100)
NRBC # BLD: 0 /100 WBCS — SIGNIFICANT CHANGE UP (ref 0–0)
PLATELET # BLD AUTO: 224 K/UL — SIGNIFICANT CHANGE UP (ref 150–400)
POTASSIUM SERPL-MCNC: 4.3 MMOL/L — SIGNIFICANT CHANGE UP (ref 3.5–5.3)
POTASSIUM SERPL-SCNC: 4.3 MMOL/L — SIGNIFICANT CHANGE UP (ref 3.5–5.3)
PROT SERPL-MCNC: 7.3 G/DL — SIGNIFICANT CHANGE UP (ref 6–8.3)
RBC # BLD: 4.75 M/UL — SIGNIFICANT CHANGE UP (ref 4.2–5.8)
RBC # FLD: 13.5 % — SIGNIFICANT CHANGE UP (ref 10.3–14.5)
SODIUM SERPL-SCNC: 142 MMOL/L — SIGNIFICANT CHANGE UP (ref 135–145)
WBC # BLD: 7.62 K/UL — SIGNIFICANT CHANGE UP (ref 3.8–10.5)
WBC # FLD AUTO: 7.62 K/UL — SIGNIFICANT CHANGE UP (ref 3.8–10.5)

## 2023-06-27 PROCEDURE — 36415 COLL VENOUS BLD VENIPUNCTURE: CPT

## 2023-06-27 PROCEDURE — 85027 COMPLETE CBC AUTOMATED: CPT

## 2023-06-27 PROCEDURE — 96365 THER/PROPH/DIAG IV INF INIT: CPT

## 2023-06-27 PROCEDURE — 96366 THER/PROPH/DIAG IV INF ADDON: CPT

## 2023-06-27 PROCEDURE — 86140 C-REACTIVE PROTEIN: CPT

## 2023-06-27 PROCEDURE — 80053 COMPREHEN METABOLIC PANEL: CPT

## 2023-06-27 RX ORDER — DIPHENHYDRAMINE HCL 50 MG
25 CAPSULE ORAL ONCE
Refills: 0 | Status: COMPLETED | OUTPATIENT
Start: 2023-06-27 | End: 2023-06-27

## 2023-06-27 RX ORDER — ACETAMINOPHEN 500 MG
650 TABLET ORAL ONCE
Refills: 0 | Status: COMPLETED | OUTPATIENT
Start: 2023-06-27 | End: 2023-06-27

## 2023-06-27 RX ORDER — IMMUNE GLOBULIN (HUMAN) 10 G/100ML
80 INJECTION INTRAVENOUS; SUBCUTANEOUS ONCE
Refills: 0 | Status: COMPLETED | OUTPATIENT
Start: 2023-06-27 | End: 2023-06-27

## 2023-06-27 RX ADMIN — Medication 650 MILLIGRAM(S): at 10:30

## 2023-06-27 RX ADMIN — IMMUNE GLOBULIN (HUMAN) 200 GRAM(S): 10 INJECTION INTRAVENOUS; SUBCUTANEOUS at 10:20

## 2023-06-27 RX ADMIN — Medication 650 MILLIGRAM(S): at 10:10

## 2023-06-27 RX ADMIN — IMMUNE GLOBULIN (HUMAN) 80 GRAM(S): 10 INJECTION INTRAVENOUS; SUBCUTANEOUS at 14:20

## 2023-06-27 RX ADMIN — Medication 25 MILLIGRAM(S): at 10:10

## 2023-06-28 ENCOUNTER — APPOINTMENT (OUTPATIENT)
Dept: INFUSION THERAPY | Facility: CLINIC | Age: 73
End: 2023-06-28

## 2023-06-28 ENCOUNTER — OUTPATIENT (OUTPATIENT)
Dept: OUTPATIENT SERVICES | Facility: HOSPITAL | Age: 73
LOS: 1 days | End: 2023-06-28
Payer: COMMERCIAL

## 2023-06-28 VITALS
RESPIRATION RATE: 16 BRPM | HEART RATE: 72 BPM | TEMPERATURE: 98 F | SYSTOLIC BLOOD PRESSURE: 134 MMHG | OXYGEN SATURATION: 97 % | DIASTOLIC BLOOD PRESSURE: 74 MMHG

## 2023-06-28 VITALS
DIASTOLIC BLOOD PRESSURE: 76 MMHG | TEMPERATURE: 98 F | RESPIRATION RATE: 17 BRPM | HEART RATE: 68 BPM | OXYGEN SATURATION: 98 % | SYSTOLIC BLOOD PRESSURE: 126 MMHG

## 2023-06-28 DIAGNOSIS — M33.90 DERMATOPOLYMYOSITIS, UNSPECIFIED, ORGAN INVOLVEMENT UNSPECIFIED: ICD-10-CM

## 2023-06-28 LAB — CK SERPL-CCNC: 117 U/L — SIGNIFICANT CHANGE UP (ref 30–200)

## 2023-06-28 PROCEDURE — 36415 COLL VENOUS BLD VENIPUNCTURE: CPT

## 2023-06-28 PROCEDURE — 82550 ASSAY OF CK (CPK): CPT

## 2023-06-28 PROCEDURE — 96365 THER/PROPH/DIAG IV INF INIT: CPT

## 2023-06-28 PROCEDURE — 96366 THER/PROPH/DIAG IV INF ADDON: CPT

## 2023-06-28 RX ORDER — ACETAMINOPHEN 500 MG
650 TABLET ORAL ONCE
Refills: 0 | Status: COMPLETED | OUTPATIENT
Start: 2023-06-28 | End: 2023-06-28

## 2023-06-28 RX ORDER — DIPHENHYDRAMINE HCL 50 MG
25 CAPSULE ORAL ONCE
Refills: 0 | Status: COMPLETED | OUTPATIENT
Start: 2023-06-28 | End: 2023-06-28

## 2023-06-28 RX ORDER — IMMUNE GLOBULIN (HUMAN) 10 G/100ML
80 INJECTION INTRAVENOUS; SUBCUTANEOUS ONCE
Refills: 0 | Status: COMPLETED | OUTPATIENT
Start: 2023-06-28 | End: 2023-06-28

## 2023-06-28 RX ADMIN — IMMUNE GLOBULIN (HUMAN) 80 GRAM(S): 10 INJECTION INTRAVENOUS; SUBCUTANEOUS at 14:11

## 2023-06-28 RX ADMIN — Medication 650 MILLIGRAM(S): at 10:24

## 2023-06-28 RX ADMIN — Medication 650 MILLIGRAM(S): at 10:20

## 2023-06-28 RX ADMIN — Medication 25 MILLIGRAM(S): at 10:20

## 2023-06-28 RX ADMIN — IMMUNE GLOBULIN (HUMAN) 200 GRAM(S): 10 INJECTION INTRAVENOUS; SUBCUTANEOUS at 10:20

## 2023-06-29 ENCOUNTER — APPOINTMENT (OUTPATIENT)
Dept: RHEUMATOLOGY | Facility: CLINIC | Age: 73
End: 2023-06-29
Payer: COMMERCIAL

## 2023-06-29 VITALS
BODY MASS INDEX: 29.82 KG/M2 | TEMPERATURE: 98.4 F | OXYGEN SATURATION: 97 % | HEART RATE: 63 BPM | WEIGHT: 179 LBS | DIASTOLIC BLOOD PRESSURE: 85 MMHG | HEIGHT: 65 IN | SYSTOLIC BLOOD PRESSURE: 134 MMHG

## 2023-06-29 DIAGNOSIS — R19.5 OTHER FECAL ABNORMALITIES: ICD-10-CM

## 2023-06-29 PROCEDURE — 99214 OFFICE O/P EST MOD 30 MIN: CPT

## 2023-06-29 NOTE — PHYSICAL EXAM
[General Appearance - Alert] : alert [General Appearance - In No Acute Distress] : in no acute distress [General Appearance - Well-Appearing] : healthy appearing [Sclera] : the sclera and conjunctiva were normal [Examination Of The Oral Cavity] : the lips and gums were normal [Oropharynx] : the oropharynx was normal [] : no respiratory distress [Respiration, Rhythm And Depth] : normal respiratory rhythm and effort [Exaggerated Use Of Accessory Muscles For Inspiration] : no accessory muscle use [Heart Rate And Rhythm] : heart rate was normal and rhythm regular [Heart Sounds] : normal S1 and S2 [Abnormal Walk] : normal gait [Musculoskeletal - Swelling] : no joint swelling seen [Oriented To Time, Place, And Person] : oriented to person, place, and time [Impaired Insight] : insight and judgment were intact [Affect] : the affect was normal [Mood] : the mood was normal [FreeTextEntry1] : Hyperpigmentation periorbital on the forehead, over the MCPs bilateral hands no chest wall rash at this time

## 2023-06-29 NOTE — HISTORY OF PRESENT ILLNESS
[FreeTextEntry1] : Initial Visit January 23, 2023\par 72-year-old man referred for rheumatology evaluation\par Patient was recently evaluated in the emergency room at [facility], noted to have a rash on the face and chest in addition to elevated CPK.\par Patient following with primary care provider, developed rash about 3 months ago\par Rash primarily on the face as well as chest wall and little bit on the neck\par Rash is red on the face associated with edema\par Patient has been started on prednisone for the rash with some mild improvement\par Review of pictures on patient's phone rashes not present in September 2022\par Over the past month has also felt increase in weakness of the upper extremities\par Was seen in the emergency room as well at home, and was unable to stand up from position on the floor for more than 1 hour, patient was worried and went to the emergency room\par Also about 1 month ago was traveling, and felt unable to lift case into the overhead compartment which was new\par \par Prednisone on and off for two months \par Helps with the rash\par \par No chest pain or shortness of breath\par No dysphagia\par \par In the emergency room, CPK 3000\par \par Patient is not currently taking a statin\par Changed haart medicine to se eif may have been side effect, no benefit with change in medication\par \par Was seen by dermatologist Dr. Gem ZAYAS, [phone]\par Had skin biopsy of the right forearm, discussed with dermatologist at time of visit, consistent with dermatomyositis\par Patient is currently taking \par amlodipine 10 mg \par Julluca \par Stopped statin, verapamil, dovato, and lisinopril\par \par February 9, 2023\par Patient returns for follow-up\par Since last visit, patient admitted to hospital for muscle biopsy completed February 6, 2023\par Was seen on February 7 by neurology as outpatient for follow-up\par EMG\par Continues on prednisone 60 mg qday\par Feels some improvement in the arms but quite limited, hip weakness\par Maybe some neck involvement \par Breathing ok\par Feels rashes are improved, facial edema improving\par Discussed treatment options with patient's including methotrexate, azathioprine, as well as IVIG\par Discussed side effects of treatment options with patient as well\par No side effects noted with prednisone for now\par Glucose at home is ok\par Reviewed previous labs, Letitia 1 negative HMG CR antibody negative, \par \par March 10, 2023\par Patient returns for follow up\par Medrol 16 mg tid\par MTX 10 mg qweek\par Folic acid 1 mg qday\par Labs from the hospital:\par Reviewed labs\par Myomarker panel + for +Mi2\par Muscle biopsy consistent with dermatomyositis\par PT twice per week\par exercises on own\par Limited range of motion of the upper extremities, lower extremities improving\par Discussed treatment options\par Discussed IvIg in addition to current regimen\par Feels increase in urination, discussed possible hyperglycemia, will obtain a1c today\par rashes improving\par \par May 19, 2023\par Patient returns for follow-up\par Patient status post IVIG April 2023\par Unable to tolerate methotrexate, now taking CellCept 1 gm twice daily\par Continues Bactrim 3 times weekly\par Medrol 16 mg qday\par Muscle biopsy consistent with dermatomyositis\par Continue continues physical therapy\par Feet are swollen and facial edema\par Will decrease medrol 12 \par Lump on neck present for many years\par +cologuard, colonoscopy pending\par Tests glucose at home always less than 130-140\par CD4 counts are low: May 2023 123, 2/2023 201, 11/2022 539\par Viral load negative\par No fevers, chills, signs of infection\par Continues bactrim tiw\par Patient was unable to tolerate azathioprine or methotrexate \par \par June 29, 2023\par Patient returns for follow up of myositis\par Patient overall feeling much improvement in symptoms\par Continues IvIG, decreased medrol to 8 mg qday\par Tolerating well, no side effects notd\par Reviewed labs from infusion, \par Medrol 8 mg qday\par cellcept 500 bid\par Bactrim tiw\par amlodipine 10 \par bisoprolol 10\par Will see PMD in August to repeat labs\par Hyperpigmentation around the eyes at site of previous rash, discussed sunscreen use as well\par +edema of the feet bilaterally\par

## 2023-06-29 NOTE — ASSESSMENT
[FreeTextEntry1] : 72-year-old man with recent diagnosis of dermatomyositis.  Patient with biopsy of the skin by dermatologist, as well as muscle biopsy February 6, 2023, consistent with dermatomyositis, myomarker panel with positive MI 2 antibody.  Patient currently taking Medrol 8 mg daily, CellCept 500 mg twice daily, IVIG every 4-week, and  continues Bactrim 1 tab three days per week.  Previously unable to tolerate azathioprine and methotrexate, started on cellcept.  Patent overall doing well, although with decrease in CD4 count, decreased cellcept dose, will continue to monitor as continues to taper steroids, if CD4 count remains low, will discontinue.   Discussed need for age-appropriate malignancy work-up, patient will discuss with primary care doctor, GI referral for colonoscopy given positive Cologuard testing.  Repeat labs reviewed from infusion. Will follow up in one month or sooner as needed. \par \par

## 2023-07-19 ENCOUNTER — TRANSCRIPTION ENCOUNTER (OUTPATIENT)
Age: 73
End: 2023-07-19

## 2023-07-25 RX ORDER — METHYLPREDNISOLONE 16 MG/1
16 TABLET ORAL DAILY
Qty: 30 | Refills: 1 | Status: DISCONTINUED | COMMUNITY
Start: 2023-02-10 | End: 2023-07-25

## 2023-08-01 ENCOUNTER — OUTPATIENT (OUTPATIENT)
Dept: OUTPATIENT SERVICES | Facility: HOSPITAL | Age: 73
LOS: 1 days | End: 2023-08-01
Payer: MEDICARE

## 2023-08-01 ENCOUNTER — APPOINTMENT (OUTPATIENT)
Dept: INFUSION THERAPY | Facility: CLINIC | Age: 73
End: 2023-08-01

## 2023-08-01 VITALS
RESPIRATION RATE: 16 BRPM | TEMPERATURE: 98 F | HEART RATE: 66 BPM | SYSTOLIC BLOOD PRESSURE: 112 MMHG | OXYGEN SATURATION: 97 % | DIASTOLIC BLOOD PRESSURE: 69 MMHG

## 2023-08-01 VITALS
OXYGEN SATURATION: 98 % | HEIGHT: 68 IN | SYSTOLIC BLOOD PRESSURE: 105 MMHG | RESPIRATION RATE: 16 BRPM | WEIGHT: 184.97 LBS | TEMPERATURE: 97 F | DIASTOLIC BLOOD PRESSURE: 68 MMHG | HEART RATE: 78 BPM

## 2023-08-01 DIAGNOSIS — M33.90 DERMATOPOLYMYOSITIS, UNSPECIFIED, ORGAN INVOLVEMENT UNSPECIFIED: ICD-10-CM

## 2023-08-01 LAB
ALBUMIN SERPL ELPH-MCNC: 3.1 G/DL — LOW (ref 3.3–5)
ALP SERPL-CCNC: 82 U/L — SIGNIFICANT CHANGE UP (ref 40–120)
ALT FLD-CCNC: 25 U/L — SIGNIFICANT CHANGE UP (ref 10–45)
ANION GAP SERPL CALC-SCNC: 7 MMOL/L — SIGNIFICANT CHANGE UP (ref 5–17)
AST SERPL-CCNC: 62 U/L — HIGH (ref 10–40)
BILIRUB SERPL-MCNC: <0.2 MG/DL — SIGNIFICANT CHANGE UP (ref 0.2–1.2)
BUN SERPL-MCNC: 22 MG/DL — SIGNIFICANT CHANGE UP (ref 7–23)
CALCIUM SERPL-MCNC: 9.2 MG/DL — SIGNIFICANT CHANGE UP (ref 8.4–10.5)
CHLORIDE SERPL-SCNC: 107 MMOL/L — SIGNIFICANT CHANGE UP (ref 96–108)
CK SERPL-CCNC: 1356 U/L — HIGH (ref 30–200)
CO2 SERPL-SCNC: 28 MMOL/L — SIGNIFICANT CHANGE UP (ref 22–31)
CREAT SERPL-MCNC: 0.55 MG/DL — SIGNIFICANT CHANGE UP (ref 0.5–1.3)
EGFR: 106 ML/MIN/1.73M2 — SIGNIFICANT CHANGE UP
GLUCOSE SERPL-MCNC: 120 MG/DL — HIGH (ref 70–99)
HCT VFR BLD CALC: 44 % — SIGNIFICANT CHANGE UP (ref 39–50)
HGB BLD-MCNC: 13.2 G/DL — SIGNIFICANT CHANGE UP (ref 13–17)
MCHC RBC-ENTMCNC: 26.5 PG — LOW (ref 27–34)
MCHC RBC-ENTMCNC: 30 GM/DL — LOW (ref 32–36)
MCV RBC AUTO: 88.4 FL — SIGNIFICANT CHANGE UP (ref 80–100)
NRBC # BLD: 0 /100 WBCS — SIGNIFICANT CHANGE UP (ref 0–0)
PLATELET # BLD AUTO: 286 K/UL — SIGNIFICANT CHANGE UP (ref 150–400)
POTASSIUM SERPL-MCNC: 3.9 MMOL/L — SIGNIFICANT CHANGE UP (ref 3.5–5.3)
POTASSIUM SERPL-SCNC: 3.9 MMOL/L — SIGNIFICANT CHANGE UP (ref 3.5–5.3)
PROT SERPL-MCNC: 7 G/DL — SIGNIFICANT CHANGE UP (ref 6–8.3)
RBC # BLD: 4.98 M/UL — SIGNIFICANT CHANGE UP (ref 4.2–5.8)
RBC # FLD: 13.5 % — SIGNIFICANT CHANGE UP (ref 10.3–14.5)
SODIUM SERPL-SCNC: 142 MMOL/L — SIGNIFICANT CHANGE UP (ref 135–145)
WBC # BLD: 8.65 K/UL — SIGNIFICANT CHANGE UP (ref 3.8–10.5)
WBC # FLD AUTO: 8.65 K/UL — SIGNIFICANT CHANGE UP (ref 3.8–10.5)

## 2023-08-01 PROCEDURE — 36415 COLL VENOUS BLD VENIPUNCTURE: CPT

## 2023-08-01 PROCEDURE — 80053 COMPREHEN METABOLIC PANEL: CPT

## 2023-08-01 PROCEDURE — 96365 THER/PROPH/DIAG IV INF INIT: CPT

## 2023-08-01 PROCEDURE — 96366 THER/PROPH/DIAG IV INF ADDON: CPT

## 2023-08-01 PROCEDURE — 82550 ASSAY OF CK (CPK): CPT

## 2023-08-01 PROCEDURE — 85027 COMPLETE CBC AUTOMATED: CPT

## 2023-08-01 RX ORDER — ACETAMINOPHEN 500 MG
650 TABLET ORAL ONCE
Refills: 0 | Status: COMPLETED | OUTPATIENT
Start: 2023-08-01 | End: 2023-08-01

## 2023-08-01 RX ORDER — DIPHENHYDRAMINE HCL 50 MG
25 CAPSULE ORAL ONCE
Refills: 0 | Status: COMPLETED | OUTPATIENT
Start: 2023-08-01 | End: 2023-08-01

## 2023-08-01 RX ORDER — IMMUNE GLOBULIN (HUMAN) 10 G/100ML
80 INJECTION INTRAVENOUS; SUBCUTANEOUS ONCE
Refills: 0 | Status: COMPLETED | OUTPATIENT
Start: 2023-08-01 | End: 2023-08-01

## 2023-08-01 RX ADMIN — IMMUNE GLOBULIN (HUMAN) 200 GRAM(S): 10 INJECTION INTRAVENOUS; SUBCUTANEOUS at 11:28

## 2023-08-01 RX ADMIN — Medication 25 MILLIGRAM(S): at 11:21

## 2023-08-01 RX ADMIN — IMMUNE GLOBULIN (HUMAN) 80 GRAM(S): 10 INJECTION INTRAVENOUS; SUBCUTANEOUS at 15:35

## 2023-08-01 RX ADMIN — Medication 650 MILLIGRAM(S): at 11:31

## 2023-08-01 RX ADMIN — Medication 650 MILLIGRAM(S): at 11:22

## 2023-08-02 ENCOUNTER — OUTPATIENT (OUTPATIENT)
Dept: OUTPATIENT SERVICES | Facility: HOSPITAL | Age: 73
LOS: 1 days | End: 2023-08-02
Payer: MEDICARE

## 2023-08-02 ENCOUNTER — APPOINTMENT (OUTPATIENT)
Dept: INFUSION THERAPY | Facility: CLINIC | Age: 73
End: 2023-08-02

## 2023-08-02 VITALS
SYSTOLIC BLOOD PRESSURE: 106 MMHG | DIASTOLIC BLOOD PRESSURE: 60 MMHG | WEIGHT: 184.97 LBS | OXYGEN SATURATION: 98 % | HEART RATE: 82 BPM | TEMPERATURE: 97 F | RESPIRATION RATE: 16 BRPM | HEIGHT: 68 IN

## 2023-08-02 VITALS
HEART RATE: 76 BPM | SYSTOLIC BLOOD PRESSURE: 135 MMHG | TEMPERATURE: 99 F | RESPIRATION RATE: 16 BRPM | OXYGEN SATURATION: 98 % | DIASTOLIC BLOOD PRESSURE: 76 MMHG

## 2023-08-02 DIAGNOSIS — M33.90 DERMATOPOLYMYOSITIS, UNSPECIFIED, ORGAN INVOLVEMENT UNSPECIFIED: ICD-10-CM

## 2023-08-02 PROCEDURE — 96366 THER/PROPH/DIAG IV INF ADDON: CPT

## 2023-08-02 PROCEDURE — 96365 THER/PROPH/DIAG IV INF INIT: CPT

## 2023-08-02 RX ORDER — IMMUNE GLOBULIN (HUMAN) 10 G/100ML
80 INJECTION INTRAVENOUS; SUBCUTANEOUS ONCE
Refills: 0 | Status: COMPLETED | OUTPATIENT
Start: 2023-08-02 | End: 2023-08-02

## 2023-08-02 RX ORDER — ACETAMINOPHEN 500 MG
650 TABLET ORAL ONCE
Refills: 0 | Status: COMPLETED | OUTPATIENT
Start: 2023-08-02 | End: 2023-08-02

## 2023-08-02 RX ORDER — DIPHENHYDRAMINE HCL 50 MG
25 CAPSULE ORAL ONCE
Refills: 0 | Status: COMPLETED | OUTPATIENT
Start: 2023-08-02 | End: 2023-08-02

## 2023-08-02 RX ADMIN — Medication 650 MILLIGRAM(S): at 10:22

## 2023-08-02 RX ADMIN — IMMUNE GLOBULIN (HUMAN) 80 GRAM(S): 10 INJECTION INTRAVENOUS; SUBCUTANEOUS at 14:25

## 2023-08-02 RX ADMIN — IMMUNE GLOBULIN (HUMAN) 80 GRAM(S): 10 INJECTION INTRAVENOUS; SUBCUTANEOUS at 10:21

## 2023-08-02 RX ADMIN — Medication 25 MILLIGRAM(S): at 10:23

## 2023-08-02 RX ADMIN — Medication 650 MILLIGRAM(S): at 10:30

## 2023-08-07 ENCOUNTER — APPOINTMENT (OUTPATIENT)
Dept: GASTROENTEROLOGY | Facility: CLINIC | Age: 73
End: 2023-08-07
Payer: COMMERCIAL

## 2023-08-07 VITALS
BODY MASS INDEX: 31.82 KG/M2 | SYSTOLIC BLOOD PRESSURE: 151 MMHG | HEART RATE: 84 BPM | OXYGEN SATURATION: 96 % | RESPIRATION RATE: 16 BRPM | HEIGHT: 65 IN | DIASTOLIC BLOOD PRESSURE: 80 MMHG | WEIGHT: 191 LBS | TEMPERATURE: 96.5 F

## 2023-08-07 PROCEDURE — 99204 OFFICE O/P NEW MOD 45 MIN: CPT

## 2023-08-07 RX ORDER — POLYETHYLENE GLYCOL 3350 AND ELECTROLYTES WITH LEMON FLAVOR 236; 22.74; 6.74; 5.86; 2.97 G/4L; G/4L; G/4L; G/4L; G/4L
236 POWDER, FOR SOLUTION ORAL
Qty: 1 | Refills: 0 | Status: ACTIVE | COMMUNITY
Start: 2023-08-07 | End: 1900-01-01

## 2023-08-07 NOTE — PHYSICAL EXAM

## 2023-08-07 NOTE — PHYSICAL EXAM
[Alert] : alert [Normal Voice/Communication] : normal voice/communication [Healthy Appearing] : healthy appearing [No Acute Distress] : no acute distress [Sclera] : the sclera and conjunctiva were normal [Hearing Threshold Finger Rub Not [place]] : hearing was normal [Normal Lips/Gums] : the lips and gums were normal [Oropharynx] : the oropharynx was normal [Normal Appearance] : the appearance of the neck was normal [No Neck Mass] : no neck mass was observed [No Respiratory Distress] : no respiratory distress [No Acc Muscle Use] : no accessory muscle use [Respiration, Rhythm And Depth] : normal respiratory rhythm and effort [Auscultation Breath Sounds / Voice Sounds] : lungs were clear to auscultation bilaterally [Heart Rate And Rhythm] : heart rate was normal and rhythm regular [Normal S1, S2] : normal S1 and S2 [Murmurs] : no murmurs [Bowel Sounds] : normal bowel sounds [Abdomen Tenderness] : non-tender [No Masses] : no abdominal mass palpated [Abdomen Soft] : soft [] : no hepatosplenomegaly [Oriented To Time, Place, And Person] : oriented to person, place, and time

## 2023-08-07 NOTE — END OF VISIT
[Time Spent: ___ minutes] : I have spent [unfilled] minutes of time on the encounter. 4 = No assist / stand by assistance

## 2023-08-08 NOTE — HISTORY OF PRESENT ILLNESS
[FreeTextEntry1] : 73 yo M recently diagnosed dermatomyositis who presents for evaluation after referred for positive cologuard test.   Patient reports he underwent a cologuard test with his PMD, Dr. Burnham at [facility].   He has recently been having lower back pain. He was out of the country in [place] and went to see a doctor. He has ultrasound results with him today (in Nepali) but seems consistent with a lipoma. There is some mention of inflammaion as well. Per patient he was given antibiotics and antiinflammatory medications with some improvement in symptoms. When he turns and bends his back a certain way will develop some pain.   Denies any GI symptoms and no nausea, vomiting, diarrhea, changes in bowel habits, constipation, rectal bleeding, black stool.   He was taking high doses of steroids when he had the cologuard test and is wondering if this affected the testing. He was diagnosed with dermatomyositis when earlier this yr could not lift hands. He was started on 60mg prednisone for at least 1 mo. After that was on 40mg prednisone for 1 mos, 16 mg and now is on 8mg prednisone daily. He notes legs were bruised when he was on higher doses of steroids.   He does not take asa/plavix/blood thinners.   He had a colonoscopy over 5 yrs ago and it was reportedly normal, Done at [facility]. [facility].\"".   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

## 2023-08-08 NOTE — HISTORY OF PRESENT ILLNESS
[FreeTextEntry1] : 73 yo M recently diagnosed dermatomyositis who presents for evaluation after referred for positive cologuard test.   Patient reports he underwent a cologuard test with his PMD, Dr. Burnham at [facility].   He has recently been having lower back pain. He was out of the country in [place] and went to see a doctor. He has ultrasound results with him today (in Chinese) but seems consistent with a lipoma. There is some mention of inflammaion as well. Per patient he was given antibiotics and antiinflammatory medications with some improvement in symptoms. When he turns and bends his back a certain way will develop some pain.   Denies any GI symptoms and no nausea, vomiting, diarrhea, changes in bowel habits, constipation, rectal bleeding, black stool.   He was taking high doses of steroids when he had the cologuard test and is wondering if this affected the testing. He was diagnosed with dermatomyositis when earlier this yr could not lift hands. He was started on 60mg prednisone for at least 1 mo. After that was on 40mg prednisone for 1 mos, 16 mg and now is on 8mg prednisone daily. He notes legs were bruised when he was on higher doses of steroids.   He does not take asa/plavix/blood thinners.   He had a colonoscopy over 5 yrs ago and it was reportedly normal, Done at [facility].   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

## 2023-08-08 NOTE — ASSESSMENT
[FreeTextEntry1] : 73 yo M recently diagnosed dermatomyositis who presents for evaluation after referred for positive cologuard test.   Positive cologuard test, normal colonoscopy over 5 yrs ago per the patient - patient will get cardiac clearance letter from cardiologist, though no hx of cardiac issues - will schedule for a colonoscopy - reviewed r/b/a/i of the procedures including but not limited to bleeding, missing an abnormal finding, pain, perforation, infection, missed polyp, cardio-respiratory risks of anesthesia, sore throat, incomplete colon examination, etc - patient agrees and gives verbal consent.  Written consent to be obtained at time of procedure - reviewed bowel preparation instructions in detail - needs adult escort the day of the procedure - may need COVID-19 testing within 3 days of procedure - preprocedure labs reviewed and are available in the chart  Follow up post-procedure

## 2023-08-08 NOTE — ASSESSMENT
[FreeTextEntry1] : 71 yo M recently diagnosed dermatomyositis who presents for evaluation after referred for positive cologuard test.   Positive cologuard test, normal colonoscopy over 5 yrs ago per the patient - patient will get cardiac clearance letter from cardiologist, though no hx of cardiac issues - will schedule for a colonoscopy - reviewed r/b/a/i of the procedures including but not limited to bleeding, missing an abnormal finding, pain, perforation, infection, missed polyp, cardio-respiratory risks of anesthesia, sore throat, incomplete colon examination, etc - patient agrees and gives verbal consent.  Written consent to be obtained at time of procedure - reviewed bowel preparation instructions in detail - needs adult escort the day of the procedure - may need COVID-19 testing within 3 days of procedure - preprocedure labs reviewed and are available in the chart  Follow up post-procedure

## 2023-08-11 ENCOUNTER — APPOINTMENT (OUTPATIENT)
Dept: RHEUMATOLOGY | Facility: CLINIC | Age: 73
End: 2023-08-11
Payer: COMMERCIAL

## 2023-08-11 VITALS
HEART RATE: 72 BPM | HEIGHT: 68 IN | BODY MASS INDEX: 28.64 KG/M2 | WEIGHT: 189 LBS | OXYGEN SATURATION: 97 % | SYSTOLIC BLOOD PRESSURE: 160 MMHG | TEMPERATURE: 98.8 F | DIASTOLIC BLOOD PRESSURE: 75 MMHG

## 2023-08-11 DIAGNOSIS — R53.1 WEAKNESS: ICD-10-CM

## 2023-08-11 PROCEDURE — 99215 OFFICE O/P EST HI 40 MIN: CPT | Mod: 25

## 2023-08-11 PROCEDURE — 36415 COLL VENOUS BLD VENIPUNCTURE: CPT

## 2023-08-11 NOTE — DATA REVIEWED
[FreeTextEntry1] : 12/14/2022 CBC:  WBC 5.4 hgb 13.6 hct 41.8 plt 248  AST 86 ALt 46 Creatine 0.69  ESR 34 hep b sag neg Hep B s ab positive hep c nr hiv viral load neg  EMG February 7, 2023 EMG demonstrate a moderate predominantly proximal myopathy with muscle membrane instability, which is consistent with an inflammatory/autoimmune myositis.  However some genetic and toxic myopathies may have similar findings.  Labs   August 1, 2023 CPK 1356 BUN 22 Creatinine 0.55 Albumin 3.1 AST 62 ALT 25 CBC normal except MCH 26.5 and MCHC 30  Previous  on June 28, 2023

## 2023-08-11 NOTE — ASSESSMENT
[FreeTextEntry1] : 72-year-old man with recent diagnosis of dermatomyositis.  Patient with biopsy of the skin by dermatologist, as well as muscle biopsy February 6, 2023, consistent with dermatomyositis, myomarker panel with positive MI 2 antibody.  Patient currently taking Medrol 8 mg daily, CellCept 500 mg twice daily, IVIG every 4-week, and continues Bactrim 1 tab three days per week.  Previously unable to tolerate azathioprine and methotrexate, started on cellcept.  Patient was previously doing well with CPK of 117, decreased CellCept to 500 mg twice daily as well as decreased Medrol to 8 mg from 12 mg, with increasing muscle weakness predominantly of the upper extremities as well as increase in CPK from 127 to 1300.  Unclear if this rise in CPK related to changes in medications, patient will see primary care doctor on Monday to repeat CD4 counts, if improvement may consider alternative to CellCept such as cyclosporine.  Will add hydroxychloroquine 200 mg daily increasing to twice daily dosing to help with cutaneous manifestations.  In addition we will repeat labs today including CPK to assess if benefit following IVIG treatment.  Patient will be scheduled for colonoscopy given Cologuard testing positive to rule out paraneoplastic etiology.  We will follow-up in 1 month or sooner pending results.

## 2023-08-11 NOTE — PHYSICAL EXAM
[General Appearance - Alert] : alert [General Appearance - In No Acute Distress] : in no acute distress [] : no respiratory distress [Respiration, Rhythm And Depth] : normal respiratory rhythm and effort [Exaggerated Use Of Accessory Muscles For Inspiration] : no accessory muscle use [No Spinal Tenderness] : no spinal tenderness [Abnormal Walk] : normal gait [Oriented To Time, Place, And Person] : oriented to person, place, and time [Impaired Insight] : insight and judgment were intact [Affect] : the affect was normal [FreeTextEntry1] : Hyperpigmentation of the face, periorbital edema, edema of the cheeks, scalp dryness

## 2023-08-11 NOTE — HISTORY OF PRESENT ILLNESS
[FreeTextEntry1] : Initial Visit January 23, 2023 72-year-old man referred for rheumatology evaluation Patient was recently evaluated in the emergency room at [facility], noted to have a rash on the face and chest in addition to elevated CPK. Patient following with primary care provider, developed rash about 3 months ago Rash primarily on the face as well as chest wall and little bit on the neck Rash is red on the face associated with edema Patient has been started on prednisone for the rash with some mild improvement Review of pictures on patient's phone rashes not present in September 2022 Over the past month has also felt increase in weakness of the upper extremities Was seen in the emergency room as well at home, and was unable to stand up from position on the floor for more than 1 hour, patient was worried and went to the emergency room Also about 1 month ago was traveling, and felt unable to lift case into the overhead compartment which was new  Prednisone on and off for two months  Helps with the rash  No chest pain or shortness of breath No dysphagia  In the emergency room, CPK 3000  Patient is not currently taking a statin Changed haart medicine to se eif may have been side effect, no benefit with change in medication  Was seen by dermatologist Dr. Gem ZAYAS, [phone] Had skin biopsy of the right forearm, discussed with dermatologist at time of visit, consistent with dermatomyositis Patient is currently taking  amlodipine 10 mg  Julluca  Stopped statin, verapamil, dovato, and lisinopril  February 9, 2023 Patient returns for follow-up Since last visit, patient admitted to hospital for muscle biopsy completed February 6, 2023 Was seen on February 7 by neurology as outpatient for follow-up EMG Continues on prednisone 60 mg qday Feels some improvement in the arms but quite limited, hip weakness Maybe some neck involvement  Breathing ok Feels rashes are improved, facial edema improving Discussed treatment options with patient's including methotrexate, azathioprine, as well as IVIG Discussed side effects of treatment options with patient as well No side effects noted with prednisone for now Glucose at home is ok Reviewed previous labs, Letitia 1 negative HMG CR antibody negative,   March 10, 2023 Patient returns for follow up Medrol 16 mg tid MTX 10 mg qweek Folic acid 1 mg qday Labs from the hospital: Reviewed labs Myomarker panel + for +Mi2 Muscle biopsy consistent with dermatomyositis PT twice per week exercises on own Limited range of motion of the upper extremities, lower extremities improving Discussed treatment options Discussed IvIg in addition to current regimen Feels increase in urination, discussed possible hyperglycemia, will obtain a1c today rashes improving  May 19, 2023 Patient returns for follow-up Patient status post IVIG April 2023 Unable to tolerate methotrexate, now taking CellCept 1 gm twice daily Continues Bactrim 3 times weekly Medrol 16 mg qday Muscle biopsy consistent with dermatomyositis Continue continues physical therapy Feet are swollen and facial edema Will decrease medrol 12  Lump on neck present for many years +cologuard, colonoscopy pending Tests glucose at home always less than 130-140 CD4 counts are low: May 2023 123, 2/2023 201, 11/2022 539 Viral load negative No fevers, chills, signs of infection Continues bactrim tiw Patient was unable to tolerate azathioprine or methotrexate   June 29, 2023 Patient returns for follow up of myositis Patient overall feeling much improvement in symptoms Continues IvIG, decreased medrol to 8 mg qday Tolerating well, no side effects notd Reviewed labs from infusion,  Medrol 8 mg qday cellcept 500 bid Bactrim tiw amlodipine 10  bisoprolol 10 Will see PMD in August to repeat labs Hyperpigmentation around the eyes at site of previous rash, discussed sunscreen use as well +edema of the feet bilaterally  August 11, 2023 Patient returns for follow up of dermatomyositis Since last visit, patient completed IVIG on August 1 and August 2, repeat CPK at that time elevated +edema of the face with some erythema Feels some itching in the scalp returned Labs reviewed from 8/1/2023 CPK 1356, increased from 117 on June 28, 2023 Changes in medication since that time included CellCept decreased from 1 g twice daily to 500 mg twice daily as well as Medrol decreased from 12 mg to 8 mg daily IVIG was delayed by less than 1 week which should not have dramatic effect on CPK at this time  Decreased range of motion of the bilateral shoulders over the past week Feels dry skin on the scalp Some dry skin on the face No rashes on the hands noted No joint pain Blood pressure elevated, has appointment with primary care doctor on Monday

## 2023-08-11 NOTE — REVIEW OF SYSTEMS
[Feeling Tired] : feeling tired [Skin Lesions] : skin lesion [Itching] : itching [Dry Skin] : dry skin [Limb Weakness] : limb weakness [Negative] : Heme/Lymph

## 2023-08-15 ENCOUNTER — TRANSCRIPTION ENCOUNTER (OUTPATIENT)
Age: 73
End: 2023-08-15

## 2023-08-15 ENCOUNTER — INPATIENT (INPATIENT)
Facility: HOSPITAL | Age: 73
LOS: 0 days | Discharge: HOME | DRG: 312 | End: 2023-08-15
Attending: INTERNAL MEDICINE | Admitting: INTERNAL MEDICINE
Payer: MEDICARE

## 2023-08-15 ENCOUNTER — APPOINTMENT (OUTPATIENT)
Dept: GASTROENTEROLOGY | Facility: HOSPITAL | Age: 73
End: 2023-08-15
Payer: COMMERCIAL

## 2023-08-15 ENCOUNTER — INPATIENT (INPATIENT)
Facility: HOSPITAL | Age: 73
LOS: 9 days | Discharge: HOME HEALTH | DRG: 83 | End: 2023-08-25
Attending: STUDENT IN AN ORGANIZED HEALTH CARE EDUCATION/TRAINING PROGRAM | Admitting: INTERNAL MEDICINE
Payer: MEDICARE

## 2023-08-15 VITALS
TEMPERATURE: 99 F | RESPIRATION RATE: 18 BRPM | HEART RATE: 93 BPM | WEIGHT: 190.04 LBS | OXYGEN SATURATION: 98 % | HEIGHT: 68 IN | DIASTOLIC BLOOD PRESSURE: 69 MMHG | SYSTOLIC BLOOD PRESSURE: 151 MMHG

## 2023-08-15 VITALS
HEART RATE: 106 BPM | OXYGEN SATURATION: 96 % | RESPIRATION RATE: 18 BRPM | TEMPERATURE: 100 F | WEIGHT: 214.95 LBS | HEIGHT: 68 IN | SYSTOLIC BLOOD PRESSURE: 142 MMHG | DIASTOLIC BLOOD PRESSURE: 85 MMHG

## 2023-08-15 LAB
ANION GAP SERPL CALC-SCNC: 10 MMOL/L — SIGNIFICANT CHANGE UP (ref 5–17)
APPEARANCE UR: CLEAR — SIGNIFICANT CHANGE UP
BACTERIA # UR AUTO: PRESENT /HPF
BASOPHILS # BLD AUTO: 0.04 K/UL — SIGNIFICANT CHANGE UP (ref 0–0.2)
BASOPHILS NFR BLD AUTO: 0.4 % — SIGNIFICANT CHANGE UP (ref 0–2)
BILIRUB UR-MCNC: NEGATIVE — SIGNIFICANT CHANGE UP
BUN SERPL-MCNC: 12 MG/DL — SIGNIFICANT CHANGE UP (ref 7–23)
CALCIUM SERPL-MCNC: 9.1 MG/DL — SIGNIFICANT CHANGE UP (ref 8.4–10.5)
CHLORIDE SERPL-SCNC: 99 MMOL/L — SIGNIFICANT CHANGE UP (ref 96–108)
CO2 SERPL-SCNC: 26 MMOL/L — SIGNIFICANT CHANGE UP (ref 22–31)
COLOR SPEC: YELLOW — SIGNIFICANT CHANGE UP
CREAT SERPL-MCNC: 0.55 MG/DL — SIGNIFICANT CHANGE UP (ref 0.5–1.3)
DIFF PNL FLD: ABNORMAL
EGFR: 106 ML/MIN/1.73M2 — SIGNIFICANT CHANGE UP
EOSINOPHIL # BLD AUTO: 0.13 K/UL — SIGNIFICANT CHANGE UP (ref 0–0.5)
EOSINOPHIL NFR BLD AUTO: 1.2 % — SIGNIFICANT CHANGE UP (ref 0–6)
EPI CELLS # UR: SIGNIFICANT CHANGE UP /HPF (ref 0–5)
FLUAV AG NPH QL: SIGNIFICANT CHANGE UP
FLUBV AG NPH QL: SIGNIFICANT CHANGE UP
GLUCOSE BLDC GLUCOMTR-MCNC: 64 MG/DL — LOW (ref 70–99)
GLUCOSE BLDC GLUCOMTR-MCNC: 67 MG/DL — LOW (ref 70–99)
GLUCOSE BLDC GLUCOMTR-MCNC: 68 MG/DL — LOW (ref 70–99)
GLUCOSE BLDC GLUCOMTR-MCNC: 76 MG/DL — SIGNIFICANT CHANGE UP (ref 70–99)
GLUCOSE BLDC GLUCOMTR-MCNC: 76 MG/DL — SIGNIFICANT CHANGE UP (ref 70–99)
GLUCOSE BLDC GLUCOMTR-MCNC: 90 MG/DL — SIGNIFICANT CHANGE UP (ref 70–99)
GLUCOSE SERPL-MCNC: 92 MG/DL — SIGNIFICANT CHANGE UP (ref 70–99)
GLUCOSE UR QL: NEGATIVE — SIGNIFICANT CHANGE UP
HCT VFR BLD CALC: 41.7 % — SIGNIFICANT CHANGE UP (ref 39–50)
HGB BLD-MCNC: 12.4 G/DL — LOW (ref 13–17)
IMM GRANULOCYTES NFR BLD AUTO: 1.1 % — HIGH (ref 0–0.9)
KETONES UR-MCNC: 15 MG/DL
LACTATE SERPL-SCNC: 1.7 MMOL/L — SIGNIFICANT CHANGE UP (ref 0.5–2)
LACTATE SERPL-SCNC: 2.5 MMOL/L — HIGH (ref 0.5–2)
LEUKOCYTE ESTERASE UR-ACNC: NEGATIVE — SIGNIFICANT CHANGE UP
LYMPHOCYTES # BLD AUTO: 1.01 K/UL — SIGNIFICANT CHANGE UP (ref 1–3.3)
LYMPHOCYTES # BLD AUTO: 9.7 % — LOW (ref 13–44)
MCHC RBC-ENTMCNC: 25.9 PG — LOW (ref 27–34)
MCHC RBC-ENTMCNC: 29.7 GM/DL — LOW (ref 32–36)
MCV RBC AUTO: 87.1 FL — SIGNIFICANT CHANGE UP (ref 80–100)
MONOCYTES # BLD AUTO: 0.69 K/UL — SIGNIFICANT CHANGE UP (ref 0–0.9)
MONOCYTES NFR BLD AUTO: 6.6 % — SIGNIFICANT CHANGE UP (ref 2–14)
NEUTROPHILS # BLD AUTO: 8.43 K/UL — HIGH (ref 1.8–7.4)
NEUTROPHILS NFR BLD AUTO: 81 % — HIGH (ref 43–77)
NITRITE UR-MCNC: NEGATIVE — SIGNIFICANT CHANGE UP
NRBC # BLD: 0 /100 WBCS — SIGNIFICANT CHANGE UP (ref 0–0)
PH UR: 6.5 — SIGNIFICANT CHANGE UP (ref 5–8)
PLATELET # BLD AUTO: 294 K/UL — SIGNIFICANT CHANGE UP (ref 150–400)
POTASSIUM SERPL-MCNC: 4.1 MMOL/L — SIGNIFICANT CHANGE UP (ref 3.5–5.3)
POTASSIUM SERPL-SCNC: 4.1 MMOL/L — SIGNIFICANT CHANGE UP (ref 3.5–5.3)
PROT UR-MCNC: NEGATIVE MG/DL — SIGNIFICANT CHANGE UP
RBC # BLD: 4.79 M/UL — SIGNIFICANT CHANGE UP (ref 4.2–5.8)
RBC # FLD: 14.1 % — SIGNIFICANT CHANGE UP (ref 10.3–14.5)
RBC CASTS # UR COMP ASSIST: < 5 /HPF — SIGNIFICANT CHANGE UP
RSV RNA NPH QL NAA+NON-PROBE: SIGNIFICANT CHANGE UP
SARS-COV-2 RNA SPEC QL NAA+PROBE: SIGNIFICANT CHANGE UP
SODIUM SERPL-SCNC: 135 MMOL/L — SIGNIFICANT CHANGE UP (ref 135–145)
SP GR SPEC: 1.01 — SIGNIFICANT CHANGE UP (ref 1–1.03)
UROBILINOGEN FLD QL: 0.2 E.U./DL — SIGNIFICANT CHANGE UP
WBC # BLD: 10.41 K/UL — SIGNIFICANT CHANGE UP (ref 3.8–10.5)
WBC # FLD AUTO: 10.41 K/UL — SIGNIFICANT CHANGE UP (ref 3.8–10.5)
WBC UR QL: < 5 /HPF — SIGNIFICANT CHANGE UP

## 2023-08-15 PROCEDURE — 70498 CT ANGIOGRAPHY NECK: CPT | Mod: 26

## 2023-08-15 PROCEDURE — 76937 US GUIDE VASCULAR ACCESS: CPT | Mod: 26,59

## 2023-08-15 PROCEDURE — 70498 CT ANGIOGRAPHY NECK: CPT

## 2023-08-15 PROCEDURE — 70450 CT HEAD/BRAIN W/O DYE: CPT | Mod: 26,59

## 2023-08-15 PROCEDURE — 0042T: CPT

## 2023-08-15 PROCEDURE — 70496 CT ANGIOGRAPHY HEAD: CPT

## 2023-08-15 PROCEDURE — 36000 PLACE NEEDLE IN VEIN: CPT | Mod: 59

## 2023-08-15 PROCEDURE — 45380 COLONOSCOPY AND BIOPSY: CPT | Mod: GC,59

## 2023-08-15 PROCEDURE — 71045 X-RAY EXAM CHEST 1 VIEW: CPT | Mod: 26

## 2023-08-15 PROCEDURE — 70496 CT ANGIOGRAPHY HEAD: CPT | Mod: 26

## 2023-08-15 PROCEDURE — 82962 GLUCOSE BLOOD TEST: CPT

## 2023-08-15 PROCEDURE — 99285 EMERGENCY DEPT VISIT HI MDM: CPT

## 2023-08-15 PROCEDURE — 70450 CT HEAD/BRAIN W/O DYE: CPT

## 2023-08-15 PROCEDURE — ZZZZZ: CPT

## 2023-08-15 PROCEDURE — 99291 CRITICAL CARE FIRST HOUR: CPT | Mod: 25

## 2023-08-15 PROCEDURE — 88305 TISSUE EXAM BY PATHOLOGIST: CPT | Mod: 26

## 2023-08-15 PROCEDURE — 45385 COLONOSCOPY W/LESION REMOVAL: CPT | Mod: GC

## 2023-08-15 RX ORDER — ACETAMINOPHEN 500 MG
650 TABLET ORAL ONCE
Refills: 0 | Status: COMPLETED | OUTPATIENT
Start: 2023-08-15 | End: 2023-08-15

## 2023-08-15 RX ORDER — AMPICILLIN SODIUM AND SULBACTAM SODIUM 250; 125 MG/ML; MG/ML
3 INJECTION, POWDER, FOR SUSPENSION INTRAMUSCULAR; INTRAVENOUS EVERY 6 HOURS
Refills: 0 | Status: DISCONTINUED | OUTPATIENT
Start: 2023-08-15 | End: 2023-08-16

## 2023-08-15 RX ORDER — SODIUM CHLORIDE 9 MG/ML
1000 INJECTION, SOLUTION INTRAVENOUS ONCE
Refills: 0 | Status: COMPLETED | OUTPATIENT
Start: 2023-08-15 | End: 2023-08-15

## 2023-08-15 RX ADMIN — SODIUM CHLORIDE 1000 MILLILITER(S): 9 INJECTION, SOLUTION INTRAVENOUS at 19:29

## 2023-08-15 RX ADMIN — Medication 650 MILLIGRAM(S): at 21:00

## 2023-08-15 RX ADMIN — AMPICILLIN SODIUM AND SULBACTAM SODIUM 200 GRAM(S): 250; 125 INJECTION, POWDER, FOR SUSPENSION INTRAMUSCULAR; INTRAVENOUS at 21:00

## 2023-08-15 NOTE — PRE-ANESTHESIA EVALUATION ADULT - NSANTHOSAYNRD_GEN_A_CORE
No. CARSON screening performed.  STOP BANG Legend: 0-2 = LOW Risk; 3-4 = INTERMEDIATE Risk; 5-8 = HIGH Risk

## 2023-08-15 NOTE — ED ADULT NURSE NOTE - OBJECTIVE STATEMENT
Brought in by [facility] for Stroke Alert in Endoscopy- piv, CT scans, MD eval complete, stroke team at bedside on [id number] to ED.     C/o syncopal episode while in bathroom p discharge p endoscopy OP, pt notes feeling dizziness prior to event. C/o feeling back to baseline now x feeling weakness bilaterally in legs.     On arrival to unit- Aox4, breathing even and unlabored on ra, no apparent distress. Flushing to face noted with pt reporting this is normal- dermatitis. Apparent swelling to R face- no conj inject, no open wound, hematoma forming periocularly, perrla. Apparent shakiness to BUE and BLE with equal strength bilaterally. PIV x 2 confirmed as documented. Placed on CM. EKG complete. Noted to be febrile rectally- MD informed, sepsis workup initiated.

## 2023-08-15 NOTE — ED PROVIDER NOTE - PROGRESS NOTE DETAILS
Family said pt seems a bit slower to answer and more confused. On my assessment, pt AAOx2, little slow to answer, exam grossly unchanged. Updated stroke team, STAT repeat CTH done, pt to go up to bed on stroke tele floor

## 2023-08-15 NOTE — ED PROVIDER NOTE - OBJECTIVE STATEMENT
72yM w/ HTN HLD DM CAD CHF HIV dermatomyositis on chronic steroids, brought to ED after stroke code for syncopal episode after colonoscopy. Pt was going from stretcher to bathroom, felt lightheaded and syncopized, hit his R face on the floor. Afterwards was complaining of generalized weakness and was noted to have b/l leg weakness. Pt says he has not eaten in 2 days (yesterday was doing bowel prep and today was NPO for procedure).  Stroke code was called prior to pt being brought to the ED, and stroke code imaging completed. Imaging revealed new hyperdensity in L lentiform nucleus/corona radiata, trace right tentorial subdural hemorrhage, right orbital floor fracture.

## 2023-08-15 NOTE — ED PROVIDER NOTE - PHYSICAL EXAMINATION
CONST: nontoxic NAD speaking in full sentences  HEAD: +R facial swelling  EYES: +R periorbital swelling, EOMI  ENMT: + dried blood in mouth, no visible lip or tongue laceration  NECK: supple, nontender  CARD: tachycardia  CHEST: breathing comfortably, no stridor/retractions/tripoding  EXT: moving all extremities  SKIN: warm, dry  NEURO: a+ox2 (person and place, not time), no facial asymmetry, no aphasia, EOMI, no neglect, 5/5 strength b/l UE, 2/5 strength b/l LE falls to bed antigravity, gait deferred

## 2023-08-15 NOTE — ED ADULT TRIAGE NOTE - CHIEF COMPLAINT QUOTE
Pt brought to ED from endoscopy s/p syncopal episode while being discharged. Pt fell forward while walking to the bathroom. Pt c/o left sided lower extremity weakness, stroke code activated. pt aaox3 at this time. Blood noted in the pt's mouth. Pt has swelling to the right side of the face.

## 2023-08-15 NOTE — H&P ADULT - NSHPREVIEWOFSYSTEMS_GEN_ALL_CORE
ROS:   Constitutional: No weight loss or fatigue; + fever currently  Eyes: No visual disturbances, or discharge; + eye pain  ENMT:  No difficulty hearing, tinnitus, vertigo; No throat pain  Neck: No pain or stiffness  Respiratory: No cough, wheezing, chills or hemoptysis  Cardiovascular: No chest pain, palpitations, shortness of breath or leg swelling; + dizziness before fall  Gastrointestinal: No abdominal pain. No nausea, vomiting or hematemesis; No diarrhea or constipation. No hematochezia.  Genitourinary: No dysuria, frequency, hematuria or incontinence  Neurological: As per HPI  Skin: No itching, burning or lesions   Endocrine: No heat or cold intolerance; No hair loss  Musculoskeletal: No joint pain or swelling; No muscle, back or extremity pain  Psychiatric: No depression, anxiety, mood swings or difficulty sleeping  Heme/Lymph: No easy bruising or bleeding gums

## 2023-08-15 NOTE — CONSULT NOTE ADULT - ASSESSMENT
72M w/PMH significant for HTN, HLD, DM, CAD, CHF, HIV, dermatomyositis s/p syncopal episode following colonoscopy sustaining blunt injury to right face from fall. Code Stroke called, with imagining revealed new hyperdensity in left lentiform nucleus/corona radiata, trace right tentorial subdural hemorrhage, and right orbital floor fracture.  - No acute surgical intervention indicated  - Unasyn 3g Q6H while inpatient, transition to Augmentin 875mg/125mg BID for a total of 7 days antibiotic course  - Sinus precautions: no nose blowing, avoid using straws avoid smoking, avoid sneezing with mouth closed, sleep with head elevated  - f/u with Dr. Jovany Vega within 7-days of discharge for continued evaluation of orbit    Nikhil Parkinson DDS, MD  Resident, Oral & Maxillofacial Surgery  [facility] Pager: [phone]  [facility] Pager: [phone]  [facility] Pager: [phone]  Available on [employer] TEAMS

## 2023-08-15 NOTE — H&P ADULT - NSHPPHYSICALEXAM_GEN_ALL_CORE
Physical exam:  General: No acute distress, awake and alert  HEENT: Pt with R sided swelling to site of impact; periorbital and infraorbital swelling  Cardiovascular: Regular rate and rhythm on monitor  Pulmonary: No use of accessory muscles  GI: Abdomen soft, non-tender, non-distended    Neurologic:  -Mental status: Awake, alert, oriented to person, place but not time (stated it was december and year 2001). Speech is fluent with intact naming, repetition, and comprehension, no dysarthria. Does not recall fall but knew he was coming in for colonoscopy; recent and remote memory intact. Follows commands. Attention/concentration intact. Fund of knowledge appropriate.  -Cranial nerves:   II: Visual fields are full to confrontation.  III, IV, VI: Extraocular movements are intact without nystagmus. Pupils equally round and reactive to light  V:  Facial sensation V1-V3 equal and intact   VII: Facial asymmetry noted on R side (site of trauma); swelling to cheek and eyelid; no lower facial asymmetry   VIII: Hearing is bilaterally intact to voice  XI: Head turning and shoulder shrug are intact.  XII: Tongue protrudes midline  Motor: Normal bulk and tone. No pronator drift. Strength bilateral upper extremity 5/5, bilateral lower extremities 2/5; could not lift off bed, when lifted for him fall immediately to bed even with repeated encouragement   Sensation: Intact to light touch bilaterally. No neglect or extinction on double simultaneous testing.  Coordination: No dysmetria on finger-to-nose bilaterally  Gait: deferred    NIHSS: 4

## 2023-08-15 NOTE — H&P ADULT - NSICDXPASTMEDICALHX_GEN_ALL_CORE_FT
PAST MEDICAL HISTORY:  CAD (coronary artery disease)     Chronic systolic congestive heart failure     Dermatomyositis     Diabetes     HIV disease     HTN (hypertension)     Hypercholesteremia

## 2023-08-15 NOTE — ED PROVIDER NOTE - CLINICAL SUMMARY MEDICAL DECISION MAKING FREE TEXT BOX
Patient w/ low grade fever and tachycardia in the ED. AAOx2. Noted R sided facial swelling and periorbital swelling on R. EOMI. Exam w/ b/l LE weakness 2/5 strength  Ddx includes stroke, ICH, metabolic abnormalities/vasovagal/orthostatic (due to fasting and bowel prep), although this explains syncopal episode, would not explain b/l LE weakness so less likely.  Pt w/ low grade fever as well, will eval for infectious etiology, could also be due to post-procedure.    -->Pt to be admitted to stroke tele, requesting unasyn 3g q6h per OMFS for R orbital floor fracture

## 2023-08-15 NOTE — H&P ADULT - NSHPLABSRESULTS_GEN_ALL_CORE
Fingerstick Blood Glucose: CAPILLARY BLOOD GLUCOSE  57 (15 Aug 2023 20:39)      POCT Blood Glucose.: 82 mg/dL (15 Aug 2023 19:21)    LABS:                        12.4   10.41 )-----------( 294      ( 15 Aug 2023 19:30 )             41.7     08-15    135  |  99  |  12  ----------------------------<  92  4.1   |  26  |  0.55    Ca    9.1      15 Aug 2023 19:30      Urinalysis Basic - ( 15 Aug 2023 19:30 )    Color: x / Appearance: x / SG: x / pH: x  Gluc: 92 mg/dL / Ketone: x  / Bili: x / Urobili: x   Blood: x / Protein: x / Nitrite: x   Leuk Esterase: x / RBC: x / WBC x   Sq Epi: x / Non Sq Epi: x / Bacteria: x        RADIOLOGY & ADDITIONAL STUDIES:    CT Brain Stroke Protocol (08.15.23 @ 18:56) >    1.  Two small sites of parallel hyperdensity in the left lentiform   nucleus/corona radiata, new since prior from 2/1/2023. Hemorrhage or   mineralization within a subacute-chronic infarct may have this appearance   and attention on short interval follow-up advised.  2.  Trace right tentorial subdural hemorrhage.  3.  Right orbital floor fracture with blood in maxillary sinus.      : CT Angio Head w/ IV Cont (08.15.23 @ 19:05) >    Unremarkable CTA examination of the brain.    Small reported Tmax elevation in left white matter. No territorial   deficit.       CT Angio Neck w/ IV Cont (08.15.23 @ 19:05) >    IMPRESSION: No arterial steno-occlusive disease or evidence of dissection.

## 2023-08-15 NOTE — H&P ADULT - ASSESSMENT
72y Male with PMHx of HTN, HLD, DM, CAD, CHF, HIV, Dermatomyositis presented to [facility] for planned colonoscopy due + Fecal immunochemical test (FIT). Stroke code called on pt  today after colonoscopy due to a syncopal event and fall hitting his head/face. Pt self transferred from stretcher to bathroom, felt dizzy and fell on his face. FB. SBP: 200 after fall. 's during stroke code. Pt does not remember the event however remember why he is here today for his colonoscopy. On exam, pt with R cheek, periorbital and infraorbital swelling, A&O x 2 (unable to tell month or year), B/L UE 5/5, B/L LE 2/5, unable to lift off bed, with assistance lifting fell within 5 seconds. NIHSS: 4 due to B/L leg weakness. Pt walked in for ambulatory procedure today. No decrease in sensation, change in vision or speech. HCT showed two sites of new hyperdensity in L lentiform nucleus/corona radiata suggestive of hemorrhage or mineralization within a subacute-chronic infarct. Pt also with trace right tentorial subdural hemorrhage and right orbital floor fracture with blood in maxillary sinus. CTA Unremarkable CTA examination of the brain. No arterial steno-occlusive disease or evidence of dissection. Patient was admitted to stroke for further workup.    Neuro  #CVA workup  - Hold DAPT due to hyperdensity suspicious for hemorrhagic conversion of  subacute-chronic infarct.   - continue atorvastatin 80mg daily  - q4hr stroke neuro checks and vitals  - obtain MRI Brain without contrast  - Stroke Code HCT Results: two sites of new hyperdensity in L lentiform nucleus/corona radiata suggestive of hemorrhage or mineralization within a subacute-chronic infarct. Pt also with trace right tentorial subdural hemorrhage and right orbital floor fracture with blood in maxillary sinus  - Stroke Code CTA Results:Unremarkable CTA examination of the brain. No arterial steno-occlusive disease or evidence of dissection  - Stroke education  - Repeat CTH x 4 hours     Cards  #HTN  - SBP < 140  - Plan to start pt back on home amlodipine and metoprolol if needed  - obtain TTE with bubble  - Stroke Code EKG Results:     #HLD  - high dose statin as above in CVA  - pending LDL results:     Pulm  - call provider if SPO2 < 94%    GI  #Nutrition/Fluids/Electrolytes   - replete K<4 and Mg <2  - Diet: pending dysphagia screen  - IVF: D5    Renal  - Trend BUN/Cr    Infectious Disease  - Rectal temp of 100.7 in ED  - Infectious workup started  - UA ordered  - CXR ordered  - Blood cx ordered    Endocrine  #DM  - pending A1C results:   - ISS  -  pending TSH results:    DVT Prophylaxis  - hold DVT prophylaxis until repeat scan  - SCDs for DVT prophylaxis       IDR Goals: Goals reviewed at interdisciplinary rounds with case management, social work, physical therapy, occupational therapy, and speech language pathology.   Please see specific therapy  notes for in depth goals.    Dispo: Pending PT/OT eval     Discussed daily hospital plans and goals with patient at bedside.    Discussed with Neurology Attending 72y Male with PMHx of HTN, HLD, DM, CAD, CHF, HIV, Dermatomyositis presented to [facility] for planned colonoscopy due + Fecal immunochemical test (FIT). Stroke code called on pt  today after colonoscopy due to a syncopal event and fall hitting his head/face. Pt self transferred from stretcher to bathroom, felt dizzy and fell on his face. FB. SBP: 200 after fall. 's during stroke code. Pt does not remember the event however remember why he is here today for his colonoscopy. On exam, pt with R cheek, periorbital and infraorbital swelling, A&O x 2 (unable to tell month or year), B/L UE 5/5, B/L LE 2/5, unable to lift off bed, with assistance lifting fell within 5 seconds. NIHSS: 4 due to B/L leg weakness. Pt walked in for ambulatory procedure today. No decrease in sensation, change in vision or speech. HCT showed two sites of new hyperdensity in L lentiform nucleus/corona radiata suggestive of hemorrhage or mineralization within a subacute-chronic infarct. Pt also with trace right tentorial subdural hemorrhage and right orbital floor fracture with blood in maxillary sinus. CTA Unremarkable CTA examination of the brain. No arterial steno-occlusive disease or evidence of dissection. Patient was admitted to stroke for further workup.    Neuro  #CVA workup  - Hold DAPT due to hyperdensity suspicious for hemorrhagic conversion of  subacute-chronic infarct.   - continue atorvastatin 80mg daily  - q4hr stroke neuro checks and vitals  - obtain MRI Brain without contrast  - Stroke Code HCT Results: two sites of new hyperdensity in L lentiform nucleus/corona radiata suggestive of hemorrhage or mineralization within a subacute-chronic infarct. Pt also with trace right tentorial subdural hemorrhage and right orbital floor fracture with blood in maxillary sinus  - Stroke Code CTA Results:Unremarkable CTA examination of the brain. No arterial steno-occlusive disease or evidence of dissection  - Stroke education  - Repeat CTH x 6 hours     Cards  #HTN  - SBP < 140  - Plan to start pt back on home amlodipine and metoprolol if needed  - obtain TTE with bubble  - Stroke Code EKG Results:     #HLD  - high dose statin as above in CVA  - pending LDL results:     Pulm  - call provider if SPO2 < 94%    GI  #Nutrition/Fluids/Electrolytes   - replete K<4 and Mg <2  - Diet: pending dysphagia screen  - IVF: D5    Renal  - Trend BUN/Cr    Infectious Disease  - Rectal temp of 100.7 in ED  - Infectious workup started  - UA ordered  - CXR ordered  - Blood cx ordered    Endocrine  #DM  - pending A1C results:   - ISS  -  pending TSH results:    DVT Prophylaxis  - hold DVT prophylaxis until repeat scan  - SCDs for DVT prophylaxis       IDR Goals: Goals reviewed at interdisciplinary rounds with case management, social work, physical therapy, occupational therapy, and speech language pathology.   Please see specific therapy  notes for in depth goals.    Dispo: Pending PT/OT eval     Discussed daily hospital plans and goals with patient at bedside.    Discussed with Neurology Attending 72y Male with PMHx of HTN, HLD, DM, CAD, CHF, HIV, Dermatomyositis presented to [facility] for planned colonoscopy due + Fecal immunochemical test (FIT). Stroke code called on pt  today after colonoscopy due to a syncopal event and fall hitting his head/face. Pt self transferred from stretcher to bathroom, felt dizzy and fell on his face. FB. SBP: 200 after fall. 's during stroke code. Pt does not remember the event however remember why he is here today for his colonoscopy. On exam, pt with R cheek, periorbital and infraorbital swelling, A&O x 2 (unable to tell month or year), B/L UE 5/5, B/L LE 2/5, unable to lift off bed, with assistance lifting fell within 5 seconds. NIHSS: 4 due to B/L leg weakness. Pt walked in for ambulatory procedure today. No decrease in sensation, change in vision or speech. HCT showed two sites of new hyperdensity in L lentiform nucleus/corona radiata suggestive of hemorrhage or mineralization within a subacute-chronic infarct. Pt also with trace right tentorial subdural hemorrhage and right orbital floor fracture with blood in maxillary sinus. CTA Unremarkable CTA examination of the brain. No arterial steno-occlusive disease or evidence of dissection. Patient was admitted to stroke for further workup.    Neuro  #CVA workup  - Hold DAPT due to hyperdensity suspicious for hemorrhagic conversion of  subacute-chronic infarct.   - continue atorvastatin 80mg daily  - q4hr stroke neuro checks and vitals  - obtain MRI Brain without contrast  - Stroke Code HCT Results: two sites of new hyperdensity in L lentiform nucleus/corona radiata suggestive of hemorrhage or mineralization within a subacute-chronic infarct. Pt also with trace right tentorial subdural hemorrhage and right orbital floor fracture with blood in maxillary sinus  - Stroke Code CTA Results:Unremarkable CTA examination of the brain. No arterial steno-occlusive disease or evidence of dissection  - Stroke education  - Repeat CTH x 6 hours     Cards  #HTN  - SBP < 140  - Plan to start pt back on home amlodipine and metoprolol if needed  - obtain TTE with bubble  - Stroke Code EKG Results:     #HLD  - high dose statin as above in CVA  - pending LDL results:     Pulm  - call provider if SPO2 < 94%    GI  #Nutrition/Fluids/Electrolytes   - replete K<4 and Mg <2  - Diet: pending dysphagia screen  - IVF: D5    Renal  - Trend BUN/Cr    Infectious Disease  - Rectal temp of 100.7 in ED, tachy to 107  - lactate downtrended  - Infectious workup started  - UA ordered  - CXR ordered  - Blood cx ordered  - started vancomycin 1500mg q12h, trough before 4th dose  - started cefepime 2g x 1 until ID approval for further doses in AM    Optho  #orbital fx  - OMFS consulted  - unasyn d/c and started vanc/cefepime for possible sepsis. OMFS aware.  - sinus precautions    Endocrine  #DM  - pending A1C results:   - ISS  -  pending TSH results:    DVT Prophylaxis  - hold DVT prophylaxis until repeat scan  - SCDs for DVT prophylaxis       IDR Goals: Goals reviewed at interdisciplinary rounds with case management, social work, physical therapy, occupational therapy, and speech language pathology.   Please see specific therapy  notes for in depth goals.    Dispo: Pending PT/OT eval     Discussed daily hospital plans and goals with patient at bedside.    Discussed with Neurology Attending 72y Male with PMHx of HTN, HLD, DM, CAD, CHF, HIV, Dermatomyositis presented to [facility] for planned colonoscopy due + Fecal immunochemical test (FIT). Stroke code called on pt  today after colonoscopy due to a syncopal event and fall hitting his head/face. Pt self transferred from stretcher to bathroom, felt dizzy and fell on his face. FB. SBP: 200 after fall. 's during stroke code. Pt does not remember the event however remember why he is here today for his colonoscopy. On exam, pt with R cheek, periorbital and infraorbital swelling, A&O x 2 (unable to tell month or year), B/L UE 5/5, B/L LE 2/5, unable to lift off bed, with assistance lifting fell within 5 seconds. NIHSS: 4 due to B/L leg weakness. Pt walked in for ambulatory procedure today. No decrease in sensation, change in vision or speech. HCT showed two sites of new hyperdensity in L lentiform nucleus/corona radiata suggestive of hemorrhage or mineralization within a subacute-chronic infarct. Pt also with trace right tentorial subdural hemorrhage and right orbital floor fracture with blood in maxillary sinus. CTA Unremarkable CTA examination of the brain. No arterial steno-occlusive disease or evidence of dissection. Patient was admitted to stroke for further workup.    Neuro  #CVA workup  - Hold DAPT due to hyperdensity suspicious for hemorrhagic conversion of  subacute-chronic infarct.   - continue atorvastatin 80mg daily  - q4hr stroke neuro checks and vitals  - obtain MRI Brain without contrast  - Stroke Code HCT Results: two sites of new hyperdensity in L lentiform nucleus/corona radiata suggestive of hemorrhage or mineralization within a subacute-chronic infarct. Pt also with trace right tentorial subdural hemorrhage and right orbital floor fracture with blood in maxillary sinus  - Stroke Code CTA Results:Unremarkable CTA examination of the brain. No arterial steno-occlusive disease or evidence of dissection  - Stroke education  - Repeat CTH x 6 hours     Cards  #HTN  - SBP < 140  - Plan to start pt back on home amlodipine and metoprolol if needed  - obtain TTE with bubble  - Stroke Code EKG Results:     #HLD  - high dose statin as above in CVA  - pending LDL results:     Pulm  - call provider if SPO2 < 94%    GI  #Nutrition/Fluids/Electrolytes   - replete K<4 and Mg <2  - Diet: pending dysphagia screen  - IVF: D5    Renal  - Trend BUN/Cr    Infectious Disease  - Rectal temp of 100.7, tachy to 107, temp increased to 101.7 and tachy to 111   - lactate downtrended  - UA ordered  - CXR ordered  - Blood cx ordered  - started vancomycin 1500mg q12h, trough before 4th dose  - started cefepime 2g x 1 until ID approval for further doses in AM  - medicine consulted    Optho  #orbital fx  - OMFS consulted  - unasyn d/c and started vanc/cefepime for possible sepsis. OMFS aware.  - sinus precautions    Endocrine  #DM  - pending A1C results:   - ISS  -  pending TSH results:    DVT Prophylaxis  - hold DVT prophylaxis until repeat scan  - SCDs for DVT prophylaxis       IDR Goals: Goals reviewed at interdisciplinary rounds with case management, social work, physical therapy, occupational therapy, and speech language pathology.   Please see specific therapy  notes for in depth goals.    Dispo: Pending PT/OT eval     Discussed daily hospital plans and goals with patient at bedside.    Discussed with Neurology Attending

## 2023-08-15 NOTE — ED ADULT NURSE NOTE - NSFALLRISKINTERV_ED_ALL_ED
Assistance OOB with selected safe patient handling equipment if applicable/Assistance with ambulation/Communicate fall risk and risk factors to all staff, patient, and family/Orthostatic vital signs/Provide visual cue: yellow wristband, yellow gown, etc/Reinforce activity limits and safety measures with patient and family/Call bell, personal items and telephone in reach/Instruct patient to call for assistance before getting out of bed/chair/stretcher/Non-slip footwear applied when patient is off stretcher/[place] to call system/Physically safe environment - no spills, clutter or unnecessary equipment/Purposeful Proactive Rounding/Room/bathroom lighting operational, light cord in reach

## 2023-08-15 NOTE — ED PROVIDER NOTE - CARE PLAN
1 Principal Discharge DX:	Syncope  Secondary Diagnosis:	Fracture of right orbital floor  Secondary Diagnosis:	Traumatic subdural hematoma with loss of consciousness

## 2023-08-15 NOTE — CONSULT NOTE ADULT - SUBJECTIVE AND OBJECTIVE BOX
72M w/PMH significant for HTN, HLD, DM, CAD, CHF, HIV, dermatomyositis presents as Code Stroke after syncopal episode following colonoscopy. Patient attempted to transfer from stretcher to bathroom, felt lightheaded, and fell hitting right face. Imagining revealed new hyperdensity in left lentiform nucleus/corona radiata, trace right tentorial subdural hemorrhage, and right orbital floor fracture. Additional work-up negative. Denies double-vision, blurry vision, or other alteration in vision. OMFS consulted for evaluation of right orbital floor fracture.    Vital Signs Last 24 Hrs  T(C): 38.2 (15 Aug 2023 19:51), Max: 38.2 (15 Aug 2023 19:51)  T(F): 100.7 (15 Aug 2023 19:51), Max: 100.7 (15 Aug 2023 19:51)  HR: 108 (15 Aug 2023 21:01) (91 - 108)  BP: 139/68 (15 Aug 2023 21:01) (139/68 - 176/84)  RR: 18 (15 Aug 2023 21:01) (18 - 18)  SpO2: 98% (15 Aug 2023 21:01) (91% - 98%)  Parameters below as of 15 Aug 2023 21:01  Patient On (Oxygen Delivery Method): room air    I&O's Summary    FOCUSED PHYSICAL EXAM:  Gen: AAOx3, NAD  Head:. NC. No edema/erythema/TTP. No gross asymmetry. No signs of scalp infection.  Eyes: EOMI without entrapment, PERRL, visual acuity intact, no diplopia, supra/infra orbital rims intact, no subconjunctival heme, no telecanthus, no exophthalmos. +R periorbital ecchymosis and edema.  Ears: Gross hearing intact, No heme appreciated, TMJ palpated and wnl.  Nose: no crepitis/septal hematoma/asymmetry.  No lacerations/abrasions/hematomas.  Malar: no malar depression, no CN V-2 paresthesia  Throat: no LAD, supple, FROM, no lesions  Extraoral/Intraoral Exam: No maxillary/mandibular segmental mobility, no lacerations, no intraoral injury obvious.    LABS:                    12.4   10.41 )-----------( 294      ( 15 Aug 2023 19:30 )             41.7     08-15    135  |  99  |  12  ----------------------------<  92  4.1   |  26  |  0.55    Ca    9.1      15 Aug 2023 19:30    Urinalysis Basic - ( 15 Aug 2023 20:34 )    Color: Yellow / Appearance: Clear / S.010 / pH: x  Gluc: x / Ketone: 15 mg/dL  / Bili: Negative / Urobili: 0.2 E.U./dL   Blood: x / Protein: NEGATIVE mg/dL / Nitrite: NEGATIVE   Leuk Esterase: NEGATIVE / RBC: < 5 /HPF / WBC < 5 /HPF   Sq Epi: x / Non Sq Epi: x / Bacteria: Present /HPF    Lactate, Blood: 2.5 mmol/L (08-15 @ 20:23)    RADIOLOGY, EKG & ADDITIONAL TESTS: Reviewed.    CT BRAIN STROKE PROTOCOL  1.  Two small sites of parallel hyperdensity in the left lentiform   nucleus/corona radiata, new since prior from 2023. Hemorrhage or   mineralization within a subacute-chronic infarct may have this appearance   and attention on short interval follow-up advised.  2.  Trace right tentorial subdural hemorrhage.  3.  Right orbital floor fracture with blood in maxillary sinus.    CTA BRAIN/NECKPERFUSION  IMPRESSION: Unremarkable CTA examination of the brain. Small reported Tmax elevation in left white matter. No territorial deficit.

## 2023-08-15 NOTE — H&P ADULT - HISTORY OF PRESENT ILLNESS
**STROKE HPI***    HPI: 72y Male with PMHx of HTN, HLD, DM, CAD, CHF, HIV, Dermatomyositis presented to [facility] for planned colonoscopy due + Fecal immunochemical test (FIT). Stroke code called on pt  today after colonoscopy due to a syncopal event and fall hitting his head/face. Pt self transferred from stretcher to bathroom, felt dizzy and fell on his face. FB. SBP: 200 after fall.'s during stroke code Pt does not remember the event however remember why he is here today for his colonoscopy.

## 2023-08-16 LAB
4/8 RATIO: 1.41 RATIO — SIGNIFICANT CHANGE UP (ref 0.86–4.14)
A1C WITH ESTIMATED AVERAGE GLUCOSE RESULT: 7.6 % — HIGH (ref 4–5.6)
ABS CD8: 165 CELLS/UL — SIGNIFICANT CHANGE UP (ref 90–775)
AMPHET UR-MCNC: NEGATIVE — SIGNIFICANT CHANGE UP
ANION GAP SERPL CALC-SCNC: 9 MMOL/L — SIGNIFICANT CHANGE UP (ref 5–17)
BARBITURATES UR SCN-MCNC: NEGATIVE — SIGNIFICANT CHANGE UP
BENZODIAZ UR-MCNC: NEGATIVE — SIGNIFICANT CHANGE UP
BUN SERPL-MCNC: 10 MG/DL — SIGNIFICANT CHANGE UP (ref 7–23)
CALCIUM SERPL-MCNC: 8.9 MG/DL — SIGNIFICANT CHANGE UP (ref 8.4–10.5)
CD16+CD56+ CELLS NFR BLD: 34 % — HIGH (ref 7–27)
CD16+CD56+ CELLS NFR SPEC: 223 CELLS/UL — SIGNIFICANT CHANGE UP (ref 80–426)
CD19 BLASTS SPEC-ACNC: 31 CELLS/UL — LOW (ref 32–326)
CD19 BLASTS SPEC-ACNC: 5 % — SIGNIFICANT CHANGE UP (ref 4–18)
CD3 BLASTS SPEC-ACNC: 400 CELLS/UL — SIGNIFICANT CHANGE UP (ref 396–2024)
CD3 BLASTS SPEC-ACNC: 59 % — SIGNIFICANT CHANGE UP (ref 58–84)
CD4 %: 34 % — SIGNIFICANT CHANGE UP (ref 30–56)
CD8 %: 24 % — SIGNIFICANT CHANGE UP (ref 11–43)
CHLORIDE SERPL-SCNC: 103 MMOL/L — SIGNIFICANT CHANGE UP (ref 96–108)
CHOLEST SERPL-MCNC: 76 MG/DL — SIGNIFICANT CHANGE UP
CO2 SERPL-SCNC: 24 MMOL/L — SIGNIFICANT CHANGE UP (ref 22–31)
COCAINE METAB.OTHER UR-MCNC: NEGATIVE — SIGNIFICANT CHANGE UP
CREAT SERPL-MCNC: 0.51 MG/DL — SIGNIFICANT CHANGE UP (ref 0.5–1.3)
EGFR: 108 ML/MIN/1.73M2 — SIGNIFICANT CHANGE UP
ESTIMATED AVERAGE GLUCOSE: 171 MG/DL — HIGH (ref 68–114)
GLUCOSE BLDC GLUCOMTR-MCNC: 102 MG/DL — HIGH (ref 70–99)
GLUCOSE BLDC GLUCOMTR-MCNC: 104 MG/DL — HIGH (ref 70–99)
GLUCOSE BLDC GLUCOMTR-MCNC: 233 MG/DL — HIGH (ref 70–99)
GLUCOSE SERPL-MCNC: 119 MG/DL — HIGH (ref 70–99)
HCT VFR BLD CALC: 40.9 % — SIGNIFICANT CHANGE UP (ref 39–50)
HDLC SERPL-MCNC: 37 MG/DL — LOW
HGB BLD-MCNC: 12 G/DL — LOW (ref 13–17)
LIPID PNL WITH DIRECT LDL SERPL: 21 MG/DL — SIGNIFICANT CHANGE UP
MAGNESIUM SERPL-MCNC: 2 MG/DL — SIGNIFICANT CHANGE UP (ref 1.6–2.6)
MCHC RBC-ENTMCNC: 25.9 PG — LOW (ref 27–34)
MCHC RBC-ENTMCNC: 29.3 GM/DL — LOW (ref 32–36)
MCV RBC AUTO: 88.3 FL — SIGNIFICANT CHANGE UP (ref 80–100)
METHADONE UR-MCNC: NEGATIVE — SIGNIFICANT CHANGE UP
NON HDL CHOLESTEROL: 39 MG/DL — SIGNIFICANT CHANGE UP
NRBC # BLD: 0 /100 WBCS — SIGNIFICANT CHANGE UP (ref 0–0)
OPIATES UR-MCNC: NEGATIVE — SIGNIFICANT CHANGE UP
PCP SPEC-MCNC: SIGNIFICANT CHANGE UP
PCP UR-MCNC: NEGATIVE — SIGNIFICANT CHANGE UP
PHOSPHATE SERPL-MCNC: 3.8 MG/DL — SIGNIFICANT CHANGE UP (ref 2.5–4.5)
PLATELET # BLD AUTO: 240 K/UL — SIGNIFICANT CHANGE UP (ref 150–400)
POTASSIUM SERPL-MCNC: 4.5 MMOL/L — SIGNIFICANT CHANGE UP (ref 3.5–5.3)
POTASSIUM SERPL-SCNC: 4.5 MMOL/L — SIGNIFICANT CHANGE UP (ref 3.5–5.3)
RBC # BLD: 4.63 M/UL — SIGNIFICANT CHANGE UP (ref 4.2–5.8)
RBC # FLD: 14.3 % — SIGNIFICANT CHANGE UP (ref 10.3–14.5)
SODIUM SERPL-SCNC: 136 MMOL/L — SIGNIFICANT CHANGE UP (ref 135–145)
T-CELL CD4 SUBSET PNL BLD: 233 CELLS/UL — LOW (ref 325–1251)
THC UR QL: NEGATIVE — SIGNIFICANT CHANGE UP
TRIGL SERPL-MCNC: 90 MG/DL — SIGNIFICANT CHANGE UP
TSH SERPL-MCNC: 1.66 UIU/ML — SIGNIFICANT CHANGE UP (ref 0.27–4.2)
WBC # BLD: 10.32 K/UL — SIGNIFICANT CHANGE UP (ref 3.8–10.5)
WBC # FLD AUTO: 10.32 K/UL — SIGNIFICANT CHANGE UP (ref 3.8–10.5)

## 2023-08-16 PROCEDURE — 99223 1ST HOSP IP/OBS HIGH 75: CPT

## 2023-08-16 PROCEDURE — 70450 CT HEAD/BRAIN W/O DYE: CPT | Mod: 26

## 2023-08-16 PROCEDURE — 93306 TTE W/DOPPLER COMPLETE: CPT | Mod: 26

## 2023-08-16 PROCEDURE — 99222 1ST HOSP IP/OBS MODERATE 55: CPT

## 2023-08-16 PROCEDURE — 70553 MRI BRAIN STEM W/O & W/DYE: CPT | Mod: 26

## 2023-08-16 RX ORDER — INSULIN LISPRO 100/ML
VIAL (ML) SUBCUTANEOUS AT BEDTIME
Refills: 0 | Status: DISCONTINUED | OUTPATIENT
Start: 2023-08-16 | End: 2023-08-25

## 2023-08-16 RX ORDER — VANCOMYCIN HCL 1 G
VIAL (EA) INTRAVENOUS
Refills: 0 | Status: DISCONTINUED | OUTPATIENT
Start: 2023-08-16 | End: 2023-08-16

## 2023-08-16 RX ORDER — AMLODIPINE BESYLATE 2.5 MG/1
10 TABLET ORAL EVERY 24 HOURS
Refills: 0 | Status: DISCONTINUED | OUTPATIENT
Start: 2023-08-16 | End: 2023-08-25

## 2023-08-16 RX ORDER — FOLIC ACID 0.8 MG
1 TABLET ORAL DAILY
Refills: 0 | Status: DISCONTINUED | OUTPATIENT
Start: 2023-08-16 | End: 2023-08-25

## 2023-08-16 RX ORDER — GLUCAGON INJECTION, SOLUTION 0.5 MG/.1ML
1 INJECTION, SOLUTION SUBCUTANEOUS ONCE
Refills: 0 | Status: DISCONTINUED | OUTPATIENT
Start: 2023-08-16 | End: 2023-08-25

## 2023-08-16 RX ORDER — AMLODIPINE BESYLATE 2.5 MG/1
1 TABLET ORAL
Qty: 0 | Refills: 0 | DISCHARGE

## 2023-08-16 RX ORDER — ACETAMINOPHEN 500 MG
650 TABLET ORAL ONCE
Refills: 0 | Status: COMPLETED | OUTPATIENT
Start: 2023-08-16 | End: 2023-08-16

## 2023-08-16 RX ORDER — EVOLOCUMAB 140 MG/ML
0 INJECTION, SOLUTION SUBCUTANEOUS
Qty: 0 | Refills: 0 | DISCHARGE

## 2023-08-16 RX ORDER — DEXTROSE 50 % IN WATER 50 %
25 SYRINGE (ML) INTRAVENOUS ONCE
Refills: 0 | Status: DISCONTINUED | OUTPATIENT
Start: 2023-08-16 | End: 2023-08-25

## 2023-08-16 RX ORDER — MYCOPHENOLATE MOFETIL 250 MG/1
2 CAPSULE ORAL
Refills: 0 | DISCHARGE

## 2023-08-16 RX ORDER — HYDROXYCHLOROQUINE SULFATE 200 MG
0 TABLET ORAL
Qty: 0 | Refills: 0 | DISCHARGE

## 2023-08-16 RX ORDER — DEXTROSE 50 % IN WATER 50 %
15 SYRINGE (ML) INTRAVENOUS ONCE
Refills: 0 | Status: DISCONTINUED | OUTPATIENT
Start: 2023-08-16 | End: 2023-08-25

## 2023-08-16 RX ORDER — DOLUTEGRAVIR SODIUM AND RILPIVIRINE HYDROCHLORIDE 50; 25 MG/1; MG/1
1 TABLET, FILM COATED ORAL
Qty: 0 | Refills: 0 | DISCHARGE

## 2023-08-16 RX ORDER — SODIUM CHLORIDE 9 MG/ML
1000 INJECTION, SOLUTION INTRAVENOUS
Refills: 0 | Status: DISCONTINUED | OUTPATIENT
Start: 2023-08-16 | End: 2023-08-25

## 2023-08-16 RX ORDER — VANCOMYCIN HCL 1 G
1500 VIAL (EA) INTRAVENOUS ONCE
Refills: 0 | Status: COMPLETED | OUTPATIENT
Start: 2023-08-16 | End: 2023-08-16

## 2023-08-16 RX ORDER — TRAZODONE HCL 50 MG
1 TABLET ORAL
Refills: 0 | DISCHARGE

## 2023-08-16 RX ORDER — MYCOPHENOLATE MOFETIL 250 MG/1
1000 CAPSULE ORAL
Refills: 0 | Status: DISCONTINUED | OUTPATIENT
Start: 2023-08-16 | End: 2023-08-16

## 2023-08-16 RX ORDER — METOPROLOL TARTRATE 50 MG
50 TABLET ORAL EVERY 12 HOURS
Refills: 0 | Status: DISCONTINUED | OUTPATIENT
Start: 2023-08-16 | End: 2023-08-25

## 2023-08-16 RX ORDER — DOLUTEGRAVIR SODIUM AND RILPIVIRINE HYDROCHLORIDE 50; 25 MG/1; MG/1
0 TABLET, FILM COATED ORAL
Qty: 0 | Refills: 0 | DISCHARGE

## 2023-08-16 RX ORDER — INSULIN LISPRO 100/ML
VIAL (ML) SUBCUTANEOUS
Refills: 0 | Status: DISCONTINUED | OUTPATIENT
Start: 2023-08-16 | End: 2023-08-25

## 2023-08-16 RX ORDER — DEXTROSE 50 % IN WATER 50 %
12.5 SYRINGE (ML) INTRAVENOUS ONCE
Refills: 0 | Status: DISCONTINUED | OUTPATIENT
Start: 2023-08-16 | End: 2023-08-25

## 2023-08-16 RX ORDER — HYDROXYCHLOROQUINE SULFATE 200 MG
1 TABLET ORAL
Refills: 0 | DISCHARGE

## 2023-08-16 RX ORDER — VANCOMYCIN HCL 1 G
1500 VIAL (EA) INTRAVENOUS EVERY 12 HOURS
Refills: 0 | Status: DISCONTINUED | OUTPATIENT
Start: 2023-08-16 | End: 2023-08-16

## 2023-08-16 RX ORDER — HYDROXYCHLOROQUINE SULFATE 200 MG
200 TABLET ORAL EVERY 12 HOURS
Refills: 0 | Status: DISCONTINUED | OUTPATIENT
Start: 2023-08-16 | End: 2023-08-16

## 2023-08-16 RX ORDER — FOLIC ACID 0.8 MG
0 TABLET ORAL
Qty: 0 | Refills: 0 | DISCHARGE

## 2023-08-16 RX ORDER — MYCOPHENOLATE MOFETIL 250 MG/1
0 CAPSULE ORAL
Qty: 0 | Refills: 0 | DISCHARGE

## 2023-08-16 RX ORDER — BISOPROLOL FUMARATE 10 MG/1
1 TABLET, FILM COATED ORAL
Refills: 0 | DISCHARGE

## 2023-08-16 RX ORDER — CEFEPIME 1 G/1
2000 INJECTION, POWDER, FOR SOLUTION INTRAMUSCULAR; INTRAVENOUS ONCE
Refills: 0 | Status: COMPLETED | OUTPATIENT
Start: 2023-08-16 | End: 2023-08-16

## 2023-08-16 RX ADMIN — CEFEPIME 100 MILLIGRAM(S): 1 INJECTION, POWDER, FOR SOLUTION INTRAMUSCULAR; INTRAVENOUS at 02:06

## 2023-08-16 RX ADMIN — Medication 1 MILLIGRAM(S): at 13:36

## 2023-08-16 RX ADMIN — Medication 300 MILLIGRAM(S): at 02:07

## 2023-08-16 RX ADMIN — Medication 50 MILLIGRAM(S): at 13:36

## 2023-08-16 RX ADMIN — AMLODIPINE BESYLATE 10 MILLIGRAM(S): 2.5 TABLET ORAL at 13:36

## 2023-08-16 RX ADMIN — Medication 1 TABLET(S): at 16:50

## 2023-08-16 RX ADMIN — Medication 300 MILLIGRAM(S): at 17:48

## 2023-08-16 RX ADMIN — Medication 650 MILLIGRAM(S): at 22:34

## 2023-08-16 RX ADMIN — Medication 650 MILLIGRAM(S): at 01:19

## 2023-08-16 RX ADMIN — Medication 16 MILLIGRAM(S): at 16:50

## 2023-08-16 RX ADMIN — Medication 650 MILLIGRAM(S): at 21:34

## 2023-08-16 NOTE — CONSULT NOTE ADULT - SUBJECTIVE AND OBJECTIVE BOX
See below for stroke HPI:  "72y Male with PMHx of HTN, HLD, DM, CAD, CHF, HIV, Dermatomyositis presented to [facility] for planned colonoscopy due + Fecal immunochemical test (FIT). Stroke code called on pt  today after colonoscopy due to a syncopal event and fall hitting his head/face. Pt self transferred from stretcher to bathroom, felt dizzy and fell on his face. FB. SBP: 200 after fall.'s during stroke code Pt does not remember the event however remember why he is here today for his colonoscopy.       Review of Systems:  Review of Systems: ROS:   Constitutional: No weight loss or fatigue; + fever currently  Eyes: No visual disturbances, or discharge; + eye pain  ENMT:  No difficulty hearing, tinnitus, vertigo; No throat pain  Neck: No pain or stiffness  Respiratory: No cough, wheezing, chills or hemoptysis  Cardiovascular: No chest pain, palpitations, shortness of breath or leg swelling; + dizziness before fall  Gastrointestinal: No abdominal pain. No nausea, vomiting or hematemesis; No diarrhea or constipation. No hematochezia.  Genitourinary: No dysuria, frequency, hematuria or incontinence  Neurological: As per HPI  Skin: No itching, burning or lesions   Endocrine: No heat or cold intolerance; No hair loss  Musculoskeletal: No joint pain or swelling; No muscle, back or extremity pain  Psychiatric: No depression, anxiety, mood swings or difficulty sleeping  Heme/Lymph: No easy bruising or bleeding gums      Allergies and Intolerances:        Allergies:  	No Known Allergies:     Home Medications:   * Incomplete Medication History as of 15-Aug-2023 21:08 documented in Structured Notes  · 	predniSONE 20 mg oral tablet: Last Dose Taken:  , 3 tab(s) orally once a day   · 	Juluca 50 mg-25 mg oral tablet: Last Dose Taken:  , 1 tab(s) orally once a day  · 	SULFAMETHOXAZOLE-TMP DS TABLET: Last Dose Taken:    · 	FOLIC ACID 1 MG TABLET: Last Dose Taken:    · 	METHYLPREDNISOLONE 4 MG TABLET: Last Dose Taken:    · 	AMLODIPINE BESYLATE 10 MG TAB: Last Dose Taken:  , 1 tab(s) orally once a day  · 	HYDROXYCHLOROQUINE 200 MG TAB: Last Dose Taken:    · 	MYCOPHENOLATE 500 MG TABLET: Last Dose Taken:    · 	REPATHA 140 MG/ML SURECLICK: Last Dose Taken:    · 	JULUCA 50-25 MG TABLET: Last Dose Taken:      Patient History:   Past Medical, Past Surgical, and Family History:  PAST MEDICAL HISTORY:  CAD (coronary artery disease)     Chronic systolic congestive heart failure     Dermatomyositis     Diabetes     HIV disease     HTN (hypertension)     Hypercholesteremia.     PAST SURGICAL HISTORY:  No significant past surgical history.     FAMILY HISTORY:  No pertinent family history in first degree relatives.    Vital Signs Last 24 Hrs  T(C): 37.7 (16 Aug 2023 19:37), Max: 38.7 (16 Aug 2023 00:24)  T(F): 99.9 (16 Aug 2023 19:37), Max: 101.7 (16 Aug 2023 00:24)  HR: 103 (16 Aug 2023 17:49) (85 - 111)  BP: 132/61 (16 Aug 2023 17:49) (120/62 - 152/76)  BP(mean): 88 (16 Aug 2023 17:49) (85 - 104)  RR: 20 (16 Aug 2023 17:49) (18 - 29)  SpO2: 96% (16 Aug 2023 17:49) (91% - 98%)    Parameters below as of 16 Aug 2023 17:49  Patient On (Oxygen Delivery Method): room air    Physical exam:

## 2023-08-16 NOTE — PHYSICAL THERAPY INITIAL EVALUATION ADULT - NSPTDISCHREC_GEN_A_CORE
HPT pending stair negotiation HPT with family assist, pending stair negotiation HPT with family assist, pending stair negotiation. If no assist is available at home patient would benefit from subacute rehab.

## 2023-08-16 NOTE — OCCUPATIONAL THERAPY INITIAL EVALUATION ADULT - MODIFIED CLINICAL TEST OF SENSORY INTEGRATION IN BALANCE TEST
Pt performed functional mobility within room with CGA via hand held assist and one lateral slight LOB requiring min A to correct. Pt tolerated standing at sink during 2 step grooming task with SBA.

## 2023-08-16 NOTE — OCCUPATIONAL THERAPY INITIAL EVALUATION ADULT - RANGE OF MOTION EXAMINATION
B shoulder flexion limited to mod range with soft end feel; present at baseline./no Passive ROM deficits were identified

## 2023-08-16 NOTE — PHYSICAL THERAPY INITIAL EVALUATION ADULT - PERTINENT HX OF CURRENT PROBLEM, REHAB EVAL
72 year old male hx of dermatomyositis presents as Code Stroke after syncopal episode following colonoscopy. Patient attempted to transfer from stretcher to bathroom, felt lightheaded, and fell hitting right face. Imagining revealed new hyperdensity in left lentiform nucleus/corona radiata, trace right tentorial subdural hemorrhage, and right orbital floor fracture. Additional work-up negative.

## 2023-08-16 NOTE — OCCUPATIONAL THERAPY INITIAL EVALUATION ADULT - PERTINENT HX OF CURRENT PROBLEM, REHAB EVAL
72M w/PMH significant for HTN, HLD, DM, CAD, CHF, HIV, dermatomyositis presents as Code Stroke after syncopal episode following colonoscopy. Patient attempted to transfer from stretcher to bathroom, felt lightheaded, and fell hitting right face. Imagining revealed new hyperdensity in left lentiform nucleus/corona radiata, trace right tentorial subdural hemorrhage, and right orbital floor fracture. Additional work-up negative. Denies double-vision, blurry vision, or other alteration in vision. OMFS consulted for evaluation of right orbital floor fracture.

## 2023-08-16 NOTE — OCCUPATIONAL THERAPY INITIAL EVALUATION ADULT - PLANNED THERAPY INTERVENTIONS, OT EVAL
ADL retraining/IADL retraining/balance training/cognitive, visual perceptual/parent/caregiver training.../strengthening/transfer training

## 2023-08-16 NOTE — PHYSICAL THERAPY INITIAL EVALUATION ADULT - GENERAL OBSERVATIONS, REHAB EVAL
patient received supine with no acute distress. +EKG +heplock, +facial erythema, worse on right side

## 2023-08-16 NOTE — OCCUPATIONAL THERAPY INITIAL EVALUATION ADULT - ADDITIONAL COMMENTS
Pt lives in apartment with 6 [address] with wife. Wife is currently out of town for two weeks. At baseline, wife assists with LE dressing and house *** Pt lives in apartment with 6 [address] with wife. Wife is currently out of town for two weeks. At baseline, wife assists with LE dressing and household/community ADL. Pt is R handed and wears glasses to read. Pt does not use device for ambulation. Pt uses grab bars in shower during tub transfer.

## 2023-08-16 NOTE — CONSULT NOTE ADULT - SUBJECTIVE AND OBJECTIVE BOX
INFECTIOUS DISEASES INITIAL CONSULT NOTE    HPI:      PAST MEDICAL & SURGICAL HISTORY:  HTN (hypertension)      Diabetes      Hypercholesteremia      HIV disease      Chronic systolic congestive heart failure      CAD (coronary artery disease)      Dermatomyositis      No significant past surgical history            Review of Systems:   Constitutional, eyes, ENT, cardiovascular, respiratory, gastrointestinal, genitourinary, integumentary, neurological, psychiatric and heme/lymph are otherwise negative other than noted above       ANTIBIOTICS:  MEDICATIONS  (STANDING):  amLODIPine   Tablet 10 milliGRAM(s) Oral every 24 hours  folic acid 1 milliGRAM(s) Oral daily  methylPREDNISolone 16 milliGRAM(s) Oral daily  metoprolol tartrate 50 milliGRAM(s) Oral every 12 hours  trimethoprim  160 mG/sulfamethoxazole 800 mG 1 Tablet(s) Oral <User Schedule>  vancomycin  IVPB 1500 milliGRAM(s) IV Intermittent every 12 hours  vancomycin  IVPB        MEDICATIONS  (PRN):      Allergies    No Known Allergies    Intolerances        SOCIAL HISTORY:    FAMILY HISTORY:  No pertinent family history in first degree relatives     no FH leading to current infection    Vital Signs Last 24 Hrs  T(C): 37.6 (16 Aug 2023 14:50), Max: 38.7 (16 Aug 2023 00:24)  T(F): 99.7 (16 Aug 2023 14:50), Max: 101.7 (16 Aug 2023 00:24)  HR: 96 (16 Aug 2023 16:25) (85 - 111)  BP: 150/72 (16 Aug 2023 16:25) (120/62 - 176/84)  BP(mean): 103 (16 Aug 2023 16:25) (85 - 104)  RR: 22 (16 Aug 2023 16:25) (18 - 29)  SpO2: 93% (16 Aug 2023 16:25) (91% - 98%)    Parameters below as of 16 Aug 2023 16:25  Patient On (Oxygen Delivery Method): room air        08-15-23 @ 07:01  -  08-16-23 @ 07:00  --------------------------------------------------------  IN: 0 mL / OUT: 700 mL / NET: -700 mL    08-16-23 @ 07:01  -  08-16-23 @ 17:15  --------------------------------------------------------  IN: 336 mL / OUT: 200 mL / NET: 136 mL        PHYSICAL EXAM:  Constitutional: alert, NAD  Eyes: the sclera and conjunctiva were normal.   ENT: the ears and nose were normal in appearance.   Neck: the appearance of the neck was normal and the neck was supple.   Pulmonary: no respiratory distress and lungs were clear to auscultation bilaterally.   Heart: heart rate was normal and rhythm regular, normal S1 and S2  Vascular:. there was no peripheral edema  Abdomen: normal bowel sounds, soft, non-tender  Neurological: no focal deficits.   Psychiatric: the affect was normal      LABS:                        12.0   10.32 )-----------( 240      ( 16 Aug 2023 05:30 )             40.9     08-16    136  |  103  |  10  ----------------------------<  119<H>  4.5   |  24  |  0.51    Ca    8.9      16 Aug 2023 05:30  Phos  3.8     08-16  Mg     2.0     08-16        Urinalysis Basic - ( 16 Aug 2023 05:30 )    Color: x / Appearance: x / SG: x / pH: x  Gluc: 119 mg/dL / Ketone: x  / Bili: x / Urobili: x   Blood: x / Protein: x / Nitrite: x   Leuk Esterase: x / RBC: x / WBC x   Sq Epi: x / Non Sq Epi: x / Bacteria: x        MICROBIOLOGY:    RADIOLOGY & ADDITIONAL STUDIES:   INFECTIOUS DISEASES INITIAL CONSULT NOTE    HPI:  72M w/ hx HIV on Juluca (follows at [facility], with recent VL UD a few months ago, no longer following CD4) with reported excellent adherence to Juluca, dermatomyositis on methypred 4mg daily and  daily and on PCP ppx with bactrim for this indication, DMT2 (A1c 7.6%), HTN, HLD, CAD, and CHF, who presented to ED on 8/15 after he had a syncopal episode following routine outpatient colonoscopy, fell and hit face on floor, and subsequently had reported BLE weakness, stroke code called and imaging revealed two small sites of possible acute hemorrhage in L lentiform nucleus/corona radiata, as well as trace SDH, and a R orbital floor fracture. He was febrile on presentation to .7 x1 and had tachycardia and borderline lactate, but these all quickly resolved. Was on 2L NC but this also resolved quickly. BCx x2 ngtd, UA neg, Fluvid neg, CXR without focal consolidation. Received a dose of unasyn on 8/15, then vanc/cefepime on 8/16. ID consulted to assist with management.     Patient had been in his usual state of health prior to colonoscopy. He had no infectious symptoms and ROS pan negative. He currently has no symptoms of infection. He does report pain over his R face, but no pain with EOM and feels vision is OK. Pain over R side of face is overall stable/improving.    PAST MEDICAL & SURGICAL HISTORY:  HTN (hypertension)      Diabetes      Hypercholesteremia      HIV disease      Chronic systolic congestive heart failure      CAD (coronary artery disease)      Dermatomyositis      No significant past surgical history            Review of Systems:   Constitutional, eyes, ENT, cardiovascular, respiratory, gastrointestinal, genitourinary, integumentary, neurological, psychiatric and heme/lymph are otherwise negative other than noted above       ANTIBIOTICS:  MEDICATIONS  (STANDING):  amLODIPine   Tablet 10 milliGRAM(s) Oral every 24 hours  folic acid 1 milliGRAM(s) Oral daily  methylPREDNISolone 16 milliGRAM(s) Oral daily  metoprolol tartrate 50 milliGRAM(s) Oral every 12 hours  trimethoprim  160 mG/sulfamethoxazole 800 mG 1 Tablet(s) Oral <User Schedule>  vancomycin  IVPB 1500 milliGRAM(s) IV Intermittent every 12 hours  vancomycin  IVPB        MEDICATIONS  (PRN):      Allergies    No Known Allergies    Intolerances        SOCIAL HISTORY: Not contributory to current presentation    FAMILY HISTORY:  No pertinent family history in first degree relatives     no FH leading to current infection    Vital Signs Last 24 Hrs  T(C): 37.6 (16 Aug 2023 14:50), Max: 38.7 (16 Aug 2023 00:24)  T(F): 99.7 (16 Aug 2023 14:50), Max: 101.7 (16 Aug 2023 00:24)  HR: 96 (16 Aug 2023 16:25) (85 - 111)  BP: 150/72 (16 Aug 2023 16:25) (120/62 - 176/84)  BP(mean): 103 (16 Aug 2023 16:25) (85 - 104)  RR: 22 (16 Aug 2023 16:25) (18 - 29)  SpO2: 93% (16 Aug 2023 16:25) (91% - 98%)    Parameters below as of 16 Aug 2023 16:25  Patient On (Oxygen Delivery Method): room air        08-15-23 @ 07:01  -  08-16-23 @ 07:00  --------------------------------------------------------  IN: 0 mL / OUT: 700 mL / NET: -700 mL    08-16-23 @ 07:01  -  08-16-23 @ 17:15  --------------------------------------------------------  IN: 336 mL / OUT: 200 mL / NET: 136 mL        PHYSICAL EXAM:  Constitutional: alert, NAD  HEENT: Swelling and brusing over R side of face. Eyelid swelling but can open eye and move eye around. No significant warmth or erythema. Mild appropriate tenderness.  Neck: the appearance of the neck was normal and the neck was supple.   Pulmonary: no respiratory distress and lungs were clear to auscultation bilaterally.   Heart: heart rate was normal and rhythm regular, normal S1 and S2  Vascular:. there was no peripheral edema  Abdomen: normal bowel sounds, soft, non-tender  Neurological: no focal deficits.   Psychiatric: the affect was normal      LABS:                        12.0   10.32 )-----------( 240      ( 16 Aug 2023 05:30 )             40.9     08-16    136  |  103  |  10  ----------------------------<  119<H>  4.5   |  24  |  0.51    Ca    8.9      16 Aug 2023 05:30  Phos  3.8     08-16  Mg     2.0     08-16        Urinalysis Basic - ( 16 Aug 2023 05:30 )    Color: x / Appearance: x / SG: x / pH: x  Gluc: 119 mg/dL / Ketone: x  / Bili: x / Urobili: x   Blood: x / Protein: x / Nitrite: x   Leuk Esterase: x / RBC: x / WBC x   Sq Epi: x / Non Sq Epi: x / Bacteria: x        MICROBIOLOGY:  Reviewed    RADIOLOGY & ADDITIONAL STUDIES:  Reviewed

## 2023-08-16 NOTE — PHYSICAL THERAPY INITIAL EVALUATION ADULT - IMPAIRMENTS CONTRIBUTING IMPAIRED BED MOBILITY, REHAB EVAL
difficult to perform at baseline, has bed rails installed on his bed at home/impaired balance/impaired postural control/decreased strength

## 2023-08-16 NOTE — PROGRESS NOTE ADULT - ASSESSMENT
Assessment and Plan :    Assessment :   72y Male with PMHx of HTN, HLD, DM, CAD, CHF, HIV, Dermatomyositis presented to [facility] for planned colonoscopy due + Fecal immunochemical test (FIT). Stroke code called on pt  today after colonoscopy due to a syncopal event and fall hitting his head/face. Stroke code was called. FSBS @ 57, SBPs post colonoscopy @ 200s ut during stroke code @ 120s. Pt was taken to CTs. Stroke code CTH w/? R BG hyperdensity c/f Hge vs mineralisation, trace SDH and R orbital floor Fx. CTA unremarkable. Pt admitted to stroke tele for further w/u. Pt found to be febrile, infectious w/u started and was started on unasyn which was later changed to vanco and cefipime as pt was persistently having low grade fevers and tachycardic with some periods of confusion. Elevated lactate initially which responded to IVF. Currently being monitored off IV Abx per ID reccs. Pending MRI and further w/u.      Neuro  #CVA workup  - No DAPT 2/2 R BG hyperdensity c/f bleed.  - q4hr stroke neuro checks and vitals  - obtain MRI Brain without contrast  - Stroke Code HCT Results: R trace SDH, R orbital floor Fx, L BG/CR hyperdensity c/f bleed Vs mineralisation.  - Stroke Code CTA Results:  - Stroke education    Cards  #HTN  - permissive hypertension, Goal SBP < 140  - Restarted Home anti hypertensives (On Bisoprolol 15mg, started on Metoprolol 50mg po BID- therapeutic conversion for Bisoprolol 5mg, can uptitrate as tolerated and amlodipine).  - TTE with bubble : PFO+, BLESSING with moderate LVOT.      #HLD  - LDL results: 21    Pulm  - call provider if SPO2 < 94%    GI  #Nutrition/Fluids/Electrolytes   - replete K<4 and Mg <2  - Diet: DASH/TLC  - IVF: None for now.    Renal  - C/T Trend Bun/Crt    Infectious Disease  - C/T trend WBC- WNL  - ID cxed for low grade temp, tachycardia, was started on unasyn which was changed to vanco and cefipime, currently being watched off Abx per ID reccs.    # Hx of HIV  - F/U T cell subset and HIV viral load - pending results  - Started on Bactrim ds  - Pt is on Juluca which is Non formulary- need to inform pt to bring his own.    # Dermatomyositis  - Home cellcept and Plaquenil held i/s/o infectious w/u.  - Home Methylprednisone ordered.    Endocrine  #DM  - A1C results: 7.6  - ISS    - TSH results: 1.660    DVT Prophylaxis  - No SQL i/s/o SDH and L BG/CR bleed.  - SCDs for DVT prophylaxis       IDR Goals: Goals reviewed at interdisciplinary rounds with case management, social work, physical therapy, occupational therapy, and speech language pathology.   Please see specific therapy  notes for in depth goals.    Dispo: Home Vs rehab     Discussed daily hospital plans and goals with patient and family at bedside. (Called and updated family.)    Discussed with Neurology Attending Dr. Aviles and Stroke fellow Dr. Burgess.

## 2023-08-16 NOTE — CONSULT NOTE ADULT - ASSESSMENT
Mr. Hess is a 72 y.o M with a PMHx of HTN, DM, HLD, HIV disease (unknown VL, CD4), chronic systolic congestive heart failure, CAD, and Dermatomyositis (on steroids) who recently had elective colonoscopy on 8/15 with subsequent syncopal episode. Pt reportedly travelled from stretcher to bathroom, felt lightheaded, syncopized, hit his R face on the floor. Pt states he has not eaten in 2 days in the setting of bowel prep and being NPO for his C-Scope, was reported to be hypoglycemic to 57 during this time (not documented), which increased to 70s after sugary snacks (oreos, juice). Stroke code was called prior to pt being brought to the ED, and stroke code imaging completed. Imaging revealed two small sites of parallel hyperdensity in the left lentiform nucleus/corona radiata, new since prior from 2/1/2023 with trace right tentorial subdural hemorrhage and a right orbital floor fracture with blood in maxillary sinus. Pt was also found to have low grade temperature 100.7 with tachycardia 100s and lactate of 2.7 (resolved) for which blood cxs drawn. Pt was started on Unasyn which was later changed to Vancomycin and Cefepime. ENT consulted for R orbital floor fracture who recommended no acute surgical interventions and continuing on Unasyn 3g Q6H while inpatient, transitioning to Augmentin 875mg/125mg BID for a total of 7 days antibiotic course. Pt last had TMax of 101.7 @ midnight 8/16, no new fevers since.     # Right orbital floor fracture  # Syncope  # High lactate # Fevers # Tachycardia   # History of HIV, history of Dermatomyositis     Recommendations:     - Follow up with blood cultures, currently NGTD   - CXR reviewed, UA reviewed. Physical examination/ROS as it pertains to infection unremarkable (no cough, diarrhea, dysuria/hematuria)   - We have a low degree of suspicion for infection at this time    Thank you for allowing us to participate in this consult. Team 1 will continue to follow.      # Syncope in setting of likely dehydration from recent colonoscopy c/b Right orbital floor fracture and trace SDH  # Possible acute hemorrhage in L lentiform nucleus/corona radiata  # Fever, tachycardia - resolved  - Patient has no current infectious syndrome, and did not have any prior to his colonoscopy. His R face is swollen and tender, as expected after the trauma he sustained with orbital fracture, but is not impressively erythematous or warm, and there does not appear to be a component of cellulitis at this time. His fever on presentation likely is due to his recent trauma and SDH, and it has not recurred. His tachycardia and borderline lactate quickly resolved with fluids and were likely due to hypovolemia.  - I recommend stopping all antibiotics at this time, and monitoring off antibiotics for any signs of worsening R facial swelling or pain, or any systemic symptoms/signs of infection (fever/tachycardia/leukocytosis).   - I do not recommend prophylactic antibiotics for orbital floor fracture, based on limited evidence that this practice is helpful.    # HIV on Juluca (follows at [facility], with recent VL UD a few months ago, no longer following CD4)  -Please continue juluca    # Dermatomyositis on methypred 4mg daily and  daily and on PCP ppx with bactrim for this indication    ID Team 1 will sign off. Please call back for ID re-evaluation if patient has any clinical change or worsening.

## 2023-08-16 NOTE — OCCUPATIONAL THERAPY INITIAL EVALUATION ADULT - GENERAL OBSERVATIONS, REHAB EVAL
Cleared by ANJU Storey for OOB with OT. Pt received seated in chair +tele +hep lock +NAD +agreeable to OT. Pt left as found with all lines intact and needs met, RN aware. MRS 4. Cleared by ANJU Storey for OOB with OT. Pt received seated in chair +tele +hep lock +NAD +agreeable to OT. Pt left as found with all lines intact and needs met, RN aware.

## 2023-08-16 NOTE — PHYSICAL THERAPY INITIAL EVALUATION ADULT - MANUAL MUSCLE TESTING RESULTS, REHAB EVAL
bilateral shoulder flex 2/5, bilateral elbow flex/ext 4/5,  4+/5, hip flex 2/5, knee ext and flex 5/5

## 2023-08-16 NOTE — OCCUPATIONAL THERAPY INITIAL EVALUATION ADULT - MODALITIES TREATMENT COMMENTS
Cranial Nerves II - XII: II: PERRLA; visual fields are full to confrontation III, IV, VI: EOMI, no nystagmus appreciated V: facial sensation intact to light touch V1-V3 b/l VII: no ptosis, no facial droop, symmetric eyebrow raise and closure VIII: hearing intact to finger rub b/l  XI: head turning and shoulder shrug intact b/l XII: tongue protrusion midline; smooth pursuits through 9 cardinal planes; visual quadrants intact; finger to nose intact; convergence intact

## 2023-08-16 NOTE — PHYSICAL THERAPY INITIAL EVALUATION ADULT - ADDITIONAL COMMENTS
had a cane but got rid of it, did not want to rely on an assistive device. Reported recent fall. Ambulated 5-6 blocks before fatigue. Bathtub shower with grab bars. Patient has bed bars which he utilizes for sup-sit due to his myositis. Wife is home with him and able to assist him as needed. Right hand dominant. He had a cane but got rid of it, did not want to rely on an assistive device. Reported recent fall. Ambulated 5-6 blocks before fatigue. Bathtub shower with grab bars. Patient has bed bars which he utilizes for sup-sit due to his myositis. Wife is home with him and able to assist him as needed.

## 2023-08-16 NOTE — CONSULT NOTE ADULT - ASSESSMENT
72M with PMH of HTN, HLD, DM2, HIV, HFrEF, dermatomyositis and CAD presenting after a syncopal event following colonoscopy, sustaining orbital fractures and suffering an intracranial bleed.  Also noted to be extremely hypertensive.  Admitted to the stroke service for further evaluation.     #Intracerebral hemorrhage  - plan per stroke team  - holding any anticoagulation  - monitor on telemetry    #Febrile episode  #Elevated lactate (resolved)  #Dermatomyositis  - ID consulted due to complex immunosuppressive history and HIV status  - holding further antibiotics  - will hold immunossupressive agents at this time until no further fevers  - lower suspicion for infection now, OK to continue home dose of steroids that he has been on for many months  - continue PCP ppx with bactrim    #HIV  - continue home HIV regimen    #Orbital fracture  - per discussion with stroke team, the fractures were discussed with a consultant - if the eye is able to move without any evidence of muscle entrapment or other issues, can be followed up outpatient    Plan of care discussed with stroke team.

## 2023-08-16 NOTE — PROGRESS NOTE ADULT - SUBJECTIVE AND OBJECTIVE BOX
Neurology Stroke Progress Note    INTERVAL HPI/OVERNIGHT EVENTS:  Patient seen and examined @ bedside. facial swelling noted initially in AM which improved later. Now w/ R facial swelling from fall and orbital Fx, but improving L facial Bruising and swelling. Denies ny other complaints. Pt states he usually has facial swelling when he wakes up in the morning 2/2 his dermatomyositis. Med rec done and restarted some of his home meds. Cellcept and Plaquenil held i/s/o infectious w/u. Consider starting in AM after D/W IM.    MEDICATIONS  (STANDING):  amLODIPine   Tablet 10 milliGRAM(s) Oral every 24 hours  folic acid 1 milliGRAM(s) Oral daily  methylPREDNISolone 16 milliGRAM(s) Oral daily  metoprolol tartrate 50 milliGRAM(s) Oral every 12 hours  trimethoprim  160 mG/sulfamethoxazole 800 mG 1 Tablet(s) Oral <User Schedule>    MEDICATIONS  (PRN):      Allergie  No Known Allergies      Vital Signs Last 24 Hrs  T(C): 37.7 (16 Aug 2023 19:37), Max: 38.7 (16 Aug 2023 00:24)  T(F): 99.9 (16 Aug 2023 19:37), Max: 101.7 (16 Aug 2023 00:24)  HR: 103 (16 Aug 2023 17:49) (85 - 111)  BP: 132/61 (16 Aug 2023 17:49) (120/62 - 152/76)  BP(mean): 88 (16 Aug 2023 17:49) (85 - 104)  RR: 20 (16 Aug 2023 17:49) (18 - 29)  SpO2: 96% (16 Aug 2023 17:49) (91% - 98%)    Parameters below as of 16 Aug 2023 17:49  Patient On (Oxygen Delivery Method): room air        Physical exam:  General: No acute distress, awake and alert  Eyes: Facial swelling/ bruising +  Neck: trachea midline, FROM.  Cardiovascular: Regular rate and rhythm on monitor.  Pulmonary: Anterior breath sounds clear bilaterally, no crackles or wheezing. No use of accessory muscles  GI: Abdomen soft, non-distended, non-tender      Neurologic:  -Mental status: Awake, alert, oriented to person, place, and time. Speech is fluent with intact naming, repetition, and comprehension, no dysarthria. Recent and remote memory intact. Follows commands. Attention/concentration intact. Fund of knowledge appropriate.  -Cranial nerves:   II: Visual fields are full to confrontation.  III, IV, VI: Extraocular movements are intact without nystagmus. L pupil : Rxn to light+, R pupil hard to ascertain given the swelling, but pt states he perceives light when shone on his eye.  V:  Facial sensation V1-V3 equal and intact   VII: Face appears symmetric, thou hard to ascertain given facial swelling from orbital Fx.  VIII: Hearing is bilaterally intact to finger rub  IX, X: Uvula is midline and soft palate rises symmetrically  XI: Head turning and shoulder shrug are intact.  XII: Tongue protrudes midline  Motor: Normal bulk and tone. No pronator drift. Strength bilateral upper extremity 5/5, bilateral lower extremities 3/5 which is baseline and chronic.  Rapid alternating movements intact and symmetric  Sensation: Intact to light touch bilaterally. No neglect or extinction on double simultaneous testing.  Coordination: No dysmetria on finger-to-nose.   Gait: Deferred.    LABS:                        12.0   10.32 )-----------( 240      ( 16 Aug 2023 05:30 )             40.9     08-16    136  |  103  |  10  ----------------------------<  119<H>  4.5   |  24  |  0.51    Ca    8.9      16 Aug 2023 05:30  Phos  3.8     08-16  Mg     2.0     08-16        Urinalysis Basic - ( 16 Aug 2023 05:30 )    Color: x / Appearance: x / SG: x / pH: x  Gluc: 119 mg/dL / Ketone: x  / Bili: x / Urobili: x   Blood: x / Protein: x / Nitrite: x   Leuk Esterase: x / RBC: x / WBC x   Sq Epi: x / Non Sq Epi: x / Bacteria: x        RADIOLOGY & ADDITIONAL TESTS: Reviewed.    Echocardiogram w/ Bubble and Doppler (08.16.23 @ 14:54)   CONCLUSIONS:     1. Hyperdynamic left ventricular systolic function.   2. Normal right ventricular size and systolic function.   3. Normal atria.   4. Injection of agitated saline via a peripheral vein reveals bubbles in   the left heart within 3 heart beats, most consistent with a patent   foramen ovale.   5. Systolic anterior motion of the mitral valve resulting in an LVOT   gradient of 40.00 mmHg and moderate LVOT obstruction.   6. Aortic sclerosis without significantstenosis.   7. No evidence of pulmonary hypertension.   8. No pericardial effusion.   9. Compared to the previous TTE performed on 2/2/2023, the pulmonary   artery systolic pressure is lower.    Prior Echo on 2/23: noted to have BLESSING with LVOT.

## 2023-08-16 NOTE — PHYSICAL THERAPY INITIAL EVALUATION ADULT - GAIT DEVIATIONS NOTED, PT EVAL
fairly unsteady gait, impulsive at times, verbal cues for pacing, no loss of balance, no c/o lightheadedness, reciprocal gait pattern

## 2023-08-16 NOTE — PHYSICAL THERAPY INITIAL EVALUATION ADULT - MODALITIES TREATMENT COMMENTS
facial asymmetry secondary to increased swelling right side. Tongue protrusion midline. H test normal performance. finger opposition normal performance

## 2023-08-16 NOTE — OCCUPATIONAL THERAPY INITIAL EVALUATION ADULT - DIAGNOSIS, OT EVAL
Pt presented to [facility] for colonoscopy. Pt experienced syncopal episode following colonoscopy with imaging demonstrating new hyperdensity in left lentiform nucleus/corona radiata, trace right tentorial subdural hemorrhage, and right orbital floor fracture. Pt referred to OT service for decreased balance, deconditioning, weakness impacting ADL function and transfers/functional mobility.

## 2023-08-17 ENCOUNTER — NON-APPOINTMENT (OUTPATIENT)
Age: 73
End: 2023-08-17

## 2023-08-17 LAB
ANION GAP SERPL CALC-SCNC: 7 MMOL/L — SIGNIFICANT CHANGE UP (ref 5–17)
BUN SERPL-MCNC: 7 MG/DL — SIGNIFICANT CHANGE UP (ref 7–23)
CALCIUM SERPL-MCNC: 9.1 MG/DL — SIGNIFICANT CHANGE UP (ref 8.4–10.5)
CHLORIDE SERPL-SCNC: 104 MMOL/L — SIGNIFICANT CHANGE UP (ref 96–108)
CK SERPL-CCNC: 1721 U/L
CO2 SERPL-SCNC: 30 MMOL/L — SIGNIFICANT CHANGE UP (ref 22–31)
CREAT SERPL-MCNC: 0.47 MG/DL — LOW (ref 0.5–1.3)
EGFR: 111 ML/MIN/1.73M2 — SIGNIFICANT CHANGE UP
GLUCOSE BLDC GLUCOMTR-MCNC: 108 MG/DL — HIGH (ref 70–99)
GLUCOSE BLDC GLUCOMTR-MCNC: 138 MG/DL — HIGH (ref 70–99)
GLUCOSE BLDC GLUCOMTR-MCNC: 148 MG/DL — HIGH (ref 70–99)
GLUCOSE BLDC GLUCOMTR-MCNC: 237 MG/DL — HIGH (ref 70–99)
GLUCOSE SERPL-MCNC: 122 MG/DL — HIGH (ref 70–99)
HCT VFR BLD CALC: 39.6 % — SIGNIFICANT CHANGE UP (ref 39–50)
HGB BLD-MCNC: 12 G/DL — LOW (ref 13–17)
HIV-1 VIRAL LOAD RESULT: SIGNIFICANT CHANGE UP
HIV1 RNA # SERPL NAA+PROBE: SIGNIFICANT CHANGE UP COPIES/ML
HIV1 RNA SER-IMP: SIGNIFICANT CHANGE UP
HIV1 RNA SERPL NAA+PROBE-ACNC: SIGNIFICANT CHANGE UP
HIV1 RNA SERPL NAA+PROBE-LOG#: SIGNIFICANT CHANGE UP LG COP/ML
MAGNESIUM SERPL-MCNC: 2.1 MG/DL — SIGNIFICANT CHANGE UP (ref 1.6–2.6)
MCHC RBC-ENTMCNC: 25.9 PG — LOW (ref 27–34)
MCHC RBC-ENTMCNC: 30.3 GM/DL — LOW (ref 32–36)
MCV RBC AUTO: 85.5 FL — SIGNIFICANT CHANGE UP (ref 80–100)
NRBC # BLD: 0 /100 WBCS — SIGNIFICANT CHANGE UP (ref 0–0)
PHOSPHATE SERPL-MCNC: 3.2 MG/DL — SIGNIFICANT CHANGE UP (ref 2.5–4.5)
PLATELET # BLD AUTO: 273 K/UL — SIGNIFICANT CHANGE UP (ref 150–400)
POTASSIUM SERPL-MCNC: 4.8 MMOL/L — SIGNIFICANT CHANGE UP (ref 3.5–5.3)
POTASSIUM SERPL-SCNC: 4.8 MMOL/L — SIGNIFICANT CHANGE UP (ref 3.5–5.3)
RBC # BLD: 4.63 M/UL — SIGNIFICANT CHANGE UP (ref 4.2–5.8)
RBC # FLD: 14 % — SIGNIFICANT CHANGE UP (ref 10.3–14.5)
SODIUM SERPL-SCNC: 141 MMOL/L — SIGNIFICANT CHANGE UP (ref 135–145)
SURGICAL PATHOLOGY STUDY: SIGNIFICANT CHANGE UP
WBC # BLD: 8.33 K/UL — SIGNIFICANT CHANGE UP (ref 3.8–10.5)
WBC # FLD AUTO: 8.33 K/UL — SIGNIFICANT CHANGE UP (ref 3.8–10.5)

## 2023-08-17 PROCEDURE — 99233 SBSQ HOSP IP/OBS HIGH 50: CPT

## 2023-08-17 RX ORDER — ENOXAPARIN SODIUM 100 MG/ML
40 INJECTION SUBCUTANEOUS EVERY 24 HOURS
Refills: 0 | Status: DISCONTINUED | OUTPATIENT
Start: 2023-08-17 | End: 2023-08-25

## 2023-08-17 RX ORDER — RILPIVIRINE HYDROCHLORIDE 25 MG/1
25 TABLET, FILM COATED ORAL
Refills: 0 | Status: DISCONTINUED | OUTPATIENT
Start: 2023-08-17 | End: 2023-08-25

## 2023-08-17 RX ORDER — HYDROXYCHLOROQUINE SULFATE 200 MG
200 TABLET ORAL
Refills: 0 | Status: DISCONTINUED | OUTPATIENT
Start: 2023-08-17 | End: 2023-08-25

## 2023-08-17 RX ORDER — MYCOPHENOLATE MOFETIL 250 MG/1
500 CAPSULE ORAL
Refills: 0 | Status: DISCONTINUED | OUTPATIENT
Start: 2023-08-17 | End: 2023-08-25

## 2023-08-17 RX ORDER — DOLUTEGRAVIR SODIUM 25 MG/1
50 TABLET, FILM COATED ORAL DAILY
Refills: 0 | Status: DISCONTINUED | OUTPATIENT
Start: 2023-08-17 | End: 2023-08-25

## 2023-08-17 RX ADMIN — RILPIVIRINE HYDROCHLORIDE 25 MILLIGRAM(S): 25 TABLET, FILM COATED ORAL at 16:58

## 2023-08-17 RX ADMIN — Medication 200 MILLIGRAM(S): at 23:34

## 2023-08-17 RX ADMIN — DOLUTEGRAVIR SODIUM 50 MILLIGRAM(S): 25 TABLET, FILM COATED ORAL at 16:58

## 2023-08-17 RX ADMIN — Medication 1 MILLIGRAM(S): at 12:39

## 2023-08-17 RX ADMIN — MYCOPHENOLATE MOFETIL 500 MILLIGRAM(S): 250 CAPSULE ORAL at 23:34

## 2023-08-17 RX ADMIN — Medication 8 MILLIGRAM(S): at 07:24

## 2023-08-17 RX ADMIN — Medication 50 MILLIGRAM(S): at 06:24

## 2023-08-17 RX ADMIN — Medication 50 MILLIGRAM(S): at 17:00

## 2023-08-17 RX ADMIN — ENOXAPARIN SODIUM 40 MILLIGRAM(S): 100 INJECTION SUBCUTANEOUS at 16:58

## 2023-08-17 RX ADMIN — Medication 1 TABLET(S): at 23:34

## 2023-08-17 RX ADMIN — AMLODIPINE BESYLATE 10 MILLIGRAM(S): 2.5 TABLET ORAL at 12:39

## 2023-08-17 NOTE — PROGRESS NOTE ADULT - SUBJECTIVE AND OBJECTIVE BOX
He denies any pain in his face.  He has started coughing up some blood clots when he inhales through his nose deeply.  He thinks that it is coming from the trauma to his face.  Denies any other shortness of breath, chest pain or other symptoms.    Remaining ROS negative     PHYSICAL EXAM:    General: no acute distress, sitting up in chair  HEENT: diffuse facial swelling  Cardiovascular: +S1/S2, RRR, no mrg  Respiratory: CTA B/L; no W/R/R  Gastrointestinal: soft, NT/ND; +BSx4  Extremities: WWP; no edema  Neurological: speech is fluent, moves all extremities  Psychiatric: pleasant mood and affect    VITAL SIGNS:  Vital Signs Last 24 Hrs  T(C): 37 (17 Aug 2023 08:18), Max: 37.8 (16 Aug 2023 21:21)  T(F): 98.6 (17 Aug 2023 08:18), Max: 100.1 (16 Aug 2023 21:21)  HR: 84 (17 Aug 2023 12:14) (74 - 103)  BP: 129/72 (17 Aug 2023 12:14) (119/66 - 150/79)  BP(mean): 93 (17 Aug 2023 12:14) (87 - 108)  RR: 18 (17 Aug 2023 12:14) (13 - 24)  SpO2: 98% (17 Aug 2023 12:14) (93% - 98%)    Parameters below as of 17 Aug 2023 12:14  Patient On (Oxygen Delivery Method): room air        MEDICATIONS:  MEDICATIONS  (STANDING):  amLODIPine   Tablet 10 milliGRAM(s) Oral every 24 hours  dextrose 5%. 1000 milliLiter(s) (50 mL/Hr) IV Continuous <Continuous>  dextrose 5%. 1000 milliLiter(s) (100 mL/Hr) IV Continuous <Continuous>  dextrose 50% Injectable 25 Gram(s) IV Push once  dextrose 50% Injectable 12.5 Gram(s) IV Push once  dextrose 50% Injectable 25 Gram(s) IV Push once  enoxaparin Injectable 40 milliGRAM(s) SubCutaneous every 24 hours  folic acid 1 milliGRAM(s) Oral daily  glucagon  Injectable 1 milliGRAM(s) IntraMuscular once  insulin lispro (ADMELOG) corrective regimen sliding scale   SubCutaneous three times a day before meals  insulin lispro (ADMELOG) corrective regimen sliding scale   SubCutaneous at bedtime  metoprolol tartrate 50 milliGRAM(s) Oral every 12 hours  trimethoprim  160 mG/sulfamethoxazole 800 mG 1 Tablet(s) Oral <User Schedule>    MEDICATIONS  (PRN):  dextrose Oral Gel 15 Gram(s) Oral once PRN Blood Glucose LESS THAN 70 milliGRAM(s)/deciliter      ALLERGIES:  Allergies    No Known Allergies    Intolerances        LABS:                        12.0   8.33  )-----------( 273      ( 17 Aug 2023 05:30 )             39.6     08-17    141  |  104  |  7   ----------------------------<  122<H>  4.8   |  30  |  0.47<L>    Ca    9.1      17 Aug 2023 05:30  Phos  3.2     08-17  Mg     2.1     08-17        Urinalysis Basic - ( 17 Aug 2023 05:30 )    Color: x / Appearance: x / SG: x / pH: x  Gluc: 122 mg/dL / Ketone: x  / Bili: x / Urobili: x   Blood: x / Protein: x / Nitrite: x   Leuk Esterase: x / RBC: x / WBC x   Sq Epi: x / Non Sq Epi: x / Bacteria: x      CAPILLARY BLOOD GLUCOSE  57 (15 Aug 2023 20:39)      POCT Blood Glucose.: 138 mg/dL (17 Aug 2023 11:39)      RADIOLOGY & ADDITIONAL TESTS: Reviewed.

## 2023-08-17 NOTE — PROGRESS NOTE ADULT - ASSESSMENT
72y Male with PMHx of HTN, HLD, DM, CAD, CHF, HIV, Dermatomyositis presented to [facility] for planned colonoscopy due + Fecal immunochemical test (FIT). Stroke code called on pt after colonoscopy due to a syncopal event and fall hitting his head/face. Stroke code was called. FSBS @ 57, SBPs post colonoscopy @ 200s during stroke code @ 120s. Pt was taken to CTs. Stroke code CTH w/? R BG hyperdensity c/f Hge vs mineralisation, trace SDH and R orbital floor Fx. CTA unremarkable. Pt admitted to stroke tele for further w/u. Pt found to be febrile, infectious w/u unrevealing after pt was persistently having low grade fevers and tachycardic with some periods of confusion. Elevated lactate initially which responded to IVF. ID recommended monitoring patient off antibiotics. Augmentin started on 8/17 as patient had 2 minor episodes of hemoptysis, patient does not appear infectious currently. MRI w/wo contrast showed Acute hemorrhage in the left basal ganglia and corona radiata and focal hemorrhage in the septum pellucidum and left external capsule, along with R SDH and R orbital bone fx. Stable to be stepped down to regional floor today.          Neuro  #CVA workup  - No DAPT 2/2 R BG/CR bleed bleed.  - q8hr stroke neuro checks and vitals  - MRI brain w/wo contrast:  Acute hemorrhage in the left basal ganglia and corona radiata and focal hemorrhage in the septum pellucidum and left external capsule, along with R SDH and R orbital bone fx  - Stroke Code HCT Results: R trace SDH, R orbital floor Fx, L BG/CR hyperdensity c/f bleed Vs mineralisation.  - Stroke Code CTA Results: Negative  - Stroke education    #Orbital bone fx  - No acute surgical intervention indicated  - Augmentin 875mg/125mg BID for a total of 7 days antibiotic course  - Sinus precautions: no nose blowing, avoid using straws avoid smoking, avoid sneezing with mouth closed, sleep with head elevated  - f/u with Dr. Jovany Vega within 7-days of discharge for continued evaluation of orbit      Cards  #HTN  - permissive hypertension, Goal SBP < 160  - Restarted Home anti hypertensives (On Bisoprolol 15mg, started on Metoprolol 50mg po BID- therapeutic conversion for Bisoprolol 5mg, can uptitrate as tolerated and amlodipine).  - TTE with bubble : PFO+, BLESSING with moderate LVOT  - No need for LE DVT as patient has not had ischemic infarct      #HLD  - LDL results: 21    Pulm  - call provider if SPO2 < 94%    GI  #Nutrition/Fluids/Electrolytes   - replete K<4 and Mg <2  - Diet: DASH/TLC  - IVF: None for now.    Renal  - C/T Trend Bun/Crt    Infectious Disease  - C/T trend WBC- WNL  - ID cxed for low grade temp, tachycardia, was started on unasyn which was changed to vanco and cefipime, rec'd monitoring off abx  - Patient with minor hemoptysis x2 on 8/17, per OMFS would continue Augmentin 875mg/125mg BID for a total of 7 days    # Hx of HIV  - F/U T cell subset and HIV viral load - viral load undetectable  - Started on Bactrim ds  - Pt is on Juluca which is Non formulary- started dolutegravir 50mg daily, rilpivirine 25mg daily    # Dermatomyositis  - Home cellcept and Plaquenil held i/s/o infectious w/u.  - Home Methylprednisone ordered.    Endocrine  #DM  - A1C results: 7.6  - ISS    - TSH results: 1.660    DVT Prophylaxis  - Cleared on 8/17 for SQL   - SCDs for DVT prophylaxis       IDR Goals: Goals reviewed at interdisciplinary rounds with case management, social work, physical therapy, occupational therapy, and speech language pathology.   Please see specific therapy  notes for in depth goals.    Dispo: Home Vs rehab     Discussed daily hospital plans and goals with patient and family at bedside.     Discussed with Neurology Attending Dr. Thelma Ernst   72y Male with PMHx of HTN, HLD, DM, CAD, CHF, HIV, Dermatomyositis presented to [facility] for planned colonoscopy due + Fecal immunochemical test (FIT). Stroke code called on pt after colonoscopy due to a syncopal event and fall hitting his head/face. Stroke code was called. FSBS @ 57, SBPs post colonoscopy @ 200s during stroke code @ 120s. Pt was taken to CTs. Stroke code CTH w/? R BG hyperdensity c/f Hge vs mineralisation, trace SDH and R orbital floor Fx. CTA unremarkable. Pt admitted to stroke tele for further w/u. Pt found to be febrile, infectious w/u unrevealing after pt was persistently having low grade fevers and tachycardic with some periods of confusion. Elevated lactate initially which responded to IVF. ID recommended monitoring patient off antibiotics. Augmentin started on 8/17 as patient had 2 minor episodes of hemoptysis, patient does not appear infectious currently. MRI w/wo contrast showed Acute hemorrhage in the left basal ganglia and corona radiata and focal hemorrhage in the septum pellucidum and left external capsule, along with R SDH and R orbital bone fx. Stable to be stepped down to regional floor today.          Neuro  #CVA workup  - No DAPT 2/2 R BG/CR bleed bleed.  - q8hr stroke neuro checks and vitals  - MRI brain w/wo contrast:  Acute hemorrhage in the left basal ganglia and corona radiata and focal hemorrhage in the septum pellucidum and left external capsule, along with R SDH and R orbital bone fx  - Stroke Code HCT Results: R trace SDH, R orbital floor Fx, L BG/CR hyperdensity c/f bleed Vs mineralisation.  - Stroke Code CTA Results: Negative  - Stroke education    #Orbital bone fx  - No acute surgical intervention indicated  - Augmentin 875mg/125mg BID for a total of 7 days antibiotic course  - Sinus precautions: no nose blowing, avoid using straws avoid smoking, avoid sneezing with mouth closed, sleep with head elevated  - f/u with Dr. Jovany Vega within 7-days of discharge for continued evaluation of orbit      Cards  #HTN  - permissive hypertension, Goal SBP < 160  - Restarted Home anti hypertensives (On Bisoprolol 15mg, started on Metoprolol 50mg po BID- therapeutic conversion for Bisoprolol 5mg, can uptitrate as tolerated and amlodipine).  - TTE with bubble : PFO+, BLESSING with moderate LVOT  - No need for LE DVT as patient has not had ischemic infarct      #HLD  - LDL results: 21    Pulm  - call provider if SPO2 < 94%    GI  #Nutrition/Fluids/Electrolytes   - replete K<4 and Mg <2  - Diet: DASH/TLC  - IVF: None for now.    Renal  - C/T Trend Bun/Crt    Infectious Disease  - C/T trend WBC- WNL  - ID cxed for low grade temp, tachycardia, was started on unasyn which was changed to vanco and cefipime, rec'd monitoring off abx  - Patient with minor hemoptysis x2 on 8/17, per OMFS would continue Augmentin 875mg/125mg BID for a total of 7 days    # Hx of HIV  - F/U T cell subset and HIV viral load - viral load undetectable  - Started on Bactrim ds  - Pt is on Juluca which is Non formulary- started dolutegravir 50mg daily, rilpivirine 25mg daily    # Dermatomyositis  - Home cellcept and Plaquenil held i/s/o infectious w/u.  - Home Methylprednisone ordered.    Endocrine  #DM  - A1C results: 7.6  - ISS    - TSH results: 1.660    DVT Prophylaxis  - Cleared on 8/17 for SQL   - SCDs for DVT prophylaxis       IDR Goals: Goals reviewed at interdisciplinary rounds with case management, social work, physical therapy, occupational therapy, and speech language pathology.   Please see specific therapy  notes for in depth goals.    Dispo: [facility]     Discussed daily hospital plans and goals with patient and family at bedside.     Discussed with Neurology Attending Dr. Thelma Ernst   72y Male with PMHx of HTN, HLD, DM, CAD, CHF, HIV, Dermatomyositis presented to [facility] for planned colonoscopy due + Fecal immunochemical test (FIT). Stroke code called on pt after colonoscopy due to a syncopal event and fall hitting his head/face. Stroke code was called. FSBS @ 57, SBPs post colonoscopy @ 200s during stroke code @ 120s. Pt was taken to CTs. Stroke code CTH w/? R BG hyperdensity c/f Hge vs mineralisation, trace SDH and R orbital floor Fx. CTA unremarkable. Pt admitted to stroke tele for further w/u. Pt found to be febrile, infectious w/u unrevealing after pt was persistently having low grade fevers and tachycardic with some periods of confusion. Elevated lactate initially which responded to IVF. ID recommended monitoring patient off antibiotics. Augmentin started on 8/17 as patient had 2 minor episodes of hemoptysis, patient does not appear infectious currently. MRI w/wo contrast showed Acute hemorrhage in the left basal ganglia and corona radiata and focal hemorrhage in the septum pellucidum and left external capsule, along with R SDH and R orbital bone fx. Stable to be stepped down to regional floor today.          Neuro  #CVA workup  - No DAPT 2/2 R BG/CR bleed bleed.  - q8hr stroke neuro checks and vitals  - MRI brain w/wo contrast:  Acute hemorrhage in the left basal ganglia and corona radiata and focal hemorrhage in the septum pellucidum and left external capsule, along with R SDH and R orbital bone fx  - Stroke Code HCT Results: R trace SDH, R orbital floor Fx, L BG/CR hyperdensity c/f bleed Vs mineralisation.  - Stroke Code CTA Results: Negative  - Stroke education    #Orbital bone fx  - No acute surgical intervention indicated  - Augmentin 875mg/125mg BID for a total of 7 days antibiotic course  - Sinus precautions: no nose blowing, avoid using straws avoid smoking, avoid sneezing with mouth closed, sleep with head elevated  - f/u with Dr. Jovany Vega within 7-days of discharge for continued evaluation of orbit      Cards  #HTN  - permissive hypertension, Goal SBP < 160  - Restarted Home anti hypertensives (On Bisoprolol 15mg, started on Metoprolol 50mg po BID- therapeutic conversion for Bisoprolol 5mg, can uptitrate as tolerated and amlodipine).  - TTE with bubble : PFO+, BLESSING with moderate LVOT  - No need for LE DVT as patient has not had ischemic infarct      #HLD  - LDL results: 21    Pulm  - call provider if SPO2 < 94%    GI  #Nutrition/Fluids/Electrolytes   - replete K<4 and Mg <2  - Diet: DASH/TLC  - IVF: None for now.    Renal  - C/T Trend Bun/Crt    Infectious Disease  - C/T trend WBC- WNL  - ID cxed for low grade temp, tachycardia, was started on unasyn which was changed to vanco and cefipime, rec'd monitoring off abx  - Patient with minor hemoptysis x2 on 8/17, per OMFS would continue Augmentin 875mg/125mg BID for a total of 7 days    # Hx of HIV  - F/U T cell subset and HIV viral load - viral load undetectable  - Started on Bactrim ds  - Pt is on Juluca which is Non formulary- started dolutegravir 50mg daily, rilpivirine 25mg daily    # Dermatomyositis  - Home cellcept and Plaquenil restarted 8/17.  - Home Methylprednisone ordered.    Endocrine  #DM  - A1C results: 7.6  - ISS    - TSH results: 1.660    DVT Prophylaxis  - Cleared on 8/17 for SQL   - SCDs for DVT prophylaxis       IDR Goals: Goals reviewed at interdisciplinary rounds with case management, social work, physical therapy, occupational therapy, and speech language pathology.   Please see specific therapy  notes for in depth goals.    Dispo: [facility]     Discussed daily hospital plans and goals with patient and family at bedside.     Discussed with Neurology Attending Dr. Thelma Ernst   72y Male with PMHx of HTN, HLD, DM, CAD, CHF, HIV, Dermatomyositis presented to [facility] for planned colonoscopy due + Fecal immunochemical test (FIT). Stroke code called on pt after colonoscopy due to a syncopal event and fall hitting his head/face. Stroke code was called. FSBS @ 57, SBPs post colonoscopy @ 200s during stroke code @ 120s. Pt was taken to CTs. Stroke code CTH w/? R BG hyperdensity c/f Hge vs mineralisation, trace SDH and R orbital floor Fx. CTA unremarkable. Pt admitted to stroke tele for further w/u. Pt found to be febrile, infectious w/u unrevealing after pt was persistently having low grade fevers and tachycardic with some periods of confusion. Elevated lactate initially which responded to IVF. ID recommended monitoring patient off antibiotics. Augmentin started on 8/17 as patient had 2 minor episodes of hemoptysis, patient does not appear infectious currently. MRI w/wo contrast showed Acute hemorrhage in the left basal ganglia and corona radiata and focal hemorrhage in the septum pellucidum and left external capsule, along with R SDH and R orbital bone fx. Stable to be stepped down to regional floor today.          Neuro  #CVA workup  - No DAPT 2/2 R BG/CR bleed bleed.  - q8hr stroke neuro checks and vitals  - MRI brain w/wo contrast:  Acute hemorrhage in the left basal ganglia and corona radiata and focal hemorrhage in the septum pellucidum and left external capsule, along with R SDH and R orbital bone fx  - Stroke Code HCT Results: R trace SDH, R orbital floor Fx, L BG/CR hyperdensity c/f bleed Vs mineralisation.  - Stroke Code CTA Results: Negative  - Stroke education    #Orbital bone fx  - No acute surgical intervention indicated  - Augmentin 875mg/125mg BID for a total of 7 days antibiotic course  - Sinus precautions: no nose blowing, avoid using straws avoid smoking, avoid sneezing with mouth closed, sleep with head elevated  - f/u with Dr. Jovany Vega within 7-days of discharge for continued evaluation of orbit      Cards  #HTN  - permissive hypertension, Goal SBP < 140  - Restarted Home anti hypertensives (On Bisoprolol 15mg, started on Metoprolol 50mg po BID- therapeutic conversion for Bisoprolol 5mg, can uptitrate as tolerated and amlodipine).  - TTE with bubble : PFO+, BLESSING with moderate LVOT  - No need for LE DVT as patient has not had ischemic infarct      #HLD  - LDL results: 21    Pulm  - call provider if SPO2 < 94%    GI  #Nutrition/Fluids/Electrolytes   - replete K<4 and Mg <2  - Diet: DASH/TLC  - IVF: None for now.    Renal  - C/T Trend Bun/Crt    Infectious Disease  - C/T trend WBC- WNL  - ID cxed for low grade temp, tachycardia, was started on unasyn which was changed to vanco and cefipime, rec'd monitoring off abx  - Patient with minor hemoptysis x2 on 8/17, per OMFS would continue Augmentin 875mg/125mg BID for a total of 7 days    # Hx of HIV  - F/U T cell subset and HIV viral load - viral load undetectable  - Started on Bactrim ds  - Pt is on Juluca which is Non formulary- started dolutegravir 50mg daily, rilpivirine 25mg daily    # Dermatomyositis  - Home cellcept and Plaquenil restarted 8/17.  - Home Methylprednisone ordered.    Endocrine  #DM  - A1C results: 7.6  - ISS    - TSH results: 1.660    DVT Prophylaxis  - Cleared on 8/17 for SQL   - SCDs for DVT prophylaxis       IDR Goals: Goals reviewed at interdisciplinary rounds with case management, social work, physical therapy, occupational therapy, and speech language pathology.   Please see specific therapy  notes for in depth goals.    Dispo: [facility]     Discussed daily hospital plans and goals with patient and family at bedside.     Discussed with Neurology Attending Dr. Thelma Ernst

## 2023-08-17 NOTE — CHART NOTE - NSCHARTNOTEFT_GEN_A_CORE
72y Male with PMHx of HTN, HLD, DM, CAD, CHF, HIV, Dermatomyositis presented to [facility] for planned colonoscopy due + Fecal immunochemical test (FIT). Stroke code called on pt  today after colonoscopy due to a syncopal event and fall hitting his head/face. Stroke code was called. FSBS @ 57, SBPs post colonoscopy @ 200s ut during stroke code @ 120s. Pt was taken to CTs. Stroke code CTH w/? R BG hyperdensity c/f Hge vs mineralisation, trace SDH and R orbital floor Fx. CTA unremarkable. Pt admitted to stroke tele for further w/u. Pt found to be febrile, infectious w/u started and was started on unasyn which was later changed to vanco and cefipime as pt was persistently having low grade fevers and tachycardic with some periods of confusion. Elevated lactate initially which responded to IVF. Currently being monitored off IV Abx per ID reccs. Pending MRI and further w/u.    CTH on 8/15 and 8/16 reviewed showing trace R tentorial SDH, repeat scan stable. Primary team requesting clearance for DVT prophylaxis. Case discussed and imaging reviewed with Dr. D'Amico. Ok to start DVT prophylaxis.

## 2023-08-17 NOTE — PROGRESS NOTE ADULT - NS ATTEND AMEND GEN_ALL_CORE FT
The patient is a 72 year old male with a PMH HTN, HLD< DM, CAD, CHF, HIV, and dermatomyositis admitted for colonoscopy with complicated by syncopal episode with head strike following the study. HCT with ? hemorrhage. CTA negative.  Repeat head CT Stable. MRI  appears most consistent with deep ICH likely due to HTN with traumatic orbital fracture and SDH secondary to fall/head strike. Continue DVT PPx. Abx per OMFS. Will need f/u MRI in 8 weeks.

## 2023-08-17 NOTE — PATIENT PROFILE ADULT - FALL HARM RISK - RISK INTERVENTIONS

## 2023-08-17 NOTE — CONSULT NOTE ADULT - SUBJECTIVE AND OBJECTIVE BOX
Patient is a 72y old  Male who presents with a chief complaint of Code Stroke after syncopal episode following colonoscopy (15 Aug 2023 22:25)      HPI:    PAST MEDICAL & SURGICAL HISTORY:  HTN (hypertension)      Diabetes      Hypercholesteremia      HIV disease      Chronic systolic congestive heart failure      CAD (coronary artery disease)      Dermatomyositis      No significant past surgical history        MEDICATIONS  (STANDING):  amLODIPine   Tablet 10 milliGRAM(s) Oral every 24 hours  dextrose 5%. 1000 milliLiter(s) (50 mL/Hr) IV Continuous <Continuous>  dextrose 5%. 1000 milliLiter(s) (100 mL/Hr) IV Continuous <Continuous>  dextrose 50% Injectable 25 Gram(s) IV Push once  dextrose 50% Injectable 12.5 Gram(s) IV Push once  dextrose 50% Injectable 25 Gram(s) IV Push once  enoxaparin Injectable 40 milliGRAM(s) SubCutaneous every 24 hours  folic acid 1 milliGRAM(s) Oral daily  glucagon  Injectable 1 milliGRAM(s) IntraMuscular once  insulin lispro (ADMELOG) corrective regimen sliding scale   SubCutaneous three times a day before meals  insulin lispro (ADMELOG) corrective regimen sliding scale   SubCutaneous at bedtime  metoprolol tartrate 50 milliGRAM(s) Oral every 12 hours  trimethoprim  160 mG/sulfamethoxazole 800 mG 1 Tablet(s) Oral <User Schedule>    MEDICATIONS  (PRN):  dextrose Oral Gel 15 Gram(s) Oral once PRN Blood Glucose LESS THAN 70 milliGRAM(s)/deciliter              FAMILY HISTORY:  No pertinent family history in first degree relatives        CBC Full  -  ( 17 Aug 2023 05:30 )  WBC Count : 8.33 K/uL  RBC Count : 4.63 M/uL  Hemoglobin : 12.0 g/dL  Hematocrit : 39.6 %  Platelet Count - Automated : 273 K/uL  Mean Cell Volume : 85.5 fl  Mean Cell Hemoglobin : 25.9 pg  Mean Cell Hemoglobin Concentration : 30.3 gm/dL  Auto Neutrophil # : x  Auto Lymphocyte # : x  Auto Monocyte # : x  Auto Eosinophil # : x  Auto Basophil # : x  Auto Neutrophil % : x  Auto Lymphocyte % : x  Auto Monocyte % : x  Auto Eosinophil % : x  Auto Basophil % : x      08-17    141  |  104  |  7   ----------------------------<  122<H>  4.8   |  30  |  0.47<L>    Ca    9.1      17 Aug 2023 05:30  Phos  3.2     08-17  Mg     2.1     08-17        Urinalysis Basic - ( 17 Aug 2023 05:30 )    Color: x / Appearance: x / SG: x / pH: x  Gluc: 122 mg/dL / Ketone: x  / Bili: x / Urobili: x   Blood: x / Protein: x / Nitrite: x   Leuk Esterase: x / RBC: x / WBC x   Sq Epi: x / Non Sq Epi: x / Bacteria: x        Radiology :     < from: MR Head w/wo IV Cont (08.16.23 @ 20:13) >  ACC: [id number] EXAM:  MR BRAIN WAW IC   ORDERED BY: DHRUV ESPOSITO     PROCEDURE DATE:  08/16/2023          INTERPRETATION:  PROCEDURE: MRI Brain with and without contrast    INDICATION: Syncope, trauma, intracranial hemorrhage    TECHNIQUE: [place] axial T1, axial and coronal T2 FLAIR, axial T2,   GRE and diffusion imaging of the brain is obtained. Following the   intravenous administration of 7 mL Gadavist contrast material, axial T1   and sagittal volumetric T1 imaging with MPR provided.    COMPARISON: CT head 8/16/2023    FINDINGS:    The ventricles and sulci are normal in size and configuration.    Again demonstrated is trace subdural hemorrhage along the right tentorium   which is not significant change from prior exam when accounting for   differences in technique. There is curvilinear T2 hypointensity, T1   iso-/hyperintensity and susceptibility related signal loss within the in   left basal ganglia and corona radiata consistent with acute   intraparenchymal hemorrhage in corresponding to density on recent CT   head. There is focal susceptibility related signal loss with T2   hypointense signal in the septum pellucidum consistent with focal   hemorrhage. There is a focus of susceptibility related signal loss in the   leftexternal capsule likely from hemorrhage. There is restricted   diffusion surrounding the area of hemorrhage in the left basal   ganglia/corona radiata which may be from blood products rather than   ischemia.    There is no significant mass effect or midline shift.    The vascular flow voids are present.    The sella and suprasellar regions are unremarkable.    The postcontrast images demonstrate no abnormal intracranial enhancement.    Again demonstrated is a right orbital floor fracture. There is hemorrhage   within the right maxillary sinus. There is right facial swelling.    IMPRESSION:    Acute hemorrhage in the left basal ganglia and corona radiata as well as   focal hemorrhage in the septum pellucidum and left external capsule.   Restricted diffusion surrounding the area of hemorrhage in the left basal   ganglia/corona radiata which is likely from blood products rather than   ischemia. No significant mass effect or midline shift.    Again demonstrated trace right subdural hematoma and right orbital floor   fracture.         Review of Systems : per HPI               Vital Signs Last 24 Hrs  T(C): 37 (17 Aug 2023 08:18), Max: 37.8 (16 Aug 2023 21:21)  T(F): 98.6 (17 Aug 2023 08:18), Max: 100.1 (16 Aug 2023 21:21)  HR: 84 (17 Aug 2023 12:14) (74 - 103)  BP: 129/72 (17 Aug 2023 12:14) (119/66 - 150/79)  BP(mean): 93 (17 Aug 2023 12:14) (87 - 108)  RR: 18 (17 Aug 2023 12:14) (13 - 24)  SpO2: 98% (17 Aug 2023 12:14) (93% - 98%)    Parameters below as of 17 Aug 2023 12:14  Patient On (Oxygen Delivery Method): room air            Physical Exam :   72 y o man lying comfortably in semi Chamberlain's position , awake , alert , no acute complaints , feels tired      Neurologic Exam :    Alert and oriented to person , place , date/year , speech fluent w/o dysarthria , follows commands , recent and remote memory intact , repetition intact , comprehension intact ,  attention/concentration intact , fund of knowledge appropriate    Cranial Nerves:     II :                         pupils equal , round and reactive to light , visual fields intact   III/ IV/VI :              extraocular movements intact , neg nystagmus , neg ptosis  V :                        facial sensation intact , V1-3 normal  VII :                      face symmetric , no droop , normal eye closure and smile  VIII :                     hearing intact to finger rub bilaterally  IX and X :             no hoarseness , gag intact , palate/ uvula rise symmetrically  XI :                       SCM / trapezius strength intact bilateral  XII :                      no tongue deviation    Motor Exam:          Right UE:               no focal weakness ,  > 3+/5 throughout  , no pronator drift     Left UE:                 no focal weakness ,  > 3+/5 throughout  , no pronator drift         Right LE:  3-/5       Left LE:   3-/5       Sensation :         intact to light touch x 4 extremities                                 DTR :     biceps/brachioradialis : equal                      patella/ankle : equal      Gait :  not tested          PM&R Impression :     admitted for c/o syncopal event and fall after colonoscopy     -  Acute hemorrhage in the left basal ganglia and corona radiata as well as   focal hemorrhage in the septum pellucidum and left external capsule    - R orbital fx         Recommendations / Plan :           1) Physical / Occupational therapy focusing on therapeutic exercises , equipment evaluation , bed mobility/transfer out of bed evaluation , progressive ambulation with assistive devices prn .    2) Current disposition plan recommendation  :     d/c home with home PT and OT

## 2023-08-17 NOTE — CONSULT NOTE ADULT - ASSESSMENT
Neurology    72 y o Male with PMHx of HTN, HLD, DM, CAD, CHF, HIV, Dermatomyositis presented to [facility] for planned colonoscopy due + Fecal immunochemical test (FIT). Stroke code called on pt  today after colonoscopy due to a syncopal event and fall hitting his head/face. Stroke code was called. FSBS @ 57, SBPs post colonoscopy @ 200s ut during stroke code @ 120s. Pt was taken to CTs. Stroke code CTH w/? R BG hyperdensity c/f Hge vs mineralisation, trace SDH and R orbital floor Fx. CTA unremarkable. Pt admitted to stroke tele for further w/u. Pt found to be febrile, infectious w/u started and was started on unasyn which was later changed to vanco and cefipime as pt was persistently having low grade fevers and tachycardic with some periods of confusion. Elevated lactate initially which responded to IVF. Currently being monitored off IV Abx per ID reccs. Pending MRI and further w/u.    Neuro  #CVA workup  - No DAPT 2/2 R BG hyperdensity c/f bleed.  - q4hr stroke neuro checks and vitals  - Stroke Code HCT Results: R trace SDH, R orbital floor Fx, L BG/CR hyperdensity c/f bleed Vs mineralisation.  - Stroke Code CTA Results:  - Stroke education    Cards  #HTN  - permissive hypertension, Goal SBP < 140  - Restarted Home anti hypertensives (On Bisoprolol 15mg, started on Metoprolol 50mg po BID- therapeutic conversion for Bisoprolol 5mg, can uptitrate as tolerated and amlodipine).  - TTE with bubble : PFO+, BLESSING with moderate LVOT.      #HLD  - LDL results: 21    Pulm  - call provider if SPO2 < 94%    GI  #Nutrition/Fluids/Electrolytes   - replete K<4 and Mg <2  - Diet: DASH/TLC  - IVF: None for now.    Renal  - C/T Trend Bun/Crt    Infectious Disease  - C/T trend WBC- WNL  - ID cxed for low grade temp, tachycardia, was started on unasyn which was changed to vanco and cefipime, currently being watched off Abx per ID reccs.    # Hx of HIV  - F/U T cell subset and HIV viral load - pending results  - Started on Bactrim ds  - Pt is on Juluca which is Non formulary- need to inform pt to bring his own.    # Dermatomyositis  - Home cellcept and Plaquenil held i/s/o infectious w/u.  - Home Methylprednisone ordered.    Endocrine  #DM  - A1C results: 7.6  - ISS    - TSH results: 1.660    DVT Prophylaxis  - No SQL i/s/o SDH and L BG/CR bleed.  - SCDs for DVT prophylaxis

## 2023-08-17 NOTE — PROGRESS NOTE ADULT - ASSESSMENT
72M with PMH of HTN, HLD, DM2, HIV, HFrEF, dermatomyositis and CAD presenting after a syncopal event following colonoscopy, sustaining orbital fractures and suffering an intracranial bleed.  Also noted to be extremely hypertensive.  Admitted to the stroke service for further evaluation.     #Intracerebral hemorrhage  - plan per stroke team  - holding any anticoagulation  - monitor on telemetry    #Febrile episode  #Elevated lactate (resolved)  #Dermatomyositis  - no further fevers, anticipate that we can restart cellcept and plaquenil soon, at least on discharge  - no indications for antibiotics at this time  - continue with bactrim for PCP ppx    #HIV  - continue home HIV regimen -    #Orbital fracture  - per discussion with stroke team, the fractures were discussed with a consultant - if the eye is able to move without any evidence of muscle entrapment or other issues, can be followed up outpatient  - now having some blood clots that he was coughing up after taking deep breaths. will need to follow up with OMFS today if this is of concern     Plan of care discussed with stroke team.  50 minutes spent on this encounter, including face to face with patient, care coordination and documentation.

## 2023-08-17 NOTE — PROGRESS NOTE ADULT - SUBJECTIVE AND OBJECTIVE BOX
-------------------TRANSFER FROM STROKE TELE TO REGIONAL------------------    72y Male with PMHx of HTN, HLD, DM, CAD, CHF, HIV, Dermatomyositis presented to [facility] for planned colonoscopy due + Fecal immunochemical test (FIT). Stroke code called on pt after colonoscopy due to a syncopal event and fall hitting his head/face. Pt self transferred from stretcher to bathroom, felt dizzy and fell on his face. FB. SBP: 200 after fall. 's during stroke code. Pt does not remember the event however remember her is in the hospital for his colonoscopy. On initial exam, pt with R cheek, periorbital and infraorbital swelling, A&O x 2 (unable to tell month or year), B/L UE 5/5, B/L LE 2/5, unable to lift off bed, with assistance lifting fell within 5 seconds. NIHSS: 4 due to B/L leg weakness. Pt walked in for ambulatory procedure today. No decrease in sensation, change in vision or speech. HCT showed two sites of new hyperdensity in L lentiform nucleus/corona radiata suggestive of hemorrhage or mineralization within a subacute-chronic infarct. Pt also with trace right tentorial subdural hemorrhage and right orbital floor fracture with blood in maxillary sinus. CTA Unremarkable. No arterial steno-occlusive disease or evidence of dissection. Patient was admitted to stroke for further workup. Pt found to be febrile, infectious w/u unrevealing after pt was persistently having low grade fevers and tachycardic with some periods of confusion. Elevated lactate initially which responded to IVF. ID recommended monitoring patient off antibiotics. Augmentin started on  as patient had 2 minor episodes of hemoptysis, patient does not appear infectious currently. MRI w/wo contrast showed Acute hemorrhage in the left basal ganglia and corona radiata and focal hemorrhage in the septum pellucidum and left external capsule, along with R SDH and R orbital bone fx. Stable to be stepped down to regional floor today.            Neurology Stroke Progress Note    INTERVAL HPI/OVERNIGHT EVENTS:  Patient seen and examined, no acute overnight events reported.     MEDICATIONS  (STANDING):  amLODIPine   Tablet 10 milliGRAM(s) Oral every 24 hours  amoxicillin  875 milliGRAM(s)/clavulanate 1 Tablet(s) Oral two times a day  dextrose 5%. 1000 milliLiter(s) (50 mL/Hr) IV Continuous <Continuous>  dextrose 5%. 1000 milliLiter(s) (100 mL/Hr) IV Continuous <Continuous>  dextrose 50% Injectable 25 Gram(s) IV Push once  dextrose 50% Injectable 12.5 Gram(s) IV Push once  dextrose 50% Injectable 25 Gram(s) IV Push once  dolutegravir 50 milliGRAM(s) Oral daily  enoxaparin Injectable 40 milliGRAM(s) SubCutaneous every 24 hours  folic acid 1 milliGRAM(s) Oral daily  glucagon  Injectable 1 milliGRAM(s) IntraMuscular once  insulin lispro (ADMELOG) corrective regimen sliding scale   SubCutaneous three times a day before meals  insulin lispro (ADMELOG) corrective regimen sliding scale   SubCutaneous at bedtime  metoprolol tartrate 50 milliGRAM(s) Oral every 12 hours  rilpivirine 25 milliGRAM(s) Oral with dinner  trimethoprim  160 mG/sulfamethoxazole 800 mG 1 Tablet(s) Oral <User Schedule>    MEDICATIONS  (PRN):  dextrose Oral Gel 15 Gram(s) Oral once PRN Blood Glucose LESS THAN 70 milliGRAM(s)/deciliter      Allergies    No Known Allergies    Intolerances        Vital Signs Last 24 Hrs  T(C): 37.2 (17 Aug 2023 15:04), Max: 37.8 (16 Aug 2023 21:21)  T(F): 99 (17 Aug 2023 15:04), Max: 100.1 (16 Aug 2023 21:21)  HR: 84 (17 Aug 2023 12:14) (74 - 103)  BP: 129/72 (17 Aug 2023 12:14) (119/66 - 150/79)  BP(mean): 93 (17 Aug 2023 12:14) (87 - 108)  RR: 18 (17 Aug 2023 12:14) (13 - 24)  SpO2: 98% (17 Aug 2023 12:14) (95% - 98%)    Parameters below as of 17 Aug 2023 12:14  Patient On (Oxygen Delivery Method): room air        Physical exam:  General: No acute distress, awake and alert  Eyes: Facial swelling/ bruising + R> L  Neck: trachea midline, FROM.  Cardiovascular: Regular rate and rhythm on monitor.  Pulmonary: Anterior breath sounds clear bilaterally, no crackles or wheezing. No use of accessory muscles  GI: Abdomen soft, non-distended, non-tender      Neurologic:  -Mental status: Awake, alert, oriented to person, place, and time. Speech is fluent with intact naming, repetition, and comprehension, no dysarthria. Recent and remote memory intact. Follows commands. Attention/concentration intact. Fund of knowledge appropriate.  -Cranial nerves:   II: Visual fields are full to confrontation.  III, IV, VI: Extraocular movements are intact without nystagmus. L pupil : Rxn to light+, R pupil hard to ascertain given the swelling, but pt states he perceives light when shone on his eye.  V:  Facial sensation V1-V3 equal and intact   VII: Face appears symmetric, thou hard to ascertain given facial swelling from orbital Fx.  VIII: Hearing is bilaterally intact to finger rub  IX, X: Uvula is midline and soft palate rises symmetrically  XI: Head turning and shoulder shrug are intact.  XII: Tongue protrudes midline  Motor: Normal bulk and tone. No pronator drift. Strength bilateral upper extremity 5/5, bilateral lower extremities 3/5 which is baseline and chronic.  Rapid alternating movements intact and symmetric  Sensation: Intact to light touch bilaterally. No neglect or extinction on double simultaneous testing.  Coordination: No dysmetria on finger-to-nose.   Gait: Deferred.      LABS:                        12.0   8.33  )-----------( 273      ( 17 Aug 2023 05:30 )             39.6     -    141  |  104  |  7   ----------------------------<  122<H>  4.8   |  30  |  0.47<L>    Ca    9.1      17 Aug 2023 05:30  Phos  3.2     -  Mg     2.1     -        Urinalysis Basic - ( 17 Aug 2023 05:30 )    Color: x / Appearance: x / SG: x / pH: x  Gluc: 122 mg/dL / Ketone: x  / Bili: x / Urobili: x   Blood: x / Protein: x / Nitrite: x   Leuk Esterase: x / RBC: x / WBC x   Sq Epi: x / Non Sq Epi: x / Bacteria: x        RADIOLOGY & ADDITIONAL TESTS:

## 2023-08-18 ENCOUNTER — TRANSCRIPTION ENCOUNTER (OUTPATIENT)
Age: 73
End: 2023-08-18

## 2023-08-18 DIAGNOSIS — S02.85XA FRACTURE OF ORBIT, UNSPECIFIED, INITIAL ENCOUNTER FOR CLOSED FRACTURE: ICD-10-CM

## 2023-08-18 DIAGNOSIS — Z29.9 ENCOUNTER FOR PROPHYLACTIC MEASURES, UNSPECIFIED: ICD-10-CM

## 2023-08-18 DIAGNOSIS — E11.9 TYPE 2 DIABETES MELLITUS WITHOUT COMPLICATIONS: ICD-10-CM

## 2023-08-18 DIAGNOSIS — I10 ESSENTIAL (PRIMARY) HYPERTENSION: ICD-10-CM

## 2023-08-18 DIAGNOSIS — M33.90 DERMATOPOLYMYOSITIS, UNSPECIFIED, ORGAN INVOLVEMENT UNSPECIFIED: ICD-10-CM

## 2023-08-18 DIAGNOSIS — R04.2 HEMOPTYSIS: ICD-10-CM

## 2023-08-18 DIAGNOSIS — B20 HUMAN IMMUNODEFICIENCY VIRUS [HIV] DISEASE: ICD-10-CM

## 2023-08-18 DIAGNOSIS — S06.5X9A TRAUMATIC SUBDURAL HEMORRHAGE WITH LOSS OF CONSCIOUSNESS OF UNSPECIFIED DURATION, INITIAL ENCOUNTER: ICD-10-CM

## 2023-08-18 DIAGNOSIS — E78.5 HYPERLIPIDEMIA, UNSPECIFIED: ICD-10-CM

## 2023-08-18 PROBLEM — I50.22 CHRONIC SYSTOLIC (CONGESTIVE) HEART FAILURE: Chronic | Status: ACTIVE | Noted: 2023-08-15

## 2023-08-18 LAB
ANION GAP SERPL CALC-SCNC: 7 MMOL/L — SIGNIFICANT CHANGE UP (ref 5–17)
BUN SERPL-MCNC: 15 MG/DL — SIGNIFICANT CHANGE UP (ref 7–23)
CALCIUM SERPL-MCNC: 8.7 MG/DL — SIGNIFICANT CHANGE UP (ref 8.4–10.5)
CHLORIDE SERPL-SCNC: 106 MMOL/L — SIGNIFICANT CHANGE UP (ref 96–108)
CO2 SERPL-SCNC: 28 MMOL/L — SIGNIFICANT CHANGE UP (ref 22–31)
CREAT SERPL-MCNC: 0.46 MG/DL — LOW (ref 0.5–1.3)
EGFR: 112 ML/MIN/1.73M2 — SIGNIFICANT CHANGE UP
GLUCOSE BLDC GLUCOMTR-MCNC: 106 MG/DL — HIGH (ref 70–99)
GLUCOSE BLDC GLUCOMTR-MCNC: 115 MG/DL — HIGH (ref 70–99)
GLUCOSE BLDC GLUCOMTR-MCNC: 134 MG/DL — HIGH (ref 70–99)
GLUCOSE BLDC GLUCOMTR-MCNC: 99 MG/DL — SIGNIFICANT CHANGE UP (ref 70–99)
GLUCOSE SERPL-MCNC: 104 MG/DL — HIGH (ref 70–99)
HCT VFR BLD CALC: 39.2 % — SIGNIFICANT CHANGE UP (ref 39–50)
HGB BLD-MCNC: 11.6 G/DL — LOW (ref 13–17)
MAGNESIUM SERPL-MCNC: 2 MG/DL — SIGNIFICANT CHANGE UP (ref 1.6–2.6)
MCHC RBC-ENTMCNC: 25.9 PG — LOW (ref 27–34)
MCHC RBC-ENTMCNC: 29.6 GM/DL — LOW (ref 32–36)
MCV RBC AUTO: 87.5 FL — SIGNIFICANT CHANGE UP (ref 80–100)
NRBC # BLD: 0 /100 WBCS — SIGNIFICANT CHANGE UP (ref 0–0)
PHOSPHATE SERPL-MCNC: 2.9 MG/DL — SIGNIFICANT CHANGE UP (ref 2.5–4.5)
PLATELET # BLD AUTO: 273 K/UL — SIGNIFICANT CHANGE UP (ref 150–400)
POTASSIUM SERPL-MCNC: 4 MMOL/L — SIGNIFICANT CHANGE UP (ref 3.5–5.3)
POTASSIUM SERPL-SCNC: 4 MMOL/L — SIGNIFICANT CHANGE UP (ref 3.5–5.3)
RBC # BLD: 4.48 M/UL — SIGNIFICANT CHANGE UP (ref 4.2–5.8)
RBC # FLD: 14.2 % — SIGNIFICANT CHANGE UP (ref 10.3–14.5)
SODIUM SERPL-SCNC: 141 MMOL/L — SIGNIFICANT CHANGE UP (ref 135–145)
WBC # BLD: 7.08 K/UL — SIGNIFICANT CHANGE UP (ref 3.8–10.5)
WBC # FLD AUTO: 7.08 K/UL — SIGNIFICANT CHANGE UP (ref 3.8–10.5)

## 2023-08-18 PROCEDURE — 99233 SBSQ HOSP IP/OBS HIGH 50: CPT

## 2023-08-18 PROCEDURE — 99239 HOSP IP/OBS DSCHRG MGMT >30: CPT

## 2023-08-18 RX ADMIN — Medication 200 MILLIGRAM(S): at 11:32

## 2023-08-18 RX ADMIN — MYCOPHENOLATE MOFETIL 500 MILLIGRAM(S): 250 CAPSULE ORAL at 11:32

## 2023-08-18 RX ADMIN — RILPIVIRINE HYDROCHLORIDE 25 MILLIGRAM(S): 25 TABLET, FILM COATED ORAL at 17:37

## 2023-08-18 RX ADMIN — Medication 1 TABLET(S): at 11:32

## 2023-08-18 RX ADMIN — ENOXAPARIN SODIUM 40 MILLIGRAM(S): 100 INJECTION SUBCUTANEOUS at 17:37

## 2023-08-18 RX ADMIN — AMLODIPINE BESYLATE 10 MILLIGRAM(S): 2.5 TABLET ORAL at 12:45

## 2023-08-18 RX ADMIN — Medication 50 MILLIGRAM(S): at 06:23

## 2023-08-18 RX ADMIN — MYCOPHENOLATE MOFETIL 500 MILLIGRAM(S): 250 CAPSULE ORAL at 23:01

## 2023-08-18 RX ADMIN — Medication 50 MILLIGRAM(S): at 17:37

## 2023-08-18 RX ADMIN — Medication 1 MILLIGRAM(S): at 11:32

## 2023-08-18 RX ADMIN — Medication 1 TABLET(S): at 07:32

## 2023-08-18 RX ADMIN — Medication 1 TABLET(S): at 23:00

## 2023-08-18 RX ADMIN — DOLUTEGRAVIR SODIUM 50 MILLIGRAM(S): 25 TABLET, FILM COATED ORAL at 12:18

## 2023-08-18 NOTE — DISCHARGE NOTE PROVIDER - NSDCHC_MEDRECSTATUS_GEN_ALL_CORE
Admission Reconciliation is Completed  Discharge Reconciliation is Not Complete Render Note In Bullet Format When Appropriate: No Show Applicator Variable?: Yes Medical Necessity Clause: This procedure was medically necessary because the lesions that were treated were: Detail Level: Simple Post-Care Instructions: I reviewed with the patient in detail post-care instructions. Patient is to wear sunprotection, and avoid picking at any of the treated lesions. Pt may apply Vaseline to crusted or scabbing areas. Wound care handout given. Consent: The patient's consent was obtained including but not limited to risks of crusting, scabbing, blistering, scarring, darker or lighter pigmentary change, recurrence, incomplete removal and infection. Medical Necessity Information: It is in your best interest to select a reason for this procedure from the list below. All of these items fulfill various CMS LCD requirements except the new and changing color options. Number Of Freeze-Thaw Cycles: 2 freeze-thaw cycles Post-Care Instructions: I reviewed with the patient in detail post-care instructions. Patient is to wear sunprotection, and avoid picking at any of the treated lesions. Pt may apply Vaseline to crusted or scabbing areas. Wound care instruction handout given. Duration Of Freeze Thaw-Cycle (Seconds): 0 Number Of Freeze-Thaw Cycles: 1 freeze-thaw cycle Detail Level: Detailed Admission Reconciliation is Completed  Discharge Reconciliation is Completed

## 2023-08-18 NOTE — PROGRESS NOTE ADULT - SUBJECTIVE AND OBJECTIVE BOX
-------------------TRANSFER FROM STROKE TELE TO [facility]------------------    72y Male with PMHx of HTN, HLD, DM, CAD, CHF, HIV, and Dermatomyositis presented to [facility] for planned colonoscopy and Fecal immunochemical test (FIT). Stroke code called on pt after colonoscopy due to a syncopal event and fall with his head/face strike where in pt self-transferred from stretcher to bathroom, felt dizzy and fell on his face. FB. SBP: 200 after fall. 's during stroke code. Pt does not remember the event however remembered that he came to the hospital for his colonoscopy. On initial exam, pt with R cheek, periorbital and infraorbital swelling, A&O x 2 (unable to tell month or year), B/L UE 5/5, B/L LE 2/5, unable to lift off bed, with assistance lifting fell within 5 seconds. NIHSS: 4 due to B/L leg weakness, but pt walked in for ambulatory procedure on admission. No decrease in sensation, change in vision or speech. HCT showed two sites of new hyperdensity in L lentiform nucleus/corona radiata suggestive of hemorrhage or mineralization within a subacute-chronic infarct. Pt also with trace right tentorial subdural hemorrhage and right orbital floor fracture with blood in maxillary sinus. CTA Unremarkable. No arterial steno-occlusive disease or evidence of dissection. Patient was admitted to stroke for further workup. Pt found to be febrile, but infectious w/u was unrevealing after pt was persistently having low grade fevers and tachycardic with some periods of confusion. Elevated lactate initially which responded to IVF. ID recommended monitoring patient off antibiotics. Augmentin started on  as patient had 2 minor episodes of hemoptysis, patient does not appear infectious currently. MRI w/wo contrast showed Acute hemorrhage in the left basal ganglia and corona radiata and focal hemorrhage in the septum pellucidum and left external capsule, along with R SDH and R orbital bone fx. Stable to be stepped down to regional floor today.            Neurology Stroke Progress Note    INTERVAL HPI/OVERNIGHT EVENTS:  Patient seen and examined, no acute overnight events reported.     MEDICATIONS  (STANDING):  amLODIPine   Tablet 10 milliGRAM(s) Oral every 24 hours  amoxicillin  875 milliGRAM(s)/clavulanate 1 Tablet(s) Oral two times a day  dextrose 5%. 1000 milliLiter(s) (50 mL/Hr) IV Continuous <Continuous>  dextrose 5%. 1000 milliLiter(s) (100 mL/Hr) IV Continuous <Continuous>  dextrose 50% Injectable 25 Gram(s) IV Push once  dextrose 50% Injectable 12.5 Gram(s) IV Push once  dextrose 50% Injectable 25 Gram(s) IV Push once  dolutegravir 50 milliGRAM(s) Oral daily  enoxaparin Injectable 40 milliGRAM(s) SubCutaneous every 24 hours  folic acid 1 milliGRAM(s) Oral daily  glucagon  Injectable 1 milliGRAM(s) IntraMuscular once  insulin lispro (ADMELOG) corrective regimen sliding scale   SubCutaneous three times a day before meals  insulin lispro (ADMELOG) corrective regimen sliding scale   SubCutaneous at bedtime  metoprolol tartrate 50 milliGRAM(s) Oral every 12 hours  rilpivirine 25 milliGRAM(s) Oral with dinner  trimethoprim  160 mG/sulfamethoxazole 800 mG 1 Tablet(s) Oral <User Schedule>    MEDICATIONS  (PRN):  dextrose Oral Gel 15 Gram(s) Oral once PRN Blood Glucose LESS THAN 70 milliGRAM(s)/deciliter      Allergies    No Known Allergies    Intolerances        Vital Signs Last 24 Hrs  T(C): 37.2 (17 Aug 2023 15:04), Max: 37.8 (16 Aug 2023 21:21)  T(F): 99 (17 Aug 2023 15:04), Max: 100.1 (16 Aug 2023 21:21)  HR: 84 (17 Aug 2023 12:14) (74 - 103)  BP: 129/72 (17 Aug 2023 12:14) (119/66 - 150/79)  BP(mean): 93 (17 Aug 2023 12:14) (87 - 108)  RR: 18 (17 Aug 2023 12:14) (13 - 24)  SpO2: 98% (17 Aug 2023 12:14) (95% - 98%)    Parameters below as of 17 Aug 2023 12:14  Patient On (Oxygen Delivery Method): room air        Physical exam:  General: No acute distress, awake and alert  Eyes: Facial swelling/ bruising + R> L  Neck: trachea midline, FROM.  Cardiovascular: Regular rate and rhythm on monitor.  Pulmonary: Anterior breath sounds clear bilaterally, no crackles or wheezing. No use of accessory muscles  GI: Abdomen soft, non-distended, non-tender      Neurologic:  -Mental status: Awake, alert, oriented to person, place, and time. Speech is fluent with intact naming, repetition, and comprehension, no dysarthria. Recent and remote memory intact. Follows commands. Attention/concentration intact. Fund of knowledge appropriate.  -Cranial nerves:   II: Visual fields are full to confrontation.  III, IV, VI: Extraocular movements are intact without nystagmus. L pupil : Rxn to light+, R pupil hard to ascertain given the swelling, but pt states he perceives light when shone on his eye.  V:  Facial sensation V1-V3 equal and intact   VII: Face appears symmetric, thou hard to ascertain given facial swelling from orbital Fx.  VIII: Hearing is bilaterally intact to finger rub  IX, X: Uvula is midline and soft palate rises symmetrically  XI: Head turning and shoulder shrug are intact.  XII: Tongue protrudes midline  Motor: Normal bulk and tone. No pronator drift. Strength bilateral upper extremity 5/5, bilateral lower extremities 3/5 which is baseline and chronic.  Rapid alternating movements intact and symmetric  Sensation: Intact to light touch bilaterally. No neglect or extinction on double simultaneous testing.  Coordination: No dysmetria on finger-to-nose.   Gait: Deferred.      LABS:                        12.0   8.33  )-----------( 273      ( 17 Aug 2023 05:30 )             39.6     -    141  |  104  |  7   ----------------------------<  122<H>  4.8   |  30  |  0.47<L>    Ca    9.1      17 Aug 2023 05:30  Phos  3.2       Mg     2.1             Urinalysis Basic - ( 17 Aug 2023 05:30 )    Color: x / Appearance: x / SG: x / pH: x  Gluc: 122 mg/dL / Ketone: x  / Bili: x / Urobili: x   Blood: x / Protein: x / Nitrite: x   Leuk Esterase: x / RBC: x / WBC x   Sq Epi: x / Non Sq Epi: x / Bacteria: x        RADIOLOGY & ADDITIONAL TESTS:     -------------------TRANSFER FROM STROKE TELE TO [facility]------------------    72y Male with PMHx of HTN, HLD, DM, CAD, CHF, HIV, and Dermatomyositis presented to [facility] for planned colonoscopy and Fecal immunochemical test (FIT). Stroke code called on pt after colonoscopy due to a syncopal event and fall with his head/face strike where in pt self-transferred from stretcher to bathroom, felt dizzy and fell on his face. FB. SBP: 200 after fall. 's during stroke code. Pt does not remember the event however remembered that he came to the hospital for his colonoscopy. On initial exam, pt with R cheek, periorbital and infraorbital swelling, A&O x 2 (unable to tell month or year), B/L UE 5/5, B/L LE 2/5, unable to lift off bed, with assistance lifting fell within 5 seconds. NIHSS: 4 due to B/L leg weakness, but pt walked in for ambulatory procedure on admission. No decrease in sensation, change in vision or speech. HCT showed two sites of new hyperdensity in L lentiform nucleus/corona radiata suggestive of hemorrhage or mineralization within a subacute-chronic infarct. Pt also with trace right tentorial subdural hemorrhage and right orbital floor fracture with blood in maxillary sinus. CTA Unremarkable. No arterial steno-occlusive disease or evidence of dissection. Patient was admitted to stroke for further workup. Pt found to be febrile, but infectious w/u was unrevealing altho pt was persistently having low grade fevers and tachycardic with some periods of confusion. Elevated lactate initially, which responded to IVF. ID recommended monitoring patient off antibiotics. Augmentin started on  as patient had 2 minor episodes of hemoptysis, patient does not appear infectious currently. MRI w/wo contrast showed Acute hemorrhage in the left basal ganglia and corona radiata and focal hemorrhage in the septum pellucidum and left external capsule, along with R SDH and R orbital bone fx. Stable to be stepped down to regional floor today.        INTERVAL HPI:  Pt seen and examined at bedside. Per pt, no complaints and no acute events overnight.    MEDICATIONS  (STANDING):  amLODIPine   Tablet 10 milliGRAM(s) Oral every 24 hours  amoxicillin  875 milliGRAM(s)/clavulanate 1 Tablet(s) Oral two times a day  dextrose 5%. 1000 milliLiter(s) (50 mL/Hr) IV Continuous <Continuous>  dextrose 5%. 1000 milliLiter(s) (100 mL/Hr) IV Continuous <Continuous>  dextrose 50% Injectable 25 Gram(s) IV Push once  dextrose 50% Injectable 12.5 Gram(s) IV Push once  dextrose 50% Injectable 25 Gram(s) IV Push once  dolutegravir 50 milliGRAM(s) Oral daily  enoxaparin Injectable 40 milliGRAM(s) SubCutaneous every 24 hours  folic acid 1 milliGRAM(s) Oral daily  glucagon  Injectable 1 milliGRAM(s) IntraMuscular once  hydroxychloroquine 200 milliGRAM(s) Oral two times a day  insulin lispro (ADMELOG) corrective regimen sliding scale   SubCutaneous three times a day before meals  insulin lispro (ADMELOG) corrective regimen sliding scale   SubCutaneous at bedtime  metoprolol tartrate 50 milliGRAM(s) Oral every 12 hours  mycophenolate mofetil 500 milliGRAM(s) Oral two times a day  rilpivirine 25 milliGRAM(s) Oral with dinner  trimethoprim  160 mG/sulfamethoxazole 800 mG 1 Tablet(s) Oral <User Schedule>    MEDICATIONS  (PRN):  dextrose Oral Gel 15 Gram(s) Oral once PRN Blood Glucose LESS THAN 70 milliGRAM(s)/deciliter      ALLERGIES:  No Known Allergies    VITAL SIGNS:  T(C): 37.2 (23 @ 22:00), Max: 37.2 (23 @ 15:04)  T(F): 99 (23 @ 22:00), Max: 99 (23 @ 15:04)  HR: 91 (23 @ 22:00) (74 - 110)  BP: 137/69 (23 @ 22:00) (129/72 - 150/79)  RR: 18 (23 @ 22:00) (18 - 21)  SpO2: 94% (23 @ 22:00) (94% - 99%)      PHYSICAL EXAM:  General: No acute distress, sleeping but easily arousable  Eyes: Facial swelling/ bruising + R> L  Neck: trachea midline, FROM.  Cardiovascular: RRR  Pulmonary: Breath sounds clear bilaterally, no crackles or wheezing. No use of accessory muscles  GI: Abdomen soft, non-distended, non-tender  Neurologic: Oriented to person, place, and time. Fluent speech. Recent and remote memory intact. Follows commands. Attention/concentration intact. Fund of knowledge appropriate. CN Grossly intact.       LABS:                        12.0   8.33  )-----------( 273      ( 17 Aug 2023 05:30 )             39.6           141  |  104  |  7   ----------------------------<  122<H>  4.8   |  30  |  0.47<L>    Ca    9.1      17 Aug 2023 05:30  Phos  3.2       Mg     2.1         URINALYSIS:  Urinalysis Basic - ( 17 Aug 2023 05:30 )    Color: x / Appearance: x / SG: x / pH: x  Gluc: 122 mg/dL / Ketone: x  / Bili: x / Urobili: x   Blood: x / Protein: x / Nitrite: x   Leuk Esterase: x / RBC: x / WBC x   Sq Epi: x / Non Sq Epi: x / Bacteria: x      CAPILLARY BLOOD GLUCOSE:  POCT Blood Glucose.: 148 mg/dL (17 Aug 2023 22:35)      RADIOLOGY & ADDITIONAL TESTS: Reviewed

## 2023-08-18 NOTE — DISCHARGE NOTE PROVIDER - NSDCACTIVITY_GEN_ALL_CORE
No restrictions  no nose blowing, avoid using straws avoid smoking, avoid sneezing with mouth closed, sleep with head elevated/No heavy lifting/straining

## 2023-08-18 NOTE — DISCHARGE NOTE PROVIDER - ATTENDING DISCHARGE PHYSICAL EXAMINATION:
General: NAD  Head: NC/AT; swelling over eyelids and redness.    Neck: Supple; no JVD  Respiratory: CTAB; no wheezes/rales/rhonchi  Cardiovascular: Regular rhythm/rate; S1/S2+, no murmurs  Gastrointestinal: Soft; NTND  Extremities: WWP; no edema/cyanosis  Neurological: A&Ox3, no obvious focal deficits

## 2023-08-18 NOTE — PROGRESS NOTE ADULT - PROBLEM SELECTOR PLAN 8
Pt has hx of HLD. LDL WNL on this hospitalization.   - LDL results: 21 (8/16)    Plan  - can get collateral about home meds, and consider restarting

## 2023-08-18 NOTE — PROGRESS NOTE ADULT - PROBLEM SELECTOR PLAN 4
Known hx of HIV. HIV viral load undetectable. Pt started on Bactrim DS. Pt is on Juluca which is Non formulary, thus was started on dolutegravir 50mg daily, rilpivirine 25mg daily.    Plan  - c/w dolutegravir 50mg qd  - c/w rilpivirine 25mg qd w/dinner  - c/w Bactrim 160-800mg 3days/wk (MWF)

## 2023-08-18 NOTE — DISCHARGE NOTE PROVIDER - NSDCMRMEDTOKEN_GEN_ALL_CORE_FT
AMLODIPINE BESYLATE 10 MG TAB: 1 tab(s) orally once a day  Bactrim  mg-160 mg oral tablet: 1 orally 3 times a week  bisoprolol 10 mg oral tablet: 1 orally once a day  bisoprolol 5 mg oral tablet: 1 orally  CellCept 500 mg oral tablet: 2 tab(s) orally 2 times a day  FOLIC ACID 1 MG TABLET:   hydroxychloroquine 200 mg oral tablet: 1 orally 2 times a day with food  Juluca 50 mg-25 mg oral tablet: 1 tab(s) orally once a day  methylPREDNISolone 16 mg oral tablet: 1 tab(s) orally once a day  traZODone 100 mg oral tablet: 1 tab(s) orally once a day (at bedtime)   AMLODIPINE BESYLATE 10 MG TAB: 1 tab(s) orally once a day  Bactrim  mg-160 mg oral tablet: 1 tab(s) orally every 12 hours  bisoprolol 10 mg oral tablet: 1 orally once a day  cefpodoxime 200 mg oral tablet: 2 tab(s) orally every 12 hours  CellCept 500 mg oral tablet: 2 tab(s) orally 2 times a day  FOLIC ACID 1 MG TABLET:   hydroxychloroquine 200 mg oral tablet: 1 orally 2 times a day with food  Juluca 50 mg-25 mg oral tablet: 1 tab(s) orally once a day  Medrol 8 mg oral tablet: 1 orally once a day  traZODone 100 mg oral tablet: 1 tab(s) orally once a day (at bedtime)

## 2023-08-18 NOTE — DISCHARGE NOTE PROVIDER - NSDCFUSCHEDAPPT_GEN_ALL_CORE_FT
[facility]  Scheduled Appointment: 08/29/2023    [facility]  AMBCHEMO  [address]  Scheduled Appointment: 08/29/2023    [facility]  YOHAN  [address]  Scheduled Appointment: 08/30/2023    [facility] PreAdmits  Scheduled Appointment: 08/30/2023    Octavia Adams  [facility]  RHEUM [address]  Scheduled Appointment: 09/13/2023     [facility] PreAdmits  Scheduled Appointment: 08/29/2023    [facility]  AMBCHEMO  [address]  Scheduled Appointment: 08/29/2023    [facility]  Scheduled Appointment: 08/30/2023    [facility]  AMBCHEMO  [address]  Scheduled Appointment: 08/30/2023    Octavia Adams  [facility] Physician [facility]  [address]  Scheduled Appointment: 09/13/2023

## 2023-08-18 NOTE — DISCHARGE NOTE PROVIDER - NSDCCPCAREPLAN_GEN_ALL_CORE_FT
PRINCIPAL DISCHARGE DIAGNOSIS  Diagnosis: Hemorrhagic stroke  Assessment and Plan of Treatment: During this hospital admission, you had a hemorrhagic stroke. During an hemorrhagic stroke, blood leaks out of your blood vessels into your brain because of a break in the blood vessel wall. This can damage areas in the brain that control other parts of the body. The blood will get absorbed back into your body over time like a bruise.   Doing your regular tasks may be difficult after you've had a stroke, but you can learn new ways to manage your daily activities. In fact, doing daily activities may help you to regain muscle strength. Be patient, give yourself time to adjust, and appreciate the progress you make. When showering or bathing, test the water temperature with a hand or foot that was not affected by the stroke. Use grab bars, a shower seat, a hand-held showerhead, and a long-handled brush. For dressing, dress while sitting, starting with the affected side or limb. Wear shirts that pull easily over your head and pants or skirts with elastic waistbands. Use zippers with loops attached to the pull tabs. You will learn additional new ways to manage after a stroke in rehabilitation. It is normal to feel fatigue after a stroke, while some days may be worse than others, you will continue to improve.  Call 911 right away if you have any of the following symptoms of another stroke:  B: Balance: Sudden: Dizziness, loss of balance, or a sense of falling, difficulty with coordinating movement  E: Eyes: Sudden double vision or trouble seeing in one or both eyes  F: Face: Sudden uneven face  A: Arms (Legs): Sudden weakness, tingling, or loss of feeling on one side of your face or body  S: Speech: Sudden trouble talking or slurred speech, sudden difficulty understanding others  T: Time: Please call 911 right away and go to the emergency room  •Sudden, severe headache  •Blackouts or seizures        SECONDARY DISCHARGE DIAGNOSES  Diagnosis: Fracture of right orbital floor  Assessment and Plan of Treatment:     Diagnosis: Traumatic subdural hematoma with loss of consciousness  Assessment and Plan of Treatment:

## 2023-08-18 NOTE — PROGRESS NOTE ADULT - PROBLEM SELECTOR PLAN 3
Pt w/ minor hemoptysis x2 on 8/17. ID was consulted for low-grade temp, w/ tachycardia. No leukocytosis, WBCs remained WNL. Pt was started on unasyn which was changed to vanco and cefipime. ID rec'd monitoring off abx, thus vanco & cefepime were dc'd. D/t new onset minor hemoptysis, per OMFS would continue Augmentin 875mg/125mg BID for a total of 7 days.    Plan  - c/w augmentin 875/125mg BID for total of 7 days

## 2023-08-18 NOTE — PROGRESS NOTE ADULT - ATTENDING COMMENTS
The patient is a 72 year old male with a PMH HTN, HLD< DM, CAD, CHF, HIV, and dermatomyositis admitted for colonoscopy with complicated by syncopal episode with head strike following the study. HCT with ? hemorrhage. CTA negative.  Repeat head CT Stable. MRI  appears most consistent with deep ICH likely due to HTN with traumatic orbital fracture and SDH secondary to fall/head strike. SBP<140. Continue DVT PPx. Abx per OMFS. Will need f/u MRI in 8 weeks.

## 2023-08-18 NOTE — DISCHARGE NOTE PROVIDER - CARE PROVIDER_API CALL
Thelma Ernst  Neurology  [address] [address]  Phone: [phone]  Fax: [phone]  Follow Up Time:     Jovany Vega  Oral/Maxillofacial Surgery  [address] [place] [address], [address]  [place] [address]  Phone: [phone]  Fax: [phone]  Follow Up Time:    Thelma Ernst  Neurology  [address] [address]  Phone: [phone]  Fax: [phone]  Scheduled Appointment: 12/01/2023 02:00 PM    Jovany Vega  Oral/Maxillofacial Surgery  [address] [place] [address], [facility] [address]  [place] [address]  Phone: [phone]  Fax: [phone]  Follow Up Time:    Thelma Ernst  Neurology  [address] [address]  Phone: [phone]  Fax: [phone]  Scheduled Appointment: 09/06/2023 09:00 AM    Jovany Vega  Oral/Maxillofacial Surgery  [address] [place] [address], [facility] [address]  [place] [address]  Phone: [phone]  Fax: [phone]  Follow Up Time:

## 2023-08-18 NOTE — PROGRESS NOTE ADULT - ASSESSMENT
72M with PMH of HTN, HLD, DM2, HIV, HFrEF, dermatomyositis and CAD presenting after a syncopal event following colonoscopy, sustaining orbital fractures and suffering an intracranial bleed.  Also noted to be extremely hypertensive.  Admitted to the stroke service for further evaluation.     #Intracerebral hemorrhage  - plan per stroke team  - holding any anticoagulation  - monitor on telemetry    #Febrile episode  #Elevated lactate (resolved)  #Dermatomyositis  - no further fevers, continue on cellcept and plaquenil  - no indications for antibiotics at this time  - continue with bactrim for PCP ppx    #HIV  - continue home HIV regimen - dolutegravir and rilpirivine (two component of januca - home medication not available at the hospital)    #Orbital fracture  - per discussion with stroke team, the fractures were discussed with a consultant - if the eye is able to move without any evidence of muscle entrapment or other issues, can be followed up outpatient  - continue on augmentin for infection prophylaxis per OMFS consult  Plan of care discussed with stroke team.  50 minutes spent on this encounter, including face to face with patient, care coordination and documentation.

## 2023-08-18 NOTE — PROGRESS NOTE ADULT - SUBJECTIVE AND OBJECTIVE BOX
Feeling well, ready to go to rehab today.  Denies any coughing up of large blood clots.  Denies any shortness of breath, fevers, chills and other new constitutional symptoms.      Remaining ROS negative       PHYSICAL EXAM:    General: no acute distress, sitting up in chair  HEENT: diffuse facial swelling  Cardiovascular: +S1/S2, RRR, no mrg  Respiratory: CTA B/L; no W/R/R  Gastrointestinal: soft, NT/ND; +BSx4  Extremities: WWP; no edema  Neurological: speech is fluent, moves all extremities  Psychiatric: pleasant mood and affect    VITAL SIGNS:  Vital Signs Last 24 Hrs  T(C): 37.2 (18 Aug 2023 11:00), Max: 37.4 (18 Aug 2023 05:31)  T(F): 98.9 (18 Aug 2023 11:00), Max: 99.4 (18 Aug 2023 05:31)  HR: 86 (18 Aug 2023 11:00) (83 - 110)  BP: 110/60 (18 Aug 2023 11:00) (108/56 - 140/68)  BP(mean): 73 (18 Aug 2023 05:31) (73 - 94)  RR: 18 (18 Aug 2023 11:00) (18 - 18)  SpO2: 96% (18 Aug 2023 11:00) (94% - 99%)    Parameters below as of 18 Aug 2023 11:00  Patient On (Oxygen Delivery Method): room air      MEDICATIONS:  MEDICATIONS  (STANDING):  amLODIPine   Tablet 10 milliGRAM(s) Oral every 24 hours  amoxicillin  875 milliGRAM(s)/clavulanate 1 Tablet(s) Oral two times a day  dextrose 5%. 1000 milliLiter(s) (50 mL/Hr) IV Continuous <Continuous>  dextrose 5%. 1000 milliLiter(s) (100 mL/Hr) IV Continuous <Continuous>  dextrose 50% Injectable 25 Gram(s) IV Push once  dextrose 50% Injectable 12.5 Gram(s) IV Push once  dextrose 50% Injectable 25 Gram(s) IV Push once  dolutegravir 50 milliGRAM(s) Oral daily  enoxaparin Injectable 40 milliGRAM(s) SubCutaneous every 24 hours  folic acid 1 milliGRAM(s) Oral daily  glucagon  Injectable 1 milliGRAM(s) IntraMuscular once  hydroxychloroquine 200 milliGRAM(s) Oral two times a day  insulin lispro (ADMELOG) corrective regimen sliding scale   SubCutaneous three times a day before meals  insulin lispro (ADMELOG) corrective regimen sliding scale   SubCutaneous at bedtime  metoprolol tartrate 50 milliGRAM(s) Oral every 12 hours  mycophenolate mofetil 500 milliGRAM(s) Oral two times a day  rilpivirine 25 milliGRAM(s) Oral with dinner  trimethoprim  160 mG/sulfamethoxazole 800 mG 1 Tablet(s) Oral <User Schedule>    MEDICATIONS  (PRN):  dextrose Oral Gel 15 Gram(s) Oral once PRN Blood Glucose LESS THAN 70 milliGRAM(s)/deciliter      ALLERGIES:  Allergies    No Known Allergies    Intolerances        LABS:                        11.6   7.08  )-----------( 273      ( 18 Aug 2023 05:30 )             39.2     08-18    141  |  106  |  15  ----------------------------<  104<H>  4.0   |  28  |  0.46<L>    Ca    8.7      18 Aug 2023 05:30  Phos  2.9     08-18  Mg     2.0     08-18        Urinalysis Basic - ( 18 Aug 2023 05:30 )    Color: x / Appearance: x / SG: x / pH: x  Gluc: 104 mg/dL / Ketone: x  / Bili: x / Urobili: x   Blood: x / Protein: x / Nitrite: x   Leuk Esterase: x / RBC: x / WBC x   Sq Epi: x / Non Sq Epi: x / Bacteria: x      CAPILLARY BLOOD GLUCOSE      POCT Blood Glucose.: 115 mg/dL (18 Aug 2023 13:24)      RADIOLOGY & ADDITIONAL TESTS: Reviewed.

## 2023-08-18 NOTE — PROGRESS NOTE ADULT - PROBLEM SELECTOR PLAN 1
Pt underwent extensive CVA workup, including q8hr stroke neuro checks and vitals. MRI brain w/wo contrast showed: Acute hemorrhage in the left basal ganglia and corona radiata and focal hemorrhage in the septum pellucidum and left external capsule, along with R SDH and R orbital bone fx. Stroke Code HCT Results: R trace SDH, R orbital floor Fx, L BG/CR hyperdensity c/f bleed Vs mineralisation. Stroke Code CTA Results: Negative.    Plan  - No DAPT 2/2 R BG/CR bleed   - Stroke education  - Pt underwent extensive CVA workup, including q8hr stroke neuro checks and vitals. MRI brain w/wo contrast showed: Acute hemorrhage in the left basal ganglia and corona radiata and focal hemorrhage in the septum pellucidum and left external capsule, along with R SDH and R orbital bone fx. Stroke Code HCT Results: R trace SDH, R orbital floor Fx, L BG/CR hyperdensity c/f bleed Vs mineralisation. Stroke Code CTA Results: Negative.    Plan  - No DAPT 2/2 R BG/CR bleed   - Stroke education Pt underwent extensive CVA workup, including q8hr stroke neuro checks and vitals. MRI brain w/wo contrast showed: Acute hemorrhage in the left basal ganglia and corona radiata and focal hemorrhage in the septum pellucidum and left external capsule, along with R SDH and R orbital bone fx. Stroke Code HCT Results: R trace SDH, R orbital floor Fx, L BG/CR hyperdensity c/f bleed Vs mineralisation. Stroke Code CTA Results: Negative.    Plan  - No DAPT 2/2 R BG/CR bleed   -SBP<140  - Stroke education

## 2023-08-18 NOTE — PROGRESS NOTE ADULT - PROBLEM SELECTOR PLAN 2
Pt had orbital bone fracture as seen on imaging. No acute surgical intervention indicated  - Augmentin 875mg/125mg BID for a total of 7 days antibiotic course  - Sinus precautions: no nose blowing, avoid using straws avoid smoking, avoid sneezing with mouth closed, sleep with head elevated  - f/u with Dr. Jovany Vega within 7-days of discharge for continued evaluation of orbit Pt had orbital bone fracture as seen on imaging. No acute surgical intervention indicated    Plan  - Sinus precautions: no nose blowing, avoid using straws avoid smoking, avoid sneezing with mouth closed, sleep with head elevated  - f/u with Dr. Jovany Vega within 7-days of discharge for continued evaluation of orbit

## 2023-08-18 NOTE — PROGRESS NOTE ADULT - TIME BILLING
review of objective data, interview of patient, and discussion of assessment plan with patient/family/multidisciplinary team. I agree with ACP, Fellow documentation except where indicated above.
review of objective data, interview of patient, and discussion of assessment plan with patient/family/multidisciplinary team. I agree with ACP, Fellow documentation except where indicated above.

## 2023-08-18 NOTE — PROGRESS NOTE ADULT - PROBLEM SELECTOR PLAN 9
F: N/A  E: replete K<4 and Mg <2  N: DASH/TLC  DVT Prophylaxis: SCDs  Code Status: Full Code  Dispo: F F: N/A  E: replete K<4 and Mg <2  N: DASH/TLC  DVT Prophylaxis: SCDs (Cleared on 8/17 for SQL)    Code Status: Full Code  Dispo: [facility]

## 2023-08-18 NOTE — DISCHARGE NOTE PROVIDER - NSDCFUADDAPPT_GEN_ALL_CORE_FT
You will receive a call from the neurology office to set up an outpatient follow up appointment. If you do not receive a call within one week of discharge, please call [phone] and ask to make an appointment with a provider from the stroke team.    Please follow up with Dr. Jovany Vega, maxillofacial surgery, within 1 week of discharge.     Follow up with your PCP within 2 weeks of discharge. Please bring your Insurance card, Photo ID and Discharge paperwork to the following appointment:    (1) Please follow up with your Neurology Provider, Dr. Thelma Ernst at [address], [address] [place] [address] on 12/01/2023 at 2:00pm.    Please note that the office will contact you for an earlier appointment once available.    Appointment was scheduled by Ms. MAXIM Richards, Referral Coordinator.            Please follow up with Dr. Jovany Vega, maxillofacial surgery, within 1 week of discharge.     Follow up with your PCP within 2 weeks of discharge. Please bring your Insurance card, Photo ID and Discharge paperwork to the following appointment:    (1) Please follow up with your Neurology Provider, Dr. Thelma Ersnt at [address], [address] [place] [address] on 09/06/2023 at 9:00 AM.    Please note that the office will contact you for an earlier appointment once available.    Appointment was scheduled by Ms. MAXIM Richards, Referral Coordinator.            Please follow up with Dr. Jovany Vega, maxillofacial surgery, within 1 week of discharge.     Follow up with your PCP within 2 weeks of discharge.

## 2023-08-18 NOTE — DISCHARGE NOTE PROVIDER - NSDCCPTREATMENT_GEN_ALL_CORE_FT
PRINCIPAL PROCEDURE  Procedure: Colonoscopy  Findings and Treatment: Using the [place] Bowel Preparation Score, each segment of the colon was graded  as follows:  Left Colon: Excellent, 3 and Good, 2 | Transverse Colon: Good, 2  | Right Colon:  Good, 2  Limitations/Complications:  There were no apparent limitations or complications  Findings:  Excavated lesions A single non-bleeding diverticulum with a wide-mouth opening  was seen in the sigmoid colon. Diverticulosis appeared to be of mild severity.  Mucosa Normal mucosa was noted in the terminal ileum.  Protruding lesions A single sessile polyp ranging in size from 3 mm to 5 mm was  found in the ascending colon.A piece-meal polypectomy was performed using a cold  jumbo forceps in the ascending colon. The polyp was completely removed and  retrieved.  A single sessile polyp of benign appearance ranging in size from 1 mm to 3 mm  was found in the rectosigmoid junction at a distance between 25 cm and 30 cm  from the anus.A piece-meal polypectomy was performed using a cold jumbo forceps  in the rectosigmoid junction. The polyp was completely removed and retrieved.  Additional findings Sharp Sigmoid angulation made procedure technically  challenging.  Impressions:  Sharp Sigmoid angulation made procedure technically challenging.  Polyp (3 mm to 5 mm) in the ascending colon. (Polypectomy).  Polyp (1 mm to 3 mm) in the rectosigmoid junction. (Polypectomy).  Normal mucosa in the terminal ileum.  Diverticulum in the sigmoid colon.

## 2023-08-18 NOTE — PROGRESS NOTE ADULT - ASSESSMENT
72y Male with PMHx of HTN, HLD, DM, CAD, CHF, HIV, Dermatomyositis presented to [facility] for planned colonoscopy due + Fecal immunochemical test (FIT). Stroke code called on pt after colonoscopy due to a syncopal event and fall hitting his head/face. Stroke code was called. FSBS @ 57, SBPs post colonoscopy @ 200s during stroke code @ 120s. Pt was taken to CTs. Stroke code CTH w/? R BG hyperdensity c/f Hge vs mineralisation, trace SDH and R orbital floor Fx. CTA unremarkable. Pt admitted to stroke tele for further w/u. Pt found to be febrile, infectious w/u unrevealing after pt was persistently having low grade fevers and tachycardic with some periods of confusion. Elevated lactate initially which responded to IVF. ID recommended monitoring patient off antibiotics. Augmentin started on 8/17 as patient had 2 minor episodes of hemoptysis, patient does not appear infectious currently. MRI w/wo contrast showed Acute hemorrhage in the left basal ganglia and corona radiata and focal hemorrhage in the septum pellucidum and left external capsule, along with R SDH and R orbital bone fx. Stable to be stepped down to regional floor today.          Neuro  #CVA workup  - No DAPT 2/2 R BG/CR bleed bleed.  - q8hr stroke neuro checks and vitals  - MRI brain w/wo contrast:  Acute hemorrhage in the left basal ganglia and corona radiata and focal hemorrhage in the septum pellucidum and left external capsule, along with R SDH and R orbital bone fx  - Stroke Code HCT Results: R trace SDH, R orbital floor Fx, L BG/CR hyperdensity c/f bleed Vs mineralisation.  - Stroke Code CTA Results: Negative  - Stroke education    #Orbital bone fx  - No acute surgical intervention indicated  - Augmentin 875mg/125mg BID for a total of 7 days antibiotic course  - Sinus precautions: no nose blowing, avoid using straws avoid smoking, avoid sneezing with mouth closed, sleep with head elevated  - f/u with Dr. Jovany Vega within 7-days of discharge for continued evaluation of orbit      Cards  #HTN  - permissive hypertension, Goal SBP < 140  - Restarted Home anti hypertensives (On Bisoprolol 15mg, started on Metoprolol 50mg po BID- therapeutic conversion for Bisoprolol 5mg, can uptitrate as tolerated and amlodipine).  - TTE with bubble : PFO+, BLESSING with moderate LVOT  - No need for LE DVT as patient has not had ischemic infarct      #HLD  - LDL results: 21    Pulm  - call provider if SPO2 < 94%    GI  #Nutrition/Fluids/Electrolytes   - replete K<4 and Mg <2  - Diet: DASH/TLC  - IVF: None for now.    Renal  - C/T Trend Bun/Crt    Infectious Disease  - C/T trend WBC- WNL  - ID cxed for low grade temp, tachycardia, was started on unasyn which was changed to vanco and cefipime, rec'd monitoring off abx  - Patient with minor hemoptysis x2 on 8/17, per OMFS would continue Augmentin 875mg/125mg BID for a total of 7 days    # Hx of HIV  - F/U T cell subset and HIV viral load - viral load undetectable  - Started on Bactrim ds  - Pt is on Juluca which is Non formulary- started dolutegravir 50mg daily, rilpivirine 25mg daily    # Dermatomyositis  - Home cellcept and Plaquenil restarted 8/17.  - Home Methylprednisone ordered.    Endocrine  #DM  - A1C results: 7.6  - ISS    - TSH results: 1.660    DVT Prophylaxis  - Cleared on 8/17 for SQL   - SCDs for DVT prophylaxis       IDR Goals: Goals reviewed at interdisciplinary rounds with case management, social work, physical therapy, occupational therapy, and speech language pathology.   Please see specific therapy  notes for in depth goals.    Dispo: Likely [facility]     Discussed daily hospital plans and goals with patient and family at bedside.     Discussed with Neurology Attending Dr. Thelma Ernst   72y Male with PMHx of HTN, HLD, DM, CAD, CHF, HIV, Dermatomyositis presented to [facility] for planned colonoscopy due + Fecal immunochemical test (FIT). Stroke code called on pt after colonoscopy due to a syncopal event and fall hitting his head/face. Stroke code was called. FSBS @ 57, SBPs post colonoscopy @ 200s during stroke code @ 120s. Pt was taken to CTs. Stroke code CTH w/? R BG hyperdensity c/f Hge vs mineralisation, trace SDH and R orbital floor Fx. CTA unremarkable. Pt admitted to stroke tele for further w/u. Pt found to be febrile, infectious w/u unrevealing after pt was persistently having low grade fevers and tachycardic with some periods of confusion. Elevated lactate initially which responded to IVF. ID recommended monitoring patient off antibiotics. Augmentin started on 8/17 as patient had 2 minor episodes of hemoptysis, patient does not appear infectious currently. MRI w/wo contrast showed Acute hemorrhage in the left basal ganglia and corona radiata and focal hemorrhage in the septum pellucidum and left external capsule, along with R SDH and R orbital bone fx. Stable to be stepped down to regional floor today.                Cards              GI      Renal  - C/T Trend Bun/Crt            Endocrine      - TSH results: 1.660    DVT Prophylaxis  - Cleared on 8/17 for SQL   - SCDs for DVT prophylaxis       IDR Goals: Goals reviewed at interdisciplinary rounds with case management, social work, physical therapy, occupational therapy, and speech language pathology.   Please see specific therapy  notes for in depth goals.    Dispo: Likely [facility]     Discussed daily hospital plans and goals with patient and family at bedside.     Discussed with Neurology Attending Dr. Thelma Ernst   72y Male with PMHx of HTN, HLD, DM, CAD, CHF, HIV, Dermatomyositis presented to [facility] for planned colonoscopy due + Fecal immunochemical test (FIT). Stroke code called on pt after colonoscopy due to a syncopal event and fall hitting his head/face. Stroke code was called. FSBS @ 57, SBPs post colonoscopy @ 200s during stroke code @ 120s. Pt was taken to CTs. Stroke code CTH w/? R BG hyperdensity c/f Hge vs mineralisation, trace SDH and R orbital floor Fx. CTA unremarkable. Pt admitted to stroke tele for further w/u. Pt found to be febrile, infectious w/u unrevealing after pt was persistently having low grade fevers and tachycardic with some periods of confusion. Elevated lactate initially which responded to IVF. ID recommended monitoring patient off antibiotics. Augmentin started on 8/17 as patient had 2 minor episodes of hemoptysis, patient does not appear infectious currently. MRI w/wo contrast showed Acute hemorrhage in the left basal ganglia and corona radiata and focal hemorrhage in the septum pellucidum and left external capsule, along with R SDH and R orbital bone fx. Stable to be stepped down to regional floor today.

## 2023-08-18 NOTE — DISCHARGE NOTE PROVIDER - HOSPITAL COURSE
Hospital course:  72y Male with PMH     Discharge NIHSS:     During this hospital course, patient had a (ischemic/hemorrhagic) stroke located in (left/right.....) as seen on (MRI/CT).   The stroke etiology is likely secondary to:  []atrial fibrillation  []small vessel disease from atherosclerotic risk factors  []other:  []etiology workup still in progress    Patient had the following workup done in house:  CT Head:   MR Head Non Contrast:  CT Angio Head:  CT Angio Neck:  []echo  []labs  []other    Physical exam at discharge:    New medications on discharge:  Labs to be followed up:  Imaging to be done as outpatient:  Further outpatient workup:   Hospital course:  72y Male with PMHx of HTN, HLD, DM, CAD, CHF, HIV, Dermatomyositis presented to [facility] for planned colonoscopy due + Fecal immunochemical test (FIT). Stroke code called on pt after colonoscopy due to a syncopal event and fall hitting his head/face. Pt self transferred from stretcher to bathroom, felt dizzy and fell on his face. FB. SBP: 200 after fall. 's during stroke code. Pt does not remember the event. Initial NIHSS of 4 due to B/L leg weakness. HCT showed two sites of new hyperdensity in L lentiform nucleus/corona radiata suggestive of hemorrhage or mineralization within a subacute-chronic infarct. Pt also with trace right tentorial subdural hemorrhage and right orbital floor fracture with blood in maxillary sinus. CTA and CTP unremarkable. Patient was admitted to stroke for further workup. Pt found to be febrile, infectious w/u unrevealing after pt was persistently having low grade fevers and tachycardic with some periods of confusion. Elevated lactate initially which responded to IVF. ID recommended monitoring patient off antibiotics. Augmentin started on  prophylactically per OMFS as patient had 2 minor episodes of hemoptysis on , patient not infectious currently. Afebrile. MRI w/wo contrast showed acute hemorrhage in the left basal ganglia and corona radiata and focal hemorrhage in the septum pellucidum and left external capsule, along with R SDH and R orbital bone fx. TTE with PFO, does not need LE dopplers as no acute infarct noted. Stable to be stepped down to regional floor, restarted on all home medications. PT recommending ALISSA, OT recommending home OT.    Discharge NIHSS:     During this hospital course, patient had a hemorrhage stroke located in left basal ganglia, corona radiata, septum pellucidum and left internal capsule as seen on MRI  The stroke etiology is likely secondary to:  [x]other: fall    Patient had the following workup done in house:  CT Head:   MR Head Non Contrast:  CT Angio Head:  CT Angio Neck:  []echo  []labs  []other    Physical exam at discharge:    New medications on discharge:  Labs to be followed up:  Imaging to be done as outpatient:  Further outpatient workup:   Hospital course:  72y Male with PMHx of HTN, HLD, DM, CAD, CHF, HIV, Dermatomyositis presented to [facility] for planned colonoscopy due + Fecal immunochemical test (FIT). Stroke code called on pt after colonoscopy due to a syncopal event and fall hitting his head/face. Pt self transferred from stretcher to bathroom, felt dizzy and fell on his face. FB. SBP: 200 after fall. 's during stroke code. Pt does not remember the event. Initial NIHSS of 4 due to B/L leg weakness. HCT showed two sites of new hyperdensity in L lentiform nucleus/corona radiata suggestive of hemorrhage or mineralization within a subacute-chronic infarct. Pt also with trace right tentorial subdural hemorrhage and right orbital floor fracture with blood in maxillary sinus. CTA and CTP unremarkable. Patient was admitted to stroke for further workup. Pt found to be febrile, infectious w/u unrevealing after pt was persistently having low grade fevers and tachycardic with some periods of confusion. Elevated lactate initially which responded to IVF. ID recommended monitoring patient off antibiotics. Augmentin started on  prophylactically per OMFS as patient had 2 minor episodes of hemoptysis on , patient not infectious currently. Afebrile. MRI w/wo contrast showed acute hemorrhage in the left basal ganglia and corona radiata and focal hemorrhage in the septum pellucidum and left external capsule, along with R SDH and R orbital bone fx. TTE with PFO, does not need LE dopplers as no acute infarct noted. Stable to be stepped down to regional floor, restarted on all home medications. Will obtain outpatient cardiac monitor to r/o cardiac etiology of syncopal event. PT recommending ALISSA, OT recommending home OT.    During this hospital course, patient had a hemorrhage stroke located in left basal ganglia, corona radiata, septum pellucidum and left internal capsule as seen on MRI  The stroke etiology is likely secondary to:  [x]other: fall    Patient had the following workup done in house:  CT Brain Stroke Protocol (08.15.23 @ 18:56)   IMPRESSION:  1.  Two small sites of parallel hyperdensity in the left lentiform   nucleus/corona radiata, new since prior from 2023. Hemorrhage or   mineralization within a subacute-chronic infarct may have this appearance   and attention on short interval follow-up advised.  2.  Trace right tentorial subdural hemorrhage.  3.  Right orbital floor fracture with blood in maxillary sinus.    CT Angio Head w/ IV Cont (08.15.23 @ 19:05)   IMPRESSION: Unremarkable CTA examination of the brain.    CT Angio Neck w/ IV Cont (08.15.23 @ 19:07)   IMPRESSION: No arterial steno-occlusive disease or evidence of dissection.    CT Perfusion w/ Maps w/ IV Cont (08.15.23 @ 19:03)   Small reported Tmax elevation in left white matter. No territorial   deficit.    MR Head w/wo IV Cont (23 @ 20:13)   IMPRESSION: Acute hemorrhage in the left basal ganglia and corona radiata as well as focal hemorrhage in the septum pellucidum and left external capsule. Restricted diffusion surrounding the area of hemorrhage in the left basal ganglia/corona radiata which is likely from blood products rather than ischemia. No significant mass effect or midline shift. Again demonstrated trace right subdural hematoma and right orbital floor   fracture.    Echocardiogram w/ Bubble and Doppler (23 @ 14:54)   CONCLUSIONS:   1. Hyperdynamic left ventricular systolic function.   2. Normal right ventricular size and systolic function.   3. Normal atria.   4. Injection of agitated saline via a peripheral vein reveals bubbles in   the left heart within 3 heart beats, most consistent with a patent   foramen ovale.   5. Systolic anterior motion of the mitral valve resulting in an LVOT   gradient of 40.00 mmHg and moderate LVOT obstruction.   6. Aortic sclerosis without significant stenosis.   7. No evidence of pulmonary hypertension.   8. No pericardial effusion.   9. Compared to the previous TTE performed on 2023, the pulmonary   artery systolic pressure is lower.    [x]labs  A1C 7.6  LDL 21  TSH 1.66    Physical exam at discharge:  -Mental status: Awake, alert, oriented to person, place, and time. Speech is fluent with intact naming, repetition, and comprehension, no dysarthria. Recent and remote memory intact. Follows commands. Attention/concentration intact. Fund of knowledge appropriate.  -Cranial nerves:   II: Visual fields are full to confrontation.  III, IV, VI: Extraocular movements are intact without nystagmus. L pupil : Rxn to light+, R pupil hard to ascertain given the swelling, but pt states he perceives light when shone on his eye.  V:  Facial sensation V1-V3 equal and intact   VII: Face appears symmetric, thou hard to ascertain given facial swelling from orbital Fx.  VIII: Hearing is bilaterally intact to finger rub  IX, X: Uvula is midline and soft palate rises symmetrically  XI: Head turning and shoulder shrug are intact.  XII: Tongue protrudes midline  Motor: Normal bulk and tone. No pronator drift. Strength bilateral upper extremity 5/5, bilateral lower extremities 3/5 which is baseline and chronic.  Rapid alternating movements intact and symmetric  Sensation: Intact to light touch bilaterally. No neglect or extinction on double simultaneous testing.  Coordination: No dysmetria on finger-to-nose.   Gait: Deferred.    Discharge NIHSS:    New medications on discharge:  Labs to be followed up: N/A  Imaging to be done as outpatient: repeat MRI w and w/o contrast in 8-10 weeks  Further outpatient workup: outpatient cardiac monitor   Hospital course:  72y Male with PMHx of HTN, HLD, DM, CAD, CHF, HIV, Dermatomyositis presented to [facility] for planned colonoscopy due + Fecal immunochemical test (FIT). Stroke code called on pt after colonoscopy due to a syncopal event and fall hitting his head/face. Pt self transferred from stretcher to bathroom, felt dizzy and fell on his face. FB. SBP: 200 after fall. 's during stroke code. Pt does not remember the event. Initial NIHSS of 4 due to B/L leg weakness. HCT showed two sites of new hyperdensity in L lentiform nucleus/corona radiata suggestive of hemorrhage or mineralization within a subacute-chronic infarct. Pt also with trace right tentorial subdural hemorrhage and right orbital floor fracture with blood in maxillary sinus. CTA and CTP unremarkable. Patient was admitted to stroke for further workup. Pt found to be febrile, infectious w/u unrevealing after pt was persistently having low grade fevers and tachycardic with some periods of confusion. Elevated lactate initially which responded to IVF. ID recommended monitoring patient off antibiotics. Augmentin started on  prophylactically per OMFS as patient had 2 minor episodes of hemoptysis on , patient not infectious currently. Afebrile. MRI w/wo contrast showed acute hemorrhage in the left basal ganglia and corona radiata and focal hemorrhage in the septum pellucidum and left external capsule, along with R SDH and R orbital bone fx. TTE with PFO, does not need LE dopplers as no acute infarct noted. Stable to be stepped down to regional floor, restarted on all home medications. Will obtain outpatient cardiac monitor to r/o cardiac etiology of syncopal event. PT recommending ALISSA, OT recommending home OT.    During this hospital course, patient had a hemorrhage stroke located in left basal ganglia, corona radiata, septum pellucidum and left internal capsule as seen on MRI  The stroke etiology is likely secondary to:  [x]other: fall    Patient had the following workup done in house:  CT Brain Stroke Protocol (08.15.23 @ 18:56)   IMPRESSION:  1.  Two small sites of parallel hyperdensity in the left lentiform   nucleus/corona radiata, new since prior from 2023. Hemorrhage or   mineralization within a subacute-chronic infarct may have this appearance   and attention on short interval follow-up advised.  2.  Trace right tentorial subdural hemorrhage.  3.  Right orbital floor fracture with blood in maxillary sinus.    CT Angio Head w/ IV Cont (08.15.23 @ 19:05)   IMPRESSION: Unremarkable CTA examination of the brain.    CT Angio Neck w/ IV Cont (08.15.23 @ 19:07)   IMPRESSION: No arterial steno-occlusive disease or evidence of dissection.    CT Perfusion w/ Maps w/ IV Cont (08.15.23 @ 19:03)   Small reported Tmax elevation in left white matter. No territorial   deficit.    MR Head w/wo IV Cont (23 @ 20:13)   IMPRESSION: Acute hemorrhage in the left basal ganglia and corona radiata as well as focal hemorrhage in the septum pellucidum and left external capsule. Restricted diffusion surrounding the area of hemorrhage in the left basal ganglia/corona radiata which is likely from blood products rather than ischemia. No significant mass effect or midline shift. Again demonstrated trace right subdural hematoma and right orbital floor   fracture.    Echocardiogram w/ Bubble and Doppler (23 @ 14:54)   CONCLUSIONS:   1. Hyperdynamic left ventricular systolic function.   2. Normal right ventricular size and systolic function.   3. Normal atria.   4. Injection of agitated saline via a peripheral vein reveals bubbles in   the left heart within 3 heart beats, most consistent with a patent   foramen ovale.   5. Systolic anterior motion of the mitral valve resulting in an LVOT   gradient of 40.00 mmHg and moderate LVOT obstruction.   6. Aortic sclerosis without significant stenosis.   7. No evidence of pulmonary hypertension.   8. No pericardial effusion.   9. Compared to the previous TTE performed on 2023, the pulmonary   artery systolic pressure is lower.    [x]labs  A1C 7.6  LDL 21  TSH 1.66    Physical exam at discharge:  -Mental status: Awake, alert, oriented to person, place, and time. Speech is fluent with intact naming, repetition, and comprehension, no dysarthria. Recent and remote memory intact. Follows commands. Attention/concentration intact. Fund of knowledge appropriate.  -Cranial nerves:   II: Visual fields are full to confrontation.  III, IV, VI: Extraocular movements are intact without nystagmus. L pupil : Rxn to light+, R pupil hard to ascertain given the swelling, but pt states he perceives light when shone on his eye.  V:  Facial sensation V1-V3 equal and intact   VII: Face appears symmetric, thou hard to ascertain given facial swelling from orbital Fx.  VIII: Hearing is bilaterally intact to finger rub  IX, X: Uvula is midline and soft palate rises symmetrically  XI: Head turning and shoulder shrug are intact.  XII: Tongue protrudes midline  Motor: Normal bulk and tone. No pronator drift. Strength bilateral upper extremity 5/5, bilateral lower extremities 3/5 which is baseline and chronic.  Rapid alternating movements intact and symmetric  Sensation: Intact to light touch bilaterally. No neglect or extinction on double simultaneous testing.  Coordination: No dysmetria on finger-to-nose.   Gait: Deferred.    Discharge NIHSS:    New medications on discharge: Augmentin 875 mg PO BID  Labs to be followed up: N/A  Imaging to be done as outpatient: repeat MRI w and w/o contrast in 8-10 weeks  Further outpatient workup: outpatient cardiac monitor   Hospital course:  72y Male with PMHx of HTN, HLD, DM, CAD, CHF, HIV, Dermatomyositis presented to [facility] for planned colonoscopy due + Fecal immunochemical test (FIT). Stroke code called on pt after colonoscopy due to a syncopal event and fall hitting his head/face. Pt self transferred from stretcher to bathroom, felt dizzy and fell on his face. FB. SBP: 200 after fall. 's during stroke code. Pt does not remember the event. Initial NIHSS of 4 due to B/L leg weakness. HCT showed two sites of new hyperdensity in L lentiform nucleus/corona radiata suggestive of hemorrhage or mineralization within a subacute-chronic infarct. Pt also with trace right tentorial subdural hemorrhage and right orbital floor fracture with blood in maxillary sinus. CTA and CTP unremarkable. Patient was admitted to stroke for further workup. Pt found to be febrile, infectious w/u unrevealing after pt was persistently having low grade fevers and tachycardic with some periods of confusion. Elevated lactate initially which responded to IVF. ID recommended monitoring patient off antibiotics. Augmentin started on  prophylactically per OMFS as patient had 2 minor episodes of hemoptysis on , patient not infectious currently. Afebrile. MRI w/wo contrast showed acute hemorrhage in the left basal ganglia and corona radiata and focal hemorrhage in the septum pellucidum and left external capsule, along with R SDH and R orbital bone fx. TTE with PFO, does not need LE dopplers as no acute infarct noted. Stable to be stepped down to regional floor, restarted on all home medications. Will obtain outpatient cardiac monitor to r/o cardiac etiology of syncopal event. PT recommending ALISSA, OT recommending home OT.    During this hospital course, patient had a hemorrhage stroke located in left basal ganglia, corona radiata, septum pellucidum and left internal capsule as seen on MRI  The stroke etiology is likely secondary to:  [x]other: fall    Patient had the following workup done in house:  CT Brain Stroke Protocol (08.15.23 @ 18:56)   IMPRESSION:  1.  Two small sites of parallel hyperdensity in the left lentiform   nucleus/corona radiata, new since prior from 2023. Hemorrhage or   mineralization within a subacute-chronic infarct may have this appearance   and attention on short interval follow-up advised.  2.  Trace right tentorial subdural hemorrhage.  3.  Right orbital floor fracture with blood in maxillary sinus.    CT Angio Head w/ IV Cont (08.15.23 @ 19:05)   IMPRESSION: Unremarkable CTA examination of the brain.    CT Angio Neck w/ IV Cont (08.15.23 @ 19:07)   IMPRESSION: No arterial steno-occlusive disease or evidence of dissection.    CT Perfusion w/ Maps w/ IV Cont (08.15.23 @ 19:03)   Small reported Tmax elevation in left white matter. No territorial   deficit.    MR Head w/wo IV Cont (23 @ 20:13)   IMPRESSION: Acute hemorrhage in the left basal ganglia and corona radiata as well as focal hemorrhage in the septum pellucidum and left external capsule. Restricted diffusion surrounding the area of hemorrhage in the left basal ganglia/corona radiata which is likely from blood products rather than ischemia. No significant mass effect or midline shift. Again demonstrated trace right subdural hematoma and right orbital floor   fracture.    Echocardiogram w/ Bubble and Doppler (23 @ 14:54)   CONCLUSIONS:   1. Hyperdynamic left ventricular systolic function.   2. Normal right ventricular size and systolic function.   3. Normal atria.   4. Injection of agitated saline via a peripheral vein reveals bubbles in   the left heart within 3 heart beats, most consistent with a patent   foramen ovale.   5. Systolic anterior motion of the mitral valve resulting in an LVOT   gradient of 40.00 mmHg and moderate LVOT obstruction.   6. Aortic sclerosis without significant stenosis.   7. No evidence of pulmonary hypertension.   8. No pericardial effusion.   9. Compared to the previous TTE performed on 2023, the pulmonary   artery systolic pressure is lower.    [x]labs  A1C 7.6  LDL 21  TSH 1.66    Physical exam at discharge:  -Mental status: Awake, alert, oriented to person, place, and time. Speech is fluent with intact naming, repetition, and comprehension, no dysarthria. Follows commands. Attention/concentration intact. Fund of knowledge appropriate.  -Cranial nerves:   II: Visual fields are full to confrontation.  III, IV, VI: Extraocular movements are intact without nystagmus. L pupil equal, round and reactive to light. Hard to assess R pupil 2/2 to periorbital swelling.   V:  Facial sensation V1-V3 equal and intact   VII: Face appears symmetric however difficult to definitively determine due to large amount of swelling 2/2 to orbital fx  VIII: Hearing is bilaterally intact to finger rub  IX, X: Uvula is midline and soft palate rises symmetrically  XI: Head turning and shoulder shrug are intact.  XII: Tongue protrudes midline  Motor: Normal bulk and tone. No pronator drift. Strength bilateral upper extremity 5/5, bilateral lower extremities 3/5 which is baseline per patient, states it's due to his history of dermatomyositis.   Sensation: Intact to light touch bilaterally. No neglect or extinction on double simultaneous testing.  Coordination: No dysmetria on finger-to-nose.   Gait: Deferred.    Discharge NIHSS:    New medications on discharge: Augmentin 875 mg PO BID  Labs to be followed up: N/A  Imaging to be done as outpatient: repeat MRI w and w/o contrast in 8-10 weeks  Further outpatient workup: outpatient cardiac monitor   Hospital course:  72y Male with PMHx of HTN, HLD, DM, CAD, CHF, HIV, Dermatomyositis presented to [facility] for planned colonoscopy due + Fecal immunochemical test (FIT). Stroke code called on pt after colonoscopy due to a syncopal event and fall hitting his head/face. Pt self transferred from stretcher to bathroom, felt dizzy and fell on his face. FB. SBP: 200 after fall. 's during stroke code. Pt does not remember the event. Initial NIHSS of 4 due to B/L leg weakness. HCT showed two sites of new hyperdensity in L lentiform nucleus/corona radiata suggestive of hemorrhage or mineralization within a subacute-chronic infarct. Pt also with trace right tentorial subdural hemorrhage and right orbital floor fracture with blood in maxillary sinus. CTA and CTP unremarkable. Patient was admitted to stroke for further workup. Pt found to be febrile, infectious w/u unrevealing after pt was persistently having low grade fevers and tachycardic with some periods of confusion. Elevated lactate initially which responded to IVF. ID recommended monitoring patient off antibiotics. Augmentin started on  prophylactically per OMFS as patient had 2 minor episodes of hemoptysis on , patient not infectious currently. Afebrile. MRI w/wo contrast showed acute hemorrhage in the left basal ganglia and corona radiata and focal hemorrhage in the septum pellucidum and left external capsule, along with R SDH and R orbital bone fx. TTE with PFO, does not need LE dopplers as no acute infarct noted. Stable to be stepped down to regional floor, restarted on all home medications. Will obtain outpatient cardiac monitor to r/o cardiac etiology of syncopal event.     During this hospital course, patient had a hemorrhage stroke located in left basal ganglia, corona radiata, septum pellucidum and left internal capsule as seen on MRI  The stroke etiology is likely secondary to:   [x]other: fall    Patient had the following workup done in house:  CT Brain Stroke Protocol (08.15.23 @ 18:56)   IMPRESSION:  1.  Two small sites of parallel hyperdensity in the left lentiform   nucleus/corona radiata, new since prior from 2023. Hemorrhage or   mineralization within a subacute-chronic infarct may have this appearance   and attention on short interval follow-up advised.  2.  Trace right tentorial subdural hemorrhage.  3.  Right orbital floor fracture with blood in maxillary sinus.    CT Angio Head w/ IV Cont (08.15.23 @ 19:05)   IMPRESSION: Unremarkable CTA examination of the brain.    CT Angio Neck w/ IV Cont (08.15.23 @ 19:07)   IMPRESSION: No arterial steno-occlusive disease or evidence of dissection.    CT Perfusion w/ Maps w/ IV Cont (08.15.23 @ 19:03)   Small reported Tmax elevation in left white matter. No territorial   deficit.    MR Head w/wo IV Cont (23 @ 20:13)   IMPRESSION: Acute hemorrhage in the left basal ganglia and corona radiata as well as focal hemorrhage in the septum pellucidum and left external capsule. Restricted diffusion surrounding the area of hemorrhage in the left basal ganglia/corona radiata which is likely from blood products rather than ischemia. No significant mass effect or midline shift. Again demonstrated trace right subdural hematoma and right orbital floor   fracture.    Echocardiogram w/ Bubble and Doppler (23 @ 14:54)   CONCLUSIONS:   1. Hyperdynamic left ventricular systolic function.   2. Normal right ventricular size and systolic function.   3. Normal atria.   4. Injection of agitated saline via a peripheral vein reveals bubbles in   the left heart within 3 heart beats, most consistent with a patent   foramen ovale.   5. Systolic anterior motion of the mitral valve resulting in an LVOT   gradient of 40.00 mmHg and moderate LVOT obstruction.   6. Aortic sclerosis without significant stenosis.   7. No evidence of pulmonary hypertension.   8. No pericardial effusion.   9. Compared to the previous TTE performed on 2023, the pulmonary   artery systolic pressure is lower.    [x]labs  A1C 7.6  LDL 21  TSH 1.66    Physical exam at discharge:  -Mental status: Awake, alert, oriented to person, place, and time. Speech is fluent with intact naming, repetition, and comprehension, no dysarthria. Follows commands. Attention/concentration intact. Fund of knowledge appropriate.  -Cranial nerves:   II: Visual fields are full to confrontation.  III, IV, VI: Extraocular movements are intact without nystagmus. L pupil equal, round and reactive to light. Hard to assess R pupil 2/2 to periorbital swelling, appears symmetrical and reactive to light  V:  Facial sensation V1-V3 equal and intact   VII: Face appears symmetric however right upper face symmetry difficult to definitively determine due to large amount of swelling 2/2 to orbital fx  VIII: Hearing is bilaterally intact to voice  IX, X: Uvula is midline and soft palate rises symmetrically  XI: Head turning and shoulder shrug are intact.  XII: Tongue protrudes midline  Motor: Normal bulk and tone. No pronator drift. Strength bilateral upper extremity 5/5, bilateral lower extremities 3/5 which is baseline per patient, states it's due to his history of dermatomyositis.   Sensation: Intact to light touch bilaterally. No neglect or extinction on double simultaneous testing.  Coordination: No dysmetria on finger-to-nose.   Gait: Narrow, steady    Discharge NIHSS:     New medications on discharge: Augmentin 875 mg PO BID  Labs to be followed up: N/A  Imaging to be done as outpatient: repeat MRI w and w/o contrast in 8-10 weeks  Further outpatient workup: outpatient cardiac monitor   Hospital course:  72y Male with PMHx of HTN, HLD, DM, CAD, CHF, HIV, Dermatomyositis presented to [facility] for planned colonoscopy due + Fecal immunochemical test (FIT). Stroke code called on pt after colonoscopy due to a syncopal event and fall hitting his head/face. Pt self transferred from stretcher to bathroom, felt dizzy and fell on his face. FB. SBP: 200 after fall. 's during stroke code. Pt does not remember the event. Initial NIHSS of 4 due to B/L leg weakness. HCT showed two sites of new hyperdensity in L lentiform nucleus/corona radiata suggestive of hemorrhage or mineralization within a subacute-chronic infarct. Pt also with trace right tentorial subdural hemorrhage and right orbital floor fracture with blood in maxillary sinus. CTA and CTP unremarkable. Patient was admitted to stroke for further workup. Pt found to be febrile, infectious w/u unrevealing after pt was persistently having low grade fevers and tachycardic with some periods of confusion. Elevated lactate initially which responded to IVF. ID recommended monitoring patient off antibiotics. Augmentin started on  prophylactically per OMFS as patient had 2 minor episodes of hemoptysis on , patient not infectious currently. Afebrile. MRI w/wo contrast showed acute hemorrhage in the left basal ganglia and corona radiata and focal hemorrhage in the septum pellucidum and left external capsule, along with R SDH and R orbital bone fx. TTE with PFO, does not need LE dopplers as no acute infarct noted. Stable to be stepped down to regional floor, restarted on all home medications. Will obtain outpatient cardiac monitor to r/o cardiac etiology of syncopal event.     During this hospital course, patient had a hemorrhage stroke located in left basal ganglia, corona radiata, septum pellucidum and left internal capsule as seen on MRI  The stroke etiology is likely secondary to:   [x]other: fall    Patient had the following workup done in house:  CT Brain Stroke Protocol (08.15.23 @ 18:56)   IMPRESSION:  1.  Two small sites of parallel hyperdensity in the left lentiform   nucleus/corona radiata, new since prior from 2023. Hemorrhage or   mineralization within a subacute-chronic infarct may have this appearance   and attention on short interval follow-up advised.  2.  Trace right tentorial subdural hemorrhage.  3.  Right orbital floor fracture with blood in maxillary sinus.    CT Angio Head w/ IV Cont (08.15.23 @ 19:05)   IMPRESSION: Unremarkable CTA examination of the brain.    CT Angio Neck w/ IV Cont (08.15.23 @ 19:07)   IMPRESSION: No arterial steno-occlusive disease or evidence of dissection.    CT Perfusion w/ Maps w/ IV Cont (08.15.23 @ 19:03)   Small reported Tmax elevation in left white matter. No territorial   deficit.    MR Head w/wo IV Cont (23 @ 20:13)   IMPRESSION: Acute hemorrhage in the left basal ganglia and corona radiata as well as focal hemorrhage in the septum pellucidum and left external capsule. Restricted diffusion surrounding the area of hemorrhage in the left basal ganglia/corona radiata which is likely from blood products rather than ischemia. No significant mass effect or midline shift. Again demonstrated trace right subdural hematoma and right orbital floor   fracture.    Echocardiogram w/ Bubble and Doppler (23 @ 14:54)   CONCLUSIONS:   1. Hyperdynamic left ventricular systolic function.   2. Normal right ventricular size and systolic function.   3. Normal atria.   4. Injection of agitated saline via a peripheral vein reveals bubbles in   the left heart within 3 heart beats, most consistent with a patent   foramen ovale.   5. Systolic anterior motion of the mitral valve resulting in an LVOT   gradient of 40.00 mmHg and moderate LVOT obstruction.   6. Aortic sclerosis without significant stenosis.   7. No evidence of pulmonary hypertension.   8. No pericardial effusion.   9. Compared to the previous TTE performed on 2023, the pulmonary   artery systolic pressure is lower.    [x]labs  A1C 7.6  LDL 21  TSH 1.66    Physical exam at discharge:  -Mental status: Awake, alert, oriented to person, place, and time. Speech is fluent with intact naming, repetition, and comprehension, no dysarthria. Follows commands. Attention/concentration intact. Fund of knowledge appropriate.  -Cranial nerves:   II: Visual fields are full to confrontation.  III, IV, VI: Extraocular movements are intact without nystagmus. L pupil equal, round and reactive to light. Hard to assess R pupil 2/2 to periorbital swelling, appears symmetrical and reactive to light  V:  Facial sensation V1-V3 equal and intact   VII: Face appears symmetric however right upper face symmetry difficult to definitively determine due to large amount of swelling 2/2 to orbital fx  VIII: Hearing is bilaterally intact to voice  IX, X: Uvula is midline and soft palate rises symmetrically  XI: Head turning and shoulder shrug are intact.  XII: Tongue protrudes midline  Motor: Normal bulk and tone. No pronator drift. Strength bilateral upper extremity 5/5, bilateral lower extremities 3/5 which is baseline per patient, states it's due to his history of dermatomyositis.   Sensation: Intact to light touch bilaterally. No neglect or extinction on double simultaneous testing.  Coordination: No dysmetria on finger-to-nose.   Gait: Narrow, steady    Discharge NIHSS:     New medications on discharge: Bactrim DS 1 tab q12h and cefpodoxime 400mg PO q12h, end date   Labs to be followed up: N/A  Imaging to be done as outpatient: repeat MRI w and w/o contrast in 8-10 weeks  Further outpatient workup: outpatient cardiac monitor   Hospital course:  72y Male with PMHx of HTN, HLD, DM, CAD, CHF, HIV, Dermatomyositis presented to [facility] for planned colonoscopy due + Fecal immunochemical test (FIT). Stroke code called on pt after colonoscopy due to a syncopal event and fall hitting his head/face. Pt self transferred from stretcher to bathroom, felt dizzy and fell on his face. FB. SBP: 200 after fall. 's during stroke code. Pt does not remember the event. Initial NIHSS of 4 due to B/L leg weakness. HCT showed two sites of new hyperdensity in L lentiform nucleus/corona radiata suggestive of hemorrhage or mineralization within a subacute-chronic infarct. Pt also with trace right tentorial subdural hemorrhage and right orbital floor fracture with blood in maxillary sinus. CTA and CTP unremarkable. Patient was admitted to stroke for further workup. Pt found to be febrile, infectious w/u unrevealing after pt was persistently having low grade fevers and tachycardic with some periods of confusion. Elevated lactate initially which responded to IVF. ID recommended monitoring patient off antibiotics. Augmentin started on  prophylactically per OMFS as patient had 2 minor episodes of hemoptysis on , patient not infectious currently. Afebrile. MRI w/wo contrast showed acute hemorrhage in the left basal ganglia and corona radiata and focal hemorrhage in the septum pellucidum and left external capsule, along with R SDH and R orbital bone fx. TTE with PFO, does not need LE dopplers as no acute infarct noted. Stable to be stepped down to regional floor, restarted on all home medications. Will obtain outpatient cardiac monitor to r/o cardiac etiology of syncopal event.     During this hospital course, patient had a hemorrhage stroke located in left basal ganglia, corona radiata, septum pellucidum and left internal capsule as seen on MRI  The stroke etiology is likely secondary to:   [x]other: fall    Patient had the following workup done in house:  CT Brain Stroke Protocol (08.15.23 @ 18:56)   IMPRESSION:  1.  Two small sites of parallel hyperdensity in the left lentiform   nucleus/corona radiata, new since prior from 2023. Hemorrhage or   mineralization within a subacute-chronic infarct may have this appearance   and attention on short interval follow-up advised.  2.  Trace right tentorial subdural hemorrhage.  3.  Right orbital floor fracture with blood in maxillary sinus.    CT Angio Head w/ IV Cont (08.15.23 @ 19:05)   IMPRESSION: Unremarkable CTA examination of the brain.    CT Angio Neck w/ IV Cont (08.15.23 @ 19:07)   IMPRESSION: No arterial steno-occlusive disease or evidence of dissection.    CT Perfusion w/ Maps w/ IV Cont (08.15.23 @ 19:03)   Small reported Tmax elevation in left white matter. No territorial   deficit.    MR Head w/wo IV Cont (23 @ 20:13)   IMPRESSION: Acute hemorrhage in the left basal ganglia and corona radiata as well as focal hemorrhage in the septum pellucidum and left external capsule. Restricted diffusion surrounding the area of hemorrhage in the left basal ganglia/corona radiata which is likely from blood products rather than ischemia. No significant mass effect or midline shift. Again demonstrated trace right subdural hematoma and right orbital floor   fracture.    Echocardiogram w/ Bubble and Doppler (23 @ 14:54)   CONCLUSIONS:   1. Hyperdynamic left ventricular systolic function.   2. Normal right ventricular size and systolic function.   3. Normal atria.   4. Injection of agitated saline via a peripheral vein reveals bubbles in   the left heart within 3 heart beats, most consistent with a patent   foramen ovale.   5. Systolic anterior motion of the mitral valve resulting in an LVOT   gradient of 40.00 mmHg and moderate LVOT obstruction.   6. Aortic sclerosis without significant stenosis.   7. No evidence of pulmonary hypertension.   8. No pericardial effusion.   9. Compared to the previous TTE performed on 2023, the pulmonary   artery systolic pressure is lower.    [x]labs  A1C 7.6  LDL 21  TSH 1.66    Physical exam at discharge:  -Mental status: Awake, alert, oriented to person, place, and time. Speech is fluent with intact naming, repetition, and comprehension, no dysarthria. Follows commands. Attention/concentration intact. Fund of knowledge appropriate.  -Cranial nerves:   II: Visual fields are full to confrontation.  III, IV, VI: Extraocular movements are intact without nystagmus. L pupil equal, round and reactive to light. Hard to assess R pupil 2/2 to periorbital swelling, appears symmetrical and reactive to light  V:  Facial sensation V1-V3 equal and intact   VII: Face appears symmetric however right upper face symmetry difficult to definitively determine due to large amount of swelling 2/2 to orbital fx  VIII: Hearing is bilaterally intact to voice  IX, X: Uvula is midline and soft palate rises symmetrically  XI: Head turning and shoulder shrug are intact.  XII: Tongue protrudes midline  Motor: Normal bulk and tone. No pronator drift. Strength bilateral upper extremity 5/5, bilateral lower extremities 3/5 which is baseline per patient, states it's due to his history of dermatomyositis.   Sensation: Intact to light touch bilaterally. No neglect or extinction on double simultaneous testing.  Coordination: No dysmetria on finger-to-nose.   Gait: Narrow, steady    Discharge NIHSS: 4    New medications on discharge: Bactrim DS 1 tab q12h and cefpodoxime 400mg PO q12h, end date   Labs to be followed up: N/A  Imaging to be done as outpatient: repeat MRI w and w/o contrast in 8-10 weeks  Further outpatient workup: outpatient cardiac monitor

## 2023-08-19 LAB
ANION GAP SERPL CALC-SCNC: 7 MMOL/L — SIGNIFICANT CHANGE UP (ref 5–17)
BUN SERPL-MCNC: 14 MG/DL — SIGNIFICANT CHANGE UP (ref 7–23)
CALCIUM SERPL-MCNC: 8.4 MG/DL — SIGNIFICANT CHANGE UP (ref 8.4–10.5)
CHLORIDE SERPL-SCNC: 105 MMOL/L — SIGNIFICANT CHANGE UP (ref 96–108)
CO2 SERPL-SCNC: 26 MMOL/L — SIGNIFICANT CHANGE UP (ref 22–31)
CREAT SERPL-MCNC: 0.56 MG/DL — SIGNIFICANT CHANGE UP (ref 0.5–1.3)
EGFR: 105 ML/MIN/1.73M2 — SIGNIFICANT CHANGE UP
GLUCOSE BLDC GLUCOMTR-MCNC: 116 MG/DL — HIGH (ref 70–99)
GLUCOSE BLDC GLUCOMTR-MCNC: 127 MG/DL — HIGH (ref 70–99)
GLUCOSE BLDC GLUCOMTR-MCNC: 97 MG/DL — SIGNIFICANT CHANGE UP (ref 70–99)
GLUCOSE SERPL-MCNC: 95 MG/DL — SIGNIFICANT CHANGE UP (ref 70–99)
HCT VFR BLD CALC: 37.2 % — LOW (ref 39–50)
HGB BLD-MCNC: 11 G/DL — LOW (ref 13–17)
MAGNESIUM SERPL-MCNC: 1.9 MG/DL — SIGNIFICANT CHANGE UP (ref 1.6–2.6)
MCHC RBC-ENTMCNC: 25.9 PG — LOW (ref 27–34)
MCHC RBC-ENTMCNC: 29.6 GM/DL — LOW (ref 32–36)
MCV RBC AUTO: 87.7 FL — SIGNIFICANT CHANGE UP (ref 80–100)
NRBC # BLD: 0 /100 WBCS — SIGNIFICANT CHANGE UP (ref 0–0)
PHOSPHATE SERPL-MCNC: 3.2 MG/DL — SIGNIFICANT CHANGE UP (ref 2.5–4.5)
PLATELET # BLD AUTO: 271 K/UL — SIGNIFICANT CHANGE UP (ref 150–400)
POTASSIUM SERPL-MCNC: 4.5 MMOL/L — SIGNIFICANT CHANGE UP (ref 3.5–5.3)
POTASSIUM SERPL-SCNC: 4.5 MMOL/L — SIGNIFICANT CHANGE UP (ref 3.5–5.3)
RBC # BLD: 4.24 M/UL — SIGNIFICANT CHANGE UP (ref 4.2–5.8)
RBC # FLD: 14.2 % — SIGNIFICANT CHANGE UP (ref 10.3–14.5)
SODIUM SERPL-SCNC: 138 MMOL/L — SIGNIFICANT CHANGE UP (ref 135–145)
WBC # BLD: 6.92 K/UL — SIGNIFICANT CHANGE UP (ref 3.8–10.5)
WBC # FLD AUTO: 6.92 K/UL — SIGNIFICANT CHANGE UP (ref 3.8–10.5)

## 2023-08-19 RX ORDER — ACETAMINOPHEN 500 MG
975 TABLET ORAL EVERY 8 HOURS
Refills: 0 | Status: DISCONTINUED | OUTPATIENT
Start: 2023-08-19 | End: 2023-08-24

## 2023-08-19 RX ADMIN — Medication 200 MILLIGRAM(S): at 11:39

## 2023-08-19 RX ADMIN — Medication 50 MILLIGRAM(S): at 17:43

## 2023-08-19 RX ADMIN — Medication 1 TABLET(S): at 11:39

## 2023-08-19 RX ADMIN — Medication 1 TABLET(S): at 22:53

## 2023-08-19 RX ADMIN — Medication 50 MILLIGRAM(S): at 05:43

## 2023-08-19 RX ADMIN — AMLODIPINE BESYLATE 10 MILLIGRAM(S): 2.5 TABLET ORAL at 12:17

## 2023-08-19 RX ADMIN — Medication 200 MILLIGRAM(S): at 22:53

## 2023-08-19 RX ADMIN — Medication 975 MILLIGRAM(S): at 18:07

## 2023-08-19 RX ADMIN — MYCOPHENOLATE MOFETIL 500 MILLIGRAM(S): 250 CAPSULE ORAL at 11:38

## 2023-08-19 RX ADMIN — DOLUTEGRAVIR SODIUM 50 MILLIGRAM(S): 25 TABLET, FILM COATED ORAL at 11:39

## 2023-08-19 RX ADMIN — Medication 1 MILLIGRAM(S): at 11:39

## 2023-08-19 RX ADMIN — RILPIVIRINE HYDROCHLORIDE 25 MILLIGRAM(S): 25 TABLET, FILM COATED ORAL at 17:43

## 2023-08-19 RX ADMIN — MYCOPHENOLATE MOFETIL 500 MILLIGRAM(S): 250 CAPSULE ORAL at 22:53

## 2023-08-19 RX ADMIN — ENOXAPARIN SODIUM 40 MILLIGRAM(S): 100 INJECTION SUBCUTANEOUS at 17:43

## 2023-08-20 LAB
ANION GAP SERPL CALC-SCNC: 9 MMOL/L — SIGNIFICANT CHANGE UP (ref 5–17)
BASOPHILS # BLD AUTO: 0.03 K/UL — SIGNIFICANT CHANGE UP (ref 0–0.2)
BASOPHILS NFR BLD AUTO: 0.5 % — SIGNIFICANT CHANGE UP (ref 0–2)
BUN SERPL-MCNC: 13 MG/DL — SIGNIFICANT CHANGE UP (ref 7–23)
CALCIUM SERPL-MCNC: 8.8 MG/DL — SIGNIFICANT CHANGE UP (ref 8.4–10.5)
CHLORIDE SERPL-SCNC: 105 MMOL/L — SIGNIFICANT CHANGE UP (ref 96–108)
CO2 SERPL-SCNC: 22 MMOL/L — SIGNIFICANT CHANGE UP (ref 22–31)
CREAT SERPL-MCNC: 0.44 MG/DL — LOW (ref 0.5–1.3)
EGFR: 113 ML/MIN/1.73M2 — SIGNIFICANT CHANGE UP
EOSINOPHIL # BLD AUTO: 0.34 K/UL — SIGNIFICANT CHANGE UP (ref 0–0.5)
EOSINOPHIL NFR BLD AUTO: 5.2 % — SIGNIFICANT CHANGE UP (ref 0–6)
GLUCOSE BLDC GLUCOMTR-MCNC: 113 MG/DL — HIGH (ref 70–99)
GLUCOSE BLDC GLUCOMTR-MCNC: 122 MG/DL — HIGH (ref 70–99)
GLUCOSE BLDC GLUCOMTR-MCNC: 130 MG/DL — HIGH (ref 70–99)
GLUCOSE BLDC GLUCOMTR-MCNC: 99 MG/DL — SIGNIFICANT CHANGE UP (ref 70–99)
GLUCOSE SERPL-MCNC: 99 MG/DL — SIGNIFICANT CHANGE UP (ref 70–99)
HCT VFR BLD CALC: 41.6 % — SIGNIFICANT CHANGE UP (ref 39–50)
HGB BLD-MCNC: 12.2 G/DL — LOW (ref 13–17)
IMM GRANULOCYTES NFR BLD AUTO: 1.2 % — HIGH (ref 0–0.9)
LYMPHOCYTES # BLD AUTO: 0.58 K/UL — LOW (ref 1–3.3)
LYMPHOCYTES # BLD AUTO: 8.8 % — LOW (ref 13–44)
MAGNESIUM SERPL-MCNC: 2 MG/DL — SIGNIFICANT CHANGE UP (ref 1.6–2.6)
MCHC RBC-ENTMCNC: 26.1 PG — LOW (ref 27–34)
MCHC RBC-ENTMCNC: 29.3 GM/DL — LOW (ref 32–36)
MCV RBC AUTO: 88.9 FL — SIGNIFICANT CHANGE UP (ref 80–100)
MONOCYTES # BLD AUTO: 0.39 K/UL — SIGNIFICANT CHANGE UP (ref 0–0.9)
MONOCYTES NFR BLD AUTO: 5.9 % — SIGNIFICANT CHANGE UP (ref 2–14)
NEUTROPHILS # BLD AUTO: 5.17 K/UL — SIGNIFICANT CHANGE UP (ref 1.8–7.4)
NEUTROPHILS NFR BLD AUTO: 78.4 % — HIGH (ref 43–77)
NRBC # BLD: 0 /100 WBCS — SIGNIFICANT CHANGE UP (ref 0–0)
PHOSPHATE SERPL-MCNC: 3.8 MG/DL — SIGNIFICANT CHANGE UP (ref 2.5–4.5)
PLATELET # BLD AUTO: 232 K/UL — SIGNIFICANT CHANGE UP (ref 150–400)
POTASSIUM SERPL-MCNC: 4.6 MMOL/L — SIGNIFICANT CHANGE UP (ref 3.5–5.3)
POTASSIUM SERPL-MCNC: SIGNIFICANT CHANGE UP (ref 3.5–5.3)
POTASSIUM SERPL-SCNC: 4.6 MMOL/L — SIGNIFICANT CHANGE UP (ref 3.5–5.3)
POTASSIUM SERPL-SCNC: SIGNIFICANT CHANGE UP (ref 3.5–5.3)
RBC # BLD: 4.68 M/UL — SIGNIFICANT CHANGE UP (ref 4.2–5.8)
RBC # FLD: 14.4 % — SIGNIFICANT CHANGE UP (ref 10.3–14.5)
SODIUM SERPL-SCNC: 136 MMOL/L — SIGNIFICANT CHANGE UP (ref 135–145)
WBC # BLD: 6.59 K/UL — SIGNIFICANT CHANGE UP (ref 3.8–10.5)
WBC # FLD AUTO: 6.59 K/UL — SIGNIFICANT CHANGE UP (ref 3.8–10.5)

## 2023-08-20 PROCEDURE — 99233 SBSQ HOSP IP/OBS HIGH 50: CPT | Mod: GC

## 2023-08-20 RX ADMIN — AMLODIPINE BESYLATE 10 MILLIGRAM(S): 2.5 TABLET ORAL at 12:58

## 2023-08-20 RX ADMIN — Medication 50 MILLIGRAM(S): at 17:06

## 2023-08-20 RX ADMIN — Medication 200 MILLIGRAM(S): at 11:48

## 2023-08-20 RX ADMIN — Medication 975 MILLIGRAM(S): at 21:08

## 2023-08-20 RX ADMIN — Medication 200 MILLIGRAM(S): at 21:07

## 2023-08-20 RX ADMIN — Medication 1 MILLIGRAM(S): at 11:46

## 2023-08-20 RX ADMIN — DOLUTEGRAVIR SODIUM 50 MILLIGRAM(S): 25 TABLET, FILM COATED ORAL at 11:52

## 2023-08-20 RX ADMIN — Medication 1 TABLET(S): at 21:07

## 2023-08-20 RX ADMIN — Medication 1 TABLET(S): at 11:46

## 2023-08-20 RX ADMIN — MYCOPHENOLATE MOFETIL 500 MILLIGRAM(S): 250 CAPSULE ORAL at 11:47

## 2023-08-20 RX ADMIN — Medication 50 MILLIGRAM(S): at 06:35

## 2023-08-20 RX ADMIN — MYCOPHENOLATE MOFETIL 500 MILLIGRAM(S): 250 CAPSULE ORAL at 21:07

## 2023-08-20 RX ADMIN — ENOXAPARIN SODIUM 40 MILLIGRAM(S): 100 INJECTION SUBCUTANEOUS at 16:00

## 2023-08-20 RX ADMIN — RILPIVIRINE HYDROCHLORIDE 25 MILLIGRAM(S): 25 TABLET, FILM COATED ORAL at 17:06

## 2023-08-20 RX ADMIN — Medication 975 MILLIGRAM(S): at 22:08

## 2023-08-20 NOTE — PROGRESS NOTE ADULT - PROBLEM SELECTOR PLAN 8
Pt has hx of HLD. LDL WNL on this hospitalization.   - LDL results: 21 (8/16)    Plan  - can consider restarting during admission

## 2023-08-20 NOTE — PROGRESS NOTE ADULT - PROBLEM SELECTOR PLAN 2
Pt had orbital bone fracture as seen on imaging. No acute surgical intervention indicated.     Plan  - Sinus precautions: no nose blowing, avoid using straws avoid smoking, avoid sneezing with mouth closed, sleep with head elevated  - f/u with Dr. Jovany Vega within 7-days of discharge for continued evaluation of orbit

## 2023-08-20 NOTE — PROGRESS NOTE ADULT - PROBLEM SELECTOR PLAN 3
Pt w/ minor hemoptysis x2 on 8/17. New onset minor hemoptysis, per OMFS would continue Augmentin 875mg/125mg BID for a total of 7 days.    Plan  - c/w augmentin 875/125mg BID for total of 7 days

## 2023-08-20 NOTE — PROGRESS NOTE ADULT - ATTENDING COMMENTS
Patient was seen and examined at bedside. Case discuss with resident. Pt with some cough with blood tinged sputum.     OBJECTIVE:  Vital Signs Last 24 Hrs  T(C): 37.6 (20 Aug 2023 06:00), Max: 38.2 (19 Aug 2023 17:47)  T(F): 99.6 (20 Aug 2023 06:00), Max: 100.8 (19 Aug 2023 17:47)  HR: 84 (20 Aug 2023 06:00) (80 - 88)  BP: 138/78 (20 Aug 2023 06:00) (106/58 - 138/78)  BP(mean): --  RR: 18 (20 Aug 2023 06:00) (18 - 18)  SpO2: 95% (20 Aug 2023 06:00) (95% - 96%)    PHYSICAL EXAM:  Gen: NAD laying in bed  facial swelling   CV: RRR, +S1/S2, no mumur  Pulm: CTA b/l no wheezing or crackles   Abd: soft, NTND + BS no rebound or guarding                         12.2   6.59  )-----------( 232      ( 20 Aug 2023 05:30 )             41.6     08-20    136  |  105  |  13  ----------------------------<  99  See Note   |  22  |  0.44<L>    Ca    8.8      20 Aug 2023 05:30  Phos  3.8     08-20  Mg     2.0     08-20      CAPILLARY BLOOD GLUCOSE  POCT Blood Glucose.: 113 mg/dL (20 Aug 2023 09:01)  POCT Blood Glucose.: 116 mg/dL (19 Aug 2023 22:23)  POCT Blood Glucose.: 127 mg/dL (19 Aug 2023 17:48)    amLODIPine   Tablet 10 milliGRAM(s) Oral every 24 hours  amoxicillin  875 milliGRAM(s)/clavulanate 1 Tablet(s) Oral two times a day  dolutegravir 50 milliGRAM(s) Oral daily  enoxaparin Injectable 40 milliGRAM(s) SubCutaneous every 24 hours  folic acid 1 milliGRAM(s) Oral daily  glucagon  Injectable 1 milliGRAM(s) IntraMuscular once  hydroxychloroquine 200 milliGRAM(s) Oral two times a day  insulin lispro (ADMELOG) corrective regimen sliding scale   SubCutaneous at bedtime  insulin lispro (ADMELOG) corrective regimen sliding scale   SubCutaneous three times a day before meals  metoprolol tartrate 50 milliGRAM(s) Oral every 12 hours  mycophenolate mofetil 500 milliGRAM(s) Oral two times a day  rilpivirine 25 milliGRAM(s) Oral with dinner  trimethoprim  160 mG/sulfamethoxazole 800 mG 1 Tablet(s) Oral <User Schedule>    A/P: 72M with PMH of HTN, HLD, DM2, HIV, HFrEF, dermatomyositis and CAD presenting after a syncopal event following colonoscopy, sustaining orbital fractures and suffering an intracranial bleed.  Also noted to be extremely hypertensive.  Admitted to the stroke service for further evaluation.     #Intracerebral hemorrhage  - plan per stroke team  - Continue to hold anticoagulation    #Dermatomyositis  - Continue on cellcept and plaquenil  - no indications for antibiotics at this time  - continue with bactrim for PCP ppx    #HIV  - continue home HIV regimen - dolutegravir and rilpirivine (two component of januca - home medication not available at the hospital)    #Orbital fracture  - Per discussion with stroke team, the fractures were discussed with a consultant - if the eye is able to move without any evidence of muscle entrapment or other issues, can be followed up outpatient  - continue on augmentin for infection prophylaxis per OMFS consult for a total of 7 days antibiotic course    #DISPO  -Pt is likely for d/c today   -Pt to  f/u with Dr. Jovany Vega within 7-days of discharge for continued evaluation of orbit Patient was seen and examined at bedside. Case discuss with resident. Pt with some cough with blood tinged sputum.     OBJECTIVE:  Vital Signs Last 24 Hrs  T(C): 37.6 (20 Aug 2023 06:00), Max: 38.2 (19 Aug 2023 17:47)  T(F): 99.6 (20 Aug 2023 06:00), Max: 100.8 (19 Aug 2023 17:47)  HR: 84 (20 Aug 2023 06:00) (80 - 88)  BP: 138/78 (20 Aug 2023 06:00) (106/58 - 138/78)  BP(mean): --  RR: 18 (20 Aug 2023 06:00) (18 - 18)  SpO2: 95% (20 Aug 2023 06:00) (95% - 96%)    PHYSICAL EXAM:  Gen: NAD laying in bed  facial swelling   CV: RRR, +S1/S2, no mumur  Pulm: CTA b/l no wheezing or crackles   Abd: soft, NTND + BS no rebound or guarding                         12.2   6.59  )-----------( 232      ( 20 Aug 2023 05:30 )             41.6     08-20    136  |  105  |  13  ----------------------------<  99  See Note   |  22  |  0.44<L>    Ca    8.8      20 Aug 2023 05:30  Phos  3.8     08-20  Mg     2.0     08-20      CAPILLARY BLOOD GLUCOSE  POCT Blood Glucose.: 113 mg/dL (20 Aug 2023 09:01)  POCT Blood Glucose.: 116 mg/dL (19 Aug 2023 22:23)  POCT Blood Glucose.: 127 mg/dL (19 Aug 2023 17:48)    amLODIPine   Tablet 10 milliGRAM(s) Oral every 24 hours  amoxicillin  875 milliGRAM(s)/clavulanate 1 Tablet(s) Oral two times a day  dolutegravir 50 milliGRAM(s) Oral daily  enoxaparin Injectable 40 milliGRAM(s) SubCutaneous every 24 hours  folic acid 1 milliGRAM(s) Oral daily  glucagon  Injectable 1 milliGRAM(s) IntraMuscular once  hydroxychloroquine 200 milliGRAM(s) Oral two times a day  insulin lispro (ADMELOG) corrective regimen sliding scale   SubCutaneous at bedtime  insulin lispro (ADMELOG) corrective regimen sliding scale   SubCutaneous three times a day before meals  metoprolol tartrate 50 milliGRAM(s) Oral every 12 hours  mycophenolate mofetil 500 milliGRAM(s) Oral two times a day  rilpivirine 25 milliGRAM(s) Oral with dinner  trimethoprim  160 mG/sulfamethoxazole 800 mG 1 Tablet(s) Oral <User Schedule>    A/P: 72M with PMH of HTN, HLD, DM2, HIV, HFrEF, dermatomyositis and CAD presenting after a syncopal event following colonoscopy, sustaining orbital fractures and suffering an intracranial bleed.  Also noted to be extremely hypertensive.  Admitted to the stroke service for further evaluation.     #Intracerebral hemorrhage  - plan per stroke team  - Continue to hold anticoagulation    #Dermatomyositis  - Continue on cellcept and plaquenil  - no indications for antibiotics at this time  - continue with bactrim for PCP ppx    #HIV  - continue home HIV regimen - dolutegravir and rilpirivine (two component of januca - home medication not available at the hospital)    #Orbital fracture  - Per discussion with stroke team, the fractures were discussed with a consultant - if the eye is able to move without any evidence of muscle entrapment or other issues, can be followed up outpatient  - continue on augmentin for infection prophylaxis per OMFS consult for a total of 7 days antibiotic course    #DISPO  -Pt is for ALISSA placement; Will f/u SW   -Pt to  f/u with Dr. Jovany Vega within 7-days of discharge for continued evaluation of orbit

## 2023-08-20 NOTE — PROGRESS NOTE ADULT - PROBLEM SELECTOR PLAN 1
Pt underwent extensive CVA workup, including q8hr stroke neuro checks and vitals. MRI brain w/wo contrast showed: Acute hemorrhage in the left basal ganglia and corona radiata and focal hemorrhage in the septum pellucidum and left external capsule, along with R SDH and R orbital bone fx. Stroke Code HCT Results: R trace SDH, R orbital floor Fx, L BG/CR hyperdensity c/f bleed Vs mineralisation. Stroke Code CTA Results: Negative.    Plan  - No DAPT 2/2 R BG/CR bleed   -SBP<140  - Stroke education

## 2023-08-20 NOTE — PROGRESS NOTE ADULT - ASSESSMENT
72y Male with PMHx of HTN, HLD, DM, CAD, CHF, HIV, Dermatomyositis presented to [facility] for planned colonoscopy due + Fecal immunochemical test (FIT). Stroke code called on pt after colonoscopy due to a syncopal event and fall hitting his head/face. Pt admitted to stroke tele for further w/u. Pt found to be febrile, infectious w/u unrevealing after pt was persistently having low grade fevers and tachycardic with some periods of confusion. Augmentin started on 8/17 as patient had 2 minor episodes of hemoptysis, patient does not appear infectious currently. MRI w/wo contrast showed Acute hemorrhage in the left basal ganglia and corona radiata and focal hemorrhage in the septum pellucidum and left external capsule, along with R SDH and R orbital bone fx. Currently reporting improvement while on regional medical floor. Pending ALISSA zuleima.

## 2023-08-20 NOTE — PROGRESS NOTE ADULT - SUBJECTIVE AND OBJECTIVE BOX
MARCUS IRWIN  72y  Male      Patient is a 72y old  Male who presents with a chief complaint of Code Stroke after syncopal episode following colonoscopy (15 Aug 2023 22:25)      INTERVAL HPI/OVERNIGHT EVENTS: NIKKO overnight. This morning the patient reports some difficulty seeing due to facial swelling that worsens when laying flat to sleep and improves throughout the day. He is also coughing up some small blood clots and blood tinged sputum since the fall. Otherwise, he denies any chest pain, SOB, abdominal pain. N/V/D/C.     Vital Signs Last 24 Hrs  T(C): 36.9 (20 Aug 2023 12:53), Max: 38.2 (19 Aug 2023 17:47)  T(F): 98.5 (20 Aug 2023 12:53), Max: 100.8 (19 Aug 2023 17:47)  HR: 85 (20 Aug 2023 12:53) (80 - 88)  BP: 127/77 (20 Aug 2023 12:53) (106/58 - 138/78)  BP(mean): --  RR: 16 (20 Aug 2023 12:53) (16 - 18)  SpO2: 95% (20 Aug 2023 12:53) (95% - 96%)    Parameters below as of 20 Aug 2023 12:53  Patient On (Oxygen Delivery Method): room air        PHYSICAL EXAM:  GENERAL: NAD however swollen face  HEAD: facial swelling with ecchymosis noted on ride side of face,   EYES: face swollen and affecting ability to open eyes   ENMT: Moist mucous membranes, Good dentition, No lesions  NERVOUS SYSTEM:  Alert & Oriented X3, Good concentration; Motor Strength 5/5 B/L upper and lower extremities  CHEST/LUNG: Clear to auscultation bilaterally; No rales, rhonchi, wheezing, or rubs  HEART: Regular rate and rhythm; No murmurs, rubs, or gallops  ABDOMEN: Soft, Nontender, Nondistended; Bowel sounds present  EXTREMITIES:  2+ Peripheral Pulses,    Consultant(s) Notes Reviewed:  [x ] YES  [ ] NO  Care Discussed with Consultants/Other Providers [ x] YES  [ ] NO    LABS:                        12.2   6.59  )-----------( 232      ( 20 Aug 2023 05:30 )             41.6     08-20    x   |  x   |  x   ----------------------------<  x   4.6   |  x   |  x     Ca    8.8      20 Aug 2023 05:30  Phos  3.8     08-20  Mg     2.0     08-20        Urinalysis Basic - ( 20 Aug 2023 05:30 )    Color: x / Appearance: x / SG: x / pH: x  Gluc: 99 mg/dL / Ketone: x  / Bili: x / Urobili: x   Blood: x / Protein: x / Nitrite: x   Leuk Esterase: x / RBC: x / WBC x   Sq Epi: x / Non Sq Epi: x / Bacteria: x        CAPILLARY BLOOD GLUCOSE      POCT Blood Glucose.: 99 mg/dL (20 Aug 2023 13:14)  POCT Blood Glucose.: 113 mg/dL (20 Aug 2023 09:01)  POCT Blood Glucose.: 116 mg/dL (19 Aug 2023 22:23)  POCT Blood Glucose.: 127 mg/dL (19 Aug 2023 17:48)      RADIOLOGY & ADDITIONAL TESTS:    Imaging Personally Reviewed:  [ ] YES  [ ] NO

## 2023-08-20 NOTE — PROGRESS NOTE ADULT - PROBLEM SELECTOR PLAN 9
F: N/A  E: replete K<4 and Mg <2  N: DASH/TLC  DVT Prophylaxis: SCDs (Cleared on 8/17 for SQL)    Code Status: Full Code  Dispo: FAREED

## 2023-08-21 DIAGNOSIS — I25.10 ATHEROSCLEROTIC HEART DISEASE OF NATIVE CORONARY ARTERY WITHOUT ANGINA PECTORIS: ICD-10-CM

## 2023-08-21 DIAGNOSIS — R21 RASH AND OTHER NONSPECIFIC SKIN ERUPTION: ICD-10-CM

## 2023-08-21 DIAGNOSIS — Z79.52 LONG TERM (CURRENT) USE OF SYSTEMIC STEROIDS: ICD-10-CM

## 2023-08-21 DIAGNOSIS — R19.5 OTHER FECAL ABNORMALITIES: ICD-10-CM

## 2023-08-21 DIAGNOSIS — I50.22 CHRONIC SYSTOLIC (CONGESTIVE) HEART FAILURE: ICD-10-CM

## 2023-08-21 DIAGNOSIS — E78.00 PURE HYPERCHOLESTEROLEMIA, UNSPECIFIED: ICD-10-CM

## 2023-08-21 DIAGNOSIS — I11.0 HYPERTENSIVE HEART DISEASE WITH HEART FAILURE: ICD-10-CM

## 2023-08-21 DIAGNOSIS — D12.2 BENIGN NEOPLASM OF ASCENDING COLON: ICD-10-CM
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
## 2023-08-21 DIAGNOSIS — K57.30 DIVERTICULOSIS OF LARGE INTESTINE WITHOUT PERFORATION OR ABSCESS WITHOUT BLEEDING: ICD-10-CM
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
## 2023-08-21 DIAGNOSIS — R55 SYNCOPE AND COLLAPSE: ICD-10-CM

## 2023-08-21 DIAGNOSIS — D12.7 BENIGN NEOPLASM OF RECTOSIGMOID JUNCTION: ICD-10-CM

## 2023-08-21 DIAGNOSIS — E11.9 TYPE 2 DIABETES MELLITUS WITHOUT COMPLICATIONS: ICD-10-CM

## 2023-08-21 DIAGNOSIS — B20 HUMAN IMMUNODEFICIENCY VIRUS [HIV] DISEASE: ICD-10-CM

## 2023-08-21 DIAGNOSIS — Z79.899 OTHER LONG TERM (CURRENT) DRUG THERAPY: ICD-10-CM

## 2023-08-21 LAB
ANION GAP SERPL CALC-SCNC: 8 MMOL/L — SIGNIFICANT CHANGE UP (ref 5–17)
APPEARANCE UR: CLEAR — SIGNIFICANT CHANGE UP
BACTERIA # UR AUTO: PRESENT /HPF
BILIRUB UR-MCNC: NEGATIVE — SIGNIFICANT CHANGE UP
BUN SERPL-MCNC: 12 MG/DL — SIGNIFICANT CHANGE UP (ref 7–23)
CALCIUM SERPL-MCNC: 8.6 MG/DL — SIGNIFICANT CHANGE UP (ref 8.4–10.5)
CHLORIDE SERPL-SCNC: 104 MMOL/L — SIGNIFICANT CHANGE UP (ref 96–108)
CK SERPL-CCNC: 1558 U/L — HIGH (ref 30–200)
CO2 SERPL-SCNC: 26 MMOL/L — SIGNIFICANT CHANGE UP (ref 22–31)
COLOR SPEC: YELLOW — SIGNIFICANT CHANGE UP
COMMENT - URINE: SIGNIFICANT CHANGE UP
CREAT SERPL-MCNC: 0.51 MG/DL — SIGNIFICANT CHANGE UP (ref 0.5–1.3)
CRP SERPL-MCNC: 58.5 MG/L — HIGH (ref 0–4)
CRP SERPL-MCNC: 60.6 MG/L — HIGH (ref 0–4)
CULTURE RESULTS: SIGNIFICANT CHANGE UP
CULTURE RESULTS: SIGNIFICANT CHANGE UP
DIFF PNL FLD: NEGATIVE — SIGNIFICANT CHANGE UP
EGFR: 108 ML/MIN/1.73M2 — SIGNIFICANT CHANGE UP
EPI CELLS # UR: SIGNIFICANT CHANGE UP /HPF (ref 0–5)
ERYTHROCYTE [SEDIMENTATION RATE] IN BLOOD: 87 MM/HR — HIGH
GLUCOSE BLDC GLUCOMTR-MCNC: 111 MG/DL — HIGH (ref 70–99)
GLUCOSE BLDC GLUCOMTR-MCNC: 121 MG/DL — HIGH (ref 70–99)
GLUCOSE BLDC GLUCOMTR-MCNC: 134 MG/DL — HIGH (ref 70–99)
GLUCOSE BLDC GLUCOMTR-MCNC: 90 MG/DL — SIGNIFICANT CHANGE UP (ref 70–99)
GLUCOSE SERPL-MCNC: 98 MG/DL — SIGNIFICANT CHANGE UP (ref 70–99)
GLUCOSE UR QL: NEGATIVE — SIGNIFICANT CHANGE UP
HCT VFR BLD CALC: 37 % — LOW (ref 39–50)
HGB BLD-MCNC: 11 G/DL — LOW (ref 13–17)
KETONES UR-MCNC: ABNORMAL MG/DL
LEUKOCYTE ESTERASE UR-ACNC: NEGATIVE — SIGNIFICANT CHANGE UP
MAGNESIUM SERPL-MCNC: 2 MG/DL — SIGNIFICANT CHANGE UP (ref 1.6–2.6)
MCHC RBC-ENTMCNC: 25.6 PG — LOW (ref 27–34)
MCHC RBC-ENTMCNC: 29.7 GM/DL — LOW (ref 32–36)
MCV RBC AUTO: 86.2 FL — SIGNIFICANT CHANGE UP (ref 80–100)
MYOGLOBIN SERPL-MCNC: 303 NG/ML — HIGH (ref 16–96)
NITRITE UR-MCNC: NEGATIVE — SIGNIFICANT CHANGE UP
NRBC # BLD: 0 /100 WBCS — SIGNIFICANT CHANGE UP (ref 0–0)
PH UR: 5.5 — SIGNIFICANT CHANGE UP (ref 5–8)
PHOSPHATE SERPL-MCNC: 3.7 MG/DL — SIGNIFICANT CHANGE UP (ref 2.5–4.5)
PLATELET # BLD AUTO: 259 K/UL — SIGNIFICANT CHANGE UP (ref 150–400)
POTASSIUM SERPL-MCNC: 4.5 MMOL/L — SIGNIFICANT CHANGE UP (ref 3.5–5.3)
POTASSIUM SERPL-SCNC: 4.5 MMOL/L — SIGNIFICANT CHANGE UP (ref 3.5–5.3)
PROCALCITONIN SERPL-MCNC: 0.07 NG/ML — SIGNIFICANT CHANGE UP (ref 0.02–0.1)
PROT UR-MCNC: 30 MG/DL
RAPID RVP RESULT: SIGNIFICANT CHANGE UP
RBC # BLD: 4.29 M/UL — SIGNIFICANT CHANGE UP (ref 4.2–5.8)
RBC # FLD: 14.5 % — SIGNIFICANT CHANGE UP (ref 10.3–14.5)
RBC CASTS # UR COMP ASSIST: < 5 /HPF — SIGNIFICANT CHANGE UP
SARS-COV-2 RNA SPEC QL NAA+PROBE: SIGNIFICANT CHANGE UP
SODIUM SERPL-SCNC: 138 MMOL/L — SIGNIFICANT CHANGE UP (ref 135–145)
SP GR SPEC: >=1.03 — SIGNIFICANT CHANGE UP (ref 1–1.03)
SPECIMEN SOURCE: SIGNIFICANT CHANGE UP
SPECIMEN SOURCE: SIGNIFICANT CHANGE UP
UROBILINOGEN FLD QL: 1 E.U./DL — SIGNIFICANT CHANGE UP
WBC # BLD: 7.43 K/UL — SIGNIFICANT CHANGE UP (ref 3.8–10.5)
WBC # FLD AUTO: 7.43 K/UL — SIGNIFICANT CHANGE UP (ref 3.8–10.5)
WBC UR QL: < 5 /HPF — SIGNIFICANT CHANGE UP

## 2023-08-21 PROCEDURE — 99233 SBSQ HOSP IP/OBS HIGH 50: CPT

## 2023-08-21 PROCEDURE — 99232 SBSQ HOSP IP/OBS MODERATE 35: CPT | Mod: 95

## 2023-08-21 PROCEDURE — 71045 X-RAY EXAM CHEST 1 VIEW: CPT | Mod: 26

## 2023-08-21 PROCEDURE — 99222 1ST HOSP IP/OBS MODERATE 55: CPT

## 2023-08-21 PROCEDURE — 76882 US LMTD JT/FCL EVL NVASC XTR: CPT | Mod: 26,RT

## 2023-08-21 RX ORDER — CEFAZOLIN SODIUM 1 G
2000 VIAL (EA) INJECTION EVERY 8 HOURS
Refills: 0 | Status: DISCONTINUED | OUTPATIENT
Start: 2023-08-21 | End: 2023-08-22

## 2023-08-21 RX ORDER — CEFAZOLIN SODIUM 1 G
2000 VIAL (EA) INJECTION EVERY 8 HOURS
Refills: 0 | Status: DISCONTINUED | OUTPATIENT
Start: 2023-08-21 | End: 2023-08-21

## 2023-08-21 RX ADMIN — Medication 200 MILLIGRAM(S): at 23:40

## 2023-08-21 RX ADMIN — AMLODIPINE BESYLATE 10 MILLIGRAM(S): 2.5 TABLET ORAL at 12:19

## 2023-08-21 RX ADMIN — Medication 1 TABLET(S): at 09:04

## 2023-08-21 RX ADMIN — Medication 2000 MILLIGRAM(S): at 23:41

## 2023-08-21 RX ADMIN — Medication 50 MILLIGRAM(S): at 07:04

## 2023-08-21 RX ADMIN — Medication 1 MILLIGRAM(S): at 12:19

## 2023-08-21 RX ADMIN — Medication 1 TABLET(S): at 07:04

## 2023-08-21 RX ADMIN — RILPIVIRINE HYDROCHLORIDE 25 MILLIGRAM(S): 25 TABLET, FILM COATED ORAL at 17:38

## 2023-08-21 RX ADMIN — Medication 975 MILLIGRAM(S): at 13:17

## 2023-08-21 RX ADMIN — ENOXAPARIN SODIUM 40 MILLIGRAM(S): 100 INJECTION SUBCUTANEOUS at 17:38

## 2023-08-21 RX ADMIN — MYCOPHENOLATE MOFETIL 500 MILLIGRAM(S): 250 CAPSULE ORAL at 23:40

## 2023-08-21 RX ADMIN — Medication 975 MILLIGRAM(S): at 12:19

## 2023-08-21 RX ADMIN — Medication 50 MILLIGRAM(S): at 17:40

## 2023-08-21 RX ADMIN — Medication 975 MILLIGRAM(S): at 23:44

## 2023-08-21 RX ADMIN — Medication 200 MILLIGRAM(S): at 09:04

## 2023-08-21 RX ADMIN — DOLUTEGRAVIR SODIUM 50 MILLIGRAM(S): 25 TABLET, FILM COATED ORAL at 12:19

## 2023-08-21 RX ADMIN — MYCOPHENOLATE MOFETIL 500 MILLIGRAM(S): 250 CAPSULE ORAL at 09:04

## 2023-08-21 RX ADMIN — Medication 1 TABLET(S): at 23:40

## 2023-08-21 NOTE — DIETITIAN INITIAL EVALUATION ADULT - ADD RECOMMEND
1. Continue with diet order   2. Encourage pt to meet nutritional needs as able   3. Monitor labs: electrolytes, cmp  4. Monitor weights   5. Pain and bowel regimen per team   6. Will continue to assess/honor food preferences as able  7. Monitor adherence to diet education

## 2023-08-21 NOTE — PROGRESS NOTE ADULT - SUBJECTIVE AND OBJECTIVE BOX
INFECTIOUS DISEASES CONSULT FOLLOW-UP NOTE    INTERVAL HPI/OVERNIGHT EVENTS:      ROS:   Constitutional, eyes, ENT, cardiovascular, respiratory, gastrointestinal, genitourinary, integumentary, neurological, psychiatric and heme/lymph are otherwise negative other than noted above       ANTIBIOTICS/RELEVANT:    MEDICATIONS  (STANDING):  amLODIPine   Tablet 10 milliGRAM(s) Oral every 24 hours  amoxicillin  875 milliGRAM(s)/clavulanate 1 Tablet(s) Oral two times a day  dextrose 5%. 1000 milliLiter(s) (50 mL/Hr) IV Continuous <Continuous>  dextrose 5%. 1000 milliLiter(s) (100 mL/Hr) IV Continuous <Continuous>  dextrose 50% Injectable 25 Gram(s) IV Push once  dextrose 50% Injectable 12.5 Gram(s) IV Push once  dextrose 50% Injectable 25 Gram(s) IV Push once  dolutegravir 50 milliGRAM(s) Oral daily  enoxaparin Injectable 40 milliGRAM(s) SubCutaneous every 24 hours  folic acid 1 milliGRAM(s) Oral daily  glucagon  Injectable 1 milliGRAM(s) IntraMuscular once  hydroxychloroquine 200 milliGRAM(s) Oral two times a day  insulin lispro (ADMELOG) corrective regimen sliding scale   SubCutaneous three times a day before meals  insulin lispro (ADMELOG) corrective regimen sliding scale   SubCutaneous at bedtime  metoprolol tartrate 50 milliGRAM(s) Oral every 12 hours  mycophenolate mofetil 500 milliGRAM(s) Oral two times a day  rilpivirine 25 milliGRAM(s) Oral with dinner  trimethoprim  160 mG/sulfamethoxazole 800 mG 1 Tablet(s) Oral <User Schedule>    MEDICATIONS  (PRN):  acetaminophen     Tablet .. 975 milliGRAM(s) Oral every 8 hours PRN Temp greater or equal to 38C (100.4F), Mild Pain (1 - 3)  dextrose Oral Gel 15 Gram(s) Oral once PRN Blood Glucose LESS THAN 70 milliGRAM(s)/deciliter        Vital Signs Last 24 Hrs  T(C): 37.6 (21 Aug 2023 13:46), Max: 38.5 (20 Aug 2023 20:58)  T(F): 99.7 (21 Aug 2023 13:46), Max: 101.3 (20 Aug 2023 20:58)  HR: 89 (21 Aug 2023 11:57) (87 - 89)  BP: 119/70 (21 Aug 2023 11:57) (101/59 - 119/70)  BP(mean): --  RR: 17 (21 Aug 2023 11:57) (17 - 18)  SpO2: 94% (21 Aug 2023 11:57) (94% - 95%)    Parameters below as of 21 Aug 2023 11:57  Patient On (Oxygen Delivery Method): room air        PHYSICAL EXAM:  Constitutional: alert, NAD  HEENT: Swelling and brusing over b/l portions of face, though R>L. No significant warmth, erythema, or tenderness over sinuses   Neck: the appearance of the neck was normal and the neck was supple.   Pulmonary: no respiratory distress and lungs were clear to auscultation bilaterally.   Heart: heart rate was normal, + ejection murmur at apex   Vascular:. there was no peripheral edema  Skin: ~ 4x6 area of blanching, non-tender, non-purulent/non-fluctuant erythema on the R lower back   Abdomen: normal bowel sounds, soft, non-tender  Neurological: no focal deficits.   Psychiatric: the affect was normal        LABS:                        11.0   7.43  )-----------( 259      ( 21 Aug 2023 07:58 )             37.0     08-21    138  |  104  |  12  ----------------------------<  98  4.5   |  26  |  0.51    Ca    8.6      21 Aug 2023 07:58  Phos  3.7     08-21  Mg     2.0     08-21        Urinalysis Basic - ( 21 Aug 2023 07:58 )    Color: x / Appearance: x / SG: x / pH: x  Gluc: 98 mg/dL / Ketone: x  / Bili: x / Urobili: x   Blood: x / Protein: x / Nitrite: x   Leuk Esterase: x / RBC: x / WBC x   Sq Epi: x / Non Sq Epi: x / Bacteria: x        MICROBIOLOGY:      RADIOLOGY & ADDITIONAL STUDIES:  Reviewed INFECTIOUS DISEASES CONSULT FOLLOW-UP NOTE    INTERVAL HPI/OVERNIGHT EVENTS: Pt had T of 101.3 overnight. Apart from this, LINDA     ROS: Pt denies fevers, chills, nausea, vomiting, diarrhea, SOB, CP, headaches, and dizziness.   Constitutional, eyes, ENT, cardiovascular, respiratory, gastrointestinal, genitourinary, integumentary, neurological, psychiatric and heme/lymph are otherwise negative other than noted above       ANTIBIOTICS/RELEVANT:    MEDICATIONS  (STANDING):  amLODIPine   Tablet 10 milliGRAM(s) Oral every 24 hours  amoxicillin  875 milliGRAM(s)/clavulanate 1 Tablet(s) Oral two times a day  dextrose 5%. 1000 milliLiter(s) (50 mL/Hr) IV Continuous <Continuous>  dextrose 5%. 1000 milliLiter(s) (100 mL/Hr) IV Continuous <Continuous>  dextrose 50% Injectable 25 Gram(s) IV Push once  dextrose 50% Injectable 12.5 Gram(s) IV Push once  dextrose 50% Injectable 25 Gram(s) IV Push once  dolutegravir 50 milliGRAM(s) Oral daily  enoxaparin Injectable 40 milliGRAM(s) SubCutaneous every 24 hours  folic acid 1 milliGRAM(s) Oral daily  glucagon  Injectable 1 milliGRAM(s) IntraMuscular once  hydroxychloroquine 200 milliGRAM(s) Oral two times a day  insulin lispro (ADMELOG) corrective regimen sliding scale   SubCutaneous three times a day before meals  insulin lispro (ADMELOG) corrective regimen sliding scale   SubCutaneous at bedtime  metoprolol tartrate 50 milliGRAM(s) Oral every 12 hours  mycophenolate mofetil 500 milliGRAM(s) Oral two times a day  rilpivirine 25 milliGRAM(s) Oral with dinner  trimethoprim  160 mG/sulfamethoxazole 800 mG 1 Tablet(s) Oral <User Schedule>    MEDICATIONS  (PRN):  acetaminophen     Tablet .. 975 milliGRAM(s) Oral every 8 hours PRN Temp greater or equal to 38C (100.4F), Mild Pain (1 - 3)  dextrose Oral Gel 15 Gram(s) Oral once PRN Blood Glucose LESS THAN 70 milliGRAM(s)/deciliter        Vital Signs Last 24 Hrs  T(C): 37.6 (21 Aug 2023 13:46), Max: 38.5 (20 Aug 2023 20:58)  T(F): 99.7 (21 Aug 2023 13:46), Max: 101.3 (20 Aug 2023 20:58)  HR: 89 (21 Aug 2023 11:57) (87 - 89)  BP: 119/70 (21 Aug 2023 11:57) (101/59 - 119/70)  BP(mean): --  RR: 17 (21 Aug 2023 11:57) (17 - 18)  SpO2: 94% (21 Aug 2023 11:57) (94% - 95%)    Parameters below as of 21 Aug 2023 11:57  Patient On (Oxygen Delivery Method): room air        PHYSICAL EXAM:  Constitutional: alert, NAD  HEENT: Swelling and brusing over b/l portions of face, though R>L. No significant warmth, erythema, or tenderness over sinuses   Neck: the appearance of the neck was normal and the neck was supple.   Pulmonary: no respiratory distress and lungs were clear to auscultation bilaterally.   Heart: heart rate was normal, + ejection murmur at apex   Vascular:. there was no peripheral edema  Skin: ~ 4x6 area of blanching, non-tender, non-purulent/non-fluctuant erythema on the R lower back   Abdomen: normal bowel sounds, soft, non-tender  Neurological: no focal deficits.   Psychiatric: the affect was normal        LABS:                        11.0   7.43  )-----------( 259      ( 21 Aug 2023 07:58 )             37.0     08-21    138  |  104  |  12  ----------------------------<  98  4.5   |  26  |  0.51    Ca    8.6      21 Aug 2023 07:58  Phos  3.7     08-21  Mg     2.0     08-21        Urinalysis Basic - ( 21 Aug 2023 07:58 )    Color: x / Appearance: x / SG: x / pH: x  Gluc: 98 mg/dL / Ketone: x  / Bili: x / Urobili: x   Blood: x / Protein: x / Nitrite: x   Leuk Esterase: x / RBC: x / WBC x   Sq Epi: x / Non Sq Epi: x / Bacteria: x        MICROBIOLOGY:      RADIOLOGY & ADDITIONAL STUDIES:  Reviewed

## 2023-08-21 NOTE — PROGRESS NOTE ADULT - PROBLEM SELECTOR PLAN 2
Pt underwent extensive CVA workup, including q8hr stroke neuro checks and vitals. MRI brain w/wo contrast showed: Acute hemorrhage in the left basal ganglia and corona radiata and focal hemorrhage in the septum pellucidum and left external capsule, along with R SDH and R orbital bone fx. Stroke Code HCT Results: R trace SDH, R orbital floor Fx, L BG/CR hyperdensity c/f bleed Vs mineralisation. Stroke Code CTA Results: Negative.    Plan  - No DAPT 2/2 R BG/CR bleed   -SBP<140  - Stroke education  - f/u neuro recs

## 2023-08-21 NOTE — PROGRESS NOTE ADULT - ATTENDING COMMENTS
72 year old man w/ PMH of HTN, HLD, DM, HIV, HFrEF, presented after syncopal event in setting of ?hypoglycemia, suffered an orbital fracture and intracerebral hemorrhage.     CTH 8.15.23 L. BG hyperdensity involving the L. lentiform nucleus and corona radiata.   CTA h/n 8.15.23 negative   MR brain acute hemorrhage in the L. BG and CR and R. septum pallucidum.   TTE hyperdynamic LV, +PFO  A1c 7.6   LDL 21    Impression: L. BG ICH and small R. SDH --- etiology of both hemorrhages possibly traumatic, although the L. BG ICH may have resulted from HTN angiopathy.     Plan:   Hold all antiplatelets. No neurovascular indication for antiplatelets as this wasn't an ischemic event. If needed for cardiac reasons, can possibly start ASA in 2-4 weeks (repeat CTH prior)   Started vancomycin for the erythematous rash on the back  Continue DVT ppx   Abx per OMFS/medicine   Will need f/u MRI in 8 weeks     50 minutes spent on total encounter. The necessity of the time spent during the encounter on this date of service was due to:     Review of imaging and chart; obtaining history; examination of pt; discussion and coordination of care, and discussion of lifestyle modification and risk factor control.

## 2023-08-21 NOTE — PROGRESS NOTE ADULT - ASSESSMENT
72M with PMH of HTN, HLD, DM2, HIV, HFrEF, dermatomyositis and CAD presenting after a syncopal event following colonoscopy, sustaining orbital fractures and suffering an intracranial bleed.  Also noted to be extremely hypertensive.  Admitted to the stroke service for further evaluation.     #Intracerebral hemorrhage  - plan per stroke team  - holding any anticoagulation  - monitor on telemetry    #Febrile episode  #Elevated lactate (resolved)  #Dermatomyositis  #?Cellulitis  - another fever overnight  - recommend ultrasound of the lower back (had a ?lipoma which was diagnosed by a physician in [facility] and was treated with antibiotics and antiinflammatory drugs)  - ID consult  - recommend starting vancomycin for possibility of abscess  - follow up with outpatient rheumatologist, particularly with immunosuppressive medications    #HIV  - continue home HIV regimen - dolutegravir and rilpirivine (two component of januca - home medication not available at the hospital)    #Orbital fracture  - per discussion with stroke team, the fractures were discussed with a consultant - if the eye is able to move without any evidence of muscle entrapment or other issues, can be followed up outpatient  - continue on augmentin for infection prophylaxis per OMFS consult  Plan of care discussed with stroke team.    50 minutes spent on this encounter, including face to face with patient, care coordination and documentation.

## 2023-08-21 NOTE — PROGRESS NOTE ADULT - SUBJECTIVE AND OBJECTIVE BOX
Feeling well today, denies feeling febrile overnight.  No new issues to report.      Remaining ROS negative   PHYSICAL EXAM:    General: no acute distress, lying in bed  HEENT: facial swelling 2/2 trauma, MMM  Dermatologic: band of erythema across lower back, warm to palpation, no obvious fluctuance    VITAL SIGNS:  Vital Signs Last 24 Hrs  T(C): 37.6 (21 Aug 2023 13:46), Max: 38.5 (20 Aug 2023 20:58)  T(F): 99.7 (21 Aug 2023 13:46), Max: 101.3 (20 Aug 2023 20:58)  HR: 89 (21 Aug 2023 11:57) (87 - 94)  BP: 119/70 (21 Aug 2023 11:57) (101/59 - 133/76)  BP(mean): --  RR: 17 (21 Aug 2023 11:57) (16 - 18)  SpO2: 94% (21 Aug 2023 11:57) (94% - 95%)    Parameters below as of 21 Aug 2023 11:57  Patient On (Oxygen Delivery Method): room air    MEDICATIONS:  MEDICATIONS  (STANDING):  amLODIPine   Tablet 10 milliGRAM(s) Oral every 24 hours  amoxicillin  875 milliGRAM(s)/clavulanate 1 Tablet(s) Oral two times a day  dextrose 5%. 1000 milliLiter(s) (50 mL/Hr) IV Continuous <Continuous>  dextrose 5%. 1000 milliLiter(s) (100 mL/Hr) IV Continuous <Continuous>  dextrose 50% Injectable 25 Gram(s) IV Push once  dextrose 50% Injectable 12.5 Gram(s) IV Push once  dextrose 50% Injectable 25 Gram(s) IV Push once  dolutegravir 50 milliGRAM(s) Oral daily  enoxaparin Injectable 40 milliGRAM(s) SubCutaneous every 24 hours  folic acid 1 milliGRAM(s) Oral daily  glucagon  Injectable 1 milliGRAM(s) IntraMuscular once  hydroxychloroquine 200 milliGRAM(s) Oral two times a day  insulin lispro (ADMELOG) corrective regimen sliding scale   SubCutaneous at bedtime  insulin lispro (ADMELOG) corrective regimen sliding scale   SubCutaneous three times a day before meals  metoprolol tartrate 50 milliGRAM(s) Oral every 12 hours  mycophenolate mofetil 500 milliGRAM(s) Oral two times a day  rilpivirine 25 milliGRAM(s) Oral with dinner  trimethoprim  160 mG/sulfamethoxazole 800 mG 1 Tablet(s) Oral <User Schedule>    MEDICATIONS  (PRN):  acetaminophen     Tablet .. 975 milliGRAM(s) Oral every 8 hours PRN Temp greater or equal to 38C (100.4F), Mild Pain (1 - 3)  dextrose Oral Gel 15 Gram(s) Oral once PRN Blood Glucose LESS THAN 70 milliGRAM(s)/deciliter      ALLERGIES:  Allergies    No Known Allergies    Intolerances        LABS:                        11.0   7.43  )-----------( 259      ( 21 Aug 2023 07:58 )             37.0     08-21    138  |  104  |  12  ----------------------------<  98  4.5   |  26  |  0.51    Ca    8.6      21 Aug 2023 07:58  Phos  3.7     08-21  Mg     2.0     08-21        Urinalysis Basic - ( 21 Aug 2023 07:58 )    Color: x / Appearance: x / SG: x / pH: x  Gluc: 98 mg/dL / Ketone: x  / Bili: x / Urobili: x   Blood: x / Protein: x / Nitrite: x   Leuk Esterase: x / RBC: x / WBC x   Sq Epi: x / Non Sq Epi: x / Bacteria: x      CAPILLARY BLOOD GLUCOSE      POCT Blood Glucose.: 121 mg/dL (21 Aug 2023 13:00)      RADIOLOGY & ADDITIONAL TESTS: Reviewed.

## 2023-08-21 NOTE — DIETITIAN INITIAL EVALUATION ADULT - PERTINENT LABORATORY DATA
08-21    138  |  104  |  12  ----------------------------<  98  4.5   |  26  |  0.51    Ca    8.6      21 Aug 2023 07:58  Phos  3.7     08-21  Mg     2.0     08-21    POCT Blood Glucose.: 90 mg/dL (08-21-23 @ 09:17)  A1C with Estimated Average Glucose Result: 7.6 % (08-16-23 @ 05:30)  A1C with Estimated Average Glucose Result: 7.0 % (02-02-23 @ 05:30)

## 2023-08-21 NOTE — PROGRESS NOTE ADULT - PROBLEM SELECTOR PLAN 1
Pt now complaining of erythematous rash on lower back, unsure of when it first appeared. Per pt, recently in [place], felt a bump on lower back which he showed to local doctor, was told it was a lipoma and was given antibiotics and had an ultrasound. Denies I&D or other interventions. Reports previously feeling pain at site of rash, however improved after taking ?antibiotics. Currently denies pain. Pt spiking intermittent fevers, unclear etiology. WBC nl, BCx NGTD. Rash likely cellulitis, however further workup needed given patients history of HIV and dermatomyositis.   - f/u ID recs  - f/u rheumatology recs  - f/u UA  - f/u CXR  - f/u procal, ESR  - Consider starting cefazolin 1g q8hr for suspected cellulitis

## 2023-08-21 NOTE — PROGRESS NOTE ADULT - ASSESSMENT
# Syncope in setting of likely dehydration from recent colonoscopy c/b Right orbital floor fracture and trace SDH  # ICH in L lentiform nucleus/corona radiata  # Fever  # HIV on Juluca (follows at [facility], with recent VL UD a few months ago, no longer following CD4)  # Dermatomyositis on methypred 4mg daily and  daily and on PCP ppx with bactrim for this indication    Patient has no current infectious syndrome, and did not have any prior to his colonoscopy. His R face is swollen but non-tender as compared to examination on 8/16. Pt had new TMax overnight of 101.3 overnight, however, denies feeling febrile since. Pt's only source of possible infection may be on the R lower back region, though area of erythema is non-tender, non-purulent, and without any obvious masses or lipomas. Pt denies nausea, vomiting, diarrhea, SOB, CP, headaches, dizziness. Pt did endorse having transient cough.     Recommendations:   - Please followup with Chest X-Ray   - Please add on a Procalcitonin, CRP to pt's AM labs   - Please start Cefazolin 2g Q8h, duration to be determined based off pt's clinical status     ID Team 1 will continue to follow

## 2023-08-21 NOTE — DIETITIAN INITIAL EVALUATION ADULT - OTHER CALCULATIONS
Based on Standards of Care pt within % IBW (140%) thus actual body weight used for all calculations (215#). Needs adjusted for advanced age. Based on Standards of Care pt within % IBW (140%) thus actual body weight used for all calculations (215#). Needs adjusted for advanced age, HIV.

## 2023-08-21 NOTE — PROGRESS NOTE ADULT - SUBJECTIVE AND OBJECTIVE BOX
O/N Events: Febrile to 101.3    Subjective/ROS: Patient seen and examined at bedside. Resting comfortably, now complaining of erythematous rash on lower back, unsure of when it first appeared. Per pt, recently in [place], felt a bump on lower back which he showed to local doctor, was given antibiotics. Denies pain, chills, headache, CP, SOB, changes in bowel/urinary habits.     VITALS  Vital Signs Last 24 Hrs  T(C): 37.6 (21 Aug 2023 13:46), Max: 38.5 (20 Aug 2023 20:58)  T(F): 99.7 (21 Aug 2023 13:46), Max: 101.3 (20 Aug 2023 20:58)  HR: 89 (21 Aug 2023 11:57) (87 - 94)  BP: 119/70 (21 Aug 2023 11:57) (101/59 - 133/76)  BP(mean): --  RR: 17 (21 Aug 2023 11:57) (16 - 18)  SpO2: 94% (21 Aug 2023 11:57) (94% - 95%)    Parameters below as of 21 Aug 2023 11:57  Patient On (Oxygen Delivery Method): room air      CAPILLARY BLOOD GLUCOSE    POCT Blood Glucose.: 121 mg/dL (21 Aug 2023 13:00)  POCT Blood Glucose.: 90 mg/dL (21 Aug 2023 09:17)  POCT Blood Glucose.: 122 mg/dL (20 Aug 2023 23:08)  POCT Blood Glucose.: 130 mg/dL (20 Aug 2023 18:16)    PHYSICAL EXAM  General: NAD  Head: Diffuse facial edema  Neck: Supple; no JVD  Respiratory: CTAB  Cardiovascular: Regular rhythm/rate  Gastrointestinal: Soft; NTND  Extremities: WWP; no edema/cyanosis  Neurological: A&Ox3  Derm: Erythematous rash with ill-defined borders crossing midline on lower back, 2 1mm puncture wounds. No fluctuance, drainage, or purulence.     MEDICATIONS  (STANDING):  amLODIPine   Tablet 10 milliGRAM(s) Oral every 24 hours  amoxicillin  875 milliGRAM(s)/clavulanate 1 Tablet(s) Oral two times a day  dextrose 5%. 1000 milliLiter(s) (50 mL/Hr) IV Continuous <Continuous>  dextrose 5%. 1000 milliLiter(s) (100 mL/Hr) IV Continuous <Continuous>  dextrose 50% Injectable 12.5 Gram(s) IV Push once  dextrose 50% Injectable 25 Gram(s) IV Push once  dextrose 50% Injectable 25 Gram(s) IV Push once  dolutegravir 50 milliGRAM(s) Oral daily  enoxaparin Injectable 40 milliGRAM(s) SubCutaneous every 24 hours  folic acid 1 milliGRAM(s) Oral daily  glucagon  Injectable 1 milliGRAM(s) IntraMuscular once  hydroxychloroquine 200 milliGRAM(s) Oral two times a day  insulin lispro (ADMELOG) corrective regimen sliding scale   SubCutaneous at bedtime  insulin lispro (ADMELOG) corrective regimen sliding scale   SubCutaneous three times a day before meals  metoprolol tartrate 50 milliGRAM(s) Oral every 12 hours  mycophenolate mofetil 500 milliGRAM(s) Oral two times a day  rilpivirine 25 milliGRAM(s) Oral with dinner  trimethoprim  160 mG/sulfamethoxazole 800 mG 1 Tablet(s) Oral <User Schedule>    MEDICATIONS  (PRN):  acetaminophen     Tablet .. 975 milliGRAM(s) Oral every 8 hours PRN Temp greater or equal to 38C (100.4F), Mild Pain (1 - 3)  dextrose Oral Gel 15 Gram(s) Oral once PRN Blood Glucose LESS THAN 70 milliGRAM(s)/deciliter    LABS                        11.0   7.43  )-----------( 259      ( 21 Aug 2023 07:58 )             37.0     08-21    138  |  104  |  12  ----------------------------<  98  4.5   |  26  |  0.51    Ca    8.6      21 Aug 2023 07:58  Phos  3.7     08-21  Mg     2.0     08-21

## 2023-08-21 NOTE — PROGRESS NOTE ADULT - ATTENDING COMMENTS
Reconsulted for intermittent fever. Patient has been on Augmentin as ppx per [facility]. His facial swelling/pain has been improving since admission. Denied HA, rhinorrhea, sore throat, n/v/d, abdominal pain, dysuria, CP, Cough. Reports new rash on R back which is mildly ttp. The area looks erythematous patch, blanching, no pastule/vesicules, crossing midline, warm.  Possible cellulitis. Doesn't look like zoster (no vesicles). Check UAX, f/u BCx, check CXR, check procal, CRP.  Start empiric cefazolin 2g IV q8h to see if erythema improves.   Cont Juluca.    Team 1 will follow you.  Case d/w primary team.    Nathalie Fisher MD, MS  Infectious Disease attending  work cell [phone]   For any questions during evening/weekend/holiday, please page ID on call

## 2023-08-21 NOTE — DIETITIAN INITIAL EVALUATION ADULT - OTHER INFO
72y Male with PMHx of HTN, HLD, DM, CAD, CHF, HIV, Dermatomyositis presented to [facility] for planned colonoscopy due + Fecal immunochemical test (FIT). Stroke code called on pt  today after colonoscopy due to a syncopal event and fall hitting his head/face. Stroke code was called. FSBS @ 57, SBPs post colonoscopy @ 200s ut during stroke code @ 120s. Pt was taken to CTs. Stroke code CTH w/? R BG hyperdensity c/f Hge vs mineralisation, trace SDH and R orbital floor Fx. CTA unremarkable. Pt admitted to stroke tele for further w/u. Pt found to be febrile, infectious w/u started and was started on unasyn which was later changed to vanco and cefipime as pt was persistently having low grade fevers and tachycardic with some periods of confusion. Elevated lactate initially which responded to IVF. Currently being monitored off IV Abx per ID reccs. Pending MRI and further w/u.    Patient seen at bedside for length of stay initial assessment. Current diet order: DASH/TLC, Consistent Carbohydrate. NKFA. No difficulty chewing/swallowing reported. Reports fair appetite, consumed 100% of breakfast which included eggs, turkey hernández, toast, fruit. PTA, patient reports good appetite and PO intake. Does not follow any specific diet at home. Discussed consistent carbohydrate diet, CHO examples, plate method, role of fiber, low sodium diet and salt alternatives when cooking at home- patient expressed understanding. Food preferences reviewed and documented in [facility]. Dosing weight: 215#, BMI 32.7, however reports # and suspects inaccurate dosing weight. Also suspects few pounds weight loss since admission due to procedures/NPO status- will continue to monitor weight trends. No pressure injuries documented. Noted with +3 edema to face/periorbital region. Denies N/V/D/C/abd pain, last BM 8/19 per EMR. Labs: HgbA1c 7.6%, glucose trending  x 24 hrs. Meds: abx, IVF, insulin, folate. Observed with no overt signs and symptoms of muscle or fat wasting. Based on ASPEN guidelines, pt does not meet criteria for malnutrition. See nutrition recommendations below.

## 2023-08-21 NOTE — DIETITIAN INITIAL EVALUATION ADULT - PERTINENT MEDS FT
MEDICATIONS  (STANDING):  amLODIPine   Tablet 10 milliGRAM(s) Oral every 24 hours  amoxicillin  875 milliGRAM(s)/clavulanate 1 Tablet(s) Oral two times a day  dextrose 5%. 1000 milliLiter(s) (50 mL/Hr) IV Continuous <Continuous>  dextrose 5%. 1000 milliLiter(s) (100 mL/Hr) IV Continuous <Continuous>  dextrose 50% Injectable 12.5 Gram(s) IV Push once  dextrose 50% Injectable 25 Gram(s) IV Push once  dextrose 50% Injectable 25 Gram(s) IV Push once  dolutegravir 50 milliGRAM(s) Oral daily  enoxaparin Injectable 40 milliGRAM(s) SubCutaneous every 24 hours  folic acid 1 milliGRAM(s) Oral daily  glucagon  Injectable 1 milliGRAM(s) IntraMuscular once  hydroxychloroquine 200 milliGRAM(s) Oral two times a day  insulin lispro (ADMELOG) corrective regimen sliding scale   SubCutaneous three times a day before meals  insulin lispro (ADMELOG) corrective regimen sliding scale   SubCutaneous at bedtime  metoprolol tartrate 50 milliGRAM(s) Oral every 12 hours  mycophenolate mofetil 500 milliGRAM(s) Oral two times a day  rilpivirine 25 milliGRAM(s) Oral with dinner  trimethoprim  160 mG/sulfamethoxazole 800 mG 1 Tablet(s) Oral <User Schedule>    MEDICATIONS  (PRN):  acetaminophen     Tablet .. 975 milliGRAM(s) Oral every 8 hours PRN Temp greater or equal to 38C (100.4F), Mild Pain (1 - 3)  dextrose Oral Gel 15 Gram(s) Oral once PRN Blood Glucose LESS THAN 70 milliGRAM(s)/deciliter

## 2023-08-21 NOTE — PROGRESS NOTE ADULT - ASSESSMENT
72y Male with PMHx of HTN, HLD, DM, CAD, CHF, HIV, Dermatomyositis presented to [facility] for planned colonoscopy due + Fecal immunochemical test (FIT). Stroke code called on pt after colonoscopy due to a syncopal event and fall hitting his head/face. Pt admitted to stroke tele for further w/u. Pt found to be febrile, infectious w/u unrevealing after pt was persistently having low grade fevers and tachycardic with some periods of confusion. Augmentin started on 8/17 as patient had 2 minor episodes of hemoptysis, patient does not appear infectious currently. MRI w/wo contrast showed Acute hemorrhage in the left basal ganglia and corona radiata and focal hemorrhage in the septum pellucidum and left external capsule, along with R SDH and R orbital bone fx. Currently reporting improvement while on regional medical floor.

## 2023-08-21 NOTE — PROGRESS NOTE ADULT - SUBJECTIVE AND OBJECTIVE BOX
INFECTIOUS DISEASES INITIAL CONSULT NOTE    HPI:  72M w/ hx HIV on Juluca (follows at [facility], with recent VL UD a few months ago, no longer following CD4) with reported excellent adherence to Juluca, dermatomyositis on methypred 4mg daily and  daily and on PCP ppx with bactrim for this indication, DMT2 (A1c 7.6%), HTN, HLD, CAD, and CHF, who presented to ED on 8/15 after he had a syncopal episode following routine outpatient colonoscopy, fell and hit face on floor, and subsequently had reported BLE weakness, stroke code called and imaging revealed two small sites of possible acute hemorrhage in L lentiform nucleus/corona radiata, as well as trace SDH, and a R orbital floor fracture. He was febrile on presentation to .7 x1 and had tachycardia and borderline lactate, but these all quickly resolved. Was on 2L NC but this also resolved quickly. BCx x2 ngtd, UA neg, Fluvid neg, CXR without focal consolidation. Received a dose of unasyn on 8/15, then vanc/cefepime on 8/16. ID consulted to assist with management.     Patient had been in his usual state of health prior to colonoscopy.  He does report pain over his R face, but no pain with EOM and feels vision is OK. Pain over R side of face is overall stable/improving.  Patient reports that his rash started about 1 month ago while on a visit to [place], at the time told by local ER physician to have a infected lipoma - given oral antibiotics  PMH of dermatomyositis currently on IVIG, mycophenolate and medrol last CPK at 1300 on 08/01/2023 - ongoing severe weakness of bilateral upper and lower extremities, reports having ongoing facial swelling that is worse since the fall - over the left side    PAST MEDICAL & SURGICAL HISTORY:  HTN (hypertension)      Diabetes      Hypercholesteremia      HIV disease      Chronic systolic congestive heart failure      CAD (coronary artery disease)      Dermatomyositis      No significant past surgical history            Review of Systems:   Constitutional, eyes, ENT, cardiovascular, respiratory, gastrointestinal, genitourinary, integumentary, neurological, psychiatric and heme/lymph are otherwise negative other than noted above       ANTIBIOTICS:  MEDICATIONS  (STANDING):  amLODIPine   Tablet 10 milliGRAM(s) Oral every 24 hours  folic acid 1 milliGRAM(s) Oral daily  methylPREDNISolone 16 milliGRAM(s) Oral daily  metoprolol tartrate 50 milliGRAM(s) Oral every 12 hours  trimethoprim  160 mG/sulfamethoxazole 800 mG 1 Tablet(s) Oral <User Schedule>  vancomycin  IVPB 1500 milliGRAM(s) IV Intermittent every 12 hours  vancomycin  IVPB        MEDICATIONS  (PRN):      Allergies    No Known Allergies    Intolerances        SOCIAL HISTORY: Not contributory to current presentation    FAMILY HISTORY:  No pertinent family history in first degree relatives     no FH leading to current infection    Vital Signs Last 24 Hrs  T(C): 37.6 (16 Aug 2023 14:50), Max: 38.7 (16 Aug 2023 00:24)  T(F): 99.7 (16 Aug 2023 14:50), Max: 101.7 (16 Aug 2023 00:24)  HR: 96 (16 Aug 2023 16:25) (85 - 111)  BP: 150/72 (16 Aug 2023 16:25) (120/62 - 176/84)  BP(mean): 103 (16 Aug 2023 16:25) (85 - 104)  RR: 22 (16 Aug 2023 16:25) (18 - 29)  SpO2: 93% (16 Aug 2023 16:25) (91% - 98%)    Parameters below as of 16 Aug 2023 16:25  Patient On (Oxygen Delivery Method): room air        08-15-23 @ 07:01  -  08-16-23 @ 07:00  --------------------------------------------------------  IN: 0 mL / OUT: 700 mL / NET: -700 mL    08-16-23 @ 07:01  -  08-16-23 @ 17:15  --------------------------------------------------------  IN: 336 mL / OUT: 200 mL / NET: 136 mL        PHYSICAL EXAM:  Constitutional: alert, NAD  HEENT: Swelling and brusing over R side of face. Eyelid swelling but can open eye and move eye around. No significant warmth or erythema. Mild appropriate tenderness.  Neck: the appearance of the neck was normal and the neck was supple.   Pulmonary: no respiratory distress and lungs were clear to auscultation bilaterally.   Heart: heart rate was normal and rhythm regular, normal S1 and S2  Vascular:. there was no peripheral edema  Abdomen: normal bowel sounds, soft, non-tender  Neurological: no focal deficits.   Psychiatric: the affect was normal  skin: - red, macular erythematous area over the lumbar spine - tender to touch - extends from left to right lower abdomen, V-shawl rash over torso and chest - ongoing heliotrope rash  LABS:                        12.0   10.32 )-----------( 240      ( 16 Aug 2023 05:30 )             40.9     08-16    136  |  103  |  10  ----------------------------<  119<H>  4.5   |  24  |  0.51    Ca    8.9      16 Aug 2023 05:30  Phos  3.8     08-16  Mg     2.0     08-16        Urinalysis Basic - ( 16 Aug 2023 05:30 )    Color: x / Appearance: x / SG: x / pH: x  Gluc: 119 mg/dL / Ketone: x  / Bili: x / Urobili: x   Blood: x / Protein: x / Nitrite: x   Leuk Esterase: x / RBC: x / WBC x   Sq Epi: x / Non Sq Epi: x / Bacteria: x        MICROBIOLOGY:  Reviewed    RADIOLOGY & ADDITIONAL STUDIES:  Reviewed

## 2023-08-21 NOTE — PROGRESS NOTE ADULT - PROBLEM SELECTOR PLAN 8
Known hx of Dermatomyositis. Home cellcept and Plaquenil restarted 8/17. s/p 1 dose Home Methylprednisone.    Plan  - c/w cellcept 500mg q12  - c/w plaquenil 200mg q12

## 2023-08-21 NOTE — PROGRESS NOTE ADULT - PROBLEM SELECTOR PLAN 3
Pt had orbital bone fracture as seen on imaging. No acute surgical intervention indicated.     Plan  - Sinus precautions: no nose blowing, avoid using straws avoid smoking, avoid sneezing with mouth closed, sleep with head elevated  - f/u with Dr. Jovany Vega/[facility] within 7-days of discharge for continued evaluation of orbit

## 2023-08-21 NOTE — PROGRESS NOTE ADULT - ASSESSMENT
# Syncope in setting of likely dehydration/hypoglycemia from recent colonoscopy c/b Right orbital floor fracture and trace SDH  # Possible acute hemorrhage in L lentiform nucleus/corona radiata - being followed by neuro  # Fever, tachycardia - resolved  # HIV on Juluca (follows at [facility], with recent VL UD a few months ago, no longer following CD4)  -Please continue juluca  # Dermatomyositis on methyl pred 8 mgmg daily and  BID amd IVIG every 15 days on PCP ppx with bactrim for this indication - last CPK at 1300 - repeat labs today - ongoing heliotrope rash and periorbital edema - prior to fall - worse over the left side since the fall.   # cellulitis (likely) over the lumbar area -recommend f/u with ID for antibiotics - this rash is not related to his dermatomyositis and likely worsening from previous infected lipoma previously diagnosed on 07/2023 at a trip to [facility]

## 2023-08-22 DIAGNOSIS — L03.90 CELLULITIS, UNSPECIFIED: ICD-10-CM

## 2023-08-22 LAB
ANION GAP SERPL CALC-SCNC: 6 MMOL/L — SIGNIFICANT CHANGE UP (ref 5–17)
BASOPHILS # BLD AUTO: 0.02 K/UL — SIGNIFICANT CHANGE UP (ref 0–0.2)
BASOPHILS NFR BLD AUTO: 0.3 % — SIGNIFICANT CHANGE UP (ref 0–2)
BUN SERPL-MCNC: 17 MG/DL — SIGNIFICANT CHANGE UP (ref 7–23)
CALCIUM SERPL-MCNC: 8.5 MG/DL — SIGNIFICANT CHANGE UP (ref 8.4–10.5)
CHLORIDE SERPL-SCNC: 104 MMOL/L — SIGNIFICANT CHANGE UP (ref 96–108)
CO2 SERPL-SCNC: 24 MMOL/L — SIGNIFICANT CHANGE UP (ref 22–31)
CREAT ?TM UR-MCNC: 139 MG/DL — SIGNIFICANT CHANGE UP
CREAT SERPL-MCNC: 0.55 MG/DL — SIGNIFICANT CHANGE UP (ref 0.5–1.3)
CRP SERPL-MCNC: 59.8 MG/L — HIGH (ref 0–4)
DSDNA AB SER-ACNC: 12 IU/ML — SIGNIFICANT CHANGE UP
EGFR: 106 ML/MIN/1.73M2 — SIGNIFICANT CHANGE UP
EOSINOPHIL # BLD AUTO: 0.24 K/UL — SIGNIFICANT CHANGE UP (ref 0–0.5)
EOSINOPHIL NFR BLD AUTO: 3.1 % — SIGNIFICANT CHANGE UP (ref 0–6)
GLUCOSE BLDC GLUCOMTR-MCNC: 117 MG/DL — HIGH (ref 70–99)
GLUCOSE BLDC GLUCOMTR-MCNC: 125 MG/DL — HIGH (ref 70–99)
GLUCOSE BLDC GLUCOMTR-MCNC: 93 MG/DL — SIGNIFICANT CHANGE UP (ref 70–99)
GLUCOSE BLDC GLUCOMTR-MCNC: 99 MG/DL — SIGNIFICANT CHANGE UP (ref 70–99)
GLUCOSE SERPL-MCNC: 107 MG/DL — HIGH (ref 70–99)
HCT VFR BLD CALC: 34.5 % — LOW (ref 39–50)
HGB BLD-MCNC: 10.5 G/DL — LOW (ref 13–17)
IMM GRANULOCYTES NFR BLD AUTO: 1.5 % — HIGH (ref 0–0.9)
LYMPHOCYTES # BLD AUTO: 0.59 K/UL — LOW (ref 1–3.3)
LYMPHOCYTES # BLD AUTO: 7.6 % — LOW (ref 13–44)
MAGNESIUM SERPL-MCNC: 2 MG/DL — SIGNIFICANT CHANGE UP (ref 1.6–2.6)
MCHC RBC-ENTMCNC: 25.8 PG — LOW (ref 27–34)
MCHC RBC-ENTMCNC: 30.4 GM/DL — LOW (ref 32–36)
MCV RBC AUTO: 84.8 FL — SIGNIFICANT CHANGE UP (ref 80–100)
MONOCYTES # BLD AUTO: 0.48 K/UL — SIGNIFICANT CHANGE UP (ref 0–0.9)
MONOCYTES NFR BLD AUTO: 6.2 % — SIGNIFICANT CHANGE UP (ref 2–14)
NEUTROPHILS # BLD AUTO: 6.34 K/UL — SIGNIFICANT CHANGE UP (ref 1.8–7.4)
NEUTROPHILS NFR BLD AUTO: 81.3 % — HIGH (ref 43–77)
NRBC # BLD: 0 /100 WBCS — SIGNIFICANT CHANGE UP (ref 0–0)
PHOSPHATE SERPL-MCNC: 3.6 MG/DL — SIGNIFICANT CHANGE UP (ref 2.5–4.5)
PLATELET # BLD AUTO: 281 K/UL — SIGNIFICANT CHANGE UP (ref 150–400)
POTASSIUM SERPL-MCNC: 4.4 MMOL/L — SIGNIFICANT CHANGE UP (ref 3.5–5.3)
POTASSIUM SERPL-SCNC: 4.4 MMOL/L — SIGNIFICANT CHANGE UP (ref 3.5–5.3)
PROCALCITONIN SERPL-MCNC: 0.22 NG/ML — HIGH (ref 0.02–0.1)
PROT ?TM UR-MCNC: 61 MG/DL — HIGH (ref 0–12)
PROT/CREAT UR-RTO: 0.4 RATIO — HIGH (ref 0–0.2)
RBC # BLD: 4.07 M/UL — LOW (ref 4.2–5.8)
RBC # FLD: 14.5 % — SIGNIFICANT CHANGE UP (ref 10.3–14.5)
SODIUM SERPL-SCNC: 134 MMOL/L — LOW (ref 135–145)
WBC # BLD: 7.79 K/UL — SIGNIFICANT CHANGE UP (ref 3.8–10.5)
WBC # FLD AUTO: 7.79 K/UL — SIGNIFICANT CHANGE UP (ref 3.8–10.5)

## 2023-08-22 PROCEDURE — 99233 SBSQ HOSP IP/OBS HIGH 50: CPT

## 2023-08-22 PROCEDURE — 99232 SBSQ HOSP IP/OBS MODERATE 35: CPT | Mod: GC

## 2023-08-22 PROCEDURE — 99232 SBSQ HOSP IP/OBS MODERATE 35: CPT | Mod: 95

## 2023-08-22 RX ORDER — SODIUM CHLORIDE 9 MG/ML
1000 INJECTION INTRAMUSCULAR; INTRAVENOUS; SUBCUTANEOUS
Refills: 0 | Status: DISCONTINUED | OUTPATIENT
Start: 2023-08-22 | End: 2023-08-23

## 2023-08-22 RX ORDER — PIPERACILLIN AND TAZOBACTAM 4; .5 G/20ML; G/20ML
3.38 INJECTION, POWDER, LYOPHILIZED, FOR SOLUTION INTRAVENOUS ONCE
Refills: 0 | Status: COMPLETED | OUTPATIENT
Start: 2023-08-22 | End: 2023-08-22

## 2023-08-22 RX ORDER — VANCOMYCIN HCL 1 G
1500 VIAL (EA) INTRAVENOUS EVERY 12 HOURS
Refills: 0 | Status: DISCONTINUED | OUTPATIENT
Start: 2023-08-22 | End: 2023-08-24

## 2023-08-22 RX ORDER — PIPERACILLIN AND TAZOBACTAM 4; .5 G/20ML; G/20ML
3.38 INJECTION, POWDER, LYOPHILIZED, FOR SOLUTION INTRAVENOUS ONCE
Refills: 0 | Status: COMPLETED | OUTPATIENT
Start: 2023-08-23 | End: 2023-08-23

## 2023-08-22 RX ORDER — PIPERACILLIN AND TAZOBACTAM 4; .5 G/20ML; G/20ML
3.38 INJECTION, POWDER, LYOPHILIZED, FOR SOLUTION INTRAVENOUS EVERY 8 HOURS
Refills: 0 | Status: DISCONTINUED | OUTPATIENT
Start: 2023-08-23 | End: 2023-08-25

## 2023-08-22 RX ADMIN — Medication 975 MILLIGRAM(S): at 20:14

## 2023-08-22 RX ADMIN — Medication 300 MILLIGRAM(S): at 16:50

## 2023-08-22 RX ADMIN — SODIUM CHLORIDE 100 MILLILITER(S): 9 INJECTION INTRAMUSCULAR; INTRAVENOUS; SUBCUTANEOUS at 07:52

## 2023-08-22 RX ADMIN — Medication 50 MILLIGRAM(S): at 06:24

## 2023-08-22 RX ADMIN — RILPIVIRINE HYDROCHLORIDE 25 MILLIGRAM(S): 25 TABLET, FILM COATED ORAL at 19:35

## 2023-08-22 RX ADMIN — DOLUTEGRAVIR SODIUM 50 MILLIGRAM(S): 25 TABLET, FILM COATED ORAL at 12:46

## 2023-08-22 RX ADMIN — Medication 975 MILLIGRAM(S): at 02:30

## 2023-08-22 RX ADMIN — PIPERACILLIN AND TAZOBACTAM 25 GRAM(S): 4; .5 INJECTION, POWDER, LYOPHILIZED, FOR SOLUTION INTRAVENOUS at 18:29

## 2023-08-22 RX ADMIN — Medication 2000 MILLIGRAM(S): at 06:24

## 2023-08-22 RX ADMIN — Medication 1 MILLIGRAM(S): at 12:46

## 2023-08-22 RX ADMIN — MYCOPHENOLATE MOFETIL 500 MILLIGRAM(S): 250 CAPSULE ORAL at 10:05

## 2023-08-22 RX ADMIN — Medication 200 MILLIGRAM(S): at 10:04

## 2023-08-22 RX ADMIN — Medication 200 MILLIGRAM(S): at 23:35

## 2023-08-22 RX ADMIN — Medication 1 TABLET(S): at 10:05

## 2023-08-22 RX ADMIN — PIPERACILLIN AND TAZOBACTAM 200 GRAM(S): 4; .5 INJECTION, POWDER, LYOPHILIZED, FOR SOLUTION INTRAVENOUS at 15:43

## 2023-08-22 RX ADMIN — Medication 50 MILLIGRAM(S): at 18:29

## 2023-08-22 RX ADMIN — Medication 2000 MILLIGRAM(S): at 14:11

## 2023-08-22 RX ADMIN — MYCOPHENOLATE MOFETIL 500 MILLIGRAM(S): 250 CAPSULE ORAL at 23:35

## 2023-08-22 RX ADMIN — AMLODIPINE BESYLATE 10 MILLIGRAM(S): 2.5 TABLET ORAL at 12:46

## 2023-08-22 RX ADMIN — Medication 975 MILLIGRAM(S): at 21:36

## 2023-08-22 RX ADMIN — ENOXAPARIN SODIUM 40 MILLIGRAM(S): 100 INJECTION SUBCUTANEOUS at 16:51

## 2023-08-22 NOTE — PROVIDER CONTACT NOTE (CHANGE IN STATUS NOTIFICATION) - RECOMMENDATIONS
Patient given Tylenol tablets and he had a bedside chest X-Ray done already tonight. Dr Olmos aware.

## 2023-08-22 NOTE — PROGRESS NOTE ADULT - SUBJECTIVE AND OBJECTIVE BOX
O/N Events: Tmax of 103 ovn.     Subjective/ROS: Patient seen and examined at bedside.     Denies Fever/Chills, HA, CP, SOB, n/v, changes in bowel/urinary habits.  12pt ROS otherwise negative.    VITALS  Vital Signs Last 24 Hrs  T(C): 36.8 (22 Aug 2023 05:48), Max: 39.8 (22 Aug 2023 00:10)  T(F): 98.3 (22 Aug 2023 05:48), Max: 103.6 (22 Aug 2023 00:10)  HR: 88 (22 Aug 2023 05:48) (88 - 108)  BP: 101/61 (22 Aug 2023 05:48) (100/62 - 129/75)  BP(mean): 74 (22 Aug 2023 02:30) (74 - 86)  RR: 18 (22 Aug 2023 05:48) (16 - 18)  SpO2: 96% (22 Aug 2023 05:48) (94% - 96%)    Parameters below as of 22 Aug 2023 02:30  Patient On (Oxygen Delivery Method): room air    CAPILLARY BLOOD GLUCOSE    POCT Blood Glucose.: 99 mg/dL (22 Aug 2023 09:06)  POCT Blood Glucose.: 134 mg/dL (21 Aug 2023 23:33)  POCT Blood Glucose.: 111 mg/dL (21 Aug 2023 18:29)  POCT Blood Glucose.: 121 mg/dL (21 Aug 2023 13:00)    PHYSICAL EXAM  General: NAD  Head: Diffuse facial edema  Neck: Supple; no JVD  Respiratory: CTAB  Cardiovascular: Regular rhythm/rate  Gastrointestinal: Soft; NTND  Extremities: WWP; no edema/cyanosis  Neurological: A&Ox3  Derm: Erythematous rash with ill-defined borders crossing midline on lower back, 2 1mm puncture wounds. No fluctuance, drainage, or purulence.     MEDICATIONS  (STANDING):  amLODIPine   Tablet 10 milliGRAM(s) Oral every 24 hours  amoxicillin  875 milliGRAM(s)/clavulanate 1 Tablet(s) Oral two times a day  ceFAZolin  Injectable. 2000 milliGRAM(s) IV Push every 8 hours  dextrose 5%. 1000 milliLiter(s) (50 mL/Hr) IV Continuous <Continuous>  dextrose 5%. 1000 milliLiter(s) (100 mL/Hr) IV Continuous <Continuous>  dextrose 50% Injectable 12.5 Gram(s) IV Push once  dextrose 50% Injectable 25 Gram(s) IV Push once  dextrose 50% Injectable 25 Gram(s) IV Push once  dolutegravir 50 milliGRAM(s) Oral daily  enoxaparin Injectable 40 milliGRAM(s) SubCutaneous every 24 hours  folic acid 1 milliGRAM(s) Oral daily  glucagon  Injectable 1 milliGRAM(s) IntraMuscular once  hydroxychloroquine 200 milliGRAM(s) Oral two times a day  insulin lispro (ADMELOG) corrective regimen sliding scale   SubCutaneous at bedtime  insulin lispro (ADMELOG) corrective regimen sliding scale   SubCutaneous three times a day before meals  metoprolol tartrate 50 milliGRAM(s) Oral every 12 hours  mycophenolate mofetil 500 milliGRAM(s) Oral two times a day  rilpivirine 25 milliGRAM(s) Oral with dinner  sodium chloride 0.9%. 1000 milliLiter(s) (100 mL/Hr) IV Continuous <Continuous>  trimethoprim  160 mG/sulfamethoxazole 800 mG 1 Tablet(s) Oral <User Schedule>    MEDICATIONS  (PRN):  acetaminophen     Tablet .. 975 milliGRAM(s) Oral every 8 hours PRN Temp greater or equal to 38C (100.4F), Mild Pain (1 - 3)  dextrose Oral Gel 15 Gram(s) Oral once PRN Blood Glucose LESS THAN 70 milliGRAM(s)/deciliter      No Known Allergies      LABS                        10.5   7.79  )-----------( 281      ( 22 Aug 2023 05:30 )             34.5     08-22    134<L>  |  104  |  17  ----------------------------<  107<H>  4.4   |  24  |  0.55    Ca    8.5      22 Aug 2023 05:30  Phos  3.6     08-22  Mg     2.0     08-22        Urinalysis Basic - ( 22 Aug 2023 05:30 )    Color: x / Appearance: x / SG: x / pH: x  Gluc: 107 mg/dL / Ketone: x  / Bili: x / Urobili: x   Blood: x / Protein: x / Nitrite: x   Leuk Esterase: x / RBC: x / WBC x   Sq Epi: x / Non Sq Epi: x / Bacteria: x      CARDIAC MARKERS ( 21 Aug 2023 20:05 )  x     / x     / 1558 U/L / x     / x            Urinalysis with Rflx Culture (collected 08-21-23 @ 16:13)        IMAGING/EKG/ETC   O/N Events: Tmax of 103 ovn. BPs soft, started fluids.    Subjective/ROS: Patient seen and examined at bedside. Resting comfortably, denies fevers/chills, HA, CP, SOB.    VITALS  Vital Signs Last 24 Hrs  T(C): 36.8 (22 Aug 2023 05:48), Max: 39.8 (22 Aug 2023 00:10)  T(F): 98.3 (22 Aug 2023 05:48), Max: 103.6 (22 Aug 2023 00:10)  HR: 88 (22 Aug 2023 05:48) (88 - 108)  BP: 101/61 (22 Aug 2023 05:48) (100/62 - 129/75)  BP(mean): 74 (22 Aug 2023 02:30) (74 - 86)  RR: 18 (22 Aug 2023 05:48) (16 - 18)  SpO2: 96% (22 Aug 2023 05:48) (94% - 96%)    Parameters below as of 22 Aug 2023 02:30  Patient On (Oxygen Delivery Method): room air    CAPILLARY BLOOD GLUCOSE    POCT Blood Glucose.: 99 mg/dL (22 Aug 2023 09:06)  POCT Blood Glucose.: 134 mg/dL (21 Aug 2023 23:33)  POCT Blood Glucose.: 111 mg/dL (21 Aug 2023 18:29)  POCT Blood Glucose.: 121 mg/dL (21 Aug 2023 13:00)    PHYSICAL EXAM  General: NAD  Head: Diffuse facial edema  Neck: Supple; no JVD  Respiratory: CTAB  Cardiovascular: Regular rhythm/rate  Gastrointestinal: Soft; NTND  Extremities: WWP; no edema/cyanosis  Neurological: A&Ox3  Derm: Erythematous rash with ill-defined borders crossing midline on lower back, 2 1mm puncture wounds. No fluctuance, drainage, or purulence.     MEDICATIONS  (STANDING):  amLODIPine   Tablet 10 milliGRAM(s) Oral every 24 hours  amoxicillin  875 milliGRAM(s)/clavulanate 1 Tablet(s) Oral two times a day  ceFAZolin  Injectable. 2000 milliGRAM(s) IV Push every 8 hours  dextrose 5%. 1000 milliLiter(s) (50 mL/Hr) IV Continuous <Continuous>  dextrose 5%. 1000 milliLiter(s) (100 mL/Hr) IV Continuous <Continuous>  dextrose 50% Injectable 12.5 Gram(s) IV Push once  dextrose 50% Injectable 25 Gram(s) IV Push once  dextrose 50% Injectable 25 Gram(s) IV Push once  dolutegravir 50 milliGRAM(s) Oral daily  enoxaparin Injectable 40 milliGRAM(s) SubCutaneous every 24 hours  folic acid 1 milliGRAM(s) Oral daily  glucagon  Injectable 1 milliGRAM(s) IntraMuscular once  hydroxychloroquine 200 milliGRAM(s) Oral two times a day  insulin lispro (ADMELOG) corrective regimen sliding scale   SubCutaneous at bedtime  insulin lispro (ADMELOG) corrective regimen sliding scale   SubCutaneous three times a day before meals  metoprolol tartrate 50 milliGRAM(s) Oral every 12 hours  mycophenolate mofetil 500 milliGRAM(s) Oral two times a day  rilpivirine 25 milliGRAM(s) Oral with dinner  sodium chloride 0.9%. 1000 milliLiter(s) (100 mL/Hr) IV Continuous <Continuous>  trimethoprim  160 mG/sulfamethoxazole 800 mG 1 Tablet(s) Oral <User Schedule>    MEDICATIONS  (PRN):  acetaminophen     Tablet .. 975 milliGRAM(s) Oral every 8 hours PRN Temp greater or equal to 38C (100.4F), Mild Pain (1 - 3)  dextrose Oral Gel 15 Gram(s) Oral once PRN Blood Glucose LESS THAN 70 milliGRAM(s)/deciliter      No Known Allergies      LABS                        10.5   7.79  )-----------( 281      ( 22 Aug 2023 05:30 )             34.5     08-22    134<L>  |  104  |  17  ----------------------------<  107<H>  4.4   |  24  |  0.55    Ca    8.5      22 Aug 2023 05:30  Phos  3.6     08-22  Mg     2.0     08-22        Urinalysis Basic - ( 22 Aug 2023 05:30 )    Color: x / Appearance: x / SG: x / pH: x  Gluc: 107 mg/dL / Ketone: x  / Bili: x / Urobili: x   Blood: x / Protein: x / Nitrite: x   Leuk Esterase: x / RBC: x / WBC x   Sq Epi: x / Non Sq Epi: x / Bacteria: x      CARDIAC MARKERS ( 21 Aug 2023 20:05 )  x     / x     / 1558 U/L / x     / x            Urinalysis with Rflx Culture (collected 08-21-23 @ 16:13)        IMAGING/EKG/ETC

## 2023-08-22 NOTE — PROGRESS NOTE ADULT - ASSESSMENT
I M     72 y o Male with PMHx of HTN, HLD, DM, CAD, CHF, HIV, Dermatomyositis presented to [facility] for planned colonoscopy due + Fecal immunochemical test (FIT). Stroke code called on pt after colonoscopy due to a syncopal event and fall hitting his head/face. Pt admitted to stroke tele for further w/u. Pt found to be febrile, infectious w/u unrevealing after pt was persistently having low grade fevers and tachycardic with some periods of confusion. Augmentin started on 8/17 as patient had 2 minor episodes of hemoptysis, patient does not appear infectious currently. MRI w/wo contrast showed Acute hemorrhage in the left basal ganglia and corona radiata and focal hemorrhage in the septum pellucidum and left external capsule, along with R SDH and R orbital bone fx. Currently reporting improvement while on [facility] medical floor.    Problem/Plan - 1:  ·  Problem: Erythematous rash.   ·  Plan: Pt now complaining of erythematous rash on lower back, unsure of when it first appeared. Per pt, recently in [place], felt a bump on lower back which he showed to local doctor, was told it was a lipoma and was given antibiotics and had an ultrasound. Denies I&D or other interventions. Reports previously feeling pain at site of rash, however improved after taking ?antibiotics. Currently denies pain. Pt spiking intermittent fevers, unclear etiology. WBC nl, BCx NGTD. Rash likely cellulitis, however further workup needed given patients history of HIV and dermatomyositis.   - f/u ID recs  - f/u rheumatology recs  - f/u UA  - f/u CXR  - f/u procal, ESR  - Consider starting cefazolin 1g q8hr for suspected cellulitis.    Problem/Plan - 2:  ·  Problem: Traumatic subdural hemorrhage with loss of consciousness.   ·  Plan: Pt underwent extensive CVA workup, including q8hr stroke neuro checks and vitals. MRI brain w/wo contrast showed: Acute hemorrhage in the left basal ganglia and corona radiata and focal hemorrhage in the septum pellucidum and left external capsule, along with R SDH and R orbital bone fx. Stroke Code HCT Results: R trace SDH, R orbital floor Fx, L BG/CR hyperdensity c/f bleed Vs mineralisation. Stroke Code CTA Results: Negative.    Plan  - No DAPT 2/2 R BG/CR bleed   -SBP<140  - Stroke education  - f/u neuro recs.    Problem/Plan - 3:  ·  Problem: Orbital fracture.   ·  Plan: Pt had orbital bone fracture as seen on imaging. No acute surgical intervention indicated.     Plan  - Sinus precautions: no nose blowing, avoid using straws avoid smoking, avoid sneezing with mouth closed, sleep with head elevated  - f/u with Dr. Jovany Vega/[facility] within 7-days of discharge for continued evaluation of orbit.    Problem/Plan - 4:  ·  Problem: Hemoptysis.   ·  Plan: Pt w/ minor hemoptysis x2 on 8/17. New onset minor hemoptysis, per OMFS would continue Augmentin 875mg/125mg BID for a total of 7 days.    Plan  - c/w augmentin 875/125mg BID for total of 7 days.    Problem/Plan - 5:  ·  Problem: Human immunodeficiency virus (HIV).   ·  Plan: Known hx of HIV. HIV viral load undetectable. Pt started on Bactrim DS. Pt is on Juluca which is Non formulary, thus was started on dolutegravir 50mg daily, rilpivirine 25mg daily.    Plan  - c/w dolutegravir 50mg qd  - c/w rilpivirine 25mg qd w/dinner  - c/w Bactrim 160-800mg 3days/wk (MWF).    Problem/Plan - 6:  ·  Problem: Hypertension.   ·  Plan: Known hx of HTN. Allowed permissive hypertension, w/ Goal SBP < 140. Restarted Home anti hypertensives (On Bisoprolol 15mg, started on Metoprolol 50mg po BID- therapeutic conversion for Bisoprolol 5mg, can uptitrate as tolerated- and amlodipine). TTE with bubble showed: PFO+, BLESSING with moderate LVOT.     Plan  - c/w amlodipine 10mg qd  - c/w metoprolol tartrate 50mg q12.    Problem/Plan - 7:  ·  Problem: Diabetes mellitus.   ·  Plan: Known hx of DM. Hgb A1c results: 7.6 on 8/16.    Plan  - iSS.    Problem/Plan - 8:  ·  Problem: Dermatomyositis.   ·  Plan: Known hx of Dermatomyositis. Home cellcept and Plaquenil restarted 8/17. s/p 1 dose Home Methylprednisone.    Plan  - c/w cellcept 500mg q12  - c/w plaquenil 200mg q12.    Problem/Plan - 9:  ·  Problem: Hyperlipidemia.   ·  Plan: Pt has hx of HLD. LDL WNL on this hospitalization.   - LDL results: 21 (8/16)    Plan  - can consider restarting during admission.    Problem/Plan - 10:  ·  Problem: Prophylactic measure.   ·  Plan; F: N/A  E: replete K<4 and Mg <2  N: DASH/TLC  DVT Prophylaxis: Lovenox  Code Status: Full Code  Dispo: F.

## 2023-08-22 NOTE — PROGRESS NOTE ADULT - SUBJECTIVE AND OBJECTIVE BOX
**INCOMPLETE NOTE**  INFECTIOUS DISEASES CONSULT FOLLOW-UP NOTE    INTERVAL HPI/OVERNIGHT EVENTS:      ROS:   Constitutional, eyes, ENT, cardiovascular, respiratory, gastrointestinal, genitourinary, integumentary, neurological, psychiatric and heme/lymph are otherwise negative other than noted above       ANTIBIOTICS/RELEVANT:    MEDICATIONS  (STANDING):  amLODIPine   Tablet 10 milliGRAM(s) Oral every 24 hours  amoxicillin  875 milliGRAM(s)/clavulanate 1 Tablet(s) Oral two times a day  ceFAZolin  Injectable. 2000 milliGRAM(s) IV Push every 8 hours  dextrose 5%. 1000 milliLiter(s) (50 mL/Hr) IV Continuous <Continuous>  dextrose 5%. 1000 milliLiter(s) (100 mL/Hr) IV Continuous <Continuous>  dextrose 50% Injectable 25 Gram(s) IV Push once  dextrose 50% Injectable 25 Gram(s) IV Push once  dextrose 50% Injectable 12.5 Gram(s) IV Push once  dolutegravir 50 milliGRAM(s) Oral daily  enoxaparin Injectable 40 milliGRAM(s) SubCutaneous every 24 hours  folic acid 1 milliGRAM(s) Oral daily  glucagon  Injectable 1 milliGRAM(s) IntraMuscular once  hydroxychloroquine 200 milliGRAM(s) Oral two times a day  insulin lispro (ADMELOG) corrective regimen sliding scale   SubCutaneous three times a day before meals  insulin lispro (ADMELOG) corrective regimen sliding scale   SubCutaneous at bedtime  metoprolol tartrate 50 milliGRAM(s) Oral every 12 hours  mycophenolate mofetil 500 milliGRAM(s) Oral two times a day  rilpivirine 25 milliGRAM(s) Oral with dinner  sodium chloride 0.9%. 1000 milliLiter(s) (100 mL/Hr) IV Continuous <Continuous>  trimethoprim  160 mG/sulfamethoxazole 800 mG 1 Tablet(s) Oral <User Schedule>    MEDICATIONS  (PRN):  acetaminophen     Tablet .. 975 milliGRAM(s) Oral every 8 hours PRN Temp greater or equal to 38C (100.4F), Mild Pain (1 - 3)  dextrose Oral Gel 15 Gram(s) Oral once PRN Blood Glucose LESS THAN 70 milliGRAM(s)/deciliter        Vital Signs Last 24 Hrs  T(C): 36.8 (22 Aug 2023 05:48), Max: 39.8 (22 Aug 2023 00:10)  T(F): 98.3 (22 Aug 2023 05:48), Max: 103.6 (22 Aug 2023 00:10)  HR: 88 (22 Aug 2023 05:48) (88 - 108)  BP: 101/61 (22 Aug 2023 05:48) (100/62 - 129/75)  BP(mean): 74 (22 Aug 2023 02:30) (74 - 86)  RR: 18 (22 Aug 2023 05:48) (16 - 18)  SpO2: 96% (22 Aug 2023 05:48) (94% - 96%)    Parameters below as of 22 Aug 2023 02:30  Patient On (Oxygen Delivery Method): room air        PHYSICAL EXAM:  Constitutional: alert, NAD  Eyes: the sclera and conjunctiva were normal.   ENT: the ears and nose were normal in appearance.   Neck: the appearance of the neck was normal and the neck was supple.   Pulmonary: no respiratory distress and lungs were clear to auscultation bilaterally.   Heart: heart rate was normal and rhythm regular, normal S1 and S2  Vascular:. there was no peripheral edema  Abdomen: normal bowel sounds, soft, non-tender  Neurological: no focal deficits.   Psychiatric: the affect was normal        LABS:                        10.5   7.79  )-----------( 281      ( 22 Aug 2023 05:30 )             34.5     08-22    134<L>  |  104  |  17  ----------------------------<  107<H>  4.4   |  24  |  0.55    Ca    8.5      22 Aug 2023 05:30  Phos  3.6     08-22  Mg     2.0     08-22        Urinalysis Basic - ( 22 Aug 2023 05:30 )    Color: x / Appearance: x / SG: x / pH: x  Gluc: 107 mg/dL / Ketone: x  / Bili: x / Urobili: x   Blood: x / Protein: x / Nitrite: x   Leuk Esterase: x / RBC: x / WBC x   Sq Epi: x / Non Sq Epi: x / Bacteria: x        MICROBIOLOGY:      RADIOLOGY & ADDITIONAL STUDIES:  Reviewed INFECTIOUS DISEASES CONSULT FOLLOW-UP NOTE    INTERVAL HPI/OVERNIGHT EVENTS: Tmax 103.6F, soft BPs 100s/60s, started on NS 100cc/hr x10hr.    ROS: Saw and evaluated patient at bedside. Resting comfortably, no fever, chills, chest pain, dyspnea, headache, rash. ROS otherwise negative at this time.       ANTIBIOTICS/RELEVANT:    MEDICATIONS  (STANDING):  amLODIPine   Tablet 10 milliGRAM(s) Oral every 24 hours  amoxicillin  875 milliGRAM(s)/clavulanate 1 Tablet(s) Oral two times a day  ceFAZolin  Injectable. 2000 milliGRAM(s) IV Push every 8 hours  dextrose 5%. 1000 milliLiter(s) (50 mL/Hr) IV Continuous <Continuous>  dextrose 5%. 1000 milliLiter(s) (100 mL/Hr) IV Continuous <Continuous>  dextrose 50% Injectable 25 Gram(s) IV Push once  dextrose 50% Injectable 25 Gram(s) IV Push once  dextrose 50% Injectable 12.5 Gram(s) IV Push once  dolutegravir 50 milliGRAM(s) Oral daily  enoxaparin Injectable 40 milliGRAM(s) SubCutaneous every 24 hours  folic acid 1 milliGRAM(s) Oral daily  glucagon  Injectable 1 milliGRAM(s) IntraMuscular once  hydroxychloroquine 200 milliGRAM(s) Oral two times a day  insulin lispro (ADMELOG) corrective regimen sliding scale   SubCutaneous three times a day before meals  insulin lispro (ADMELOG) corrective regimen sliding scale   SubCutaneous at bedtime  metoprolol tartrate 50 milliGRAM(s) Oral every 12 hours  mycophenolate mofetil 500 milliGRAM(s) Oral two times a day  rilpivirine 25 milliGRAM(s) Oral with dinner  sodium chloride 0.9%. 1000 milliLiter(s) (100 mL/Hr) IV Continuous <Continuous>  trimethoprim  160 mG/sulfamethoxazole 800 mG 1 Tablet(s) Oral <User Schedule>    MEDICATIONS  (PRN):  acetaminophen     Tablet .. 975 milliGRAM(s) Oral every 8 hours PRN Temp greater or equal to 38C (100.4F), Mild Pain (1 - 3)  dextrose Oral Gel 15 Gram(s) Oral once PRN Blood Glucose LESS THAN 70 milliGRAM(s)/deciliter        Vital Signs Last 24 Hrs  T(C): 36.8 (22 Aug 2023 05:48), Max: 39.8 (22 Aug 2023 00:10)  T(F): 98.3 (22 Aug 2023 05:48), Max: 103.6 (22 Aug 2023 00:10)  HR: 88 (22 Aug 2023 05:48) (88 - 108)  BP: 101/61 (22 Aug 2023 05:48) (100/62 - 129/75)  BP(mean): 74 (22 Aug 2023 02:30) (74 - 86)  RR: 18 (22 Aug 2023 05:48) (16 - 18)  SpO2: 96% (22 Aug 2023 05:48) (94% - 96%)    Parameters below as of 22 Aug 2023 02:30  Patient On (Oxygen Delivery Method): room air        PHYSICAL EXAM:  Constitutional: alert, NAD  HEENT: Swelling and brusing over b/l portions of face,  R>L. No significant warmth, erythema, or tenderness over sinuses   Neck: the appearance of the neck was normal and the neck was supple.   Pulmonary: no respiratory distress and lungs were clear to auscultation bilaterally.   Heart: heart rate was normal, + ejection murmur at apex   Vascular:. there was no peripheral edema  Skin: ~ 4x6 area of blanching, non-tender, non-purulent/non-fluctuant erythema on the R lower back   Abdomen: normal bowel sounds, soft, non-tender  Neurological: no focal deficits.   Psychiatric: the affect was normal      LABS:                        10.5   7.79  )-----------( 281      ( 22 Aug 2023 05:30 )             34.5     08-22    134<L>  |  104  |  17  ----------------------------<  107<H>  4.4   |  24  |  0.55    Ca    8.5      22 Aug 2023 05:30  Phos  3.6     08-22  Mg     2.0     08-22        Urinalysis Basic - ( 22 Aug 2023 05:30 )    Color: x / Appearance: x / SG: x / pH: x  Gluc: 107 mg/dL / Ketone: x  / Bili: x / Urobili: x   Blood: x / Protein: x / Nitrite: x   Leuk Esterase: x / RBC: x / WBC x   Sq Epi: x / Non Sq Epi: x / Bacteria: x        MICROBIOLOGY:      RADIOLOGY & ADDITIONAL STUDIES:  Reviewed

## 2023-08-22 NOTE — PROGRESS NOTE ADULT - ATTENDING COMMENTS
#Cellulitis, immunosuppression due to drug therapy, dermatomyositis    Patient spiked 103.6 fever. Patient feels wel, asymptomatic.  WBC 7.7, procal 0.22, ESR 87, CRP 59.8.  BCx ngtd. CXR clear, US back showed subcutaneous edema but no abscess. Suspect cellulitis of the back.  Given high fever and immunocompromised status, and reportedly he had "infected lipoma" recently (though no lipoma seen on US), stop augmentin/cefazolin. Start zosyn 3.375g IV q8h and vanco 1500mg IV q12h.  Check vanc trough before 4th dose, goal 10-15.  f/u BCx.  Of note, patient is not on methylprednisolone.  Please discuss with rheum about restarting it; he is at risk of adrenal insufficiency if steroid stopped abruptly.    Team 1 will follow you.  Case d/w primary team.    Nathalie Fisher MD, MS  Infectious Disease attending  work cell [phone]   For any questions during evening/weekend/holiday, please page ID on call #Cellulitis, immunosuppression due to drug therapy, dermatomyositis, HIV    Patient spiked 103.6 fever. Patient feels wel, asymptomatic.  WBC 7.7, procal 0.22, ESR 87, CRP 59.8.  BCx ngtd. CXR clear, US back showed subcutaneous edema but no abscess. Suspect cellulitis of the back.  Given high fever and immunocompromised status, and reportedly he had "infected lipoma" recently (though no lipoma seen on US), stop augmentin/cefazolin. Start zosyn 3.375g IV q8h and vanco 1500mg IV q12h.  Check vanc trough before 4th dose, goal 10-15.  f/u BCx.  Of note, patient is not on methylprednisolone.  Please discuss with rheum about restarting it; he is at risk of adrenal insufficiency if steroid stopped abruptly.  Cont Juluca.     Team 1 will follow you.  Case d/w primary team.    Nathalie Fisher MD, MS  Infectious Disease attending  work cell [phone]   For any questions during evening/weekend/holiday, please page ID on call

## 2023-08-22 NOTE — PROGRESS NOTE ADULT - PROBLEM SELECTOR PLAN 4
Pt w/ minor hemoptysis x2 on 8/17. New onset minor hemoptysis, per OMFS would continue Augmentin 875mg/125mg BID for a total of 7 days.    Plan  - c/w augmentin 875/125mg BID for total of 7 days Resolved. Pt w/ minor hemoptysis x2 on 8/17.

## 2023-08-22 NOTE — PROGRESS NOTE ADULT - ATTENDING COMMENTS
72 year old man w/ PMH of HTN, HLD, DM, HIV, HFrEF, presented after syncopal event in setting of ?hypoglycemia, suffered an orbital fracture and intracerebral hemorrhage.     CTH 8.15.23 L. BG hyperdensity involving the L. lentiform nucleus and corona radiata.   CTA h/n 8.15.23 negative   MR brain acute hemorrhage in the L. BG and CR and R. septum pallucidum.   TTE hyperdynamic LV, +PFO  A1c 7.6   LDL 21    Impression: L. BG ICH and small R. SDH --- etiology of both hemorrhages possibly traumatic, although the L. BG ICH may have resulted from HTN angiopathy.     Plan:   Hold all antiplatelets. No neurovascular indication for antiplatelets as this wasn't an ischemic event.   Abx per medicine and ID for back cellulitis   Continue DVT ppx   Will need f/u MRI in 8 weeks     35 minutes spent on total encounter. The necessity of the time spent during the encounter on this date of service was due to:     Review of imaging and chart; obtaining history; examination of pt; discussion and coordination of care, and discussion of lifestyle modification and risk factor control.

## 2023-08-22 NOTE — PROGRESS NOTE ADULT - SUBJECTIVE AND OBJECTIVE BOX
Physical Medicine and Rehabilitation Progress Note :       Patient is a 72y old  Male who presents with a chief complaint of periorbital fracture (21 Aug 2023 21:49)      HPI:                            10.5   7.79  )-----------( 281      ( 22 Aug 2023 05:30 )             34.5       08-22    134<L>  |  104  |  17  ----------------------------<  107<H>  4.4   |  24  |  0.55    Ca    8.5      22 Aug 2023 05:30  Phos  3.6     08-22  Mg     2.0     08-22      Vital Signs Last 24 Hrs  T(C): 36.8 (22 Aug 2023 05:48), Max: 39.8 (22 Aug 2023 00:10)  T(F): 98.3 (22 Aug 2023 05:48), Max: 103.6 (22 Aug 2023 00:10)  HR: 88 (22 Aug 2023 05:48) (88 - 108)  BP: 101/61 (22 Aug 2023 05:48) (100/62 - 129/75)  BP(mean): 74 (22 Aug 2023 02:30) (74 - 86)  RR: 18 (22 Aug 2023 05:48) (16 - 18)  SpO2: 96% (22 Aug 2023 05:48) (95% - 96%)    Parameters below as of 22 Aug 2023 02:30  Patient On (Oxygen Delivery Method): room air        MEDICATIONS  (STANDING):  amLODIPine   Tablet 10 milliGRAM(s) Oral every 24 hours  amoxicillin  875 milliGRAM(s)/clavulanate 1 Tablet(s) Oral two times a day  ceFAZolin  Injectable. 2000 milliGRAM(s) IV Push every 8 hours  dextrose 5%. 1000 milliLiter(s) (50 mL/Hr) IV Continuous <Continuous>  dextrose 5%. 1000 milliLiter(s) (100 mL/Hr) IV Continuous <Continuous>  dextrose 50% Injectable 12.5 Gram(s) IV Push once  dextrose 50% Injectable 25 Gram(s) IV Push once  dextrose 50% Injectable 25 Gram(s) IV Push once  dolutegravir 50 milliGRAM(s) Oral daily  enoxaparin Injectable 40 milliGRAM(s) SubCutaneous every 24 hours  folic acid 1 milliGRAM(s) Oral daily  glucagon  Injectable 1 milliGRAM(s) IntraMuscular once  hydroxychloroquine 200 milliGRAM(s) Oral two times a day  insulin lispro (ADMELOG) corrective regimen sliding scale   SubCutaneous three times a day before meals  insulin lispro (ADMELOG) corrective regimen sliding scale   SubCutaneous at bedtime  metoprolol tartrate 50 milliGRAM(s) Oral every 12 hours  mycophenolate mofetil 500 milliGRAM(s) Oral two times a day  rilpivirine 25 milliGRAM(s) Oral with dinner  sodium chloride 0.9%. 1000 milliLiter(s) (100 mL/Hr) IV Continuous <Continuous>  trimethoprim  160 mG/sulfamethoxazole 800 mG 1 Tablet(s) Oral <User Schedule>    MEDICATIONS  (PRN):  acetaminophen     Tablet .. 975 milliGRAM(s) Oral every 8 hours PRN Temp greater or equal to 38C (100.4F), Mild Pain (1 - 3)  dextrose Oral Gel 15 Gram(s) Oral once PRN Blood Glucose LESS THAN 70 milliGRAM(s)/deciliter          Functional Status Assessment :       Pain Assessment/Number Scale (0-10) Adult  Presence of Pain: denies pain/discomfort (Rating = 0)  Pain Rating (0-10): Rest: 0 (no pain/absence of nonverbal indicators of pain)  Pain Rating (0-10): Activity: 0 (no pain/absence of nonverbal indicators of pain)    Safety      [facility] Functional Assessment: Basic Mobility  Type of Assessment: Daily assessment  Turning from your back to your side while in a flat bed without using bedrails?: 3 = A little assistance  Moving from lying on your back to sitting on the flat side of a flat bed without using bedrails?: 3 = A little assistance  Moving to and from a bed to a chair (including a wheelchair)?: 3 = A little assistance  Standing up from a chair using your arms (e.g. wheelchair or bedside chair)?: 3 = A little assistance  Walking in hospital room?: 3 = A little assistance  Climbing 3-5 steps with a railing?: 3 = A little assistance  Score: 18   Row Comment: Ask the patient "How much help from another person do you currently need? (If the patient hasn't done an activity recently, how much help from another person do you think he/she needs if he/she tried?)    Cognitive/Neuro      Cognitive/Neuro/Behavioral  Cognitive/Neuro/Behavioral [WDL Definition: Alert; opens eyes spontaneously; arouses to voice or touch; oriented x 4; follows commands; speech spontaneous, logical; purposeful motor response; behavior appropriate to situation]: WDL    Language Assistance  Preferred Language to Address Healthcare Preferred Language to Address Healthcare: English    Therapeutic Interventions      Sit-Stand Transfer Training  Transfer Training Sit-to-Stand Transfer: contact guard;  verbal cues;  weight-bearing as tolerated   rolling walker  Transfer Training Stand-to-Sit Transfer: contact guard;  verbal cues;  weight-bearing as tolerated   rolling walker  Sit-to-Stand Transfer Training Transfer Safety Analysis: impaired balance;  decreased strength;  rolling walker    Gait Training  Gait Training: contact guard;  verbal cues;  weight-bearing as tolerated   rolling walker;  150 feet  Gait Analysis: 3-point gait   decreased consuelo;  decreased hip/knee flexion;  decreased step length;  impaired balance;  decreased strength;  150 feet;  rolling walker    Therapeutic Exercise  Therapeutic Exercise Detail: Sitting in a chair: bilateral hip flexion knee ext x 10 reps each exercise             PM&R Impression : as above    Current Disposition Plan Recommendations :    subacute rehab placement

## 2023-08-22 NOTE — PROGRESS NOTE ADULT - ASSESSMENT
72M with PMH of HTN, HLD, DM2, HIV, HFrEF, dermatomyositis and CAD presenting after a syncopal event following colonoscopy, sustaining orbital fractures and suffering an intracranial bleed.  Also noted to be extremely hypertensive.  Admitted to the stroke service for further evaluation.     #Intracerebral hemorrhage  - plan per stroke team  - holding any anticoagulation  - monitor on telemetry  - patient confirmed with me today that he is not on aspirin at home, and his cardiologist has not recommended him to start on it in the outpatient setting.     #Febrile episode  #Elevated lactate (resolved)  #Dermatomyositis  #?Cellulitis  - continue with cefazolin  - appreciate ID and rheum input  - once afebrile for 48 hours, can be discharged on oral regimen  - elevated CRP and ESR, likely chronic elevation due to dermatomyositis.     #HIV  - continue home HIV regimen - dolutegravir and rilpirivine (two component of januca - home medication not available at the hospital)  - viral load undetectable    #Orbital fracture  - per discussion with stroke team, the fractures were discussed with a consultant - if the eye is able to move without any evidence of muscle entrapment or other issues, can be followed up outpatient  - continue on augmentin for infection prophylaxis per OMFS consult    Plan of care discussed with stroke team.    50 minutes spent on this encounter, including face to face with patient, care coordination and documentation.

## 2023-08-22 NOTE — PROGRESS NOTE ADULT - PROBLEM SELECTOR PLAN 9
Pt has hx of HLD. LDL WNL on this hospitalization.   - LDL results: 21 (8/16)    Plan  - can consider restarting during admission Pt has hx of HLD. LDL WNL on this hospitalization.  On repatha at home, due for next injection. Statins contraindicated due to dermatomyositis.  - LDL results: 21 (8/16)    Plan  - can restart injections on DC

## 2023-08-22 NOTE — PROVIDER CONTACT NOTE (CHANGE IN STATUS NOTIFICATION) - ASSESSMENT
BP= 123/68, HR= 108, RR= 16, O2 sat=95% Oral temp= 102.7F. Dr Olmos ordered a rectal temp. Rectal temp= 103.6.

## 2023-08-22 NOTE — PROGRESS NOTE ADULT - ASSESSMENT
71yo M w/ PMHx HTN, HLD, DM2, HIV on Juluca, HFrEF, Dermatomyositis, & CAD admitted w/ orbital fractures/ICH, w/ recurrent high fevers possibly 2/2 cellulitis however pending further workup.     Overnight, pt w/ fever to 103.6F, feels well, asymptomatic. CXR clear, BCx NGTD. US back w/o abscess only subcu edema. May have cellulitis of the back. Given high fevers and immunocompromised status, switch to Vanc and Zosyn.     #Cellulitis #Immunosuppression due to drug therapy/HIV # HIV on Juluca  # Dermatomyositis     Recommendations:   - Stop Augmentin & Cefazolin  - Start Zosyn 3.375g IV q8h & Vanc 1500mg IV q12h, check trough prior to 4th dose (goal 10-15)  - C/w Abena, Bactrim ppx  - F/u BCx   - If fevers continue, may consider repeat imaging.   - Team 1 to continue to follow. Case d/w primary team.

## 2023-08-22 NOTE — PROGRESS NOTE ADULT - ASSESSMENT
72y Male with PMHx of HTN, HLD, DM, CAD, CHF, HIV, Dermatomyositis presented to [facility] for planned colonoscopy due + Fecal immunochemical test (FIT). Stroke code called on pt after colonoscopy due to a syncopal event and fall hitting his head/face. Pt admitted to stroke tele for further w/u. Pt found to be febrile, infectious w/u unrevealing after pt was persistently having low grade fevers and tachycardic with some periods of confusion. Augmentin started on 8/17 as patient had 2 minor episodes of hemoptysis, patient does not appear infectious currently. MRI w/wo contrast showed Acute hemorrhage in the left basal ganglia and corona radiata and focal hemorrhage in the septum pellucidum and left external capsule, along with R SDH and R orbital bone fx. Currently reporting improvement while on regional medical floor.       72M with PMH of HTN, HLD, DM2, HIV, HFrEF, dermatomyositis and CAD presenting after a syncopal event following colonoscopy, sustaining orbital fractures and suffering an intracranial bleed.  Also noted to be extremely hypertensive.  Admitted to the stroke service for further evaluation.

## 2023-08-22 NOTE — PROGRESS NOTE ADULT - PROBLEM SELECTOR PLAN 3
Pt had orbital bone fracture as seen on imaging. No acute surgical intervention indicated.     Plan  - Sinus precautions: no nose blowing, avoid using straws avoid smoking, avoid sneezing with mouth closed, sleep with head elevated  - f/u with Dr. Jovany Vega/[facility] within 7-days of discharge for continued evaluation of orbit Pt had orbital bone fracture as seen on imaging. No acute surgical intervention indicated.     Plan  - Sinus precautions: no nose blowing, avoid using straws avoid smoking, avoid sneezing with mouth closed, sleep with head elevated  - Seen by OMFS this admission, instructed to f/u with Dr. Jovany Vega however not covered by insurance  - f/u OMFS within 7-days of discharge for continued evaluation of orbit

## 2023-08-22 NOTE — PROGRESS NOTE ADULT - PROBLEM SELECTOR PLAN 1
Pt now complaining of erythematous rash on lower back, unsure of when it first appeared. Per pt, recently in [place], felt a bump on lower back which he showed to local doctor, was told it was a lipoma and was given antibiotics and had an ultrasound. Denies I&D or other interventions. Reports previously feeling pain at site of rash, however improved after taking ?antibiotics. Currently denies pain. Pt spiking intermittent fevers, unclear etiology. WBC nl, BCx NGTD. Rash likely cellulitis, however further workup needed given patients history of HIV and dermatomyositis.   - f/u ID recs  - f/u rheumatology recs  - f/u UA  - f/u CXR  - f/u procal, ESR  - Consider starting cefazolin 1g q8hr for suspected cellulitis Pt complaining of erythematous rash on lower back, unsure of when it first appeared. Per pt, recently in [place], felt a bump on lower back which he showed to local doctor, was told it was a lipoma and was given antibiotics and had an ultrasound. Denies I&D or other interventions. Reports previously feeling pain at site of rash, however improved after taking ?antibiotics in [facility]. Currently denies pain. Pt spiking intermittent fevers, unclear etiology, Tmax 103.6 8/21. WBC nl, BCx NGTD. Rash likely cellulitis, however further workup needed given patients history of HIV and dermatomyositis.   - s/p 1000cc NS due to soft BPs  - Ultrasound of rash negative for fluid collection  - cefazolin dc'd  - started vanc and zosyn per ID  - patient takes 16mg methylprednisolone at home, holding iso fevers and possible bacteremia  - f/u surveillance BCx

## 2023-08-22 NOTE — PROGRESS NOTE ADULT - SUBJECTIVE AND OBJECTIVE BOX
Feeling well today.  Denies feeling febrile overnight.  No diarrhea, shortness of breath, cough, chest pain or other constitutional symptoms.     Remaining ROS negative     PHYSICAL EXAM:    General: no acute distress, sitting up in chair  HEENT: facial swelling, with rash (unchanged)  Cardiovascular: +S1/S2, RRR, no mrg  Respiratory: CTA B/L; no W/R/R  Gastrointestinal: soft, NT/ND; +BSx4  Extremities: WWP; no edema  Neurological: speech fluent, follows commands, moves all extremities  Psychiatric: pleasant mood and affect    VITAL SIGNS:  Vital Signs Last 24 Hrs  T(C): 36.8 (22 Aug 2023 05:48), Max: 39.8 (22 Aug 2023 00:10)  T(F): 98.3 (22 Aug 2023 05:48), Max: 103.6 (22 Aug 2023 00:10)  HR: 88 (22 Aug 2023 05:48) (88 - 108)  BP: 101/61 (22 Aug 2023 05:48) (100/62 - 129/75)  BP(mean): 74 (22 Aug 2023 02:30) (74 - 86)  RR: 18 (22 Aug 2023 05:48) (16 - 18)  SpO2: 96% (22 Aug 2023 05:48) (95% - 96%)    Parameters below as of 22 Aug 2023 02:30  Patient On (Oxygen Delivery Method): room air      MEDICATIONS:  MEDICATIONS  (STANDING):  amLODIPine   Tablet 10 milliGRAM(s) Oral every 24 hours  amoxicillin  875 milliGRAM(s)/clavulanate 1 Tablet(s) Oral two times a day  ceFAZolin  Injectable. 2000 milliGRAM(s) IV Push every 8 hours  dextrose 5%. 1000 milliLiter(s) (50 mL/Hr) IV Continuous <Continuous>  dextrose 5%. 1000 milliLiter(s) (100 mL/Hr) IV Continuous <Continuous>  dextrose 50% Injectable 25 Gram(s) IV Push once  dextrose 50% Injectable 25 Gram(s) IV Push once  dextrose 50% Injectable 12.5 Gram(s) IV Push once  dolutegravir 50 milliGRAM(s) Oral daily  enoxaparin Injectable 40 milliGRAM(s) SubCutaneous every 24 hours  folic acid 1 milliGRAM(s) Oral daily  glucagon  Injectable 1 milliGRAM(s) IntraMuscular once  hydroxychloroquine 200 milliGRAM(s) Oral two times a day  insulin lispro (ADMELOG) corrective regimen sliding scale   SubCutaneous at bedtime  insulin lispro (ADMELOG) corrective regimen sliding scale   SubCutaneous three times a day before meals  metoprolol tartrate 50 milliGRAM(s) Oral every 12 hours  mycophenolate mofetil 500 milliGRAM(s) Oral two times a day  rilpivirine 25 milliGRAM(s) Oral with dinner  sodium chloride 0.9%. 1000 milliLiter(s) (100 mL/Hr) IV Continuous <Continuous>  trimethoprim  160 mG/sulfamethoxazole 800 mG 1 Tablet(s) Oral <User Schedule>    MEDICATIONS  (PRN):  acetaminophen     Tablet .. 975 milliGRAM(s) Oral every 8 hours PRN Temp greater or equal to 38C (100.4F), Mild Pain (1 - 3)  dextrose Oral Gel 15 Gram(s) Oral once PRN Blood Glucose LESS THAN 70 milliGRAM(s)/deciliter      ALLERGIES:  Allergies    No Known Allergies    Intolerances        LABS:                        10.5   7.79  )-----------( 281      ( 22 Aug 2023 05:30 )             34.5     08-22    134<L>  |  104  |  17  ----------------------------<  107<H>  4.4   |  24  |  0.55    Ca    8.5      22 Aug 2023 05:30  Phos  3.6     08-22  Mg     2.0     08-22        Urinalysis Basic - ( 22 Aug 2023 05:30 )    Color: x / Appearance: x / SG: x / pH: x  Gluc: 107 mg/dL / Ketone: x  / Bili: x / Urobili: x   Blood: x / Protein: x / Nitrite: x   Leuk Esterase: x / RBC: x / WBC x   Sq Epi: x / Non Sq Epi: x / Bacteria: x      CAPILLARY BLOOD GLUCOSE      POCT Blood Glucose.: 99 mg/dL (22 Aug 2023 09:06)      RADIOLOGY & ADDITIONAL TESTS: Reviewed.

## 2023-08-23 LAB
ALDOLASE SERPL-CCNC: 15.3 U/L — HIGH (ref 3.3–10.3)
ANION GAP SERPL CALC-SCNC: 8 MMOL/L — SIGNIFICANT CHANGE UP (ref 5–17)
BASOPHILS # BLD AUTO: 0 K/UL — SIGNIFICANT CHANGE UP (ref 0–0.2)
BASOPHILS NFR BLD AUTO: 0 % — SIGNIFICANT CHANGE UP (ref 0–2)
BUN SERPL-MCNC: 10 MG/DL — SIGNIFICANT CHANGE UP (ref 7–23)
BURR CELLS BLD QL SMEAR: PRESENT — SIGNIFICANT CHANGE UP
CALCIUM SERPL-MCNC: 8.7 MG/DL — SIGNIFICANT CHANGE UP (ref 8.4–10.5)
CHLORIDE SERPL-SCNC: 103 MMOL/L — SIGNIFICANT CHANGE UP (ref 96–108)
CK SERPL-CCNC: 1505 U/L — HIGH (ref 30–200)
CO2 SERPL-SCNC: 25 MMOL/L — SIGNIFICANT CHANGE UP (ref 22–31)
CREAT SERPL-MCNC: 0.48 MG/DL — LOW (ref 0.5–1.3)
CRP SERPL-MCNC: 46.8 MG/L — HIGH (ref 0–4)
EGFR: 110 ML/MIN/1.73M2 — SIGNIFICANT CHANGE UP
EOSINOPHIL # BLD AUTO: 0.25 K/UL — SIGNIFICANT CHANGE UP (ref 0–0.5)
EOSINOPHIL NFR BLD AUTO: 3.5 % — SIGNIFICANT CHANGE UP (ref 0–6)
ERYTHROCYTE [SEDIMENTATION RATE] IN BLOOD: 97 MM/HR — HIGH
GIANT PLATELETS BLD QL SMEAR: PRESENT — SIGNIFICANT CHANGE UP
GLUCOSE BLDC GLUCOMTR-MCNC: 118 MG/DL — HIGH (ref 70–99)
GLUCOSE BLDC GLUCOMTR-MCNC: 119 MG/DL — HIGH (ref 70–99)
GLUCOSE BLDC GLUCOMTR-MCNC: 148 MG/DL — HIGH (ref 70–99)
GLUCOSE BLDC GLUCOMTR-MCNC: 99 MG/DL — SIGNIFICANT CHANGE UP (ref 70–99)
GLUCOSE SERPL-MCNC: 127 MG/DL — HIGH (ref 70–99)
HCT VFR BLD CALC: 35.6 % — LOW (ref 39–50)
HGB BLD-MCNC: 10.7 G/DL — LOW (ref 13–17)
LYMPHOCYTES # BLD AUTO: 0.39 K/UL — LOW (ref 1–3.3)
LYMPHOCYTES # BLD AUTO: 5.3 % — LOW (ref 13–44)
MAGNESIUM SERPL-MCNC: 2.1 MG/DL — SIGNIFICANT CHANGE UP (ref 1.6–2.6)
MANUAL SMEAR VERIFICATION: SIGNIFICANT CHANGE UP
MCHC RBC-ENTMCNC: 25.8 PG — LOW (ref 27–34)
MCHC RBC-ENTMCNC: 30.1 GM/DL — LOW (ref 32–36)
MCV RBC AUTO: 86 FL — SIGNIFICANT CHANGE UP (ref 80–100)
MONOCYTES # BLD AUTO: 0.44 K/UL — SIGNIFICANT CHANGE UP (ref 0–0.9)
MONOCYTES NFR BLD AUTO: 6.1 % — SIGNIFICANT CHANGE UP (ref 2–14)
MYOGLOBIN SERPL-MCNC: 292 NG/ML — HIGH (ref 16–96)
NEUTROPHILS # BLD AUTO: 6.2 K/UL — SIGNIFICANT CHANGE UP (ref 1.8–7.4)
NEUTROPHILS NFR BLD AUTO: 84.2 % — HIGH (ref 43–77)
NEUTS BAND # BLD: 0.9 % — SIGNIFICANT CHANGE UP (ref 0–8)
OVALOCYTES BLD QL SMEAR: SLIGHT — SIGNIFICANT CHANGE UP
PHOSPHATE SERPL-MCNC: 3.3 MG/DL — SIGNIFICANT CHANGE UP (ref 2.5–4.5)
PLAT MORPH BLD: ABNORMAL
PLATELET # BLD AUTO: 321 K/UL — SIGNIFICANT CHANGE UP (ref 150–400)
POIKILOCYTOSIS BLD QL AUTO: SLIGHT — SIGNIFICANT CHANGE UP
POLYCHROMASIA BLD QL SMEAR: SLIGHT — SIGNIFICANT CHANGE UP
POTASSIUM SERPL-MCNC: 4.3 MMOL/L — SIGNIFICANT CHANGE UP (ref 3.5–5.3)
POTASSIUM SERPL-SCNC: 4.3 MMOL/L — SIGNIFICANT CHANGE UP (ref 3.5–5.3)
RBC # BLD: 4.14 M/UL — LOW (ref 4.2–5.8)
RBC # FLD: 14.4 % — SIGNIFICANT CHANGE UP (ref 10.3–14.5)
RBC BLD AUTO: ABNORMAL
SODIUM SERPL-SCNC: 136 MMOL/L — SIGNIFICANT CHANGE UP (ref 135–145)
WBC # BLD: 7.28 K/UL — SIGNIFICANT CHANGE UP (ref 3.8–10.5)
WBC # FLD AUTO: 7.28 K/UL — SIGNIFICANT CHANGE UP (ref 3.8–10.5)

## 2023-08-23 PROCEDURE — 99232 SBSQ HOSP IP/OBS MODERATE 35: CPT | Mod: 95

## 2023-08-23 PROCEDURE — 99232 SBSQ HOSP IP/OBS MODERATE 35: CPT | Mod: GC

## 2023-08-23 PROCEDURE — 99233 SBSQ HOSP IP/OBS HIGH 50: CPT

## 2023-08-23 RX ADMIN — PIPERACILLIN AND TAZOBACTAM 25 GRAM(S): 4; .5 INJECTION, POWDER, LYOPHILIZED, FOR SOLUTION INTRAVENOUS at 06:32

## 2023-08-23 RX ADMIN — MYCOPHENOLATE MOFETIL 500 MILLIGRAM(S): 250 CAPSULE ORAL at 22:40

## 2023-08-23 RX ADMIN — DOLUTEGRAVIR SODIUM 50 MILLIGRAM(S): 25 TABLET, FILM COATED ORAL at 12:55

## 2023-08-23 RX ADMIN — PIPERACILLIN AND TAZOBACTAM 25 GRAM(S): 4; .5 INJECTION, POWDER, LYOPHILIZED, FOR SOLUTION INTRAVENOUS at 00:48

## 2023-08-23 RX ADMIN — Medication 200 MILLIGRAM(S): at 10:36

## 2023-08-23 RX ADMIN — Medication 1 TABLET(S): at 08:00

## 2023-08-23 RX ADMIN — Medication 50 MILLIGRAM(S): at 06:31

## 2023-08-23 RX ADMIN — RILPIVIRINE HYDROCHLORIDE 25 MILLIGRAM(S): 25 TABLET, FILM COATED ORAL at 19:19

## 2023-08-23 RX ADMIN — Medication 975 MILLIGRAM(S): at 20:10

## 2023-08-23 RX ADMIN — PIPERACILLIN AND TAZOBACTAM 25 GRAM(S): 4; .5 INJECTION, POWDER, LYOPHILIZED, FOR SOLUTION INTRAVENOUS at 15:20

## 2023-08-23 RX ADMIN — Medication 300 MILLIGRAM(S): at 19:19

## 2023-08-23 RX ADMIN — PIPERACILLIN AND TAZOBACTAM 25 GRAM(S): 4; .5 INJECTION, POWDER, LYOPHILIZED, FOR SOLUTION INTRAVENOUS at 22:40

## 2023-08-23 RX ADMIN — Medication 50 MILLIGRAM(S): at 19:19

## 2023-08-23 RX ADMIN — Medication 1 MILLIGRAM(S): at 12:55

## 2023-08-23 RX ADMIN — Medication 200 MILLIGRAM(S): at 22:40

## 2023-08-23 RX ADMIN — Medication 975 MILLIGRAM(S): at 21:27

## 2023-08-23 RX ADMIN — Medication 8 MILLIGRAM(S): at 16:59

## 2023-08-23 RX ADMIN — AMLODIPINE BESYLATE 10 MILLIGRAM(S): 2.5 TABLET ORAL at 12:55

## 2023-08-23 RX ADMIN — Medication 300 MILLIGRAM(S): at 06:45

## 2023-08-23 RX ADMIN — MYCOPHENOLATE MOFETIL 500 MILLIGRAM(S): 250 CAPSULE ORAL at 10:36

## 2023-08-23 RX ADMIN — ENOXAPARIN SODIUM 40 MILLIGRAM(S): 100 INJECTION SUBCUTANEOUS at 17:00

## 2023-08-23 NOTE — PROGRESS NOTE ADULT - SUBJECTIVE AND OBJECTIVE BOX
INFECTIOUS DISEASES INITIAL CONSULT NOTE    HPI:  72M w/ hx HIV on Juluca (follows at [facility], with recent VL UD a few months ago, no longer following CD4) with reported excellent adherence to Juluca, dermatomyositis on methypred 4mg daily and  daily and on PCP ppx with bactrim for this indication, DMT2 (A1c 7.6%), HTN, HLD, CAD, and CHF, who presented to ED on 8/15 after he had a syncopal episode following routine outpatient colonoscopy, fell and hit face on floor, and subsequently had reported BLE weakness, stroke code called and imaging revealed two small sites of possible acute hemorrhage in L lentiform nucleus/corona radiata, as well as trace SDH, and a R orbital floor fracture. He was febrile on presentation to .7 x1 and had tachycardia and borderline lactate, but these all quickly resolved. Was on 2L NC but this also resolved quickly. BCx x2 ngtd, UA neg, Fluvid neg, CXR without focal consolidation. Received a dose of unasyn on 8/15, then vanc/cefepime on 8/16. ID consulted to assist with management.     Patient had been in his usual state of health prior to colonoscopy.  He does report pain over his R face, but no pain with EOM and feels vision is OK. Pain over R side of face is overall stable/improving.  Patient reports that his rash started about 1 month ago while on a visit to [place], at the time told by local ER physician to have a infected lipoma - given oral antibiotics  PMH of dermatomyositis currently on IVIG, mycophenolate and medrol last CPK at 1300 on 08/01/2023 - ongoing severe weakness of bilateral upper and lower extremities, reports having ongoing facial swelling that is worse since the fall - over the left side    PAST MEDICAL & SURGICAL HISTORY:  HTN (hypertension)      Diabetes      Hypercholesteremia      HIV disease      Chronic systolic congestive heart failure      CAD (coronary artery disease)      Dermatomyositis      No significant past surgical history            Review of Systems:   Constitutional, eyes, ENT, cardiovascular, respiratory, gastrointestinal, genitourinary, integumentary, neurological, psychiatric and heme/lymph are otherwise negative other than noted above       ANTIBIOTICS:  MEDICATIONS  (STANDING):  amLODIPine   Tablet 10 milliGRAM(s) Oral every 24 hours  folic acid 1 milliGRAM(s) Oral daily  methylPREDNISolone 16 milliGRAM(s) Oral daily  metoprolol tartrate 50 milliGRAM(s) Oral every 12 hours  trimethoprim  160 mG/sulfamethoxazole 800 mG 1 Tablet(s) Oral <User Schedule>  vancomycin  IVPB 1500 milliGRAM(s) IV Intermittent every 12 hours  vancomycin  IVPB        MEDICATIONS  (PRN):      Allergies    No Known Allergies    Intolerances        SOCIAL HISTORY: Not contributory to current presentation    FAMILY HISTORY:  No pertinent family history in first degree relatives     no FH leading to current infection    Vital Signs Last 24 Hrs  T(C): 37.6 (16 Aug 2023 14:50), Max: 38.7 (16 Aug 2023 00:24)  T(F): 99.7 (16 Aug 2023 14:50), Max: 101.7 (16 Aug 2023 00:24)  HR: 96 (16 Aug 2023 16:25) (85 - 111)  BP: 150/72 (16 Aug 2023 16:25) (120/62 - 176/84)  BP(mean): 103 (16 Aug 2023 16:25) (85 - 104)  RR: 22 (16 Aug 2023 16:25) (18 - 29)  SpO2: 93% (16 Aug 2023 16:25) (91% - 98%)    Parameters below as of 16 Aug 2023 16:25  Patient On (Oxygen Delivery Method): room air        08-15-23 @ 07:01  -  08-16-23 @ 07:00  --------------------------------------------------------  IN: 0 mL / OUT: 700 mL / NET: -700 mL    08-16-23 @ 07:01  -  08-16-23 @ 17:15  --------------------------------------------------------  IN: 336 mL / OUT: 200 mL / NET: 136 mL        PHYSICAL EXAM:  Constitutional: alert, NAD  HEENT: Swelling and brusing over R side of face. Eyelid swelling but can open eye and move eye around. No significant warmth or erythema. Mild appropriate tenderness. bilateral periorbital edema  Neck: the appearance of the neck was normal and the neck was supple.   Pulmonary: no respiratory distress and lungs were clear to auscultation bilaterally.   Heart: heart rate was normal and rhythm regular, normal S1 and S2  Vascular:. there was no peripheral edema  Abdomen: normal bowel sounds, soft, non-tender  Neurological: no focal deficits.   Psychiatric: the affect was normal  skin: - red, macular erythematous area over the lumbar spine - tender to touch - extends from left to right lower abdomen, V-shawl rash over torso and chest - ongoing heliotrope rash  Muskoloskeletal: bilateral upper and lower extremities weaknesss 3/5  LABS:                              RADIOLOGY & ADDITIONAL STUDIES:  Reviewed   The patient is a 30y Male complaining of rib pain/injury.

## 2023-08-23 NOTE — PROGRESS NOTE ADULT - ASSESSMENT
# Syncope in setting of likely dehydration/hypoglycemia from recent colonoscopy c/b Right orbital floor fracture and trace SDH  # Possible acute hemorrhage in L lentiform nucleus/corona radiata - being followed by neuro  # Fever, tachycardia - resolved  # HIV on Juluca (follows at [facility], with recent VL UD a few months ago, no longer following CD4)  -Please continue juluca  # Dermatomyositis on methyl pred 8 mg and  BID amd IVIG every 15 days on PCP ppx with bactrim for this indication - last CPK at 1500 - has re-started medrol at 8 mg daily - repeat labs today - ongoing heliotrope rash and periorbital edema - will need pulse once infection is clear - pending IVIG infusion next week - will plan for hospital infusion if still inpatient - # cellulitis (likely) over the lumbar area -n now on IV vanco with slight improvement - f/u with ID - not related to dermatomyositis

## 2023-08-23 NOTE — PROGRESS NOTE ADULT - PROBLEM SELECTOR PLAN 9
Pt has hx of HLD. LDL WNL on this hospitalization.  On repatha at home, due for next injection. Statins contraindicated due to dermatomyositis.  - LDL results: 21 (8/16)    Plan  - can restart injections on DC

## 2023-08-23 NOTE — PROGRESS NOTE ADULT - SUBJECTIVE AND OBJECTIVE BOX
O/N Events: fever of 100.9 and complaint of itchiness overnight    Subjective/ROS: Patient seen and examined at bedside.     Denies Fever/Chills, HA, CP, SOB, n/v, changes in bowel/urinary habits. Patient complains of itchiness    VITALS  Vital Signs Last 24 Hrs  T(C): 36.7 (23 Aug 2023 12:40), Max: 38.3 (22 Aug 2023 20:10)  T(F): 98.1 (23 Aug 2023 12:40), Max: 100.9 (22 Aug 2023 20:10)  HR: 61 (23 Aug 2023 12:40) (61 - 108)  BP: 131/69 (23 Aug 2023 12:40) (100/61 - 131/69)  BP(mean): 77 (23 Aug 2023 02:00) (74 - 77)  RR: 18 (23 Aug 2023 12:40) (16 - 18)  SpO2: 94% (23 Aug 2023 12:40) (94% - 97%)    Parameters below as of 23 Aug 2023 12:40  Patient On (Oxygen Delivery Method): room air        CAPILLARY BLOOD GLUCOSE      POCT Blood Glucose.: 99 mg/dL (23 Aug 2023 12:54)  POCT Blood Glucose.: 118 mg/dL (23 Aug 2023 09:09)  POCT Blood Glucose.: 117 mg/dL (22 Aug 2023 22:37)  POCT Blood Glucose.: 125 mg/dL (22 Aug 2023 17:51)      PHYSICAL EXAM  General: NAD  Head: NC/AT; MMM; PERRL; EOMI; face and eyelids appear puffy  Neck: Supple; no JVD  Respiratory: CTAB; no wheezes/rales/rhonchi  Cardiovascular: Regular rhythm/rate; S1/S2+, no murmurs, rubs gallops   Gastrointestinal: Soft; NTND;  Extremities: WWP; no edema/cyanosis  Neurological: A&Ox3, no obvious focal deficits  Skin: Facial skin shows heliotrope rash    MEDICATIONS  (STANDING):  amLODIPine   Tablet 10 milliGRAM(s) Oral every 24 hours  dextrose 5%. 1000 milliLiter(s) (50 mL/Hr) IV Continuous <Continuous>  dextrose 5%. 1000 milliLiter(s) (100 mL/Hr) IV Continuous <Continuous>  dextrose 50% Injectable 25 Gram(s) IV Push once  dextrose 50% Injectable 25 Gram(s) IV Push once  dextrose 50% Injectable 12.5 Gram(s) IV Push once  dolutegravir 50 milliGRAM(s) Oral daily  enoxaparin Injectable 40 milliGRAM(s) SubCutaneous every 24 hours  folic acid 1 milliGRAM(s) Oral daily  glucagon  Injectable 1 milliGRAM(s) IntraMuscular once  hydroxychloroquine 200 milliGRAM(s) Oral two times a day  insulin lispro (ADMELOG) corrective regimen sliding scale   SubCutaneous at bedtime  insulin lispro (ADMELOG) corrective regimen sliding scale   SubCutaneous three times a day before meals  methylPREDNISolone 8 milliGRAM(s) Oral daily  metoprolol tartrate 50 milliGRAM(s) Oral every 12 hours  mycophenolate mofetil 500 milliGRAM(s) Oral two times a day  piperacillin/tazobactam IVPB.. 3.375 Gram(s) IV Intermittent every 8 hours  rilpivirine 25 milliGRAM(s) Oral with dinner  trimethoprim  160 mG/sulfamethoxazole 800 mG 1 Tablet(s) Oral <User Schedule>  vancomycin  IVPB 1500 milliGRAM(s) IV Intermittent every 12 hours    MEDICATIONS  (PRN):  acetaminophen     Tablet .. 975 milliGRAM(s) Oral every 8 hours PRN Temp greater or equal to 38C (100.4F), Mild Pain (1 - 3)  dextrose Oral Gel 15 Gram(s) Oral once PRN Blood Glucose LESS THAN 70 milliGRAM(s)/deciliter      No Known Allergies      LABS                        10.7   7.28  )-----------( 321      ( 23 Aug 2023 05:30 )             35.6     08-23    136  |  103  |  10  ----------------------------<  127<H>  4.3   |  25  |  0.48<L>    Ca    8.7      23 Aug 2023 05:30  Phos  3.3     08-23  Mg     2.1     08-23        Urinalysis Basic - ( 23 Aug 2023 05:30 )    Color: x / Appearance: x / SG: x / pH: x  Gluc: 127 mg/dL / Ketone: x  / Bili: x / Urobili: x   Blood: x / Protein: x / Nitrite: x   Leuk Esterase: x / RBC: x / WBC x   Sq Epi: x / Non Sq Epi: x / Bacteria: x      CARDIAC MARKERS ( 21 Aug 2023 20:05 )  x     / x     / 1558 U/L / x     / x              IMAGING/EKG/ETC

## 2023-08-23 NOTE — PROGRESS NOTE ADULT - ASSESSMENT
72M with PMH of HTN, HLD, DM2, HIV, HFrEF, dermatomyositis and CAD presenting after a syncopal event following colonoscopy, sustaining orbital fractures and suffering an intracranial bleed.  Also noted to be extremely hypertensive.  Admitted to the stroke service for further evaluation.

## 2023-08-23 NOTE — PROGRESS NOTE ADULT - PROBLEM SELECTOR PLAN 1
Pt complaining of erythematous rash on lower back, unsure of when it first appeared. Per pt, recently in [place], felt a bump on lower back which he showed to local doctor, was told it was a lipoma and was given antibiotics and had an ultrasound. Denies I&D or other interventions. Reports previously feeling pain at site of rash, however improved after taking ?antibiotics in [facility]. Currently denies pain. Pt spiking intermittent fevers, unclear etiology, Tmax 103.6 8/21. WBC nl, BCx NGTD. Rash likely cellulitis, however further workup needed given patients history of HIV and dermatomyositis.   s/p 1000cc NS due to soft BPs  Ultrasound of rash negative for fluid collection  Cefazolin dc'd  - c/w vanco 1500mg IV q12h and zosyn 3.375g IV q8h to treat cellulitis.  Check vanc trough before 4th dose, goal 10-15  - patient takes 16mg methylprednisolone at home. Restarted on 8 mg today.  - f/u surveillance BCx: no growth at day 1

## 2023-08-23 NOTE — PROGRESS NOTE ADULT - ATTENDING COMMENTS
#Cellulitis, immunosuppression due to drug therapy, dermatomyositis, HIV    Febrile to 100.9.  Patient feels well, erythema on back unchanged.  For now cont vanco 1500mg IV q12h and zosyn 3.375g IV q8h to treat cellulitis.  Check vanc trough before 4th dose, goal 10-15.  Cont Juluca.      Team 1 will follow you.  Case d/w primary team.    Nathalie Fisher MD, MS  Infectious Disease attending  work cell [phone]   For any questions during evening/weekend/holiday, please page ID on call

## 2023-08-23 NOTE — PROGRESS NOTE ADULT - ASSESSMENT
72M with PMH of HTN, HLD, DM2, HIV, HFrEF, dermatomyositis and CAD presenting after a syncopal event following colonoscopy, sustaining orbital fractures and suffering an intracranial bleed.  Also noted to be extremely hypertensive.  Admitted to the stroke service for further evaluation.     #Intracerebral hemorrhage  - plan per stroke team  - holding any anticoagulation  - stable.  Likely from hypertensive episode after colonoscopy  - patient confirmed with me that he is not on aspirin at home, and his cardiologist has not recommended him to start on it in the outpatient setting.   - no active neurological issues at this time    #Febrile episode  #Elevated lactate (resolved)  #Dermatomyositis  #Cellulitis  - continues with fevers, switched to vancomycin and zosyn for broader coverage  - appreciate ID and rheum input  - elevated CRP and ESR, likely chronic elevation due to dermatomyositis.   - please discuss with rheumatology about how to proceed with steroids  - no abscess seen on ultrasound of his lower back, at the site of the erythema  - transfer to medicine to further management of sepsis and no further neurological workup indicated.     #HIV  - continue home HIV regimen - dolutegravir and rilpirivine (two component of januca - home medication not available at the hospital)  - viral load undetectable    #Orbital fracture  - stable, outpatient follow up    Plan of care discussed with stroke team.    50 minutes spent on this encounter, including face to face with patient, care coordination and documentation.    72M with PMH of HTN, HLD, DM2, HIV, HFrEF, dermatomyositis and CAD presenting after a syncopal event following colonoscopy, sustaining orbital fractures and suffering an intracranial bleed.  Also noted to be extremely hypertensive.  Admitted to the stroke service for further evaluation.     #Intracerebral hemorrhage  - plan per stroke team  - holding any anticoagulation  - stable.  Likely from hypertensive episode after colonoscopy  - patient confirmed with me that he is not on aspirin at home, and his cardiologist has not recommended him to start on it in the outpatient setting.   - no active neurological issues at this time    #Febrile episode  #Elevated lactate (resolved)  #Dermatomyositis  #Cellulitis  - continues with fevers, switched to vancomycin and zosyn for broader coverage --> now developed new rash, and thought to be related to antibiotics.  ID will be adjusting antibiotic regimen.    - appreciate ID and rheum input  - elevated CRP and ESR, likely chronic elevation due to dermatomyositis.   - please discuss with rheumatology about how to proceed with steroids and other immunosuppressive meds as they were held due to fevers.    - should ideally continue steroids  - no abscess seen on ultrasound of his lower back, at the site of the erythema  - transfer to medicine to further management of sepsis and no further neurological workup indicated.   - not experiencing any diarrhea or cough  - RVP and COVID negative    #HIV  - continue home HIV regimen - dolutegravir and rilpirivine (two component of januca - home medication not available at the hospital)  - viral load undetectable    #Orbital fracture  - stable, outpatient follow up  - OMFS had recommended augmentin to complete a 7 day course, however this is discontinued since starting IV antibiotics.   - extraocular movements appears intact on exam.  His facial sw    Plan of care discussed with stroke team.    50 minutes spent on this encounter, including face to face with patient, care coordination and documentation.    72M with PMH of HTN, HLD, DM2, HIV, HFrEF, dermatomyositis and CAD presenting after a syncopal event following colonoscopy, sustaining orbital fractures and suffering an intracranial bleed.  Also noted to be extremely hypertensive.  Admitted to the stroke service for further evaluation.     #Intracerebral hemorrhage  - plan per stroke team  - holding any anticoagulation  - stable.  Likely from hypertensive episode after colonoscopy  - patient confirmed with me that he is not on aspirin at home, and his cardiologist has not recommended him to start on it in the outpatient setting.   - no active neurological issues at this time    #Febrile episode  #Elevated lactate (resolved)  #Dermatomyositis  #Cellulitis  - continues with fevers, switched to vancomycin and zosyn for broader coverage --> now developed new rash, and thought to be related to antibiotics.  ID will be adjusting antibiotic regimen.    - appreciate ID and rheum input  - elevated CRP and ESR, likely chronic elevation due to dermatomyositis.   - continue home dose of steroids, cellcept and plaquenil  - no abscess seen on ultrasound of his lower back, at the site of the erythema  - transfer to medicine to further management of sepsis and no further neurological workup indicated.   - not experiencing any diarrhea or cough  - RVP and COVID negative  - CK levels elevated - expected with dermatomyositis. No need to trend.     #HIV  - continue home HIV regimen - dolutegravir and rilpirivine (two component of januca - home medication not available at the hospital)  - viral load undetectable    #Orbital fracture  - stable, outpatient follow up  - OMFS had recommended augmentin to complete a 7 day course, however this is discontinued since starting IV antibiotics.   - extraocular movements appears intact on exam.  His facial swelling is worse in the morning, and improves throughout the day    Plan of care discussed with stroke team.    50 minutes spent on this encounter, including face to face with patient, care coordination and documentation.

## 2023-08-23 NOTE — PROGRESS NOTE ADULT - PROBLEM SELECTOR PLAN 3
Pt had orbital bone fracture as seen on imaging. No acute surgical intervention indicated.     Plan  - Sinus precautions: no nose blowing, avoid using straws avoid smoking, avoid sneezing with mouth closed, sleep with head elevated  - Seen by OMFS this admission, instructed to f/u with Dr. Jovany Vega however not covered by insurance  - f/u OMFS within 7-days of discharge for continued evaluation of orbit

## 2023-08-23 NOTE — PROGRESS NOTE ADULT - ASSESSMENT
73yo M w/ PMHx HIV on Juluca, HFrEF, Dermatomyositis, & DM2 admitted w/ orbital fractures/ICH, w/ recurrent high fevers possibly 2/2 cellulitis vs CNS etiology, being managed w/ IV abx. Course c/b blanching erythematous over lower back, possibly 2/2 drug reaction iso underlying dermatomyositis.     Erythema on back unchanged / may be slightly improved.    #Cellulitis #Immunosuppression due to drug therapy/HIV # HIV on Juluca  # Dermatomyositis     Recommendations:   - C/w Zosyn 3.375g IV q8h & Vanc 1500mg IV q12h for cellulitis. Check vanc trough before 4th dose.   - C/w Abena, Bactrim ppx  - Team 1 to continue to follow. Case d/w primary team.

## 2023-08-23 NOTE — PROGRESS NOTE ADULT - SUBJECTIVE AND OBJECTIVE BOX
**INCOMPLETE NOTE**    INFECTIOUS DISEASES CONSULT FOLLOW-UP NOTE    INTERVAL HPI/OVERNIGHT EVENTS:      ROS:   Constitutional, eyes, ENT, cardiovascular, respiratory, gastrointestinal, genitourinary, integumentary, neurological, psychiatric and heme/lymph are otherwise negative other than noted above       ANTIBIOTICS/RELEVANT:    MEDICATIONS  (STANDING):  amLODIPine   Tablet 10 milliGRAM(s) Oral every 24 hours  dextrose 5%. 1000 milliLiter(s) (50 mL/Hr) IV Continuous <Continuous>  dextrose 5%. 1000 milliLiter(s) (100 mL/Hr) IV Continuous <Continuous>  dextrose 50% Injectable 25 Gram(s) IV Push once  dextrose 50% Injectable 12.5 Gram(s) IV Push once  dextrose 50% Injectable 25 Gram(s) IV Push once  dolutegravir 50 milliGRAM(s) Oral daily  enoxaparin Injectable 40 milliGRAM(s) SubCutaneous every 24 hours  folic acid 1 milliGRAM(s) Oral daily  glucagon  Injectable 1 milliGRAM(s) IntraMuscular once  hydroxychloroquine 200 milliGRAM(s) Oral two times a day  insulin lispro (ADMELOG) corrective regimen sliding scale   SubCutaneous at bedtime  insulin lispro (ADMELOG) corrective regimen sliding scale   SubCutaneous three times a day before meals  metoprolol tartrate 50 milliGRAM(s) Oral every 12 hours  mycophenolate mofetil 500 milliGRAM(s) Oral two times a day  piperacillin/tazobactam IVPB.. 3.375 Gram(s) IV Intermittent every 8 hours  rilpivirine 25 milliGRAM(s) Oral with dinner  sodium chloride 0.9%. 1000 milliLiter(s) (100 mL/Hr) IV Continuous <Continuous>  trimethoprim  160 mG/sulfamethoxazole 800 mG 1 Tablet(s) Oral <User Schedule>  vancomycin  IVPB 1500 milliGRAM(s) IV Intermittent every 12 hours    MEDICATIONS  (PRN):  acetaminophen     Tablet .. 975 milliGRAM(s) Oral every 8 hours PRN Temp greater or equal to 38C (100.4F), Mild Pain (1 - 3)  dextrose Oral Gel 15 Gram(s) Oral once PRN Blood Glucose LESS THAN 70 milliGRAM(s)/deciliter        Vital Signs Last 24 Hrs  T(C): 37 (23 Aug 2023 06:00), Max: 38.3 (22 Aug 2023 20:10)  T(F): 98.6 (23 Aug 2023 06:00), Max: 100.9 (22 Aug 2023 20:10)  HR: 88 (23 Aug 2023 06:00) (77 - 108)  BP: 102/62 (23 Aug 2023 06:00) (100/61 - 124/66)  BP(mean): 77 (23 Aug 2023 02:00) (74 - 77)  RR: 18 (23 Aug 2023 06:00) (16 - 18)  SpO2: 94% (23 Aug 2023 06:00) (94% - 97%)    Parameters below as of 23 Aug 2023 06:00  Patient On (Oxygen Delivery Method): room air        08-22-23 @ 07:01  -  08-23-23 @ 07:00  --------------------------------------------------------  IN: 725 mL / OUT: 1050 mL / NET: -325 mL    08-23-23 @ 07:01  -  08-23-23 @ 08:23  --------------------------------------------------------  IN: 25 mL / OUT: 0 mL / NET: 25 mL      PHYSICAL EXAM:  Constitutional: alert, NAD  HEENT: Swelling and brusing over b/l portions of face,  R>L. No significant warmth, erythema, or tenderness over sinuses   Neck: the appearance of the neck was normal and the neck was supple.   Pulmonary: no respiratory distress and lungs were clear to auscultation bilaterally.   Heart: heart rate was normal, + ejection murmur at apex   Vascular:. there was no peripheral edema  Skin: ~ 4x6 area of blanching, non-tender, non-purulent/non-fluctuant erythema on the R lower back   Abdomen: normal bowel sounds, soft, non-tender  Neurological: no focal deficits.   Psychiatric: the affect was normal        LABS:                        10.5   7.79  )-----------( 281      ( 22 Aug 2023 05:30 )             34.5     08-22    134<L>  |  104  |  17  ----------------------------<  107<H>  4.4   |  24  |  0.55    Ca    8.5      22 Aug 2023 05:30  Phos  3.6     08-22  Mg     2.0     08-22        Urinalysis Basic - ( 22 Aug 2023 05:30 )    Color: x / Appearance: x / SG: x / pH: x  Gluc: 107 mg/dL / Ketone: x  / Bili: x / Urobili: x   Blood: x / Protein: x / Nitrite: x   Leuk Esterase: x / RBC: x / WBC x   Sq Epi: x / Non Sq Epi: x / Bacteria: x        MICROBIOLOGY:      RADIOLOGY & ADDITIONAL STUDIES:  Reviewed **INCOMPLETE NOTE**    INFECTIOUS DISEASES CONSULT FOLLOW-UP NOTE    INTERVAL HPI/OVERNIGHT EVENTS: Febrile 100.9 around 8PM yesterday, got tylenol.     ROS:   Saw pt at bedside this AM, reports new rash over front of neck that started yesterday. States rash feels hot to touch but is not painful/tender, itchy, or uncomfortably. Is unsure whether the rash has spread/worsened since it started. Felt feverish last night when temp was high, no chills. No nausea, vomiting, diarrhea, chest pain, palpitations, dyspnea.       Constitutional, eyes, ENT, cardiovascular, respiratory, gastrointestinal, genitourinary, integumentary, neurological, psychiatric and heme/lymph are otherwise negative other than noted above       ANTIBIOTICS/RELEVANT:    MEDICATIONS  (STANDING):  amLODIPine   Tablet 10 milliGRAM(s) Oral every 24 hours  dextrose 5%. 1000 milliLiter(s) (50 mL/Hr) IV Continuous <Continuous>  dextrose 5%. 1000 milliLiter(s) (100 mL/Hr) IV Continuous <Continuous>  dextrose 50% Injectable 25 Gram(s) IV Push once  dextrose 50% Injectable 12.5 Gram(s) IV Push once  dextrose 50% Injectable 25 Gram(s) IV Push once  dolutegravir 50 milliGRAM(s) Oral daily  enoxaparin Injectable 40 milliGRAM(s) SubCutaneous every 24 hours  folic acid 1 milliGRAM(s) Oral daily  glucagon  Injectable 1 milliGRAM(s) IntraMuscular once  hydroxychloroquine 200 milliGRAM(s) Oral two times a day  insulin lispro (ADMELOG) corrective regimen sliding scale   SubCutaneous at bedtime  insulin lispro (ADMELOG) corrective regimen sliding scale   SubCutaneous three times a day before meals  metoprolol tartrate 50 milliGRAM(s) Oral every 12 hours  mycophenolate mofetil 500 milliGRAM(s) Oral two times a day  piperacillin/tazobactam IVPB.. 3.375 Gram(s) IV Intermittent every 8 hours  rilpivirine 25 milliGRAM(s) Oral with dinner  sodium chloride 0.9%. 1000 milliLiter(s) (100 mL/Hr) IV Continuous <Continuous>  trimethoprim  160 mG/sulfamethoxazole 800 mG 1 Tablet(s) Oral <User Schedule>  vancomycin  IVPB 1500 milliGRAM(s) IV Intermittent every 12 hours    MEDICATIONS  (PRN):  acetaminophen     Tablet .. 975 milliGRAM(s) Oral every 8 hours PRN Temp greater or equal to 38C (100.4F), Mild Pain (1 - 3)  dextrose Oral Gel 15 Gram(s) Oral once PRN Blood Glucose LESS THAN 70 milliGRAM(s)/deciliter        Vital Signs Last 24 Hrs  T(C): 37 (23 Aug 2023 06:00), Max: 38.3 (22 Aug 2023 20:10)  T(F): 98.6 (23 Aug 2023 06:00), Max: 100.9 (22 Aug 2023 20:10)  HR: 88 (23 Aug 2023 06:00) (77 - 108)  BP: 102/62 (23 Aug 2023 06:00) (100/61 - 124/66)  BP(mean): 77 (23 Aug 2023 02:00) (74 - 77)  RR: 18 (23 Aug 2023 06:00) (16 - 18)  SpO2: 94% (23 Aug 2023 06:00) (94% - 97%)    Parameters below as of 23 Aug 2023 06:00  Patient On (Oxygen Delivery Method): room air        08-22-23 @ 07:01  -  08-23-23 @ 07:00  --------------------------------------------------------  IN: 725 mL / OUT: 1050 mL / NET: -325 mL    08-23-23 @ 07:01  - 08-23-23 @ 08:23  --------------------------------------------------------  IN: 25 mL / OUT: 0 mL / NET: 25 mL      PHYSICAL EXAM:  Constitutional: alert, NAD  HEENT: Swelling and brusing over b/l portions of face,  R>L. No significant warmth, erythema, or tenderness over sinuses   Neck: New blanching warm/hot erythematous rash over anterior neck from mandible to anterior chest to right above nipple-line.   Pulmonary: no respiratory distress and lungs were clear to auscultation bilaterally.   Heart: heart rate was normal, + ejection murmur at apex   Vascular:. there was no peripheral edema  Skin: ~ 4x6 area of blanching, non-tender, non-purulent/non-fluctuant erythema on the R lower back, slightly smaller w/ less swelling compared to yesterday's exam  Abdomen: normal bowel sounds, soft, non-tender  Neurological: no focal deficits.   Psychiatric: the affect was normal        LABS:                        10.5   7.79  )-----------( 281      ( 22 Aug 2023 05:30 )             34.5     08-22    134<L>  |  104  |  17  ----------------------------<  107<H>  4.4   |  24  |  0.55    Ca    8.5      22 Aug 2023 05:30  Phos  3.6     08-22  Mg     2.0     08-22        Urinalysis Basic - ( 22 Aug 2023 05:30 )    Color: x / Appearance: x / SG: x / pH: x  Gluc: 107 mg/dL / Ketone: x  / Bili: x / Urobili: x   Blood: x / Protein: x / Nitrite: x   Leuk Esterase: x / RBC: x / WBC x   Sq Epi: x / Non Sq Epi: x / Bacteria: x        MICROBIOLOGY:      RADIOLOGY & ADDITIONAL STUDIES:  Reviewed INFECTIOUS DISEASES CONSULT FOLLOW-UP NOTE    INTERVAL HPI/OVERNIGHT EVENTS: Febrile 100.9 around 8PM yesterday, got tylenol.     ROS:   Saw pt at bedside this AM, reports new rash over front of neck that started yesterday. States rash feels hot to touch but is not painful/tender, itchy, or uncomfortably. Is unsure whether the rash has spread/worsened since it started. Felt feverish last night when temp was high, no chills. No nausea, vomiting, diarrhea, chest pain, palpitations, dyspnea.       Constitutional, eyes, ENT, cardiovascular, respiratory, gastrointestinal, genitourinary, integumentary, neurological, psychiatric and heme/lymph are otherwise negative other than noted above       ANTIBIOTICS/RELEVANT:    MEDICATIONS  (STANDING):  amLODIPine   Tablet 10 milliGRAM(s) Oral every 24 hours  dextrose 5%. 1000 milliLiter(s) (50 mL/Hr) IV Continuous <Continuous>  dextrose 5%. 1000 milliLiter(s) (100 mL/Hr) IV Continuous <Continuous>  dextrose 50% Injectable 25 Gram(s) IV Push once  dextrose 50% Injectable 12.5 Gram(s) IV Push once  dextrose 50% Injectable 25 Gram(s) IV Push once  dolutegravir 50 milliGRAM(s) Oral daily  enoxaparin Injectable 40 milliGRAM(s) SubCutaneous every 24 hours  folic acid 1 milliGRAM(s) Oral daily  glucagon  Injectable 1 milliGRAM(s) IntraMuscular once  hydroxychloroquine 200 milliGRAM(s) Oral two times a day  insulin lispro (ADMELOG) corrective regimen sliding scale   SubCutaneous at bedtime  insulin lispro (ADMELOG) corrective regimen sliding scale   SubCutaneous three times a day before meals  metoprolol tartrate 50 milliGRAM(s) Oral every 12 hours  mycophenolate mofetil 500 milliGRAM(s) Oral two times a day  piperacillin/tazobactam IVPB.. 3.375 Gram(s) IV Intermittent every 8 hours  rilpivirine 25 milliGRAM(s) Oral with dinner  sodium chloride 0.9%. 1000 milliLiter(s) (100 mL/Hr) IV Continuous <Continuous>  trimethoprim  160 mG/sulfamethoxazole 800 mG 1 Tablet(s) Oral <User Schedule>  vancomycin  IVPB 1500 milliGRAM(s) IV Intermittent every 12 hours    MEDICATIONS  (PRN):  acetaminophen     Tablet .. 975 milliGRAM(s) Oral every 8 hours PRN Temp greater or equal to 38C (100.4F), Mild Pain (1 - 3)  dextrose Oral Gel 15 Gram(s) Oral once PRN Blood Glucose LESS THAN 70 milliGRAM(s)/deciliter        Vital Signs Last 24 Hrs  T(C): 37 (23 Aug 2023 06:00), Max: 38.3 (22 Aug 2023 20:10)  T(F): 98.6 (23 Aug 2023 06:00), Max: 100.9 (22 Aug 2023 20:10)  HR: 88 (23 Aug 2023 06:00) (77 - 108)  BP: 102/62 (23 Aug 2023 06:00) (100/61 - 124/66)  BP(mean): 77 (23 Aug 2023 02:00) (74 - 77)  RR: 18 (23 Aug 2023 06:00) (16 - 18)  SpO2: 94% (23 Aug 2023 06:00) (94% - 97%)    Parameters below as of 23 Aug 2023 06:00  Patient On (Oxygen Delivery Method): room air        08-22-23 @ 07:01  -  08-23-23 @ 07:00  --------------------------------------------------------  IN: 725 mL / OUT: 1050 mL / NET: -325 mL    08-23-23 @ 07:01  -  08-23-23 @ 08:23  --------------------------------------------------------  IN: 25 mL / OUT: 0 mL / NET: 25 mL      PHYSICAL EXAM:  Constitutional: alert, NAD  HEENT: Swelling and brusing over b/l portions of face,  R>L. No significant warmth, erythema, or tenderness over sinuses   Neck: New blanching warm/hot erythematous rash over anterior neck from mandible to anterior chest to right above nipple-line.   Pulmonary: no respiratory distress and lungs were clear to auscultation bilaterally.   Heart: heart rate was normal, + ejection murmur at apex   Vascular:. there was no peripheral edema  Skin: ~ 4x6 area of blanching, non-tender, non-purulent/non-fluctuant erythema on the R lower back, slightly smaller w/ less swelling compared to yesterday's exam  Abdomen: normal bowel sounds, soft, non-tender  Neurological: no focal deficits.   Psychiatric: the affect was normal        LABS:                        10.5   7.79  )-----------( 281      ( 22 Aug 2023 05:30 )             34.5     08-22    134<L>  |  104  |  17  ----------------------------<  107<H>  4.4   |  24  |  0.55    Ca    8.5      22 Aug 2023 05:30  Phos  3.6     08-22  Mg     2.0     08-22        Urinalysis Basic - ( 22 Aug 2023 05:30 )    Color: x / Appearance: x / SG: x / pH: x  Gluc: 107 mg/dL / Ketone: x  / Bili: x / Urobili: x   Blood: x / Protein: x / Nitrite: x   Leuk Esterase: x / RBC: x / WBC x   Sq Epi: x / Non Sq Epi: x / Bacteria: x        MICROBIOLOGY:      RADIOLOGY & ADDITIONAL STUDIES:  Reviewed

## 2023-08-23 NOTE — PROGRESS NOTE ADULT - SUBJECTIVE AND OBJECTIVE BOX
Febrile again overnight, but asymptomatic.     Remaining ROS negative     PHYSICAL EXAM:    General: no acute distress, sitting up in chair  HEENT: facial swelling, with rash (unchanged)  Cardiovascular: +S1/S2, RRR, no mrg  Respiratory: CTA B/L; no W/R/R  Gastrointestinal: soft, NT/ND; +BSx4  Extremities: WWP; no edema  Neurological: speech fluent, follows commands, moves all extremities  Psychiatric: pleasant mood and affect    VITAL SIGNS:  Vital Signs Last 24 Hrs  T(C): 37 (23 Aug 2023 06:00), Max: 38.3 (22 Aug 2023 20:10)  T(F): 98.6 (23 Aug 2023 06:00), Max: 100.9 (22 Aug 2023 20:10)  HR: 88 (23 Aug 2023 06:00) (77 - 108)  BP: 102/62 (23 Aug 2023 06:00) (100/61 - 124/66)  BP(mean): 77 (23 Aug 2023 02:00) (74 - 77)  RR: 18 (23 Aug 2023 06:00) (16 - 18)  SpO2: 94% (23 Aug 2023 06:00) (94% - 97%)    Parameters below as of 23 Aug 2023 06:00  Patient On (Oxygen Delivery Method): room air          MEDICATIONS:  MEDICATIONS  (STANDING):  amLODIPine   Tablet 10 milliGRAM(s) Oral every 24 hours  dextrose 5%. 1000 milliLiter(s) (50 mL/Hr) IV Continuous <Continuous>  dextrose 5%. 1000 milliLiter(s) (100 mL/Hr) IV Continuous <Continuous>  dextrose 50% Injectable 25 Gram(s) IV Push once  dextrose 50% Injectable 25 Gram(s) IV Push once  dextrose 50% Injectable 12.5 Gram(s) IV Push once  dolutegravir 50 milliGRAM(s) Oral daily  enoxaparin Injectable 40 milliGRAM(s) SubCutaneous every 24 hours  folic acid 1 milliGRAM(s) Oral daily  glucagon  Injectable 1 milliGRAM(s) IntraMuscular once  hydroxychloroquine 200 milliGRAM(s) Oral two times a day  insulin lispro (ADMELOG) corrective regimen sliding scale   SubCutaneous at bedtime  insulin lispro (ADMELOG) corrective regimen sliding scale   SubCutaneous three times a day before meals  metoprolol tartrate 50 milliGRAM(s) Oral every 12 hours  mycophenolate mofetil 500 milliGRAM(s) Oral two times a day  piperacillin/tazobactam IVPB.. 3.375 Gram(s) IV Intermittent every 8 hours  rilpivirine 25 milliGRAM(s) Oral with dinner  sodium chloride 0.9%. 1000 milliLiter(s) (100 mL/Hr) IV Continuous <Continuous>  trimethoprim  160 mG/sulfamethoxazole 800 mG 1 Tablet(s) Oral <User Schedule>  vancomycin  IVPB 1500 milliGRAM(s) IV Intermittent every 12 hours    MEDICATIONS  (PRN):  acetaminophen     Tablet .. 975 milliGRAM(s) Oral every 8 hours PRN Temp greater or equal to 38C (100.4F), Mild Pain (1 - 3)  dextrose Oral Gel 15 Gram(s) Oral once PRN Blood Glucose LESS THAN 70 milliGRAM(s)/deciliter      ALLERGIES:  Allergies    No Known Allergies    Intolerances        LABS:                        10.7   7.28  )-----------( 321      ( 23 Aug 2023 05:30 )             35.6     08-23    136  |  103  |  10  ----------------------------<  127<H>  4.3   |  25  |  0.48<L>    Ca    8.7      23 Aug 2023 05:30  Phos  3.3     08-23  Mg     2.1     08-23        Urinalysis Basic - ( 23 Aug 2023 05:30 )    Color: x / Appearance: x / SG: x / pH: x  Gluc: 127 mg/dL / Ketone: x  / Bili: x / Urobili: x   Blood: x / Protein: x / Nitrite: x   Leuk Esterase: x / RBC: x / WBC x   Sq Epi: x / Non Sq Epi: x / Bacteria: x      CAPILLARY BLOOD GLUCOSE      POCT Blood Glucose.: 118 mg/dL (23 Aug 2023 09:09)      RADIOLOGY & ADDITIONAL TESTS: Reviewed. Febrile again overnight, but asymptomatic.     Remaining ROS negative     PHYSICAL EXAM:    General: no acute distress, sitting up in chair  HEENT: facial swelling, with rash (unchanged), new rash spread to neck  Cardiovascular: +S1/S2, RRR, no mrg  Respiratory: CTA B/L; no W/R/R  Gastrointestinal: soft, NT/ND; +BSx4  Extremities: WWP; no edema  Neurological: speech fluent, follows commands, moves all extremities  Skin: erythematous band around lower back, not vesicular, warm to touch, no fluctuance, blanching  Psychiatric: pleasant mood and affect    VITAL SIGNS:  Vital Signs Last 24 Hrs  T(C): 37 (23 Aug 2023 06:00), Max: 38.3 (22 Aug 2023 20:10)  T(F): 98.6 (23 Aug 2023 06:00), Max: 100.9 (22 Aug 2023 20:10)  HR: 88 (23 Aug 2023 06:00) (77 - 108)  BP: 102/62 (23 Aug 2023 06:00) (100/61 - 124/66)  BP(mean): 77 (23 Aug 2023 02:00) (74 - 77)  RR: 18 (23 Aug 2023 06:00) (16 - 18)  SpO2: 94% (23 Aug 2023 06:00) (94% - 97%)    Parameters below as of 23 Aug 2023 06:00  Patient On (Oxygen Delivery Method): room air    MEDICATIONS:  MEDICATIONS  (STANDING):  amLODIPine   Tablet 10 milliGRAM(s) Oral every 24 hours  dextrose 5%. 1000 milliLiter(s) (50 mL/Hr) IV Continuous <Continuous>  dextrose 5%. 1000 milliLiter(s) (100 mL/Hr) IV Continuous <Continuous>  dextrose 50% Injectable 25 Gram(s) IV Push once  dextrose 50% Injectable 25 Gram(s) IV Push once  dextrose 50% Injectable 12.5 Gram(s) IV Push once  dolutegravir 50 milliGRAM(s) Oral daily  enoxaparin Injectable 40 milliGRAM(s) SubCutaneous every 24 hours  folic acid 1 milliGRAM(s) Oral daily  glucagon  Injectable 1 milliGRAM(s) IntraMuscular once  hydroxychloroquine 200 milliGRAM(s) Oral two times a day  insulin lispro (ADMELOG) corrective regimen sliding scale   SubCutaneous at bedtime  insulin lispro (ADMELOG) corrective regimen sliding scale   SubCutaneous three times a day before meals  metoprolol tartrate 50 milliGRAM(s) Oral every 12 hours  mycophenolate mofetil 500 milliGRAM(s) Oral two times a day  piperacillin/tazobactam IVPB.. 3.375 Gram(s) IV Intermittent every 8 hours  rilpivirine 25 milliGRAM(s) Oral with dinner  sodium chloride 0.9%. 1000 milliLiter(s) (100 mL/Hr) IV Continuous <Continuous>  trimethoprim  160 mG/sulfamethoxazole 800 mG 1 Tablet(s) Oral <User Schedule>  vancomycin  IVPB 1500 milliGRAM(s) IV Intermittent every 12 hours    MEDICATIONS  (PRN):  acetaminophen     Tablet .. 975 milliGRAM(s) Oral every 8 hours PRN Temp greater or equal to 38C (100.4F), Mild Pain (1 - 3)  dextrose Oral Gel 15 Gram(s) Oral once PRN Blood Glucose LESS THAN 70 milliGRAM(s)/deciliter      ALLERGIES:  Allergies    No Known Allergies    Intolerances        LABS:                        10.7   7.28  )-----------( 321      ( 23 Aug 2023 05:30 )             35.6     08-23    136  |  103  |  10  ----------------------------<  127<H>  4.3   |  25  |  0.48<L>    Ca    8.7      23 Aug 2023 05:30  Phos  3.3     08-23  Mg     2.1     08-23        Urinalysis Basic - ( 23 Aug 2023 05:30 )    Color: x / Appearance: x / SG: x / pH: x  Gluc: 127 mg/dL / Ketone: x  / Bili: x / Urobili: x   Blood: x / Protein: x / Nitrite: x   Leuk Esterase: x / RBC: x / WBC x   Sq Epi: x / Non Sq Epi: x / Bacteria: x      CAPILLARY BLOOD GLUCOSE      POCT Blood Glucose.: 118 mg/dL (23 Aug 2023 09:09)      RADIOLOGY & ADDITIONAL TESTS: Reviewed. Febrile again overnight, but asymptomatic.     Remaining ROS negative     PHYSICAL EXAM:    General: no acute distress, sitting up in chair  HEENT: facial swelling, with rash (unchanged), new rash spread to neck  Cardiovascular: +S1/S2, RRR, no mrg  Respiratory: CTA B/L; no W/R/R  Gastrointestinal: soft, NT/ND; +BSx4  Extremities: WWP; no edema  Neurological: speech fluent, follows commands, moves all extremities  Skin: erythematous band around lower back, not vesicular, warm to touch, no fluctuance, blanching, heliotrope rash present on face  Psychiatric: pleasant mood and affect    VITAL SIGNS:  Vital Signs Last 24 Hrs  T(C): 37 (23 Aug 2023 06:00), Max: 38.3 (22 Aug 2023 20:10)  T(F): 98.6 (23 Aug 2023 06:00), Max: 100.9 (22 Aug 2023 20:10)  HR: 88 (23 Aug 2023 06:00) (77 - 108)  BP: 102/62 (23 Aug 2023 06:00) (100/61 - 124/66)  BP(mean): 77 (23 Aug 2023 02:00) (74 - 77)  RR: 18 (23 Aug 2023 06:00) (16 - 18)  SpO2: 94% (23 Aug 2023 06:00) (94% - 97%)    Parameters below as of 23 Aug 2023 06:00  Patient On (Oxygen Delivery Method): room air    MEDICATIONS:  MEDICATIONS  (STANDING):  amLODIPine   Tablet 10 milliGRAM(s) Oral every 24 hours  dextrose 5%. 1000 milliLiter(s) (50 mL/Hr) IV Continuous <Continuous>  dextrose 5%. 1000 milliLiter(s) (100 mL/Hr) IV Continuous <Continuous>  dextrose 50% Injectable 25 Gram(s) IV Push once  dextrose 50% Injectable 25 Gram(s) IV Push once  dextrose 50% Injectable 12.5 Gram(s) IV Push once  dolutegravir 50 milliGRAM(s) Oral daily  enoxaparin Injectable 40 milliGRAM(s) SubCutaneous every 24 hours  folic acid 1 milliGRAM(s) Oral daily  glucagon  Injectable 1 milliGRAM(s) IntraMuscular once  hydroxychloroquine 200 milliGRAM(s) Oral two times a day  insulin lispro (ADMELOG) corrective regimen sliding scale   SubCutaneous at bedtime  insulin lispro (ADMELOG) corrective regimen sliding scale   SubCutaneous three times a day before meals  metoprolol tartrate 50 milliGRAM(s) Oral every 12 hours  mycophenolate mofetil 500 milliGRAM(s) Oral two times a day  piperacillin/tazobactam IVPB.. 3.375 Gram(s) IV Intermittent every 8 hours  rilpivirine 25 milliGRAM(s) Oral with dinner  sodium chloride 0.9%. 1000 milliLiter(s) (100 mL/Hr) IV Continuous <Continuous>  trimethoprim  160 mG/sulfamethoxazole 800 mG 1 Tablet(s) Oral <User Schedule>  vancomycin  IVPB 1500 milliGRAM(s) IV Intermittent every 12 hours    MEDICATIONS  (PRN):  acetaminophen     Tablet .. 975 milliGRAM(s) Oral every 8 hours PRN Temp greater or equal to 38C (100.4F), Mild Pain (1 - 3)  dextrose Oral Gel 15 Gram(s) Oral once PRN Blood Glucose LESS THAN 70 milliGRAM(s)/deciliter      ALLERGIES:  Allergies    No Known Allergies    Intolerances        LABS:                        10.7   7.28  )-----------( 321      ( 23 Aug 2023 05:30 )             35.6     08-23    136  |  103  |  10  ----------------------------<  127<H>  4.3   |  25  |  0.48<L>    Ca    8.7      23 Aug 2023 05:30  Phos  3.3     08-23  Mg     2.1     08-23        Urinalysis Basic - ( 23 Aug 2023 05:30 )    Color: x / Appearance: x / SG: x / pH: x  Gluc: 127 mg/dL / Ketone: x  / Bili: x / Urobili: x   Blood: x / Protein: x / Nitrite: x   Leuk Esterase: x / RBC: x / WBC x   Sq Epi: x / Non Sq Epi: x / Bacteria: x      CAPILLARY BLOOD GLUCOSE      POCT Blood Glucose.: 118 mg/dL (23 Aug 2023 09:09)      RADIOLOGY & ADDITIONAL TESTS: Reviewed.

## 2023-08-24 LAB
ANION GAP SERPL CALC-SCNC: 6 MMOL/L — SIGNIFICANT CHANGE UP (ref 5–17)
BASOPHILS # BLD AUTO: 0.03 K/UL — SIGNIFICANT CHANGE UP (ref 0–0.2)
BASOPHILS NFR BLD AUTO: 0.5 % — SIGNIFICANT CHANGE UP (ref 0–2)
BUN SERPL-MCNC: 8 MG/DL — SIGNIFICANT CHANGE UP (ref 7–23)
CALCIUM SERPL-MCNC: 8.9 MG/DL — SIGNIFICANT CHANGE UP (ref 8.4–10.5)
CHLORIDE SERPL-SCNC: 102 MMOL/L — SIGNIFICANT CHANGE UP (ref 96–108)
CO2 SERPL-SCNC: 25 MMOL/L — SIGNIFICANT CHANGE UP (ref 22–31)
CREAT SERPL-MCNC: 0.46 MG/DL — LOW (ref 0.5–1.3)
EGFR: 112 ML/MIN/1.73M2 — SIGNIFICANT CHANGE UP
EOSINOPHIL # BLD AUTO: 0.13 K/UL — SIGNIFICANT CHANGE UP (ref 0–0.5)
EOSINOPHIL NFR BLD AUTO: 2 % — SIGNIFICANT CHANGE UP (ref 0–6)
GLUCOSE BLDC GLUCOMTR-MCNC: 111 MG/DL — HIGH (ref 70–99)
GLUCOSE BLDC GLUCOMTR-MCNC: 116 MG/DL — HIGH (ref 70–99)
GLUCOSE BLDC GLUCOMTR-MCNC: 139 MG/DL — HIGH (ref 70–99)
GLUCOSE BLDC GLUCOMTR-MCNC: 177 MG/DL — HIGH (ref 70–99)
GLUCOSE SERPL-MCNC: 133 MG/DL — HIGH (ref 70–99)
HCT VFR BLD CALC: 35.7 % — LOW (ref 39–50)
HGB BLD-MCNC: 10.5 G/DL — LOW (ref 13–17)
IMM GRANULOCYTES NFR BLD AUTO: 1.2 % — HIGH (ref 0–0.9)
LYMPHOCYTES # BLD AUTO: 0.62 K/UL — LOW (ref 1–3.3)
LYMPHOCYTES # BLD AUTO: 9.5 % — LOW (ref 13–44)
MAGNESIUM SERPL-MCNC: 2.3 MG/DL — SIGNIFICANT CHANGE UP (ref 1.6–2.6)
MCHC RBC-ENTMCNC: 25.6 PG — LOW (ref 27–34)
MCHC RBC-ENTMCNC: 29.4 GM/DL — LOW (ref 32–36)
MCV RBC AUTO: 87.1 FL — SIGNIFICANT CHANGE UP (ref 80–100)
MONOCYTES # BLD AUTO: 0.46 K/UL — SIGNIFICANT CHANGE UP (ref 0–0.9)
MONOCYTES NFR BLD AUTO: 7.1 % — SIGNIFICANT CHANGE UP (ref 2–14)
NEUTROPHILS # BLD AUTO: 5.19 K/UL — SIGNIFICANT CHANGE UP (ref 1.8–7.4)
NEUTROPHILS NFR BLD AUTO: 79.7 % — HIGH (ref 43–77)
NRBC # BLD: 0 /100 WBCS — SIGNIFICANT CHANGE UP (ref 0–0)
PHOSPHATE SERPL-MCNC: 3.4 MG/DL — SIGNIFICANT CHANGE UP (ref 2.5–4.5)
PLATELET # BLD AUTO: 303 K/UL — SIGNIFICANT CHANGE UP (ref 150–400)
POTASSIUM SERPL-MCNC: 5.1 MMOL/L — SIGNIFICANT CHANGE UP (ref 3.5–5.3)
POTASSIUM SERPL-SCNC: 5.1 MMOL/L — SIGNIFICANT CHANGE UP (ref 3.5–5.3)
PROCALCITONIN SERPL-MCNC: 0.13 NG/ML — HIGH (ref 0.02–0.1)
RBC # BLD: 4.1 M/UL — LOW (ref 4.2–5.8)
RBC # FLD: 14.4 % — SIGNIFICANT CHANGE UP (ref 10.3–14.5)
SODIUM SERPL-SCNC: 133 MMOL/L — LOW (ref 135–145)
SSA-52 (RO52) (ENA) ANTIBODY, IGG: 28 AU/ML — SIGNIFICANT CHANGE UP (ref 0–40)
SSA-60 (RO60) (ENA) ANTIBODY, IGG: 7 AU/ML — SIGNIFICANT CHANGE UP (ref 0–40)
VANCOMYCIN TROUGH SERPL-MCNC: 11.7 UG/ML — SIGNIFICANT CHANGE UP (ref 10–20)
VANCOMYCIN TROUGH SERPL-MCNC: 13.9 UG/ML — SIGNIFICANT CHANGE UP (ref 10–20)
WBC # BLD: 6.51 K/UL — SIGNIFICANT CHANGE UP (ref 3.8–10.5)
WBC # FLD AUTO: 6.51 K/UL — SIGNIFICANT CHANGE UP (ref 3.8–10.5)

## 2023-08-24 PROCEDURE — 99232 SBSQ HOSP IP/OBS MODERATE 35: CPT | Mod: GC

## 2023-08-24 PROCEDURE — 99233 SBSQ HOSP IP/OBS HIGH 50: CPT | Mod: GC

## 2023-08-24 RX ORDER — VANCOMYCIN HCL 1 G
VIAL (EA) INTRAVENOUS
Refills: 0 | Status: DISCONTINUED | OUTPATIENT
Start: 2023-08-24 | End: 2023-08-25

## 2023-08-24 RX ORDER — VANCOMYCIN HCL 1 G
1500 VIAL (EA) INTRAVENOUS EVERY 12 HOURS
Refills: 0 | Status: DISCONTINUED | OUTPATIENT
Start: 2023-08-25 | End: 2023-08-25

## 2023-08-24 RX ORDER — VANCOMYCIN HCL 1 G
1500 VIAL (EA) INTRAVENOUS ONCE
Refills: 0 | Status: COMPLETED | OUTPATIENT
Start: 2023-08-24 | End: 2023-08-24

## 2023-08-24 RX ADMIN — Medication 300 MILLIGRAM(S): at 06:03

## 2023-08-24 RX ADMIN — MYCOPHENOLATE MOFETIL 500 MILLIGRAM(S): 250 CAPSULE ORAL at 21:56

## 2023-08-24 RX ADMIN — Medication 50 MILLIGRAM(S): at 18:32

## 2023-08-24 RX ADMIN — Medication 200 MILLIGRAM(S): at 21:56

## 2023-08-24 RX ADMIN — AMLODIPINE BESYLATE 10 MILLIGRAM(S): 2.5 TABLET ORAL at 12:14

## 2023-08-24 RX ADMIN — PIPERACILLIN AND TAZOBACTAM 25 GRAM(S): 4; .5 INJECTION, POWDER, LYOPHILIZED, FOR SOLUTION INTRAVENOUS at 15:12

## 2023-08-24 RX ADMIN — PIPERACILLIN AND TAZOBACTAM 25 GRAM(S): 4; .5 INJECTION, POWDER, LYOPHILIZED, FOR SOLUTION INTRAVENOUS at 06:02

## 2023-08-24 RX ADMIN — DOLUTEGRAVIR SODIUM 50 MILLIGRAM(S): 25 TABLET, FILM COATED ORAL at 11:21

## 2023-08-24 RX ADMIN — Medication 1 MILLIGRAM(S): at 11:20

## 2023-08-24 RX ADMIN — Medication 200 MILLIGRAM(S): at 11:20

## 2023-08-24 RX ADMIN — Medication 100 MILLIGRAM(S): at 18:31

## 2023-08-24 RX ADMIN — Medication 8 MILLIGRAM(S): at 06:02

## 2023-08-24 RX ADMIN — RILPIVIRINE HYDROCHLORIDE 25 MILLIGRAM(S): 25 TABLET, FILM COATED ORAL at 18:32

## 2023-08-24 RX ADMIN — MYCOPHENOLATE MOFETIL 500 MILLIGRAM(S): 250 CAPSULE ORAL at 11:20

## 2023-08-24 RX ADMIN — Medication 50 MILLIGRAM(S): at 06:02

## 2023-08-24 RX ADMIN — PIPERACILLIN AND TAZOBACTAM 25 GRAM(S): 4; .5 INJECTION, POWDER, LYOPHILIZED, FOR SOLUTION INTRAVENOUS at 23:46

## 2023-08-24 RX ADMIN — ENOXAPARIN SODIUM 40 MILLIGRAM(S): 100 INJECTION SUBCUTANEOUS at 17:46

## 2023-08-24 RX ADMIN — Medication 300 MILLIGRAM(S): at 21:56

## 2023-08-24 NOTE — PROGRESS NOTE ADULT - ATTENDING COMMENTS
#Cellulitis, immunosuppression due to drug therapy, dermatomyositis, HIV    Febrile to 100.3, overall downtrending.  Patient feels well, erythema on back unchanged.  It seems fever curve is downtrending with abx.  however back rash is unchanged so unclear if he has cellulitis or not - rheum says his back rash is not typical for dermatomyositis; however patient has CK elevation.  Given immunocompromised status, suggest continuing vanco 1500mg IV q12h and zosyn 3.375g IV q8h for now.  if patient afebrile tomorrow, will switch to short course PO abx (Bactrim DS 1 tab q12h and cefpodoxime 400mg PO q12h, end date 8/28).  Check vanc trough again at 5pm, goal 10-15.  Cont Juluca.      Team 1 will follow you.  Case d/w primary team.    Nathalie Fisher MD, MS  Infectious Disease attending  work cell [phone]   For any questions during evening/weekend/holiday, please page ID on call.

## 2023-08-24 NOTE — PROGRESS NOTE ADULT - PROBLEM SELECTOR PLAN 6
Known hx of HTN. Allowed permissive hypertension, w/ Goal SBP < 140. Restarted Home anti hypertensives (On Bisoprolol 15mg, started on Metoprolol 50mg po BID- therapeutic conversion for Bisoprolol 5mg, can uptitrate as tolerated- and amlodipine). TTE with bubble showed: PFO+, BLESSING with moderate LVOT.     Plan  - c/w amlodipine 10mg qd  - c/w metoprolol tartrate 50mg q12
Known hx of HTN. Allowed permissive hypertension, w/ Goal SBP < 140. Restarted Home anti hypertensives (On Bisoprolol 15mg, started on Metoprolol 50mg po BID- therapeutic conversion for Bisoprolol 5mg, can uptitrate as tolerated- and amlodipine). TTE with bubble showed: PFO+, BLESSING with moderate LVOT.     Plan  - c/w amlodipine 10mg qd  - c/w metoprolol tartrate 50mg q12
Known hx of DM. Hgb A1c results: 7.6 on 8/16.    Plan  - iSS
Known hx of DM. Hgb A1c results: 7.6 on 8/16.    Plan  - iSS
Known hx of HTN. Allowed permissive hypertension, w/ Goal SBP < 140. Restarted Home anti hypertensives (On Bisoprolol 15mg, started on Metoprolol 50mg po BID- therapeutic conversion for Bisoprolol 5mg, can uptitrate as tolerated- and amlodipine). TTE with bubble showed: PFO+, BLESSING with moderate LVOT.     Plan  - c/w amlodipine 10mg qd  - c/w metoprolol tartrate 50mg q12
Known hx of HTN. Allowed permissive hypertension, w/ Goal SBP < 140. Restarted Home anti hypertensives (On Bisoprolol 15mg, started on Metoprolol 50mg po BID- therapeutic conversion for Bisoprolol 5mg, can uptitrate as tolerated- and amlodipine). TTE with bubble showed: PFO+, BLESSING with moderate LVOT.     Plan  - c/w amlodipine 10mg qd  - c/w metoprolol tartrate 50mg q12

## 2023-08-24 NOTE — PROGRESS NOTE ADULT - SUBJECTIVE AND OBJECTIVE BOX
O/N Events: no acute events overnight.    Subjective/ROS: Patient seen and examined at bedside. Patient says that the steroids have helped with dermatomyositis itching. No acute complaints.    Denies Fever/Chills, HA, CP, SOB, n/v, changes in bowel/urinary habits.  12pt ROS otherwise negative.    VITALS  Vital Signs Last 24 Hrs  T(C): 36.9 (24 Aug 2023 12:00), Max: 37.9 (23 Aug 2023 19:15)  T(F): 98.5 (24 Aug 2023 12:00), Max: 100.3 (23 Aug 2023 19:15)  HR: 81 (24 Aug 2023 12:00) (78 - 114)  BP: 134/77 (24 Aug 2023 12:00) (103/72 - 146/74)  BP(mean): --  RR: 18 (24 Aug 2023 12:00) (18 - 18)  SpO2: 97% (24 Aug 2023 12:00) (93% - 97%)    Parameters below as of 24 Aug 2023 12:00  Patient On (Oxygen Delivery Method): room air        CAPILLARY BLOOD GLUCOSE      POCT Blood Glucose.: 139 mg/dL (24 Aug 2023 13:18)  POCT Blood Glucose.: 116 mg/dL (24 Aug 2023 10:12)  POCT Blood Glucose.: 148 mg/dL (23 Aug 2023 22:05)  POCT Blood Glucose.: 119 mg/dL (23 Aug 2023 17:45)      PHYSICAL EXAM  General: NAD  Head: NC/AT; swelling over eyelids and redness.    Neck: Supple; no JVD  Respiratory: CTAB; no wheezes/rales/rhonchi  Cardiovascular: Regular rhythm/rate; S1/S2+, no murmurs  Gastrointestinal: Soft; NTND  Extremities: WWP; no edema/cyanosis  Neurological: A&Ox3, no obvious focal deficits    MEDICATIONS  (STANDING):  amLODIPine   Tablet 10 milliGRAM(s) Oral every 24 hours  dextrose 5%. 1000 milliLiter(s) (50 mL/Hr) IV Continuous <Continuous>  dextrose 5%. 1000 milliLiter(s) (100 mL/Hr) IV Continuous <Continuous>  dextrose 50% Injectable 25 Gram(s) IV Push once  dextrose 50% Injectable 25 Gram(s) IV Push once  dextrose 50% Injectable 12.5 Gram(s) IV Push once  dolutegravir 50 milliGRAM(s) Oral daily  enoxaparin Injectable 40 milliGRAM(s) SubCutaneous every 24 hours  folic acid 1 milliGRAM(s) Oral daily  glucagon  Injectable 1 milliGRAM(s) IntraMuscular once  hydroxychloroquine 200 milliGRAM(s) Oral two times a day  insulin lispro (ADMELOG) corrective regimen sliding scale   SubCutaneous three times a day before meals  insulin lispro (ADMELOG) corrective regimen sliding scale   SubCutaneous at bedtime  methylPREDNISolone sodium succinate IVPB 500 milliGRAM(s) IV Intermittent once  metoprolol tartrate 50 milliGRAM(s) Oral every 12 hours  mycophenolate mofetil 500 milliGRAM(s) Oral two times a day  piperacillin/tazobactam IVPB.. 3.375 Gram(s) IV Intermittent every 8 hours  rilpivirine 25 milliGRAM(s) Oral with dinner  trimethoprim  160 mG/sulfamethoxazole 800 mG 1 Tablet(s) Oral <User Schedule>    MEDICATIONS  (PRN):  dextrose Oral Gel 15 Gram(s) Oral once PRN Blood Glucose LESS THAN 70 milliGRAM(s)/deciliter      No Known Allergies      LABS                        10.5   6.51  )-----------( 303      ( 24 Aug 2023 05:30 )             35.7     08-24    133<L>  |  102  |  8   ----------------------------<  133<H>  5.1   |  25  |  0.46<L>    Ca    8.9      24 Aug 2023 05:30  Phos  3.4     08-24  Mg     2.3     08-24        Urinalysis Basic - ( 24 Aug 2023 05:30 )    Color: x / Appearance: x / SG: x / pH: x  Gluc: 133 mg/dL / Ketone: x  / Bili: x / Urobili: x   Blood: x / Protein: x / Nitrite: x   Leuk Esterase: x / RBC: x / WBC x   Sq Epi: x / Non Sq Epi: x / Bacteria: x      CARDIAC MARKERS ( 23 Aug 2023 20:42 )  x     / x     / 1505 U/L / x     / x              IMAGING/EKG/ETC

## 2023-08-24 NOTE — PROGRESS NOTE ADULT - ASSESSMENT
Neurology     72 y o M with PMH of HTN, HLD, DM2, HIV, HFrEF, dermatomyositis and CAD presenting after a syncopal event following colonoscopy, sustaining orbital fractures and suffering an intracranial bleed.  Also noted to be extremely hypertensive.  Admitted to the stroke service for further evaluation.     Problem/Plan - 1:  ·  Problem: Cellulitis.   ·  Plan: Pt complaining of erythematous rash on lower back, unsure of when it first appeared. Per pt, recently in [place], felt a bump on lower back which he showed to local doctor, was told it was a lipoma and was given antibiotics and had an ultrasound. Denies I&D or other interventions. Reports previously feeling pain at site of rash, however improved after taking ?antibiotics in [facility]. Currently denies pain. Pt spiking intermittent fevers, unclear etiology, Tmax 103.6 8/21. WBC nl, BCx NGTD. Rash likely cellulitis, however further workup needed given patients history of HIV and dermatomyositis.   s/p 1000cc NS due to soft BPs  Ultrasound of rash negative for fluid collection  Cefazolin dc'd  - c/w vanco 1500mg IV q12h and zosyn 3.375g IV q8h to treat cellulitis.  Check vanc trough before 4th dose, goal 10-15  - patient takes 16mg methylprednisolone at home. Restarted on 8 mg today.  - f/u surveillance BCx: no growth at day 1.    Problem/Plan - 2:  ·  Problem: Traumatic subdural hemorrhage with loss of consciousness.   ·  Plan: Pt underwent extensive CVA workup, including q8hr stroke neuro checks and vitals. MRI brain w/wo contrast showed: Acute hemorrhage in the left basal ganglia and corona radiata and focal hemorrhage in the septum pellucidum and left external capsule, along with R SDH and R orbital bone fx. Stroke Code HCT Results: R trace SDH, R orbital floor Fx, L BG/CR hyperdensity c/f bleed Vs mineralisation. Stroke Code CTA Results: Negative.    Plan  - No DAPT 2/2 R BG/CR bleed   -SBP<140  - Stroke education  - f/u neuro recs.    Problem/Plan - 3:  ·  Problem: Orbital fracture.   ·  Plan: Pt had orbital bone fracture as seen on imaging. No acute surgical intervention indicated.     Plan  - Sinus precautions: no nose blowing, avoid using straws avoid smoking, avoid sneezing with mouth closed, sleep with head elevated  - Seen by OMFS this admission, instructed to f/u with Dr. Jovany Vega however not covered by insurance  - f/u OMFS within 7-days of discharge for continued evaluation of orbit.    Problem/Plan - 4:  ·  Problem: Hemoptysis.   ·  Plan: Resolved. Pt w/ minor hemoptysis x2 on 8/17.    Problem/Plan - 5:  ·  Problem: Human immunodeficiency virus (HIV).   ·  Plan: Known hx of HIV. HIV viral load undetectable. Pt started on Bactrim DS. Pt is on Juluca which is Non formulary, thus was started on dolutegravir 50mg daily, rilpivirine 25mg daily.    Plan  - c/w dolutegravir 50mg qd  - c/w rilpivirine 25mg qd w/dinner  - c/w Bactrim 160-800mg 3days/wk (MWF).    Problem/Plan - 6:  ·  Problem: Hypertension.   ·  Plan: Known hx of HTN. Allowed permissive hypertension, w/ Goal SBP < 140. Restarted Home anti hypertensives (On Bisoprolol 15mg, started on Metoprolol 50mg po BID- therapeutic conversion for Bisoprolol 5mg, can uptitrate as tolerated- and amlodipine). TTE with bubble showed: PFO+, BLESSING with moderate LVOT.     Plan  - c/w amlodipine 10mg qd  - c/w metoprolol tartrate 50mg q12.    Problem/Plan - 7:  ·  Problem: Diabetes mellitus.   ·  Plan: Known hx of DM. Hgb A1c results: 7.6 on 8/16.    Plan  - iSS.    Problem/Plan - 8:  ·  Problem: Dermatomyositis.   ·  Plan: Known hx of Dermatomyositis. Home cellcept and Plaquenil restarted 8/17. s/p 1 dose Home Methylprednisone.    Plan  - c/w cellcept 500mg q12  - c/w plaquenil 200mg q12.    Problem/Plan - 9:  ·  Problem: Hyperlipidemia.   ·  Plan: Pt has hx of HLD. LDL WNL on this hospitalization.  On repatha at home, due for next injection. Statins contraindicated due to dermatomyositis.  - LDL results: 21 (8/16)    Plan  - can restart injections on DC.    Problem/Plan - 10:  ·  Problem: Prophylactic measure.   ·  Plan; F: N/A  E: replete K<4 and Mg <2  N: DASH/TLC  DVT Prophylaxis: Lovenox  Code Status: Full Code  Dispo: [facility].

## 2023-08-24 NOTE — PROGRESS NOTE ADULT - PROBLEM SELECTOR PROBLEM 9
Hyperlipidemia
Hyperlipidemia
Prophylactic measure
Hyperlipidemia
Prophylactic measure
Hyperlipidemia

## 2023-08-24 NOTE — PROGRESS NOTE ADULT - ATTENDING COMMENTS
72M with PMH of HTN, HLD, DM2, HIV, HFrEF, dermatomyositis and CAD presenting after a syncopal event following colonoscopy, sustaining orbital fractures and suffering an intracranial bleed.  Also noted to be extremely hypertensive.  Admitted to the stroke service for further evaluation.  Now transferred to medicine for continued fevers. Do not suspect rash on back from cellulitis given lack of improvement on broad spectrum and history obtained. Patient claimed to have lipoma there however none palpated, differential for lipoma is sebaceous cyst and a ruptured cyst would cause inflammation and redness of skin that can last for >1week. However, this does not explain the fevers which have been down trending on antibiotics.     Labs and imaging reviewed    Problem List  #SDH  #Orbital Fracture  #Dermatomyositis   #HIV, chronic  #HTN  #DM  #HLD    Plan  -Need clarification if Bactrim is PPX for immunosuppression from Dermatomyositis treatment or HIV, he does not meet criteria for HIV ppx given VLUD  -Discussion with ID about broad spectrum abx treatment, unclear what source of infection is at this time, would not classify current rash on back as cellulitis given lack of improvement and findings as mentioned above.   -Patient needs further collateral regarding Dermatomyositis treatments, he is medically optimized and will be ready for discharge. He will not be able to receive IVIg at rehab, clarification needed from rheum team about whether family should hire support at home for him to receive treatment or for him to miss infusion until after rehab     From hospitalist perspective patient would be medically ready once on oral regimen of antibiotics. Will discuss course with ID.

## 2023-08-24 NOTE — PROGRESS NOTE ADULT - PROBLEM SELECTOR PLAN 7
Known hx of DM. Hgb A1c results: 7.6 on 8/16.    Plan  - iSS
Known hx of Dermatomyositis. Home cellcept and Plaquenil restarted 8/17. s/p 1 dose Home Methylprednisone.    Plan  - c/w cellcept 500mg q12  - c/w plaquenil 200mg q12
Known hx of DM. Hgb A1c results: 7.6 on 8/16.    Plan  - iSS
Known hx of Dermatomyositis. Home cellcept and Plaquenil restarted 8/17. s/p 1 dose Home Methylprednisone.    Plan  - c/w cellcept 500mg q12  - c/w plaquenil 200mg q12

## 2023-08-24 NOTE — PROGRESS NOTE ADULT - PROBLEM SELECTOR PROBLEM 5
Human immunodeficiency virus (HIV)
Hypertension
Hypertension

## 2023-08-24 NOTE — PROGRESS NOTE ADULT - ASSESSMENT
3yo M w/ PMHx HIV on Juluca, HFrEF, Dermatomyositis, & DM2 admitted w/ orbital fractures/ICH, w/ recurrent high fevers possibly 2/2 cellulitis vs CNS etiology, being managed w/ IV abx. Course c/b blanching erythematous over lower back, possibly 2/2 drug reaction iso underlying dermatomyositis.     Erythema on back unchanged / may be slightly improved.    #Cellulitis #Immunosuppression due to drug therapy/HIV # HIV on Juluca  # Dermatomyositis     Recommendations:   **INCOMPLETE NOTE** 3yo M w/ PMHx HIV on Juluca, HFrEF, Dermatomyositis, & DM2 admitted w/ orbital fractures/ICH, w/ recurrent high fevers possibly 2/2 cellulitis vs CNS etiology, being managed w/ IV abx. Course c/b blanching erythematous over lower back, possibly 2/2 drug reaction iso underlying dermatomyositis.     Rash over anterior neck/chest unchanged and also per pt, this rash consistent w/ previous rashes due to dermatomyositis. Rash on lower back appears stable if not somewhat enlarged.  Still spiking low fevers, recently 100.3F yesterday evening. Still unclear if truly cellulitis. Pt on Vanc & Zosyn, & was re-started on Medrol 8mg qd (half of home dose).     #Cellulitis #Immunosuppression due to drug therapy/HIV # HIV on Juluca  # Dermatomyositis     Recommendations:   - C/w current Vanc 1500mg q12h (dose by level) & Zosyn 3.375g q8h   - Please repeat vanc trough today 8/24 at 5PM for 6PM dose   - F/u add-on procal  3yo M w/ PMHx HIV on Juluca, HFrEF, Dermatomyositis, & DM2 admitted w/ orbital fractures/ICH, w/ recurrent high fevers possibly 2/2 cellulitis vs CNS etiology, being managed w/ IV abx. Course c/b blanching erythematous over lower back, possibly 2/2 drug reaction iso underlying dermatomyositis.     Rash over anterior neck/chest unchanged and also per pt, this rash consistent w/ previous rashes due to dermatomyositis. Rash on lower back appears stable if not somewhat enlarged.  Still spiking low fevers, recently 100.3F yesterday evening. Still unclear if truly cellulitis. Pt on Vanc & Zosyn, & was re-started on Medrol 8mg qd (half of home dose).     #Cellulitis #Immunosuppression due to drug therapy/HIV # HIV on Juluca  # Dermatomyositis     Recommendations:   - C/w current Vanc 1500mg q12h (dose by level) & Zosyn 3.375g q8h   - Please repeat vanc trough today 8/24 at 5PM for 6PM dose   - F/u add-on procal   - If pt afebrile tomorrow, may switch to PO abx (Bactrim DS 1 tab q12h & Cefpodoxime 400mg PO q12h, to end 8/28).   - C/w Juluca  - Team 1 will continue to follow you. Case d/w primary team.

## 2023-08-24 NOTE — PROGRESS NOTE ADULT - PROBLEM SELECTOR PROBLEM 4
Hemoptysis
Human immunodeficiency virus (HIV)
Human immunodeficiency virus (HIV)
Hemoptysis

## 2023-08-24 NOTE — PROGRESS NOTE ADULT - SUBJECTIVE AND OBJECTIVE BOX
**INCOMPLETE NOTE**    INFECTIOUS DISEASES CONSULT FOLLOW-UP NOTE    INTERVAL HPI/OVERNIGHT EVENTS:      ROS:   Constitutional, eyes, ENT, cardiovascular, respiratory, gastrointestinal, genitourinary, integumentary, neurological, psychiatric and heme/lymph are otherwise negative other than noted above       ANTIBIOTICS/RELEVANT:    MEDICATIONS  (STANDING):  amLODIPine   Tablet 10 milliGRAM(s) Oral every 24 hours  dextrose 5%. 1000 milliLiter(s) (50 mL/Hr) IV Continuous <Continuous>  dextrose 5%. 1000 milliLiter(s) (100 mL/Hr) IV Continuous <Continuous>  dextrose 50% Injectable 12.5 Gram(s) IV Push once  dextrose 50% Injectable 25 Gram(s) IV Push once  dextrose 50% Injectable 25 Gram(s) IV Push once  dolutegravir 50 milliGRAM(s) Oral daily  enoxaparin Injectable 40 milliGRAM(s) SubCutaneous every 24 hours  folic acid 1 milliGRAM(s) Oral daily  glucagon  Injectable 1 milliGRAM(s) IntraMuscular once  hydroxychloroquine 200 milliGRAM(s) Oral two times a day  insulin lispro (ADMELOG) corrective regimen sliding scale   SubCutaneous three times a day before meals  insulin lispro (ADMELOG) corrective regimen sliding scale   SubCutaneous at bedtime  methylPREDNISolone 8 milliGRAM(s) Oral daily  metoprolol tartrate 50 milliGRAM(s) Oral every 12 hours  mycophenolate mofetil 500 milliGRAM(s) Oral two times a day  piperacillin/tazobactam IVPB.. 3.375 Gram(s) IV Intermittent every 8 hours  rilpivirine 25 milliGRAM(s) Oral with dinner  trimethoprim  160 mG/sulfamethoxazole 800 mG 1 Tablet(s) Oral <User Schedule>  vancomycin  IVPB 1500 milliGRAM(s) IV Intermittent every 12 hours    MEDICATIONS  (PRN):  acetaminophen     Tablet .. 975 milliGRAM(s) Oral every 8 hours PRN Temp greater or equal to 38C (100.4F), Mild Pain (1 - 3)  dextrose Oral Gel 15 Gram(s) Oral once PRN Blood Glucose LESS THAN 70 milliGRAM(s)/deciliter        Vital Signs Last 24 Hrs  T(C): 36.6 (24 Aug 2023 05:50), Max: 37.9 (23 Aug 2023 19:15)  T(F): 97.8 (24 Aug 2023 05:50), Max: 100.3 (23 Aug 2023 19:15)  HR: 78 (24 Aug 2023 05:50) (61 - 114)  BP: 103/72 (24 Aug 2023 05:50) (103/72 - 146/74)  BP(mean): --  RR: 18 (24 Aug 2023 05:50) (18 - 18)  SpO2: 96% (24 Aug 2023 05:50) (93% - 96%)    Parameters below as of 23 Aug 2023 21:27  Patient On (Oxygen Delivery Method): room air        08-23-23 @ 07:01  -  08-24-23 @ 07:00  --------------------------------------------------------  IN: 25 mL / OUT: 0 mL / NET: 25 mL      PHYSICAL EXAM:  Constitutional: alert, NAD  HEENT: Swelling and brusing over b/l portions of face,  R>L. No significant warmth, erythema, or tenderness over sinuses   Neck: New blanching warm/hot erythematous rash over anterior neck from mandible to anterior chest to right above nipple-line.   Pulmonary: no respiratory distress and lungs were clear to auscultation bilaterally.   Heart: heart rate was normal, + ejection murmur at apex   Vascular:. there was no peripheral edema  Skin: ~ 4x6 area of blanching, non-tender, non-purulent/non-fluctuant erythema on the R lower back, slightly smaller w/ less swelling compared to yesterday's exam  Abdomen: normal bowel sounds, soft, non-tender  Neurological: no focal deficits.   Psychiatric: the affect was normal  LABS:                        10.5   6.51  )-----------( 303      ( 24 Aug 2023 05:30 )             35.7     08-24    133<L>  |  102  |  8   ----------------------------<  133<H>  5.1   |  25  |  0.46<L>    Ca    8.9      24 Aug 2023 05:30  Phos  3.4     08-24  Mg     2.3     08-24        Urinalysis Basic - ( 24 Aug 2023 05:30 )    Color: x / Appearance: x / SG: x / pH: x  Gluc: 133 mg/dL / Ketone: x  / Bili: x / Urobili: x   Blood: x / Protein: x / Nitrite: x   Leuk Esterase: x / RBC: x / WBC x   Sq Epi: x / Non Sq Epi: x / Bacteria: x        MICROBIOLOGY:      RADIOLOGY & ADDITIONAL STUDIES:  Reviewed **INCOMPLETE NOTE**    INFECTIOUS DISEASES CONSULT FOLLOW-UP NOTE    INTERVAL HPI/OVERNIGHT EVENTS:      ROS:   Constitutional, eyes, ENT, cardiovascular, respiratory, gastrointestinal, genitourinary, integumentary, neurological, psychiatric and heme/lymph are otherwise negative other than noted above       ANTIBIOTICS/RELEVANT:    MEDICATIONS  (STANDING):  amLODIPine   Tablet 10 milliGRAM(s) Oral every 24 hours  dextrose 5%. 1000 milliLiter(s) (50 mL/Hr) IV Continuous <Continuous>  dextrose 5%. 1000 milliLiter(s) (100 mL/Hr) IV Continuous <Continuous>  dextrose 50% Injectable 12.5 Gram(s) IV Push once  dextrose 50% Injectable 25 Gram(s) IV Push once  dextrose 50% Injectable 25 Gram(s) IV Push once  dolutegravir 50 milliGRAM(s) Oral daily  enoxaparin Injectable 40 milliGRAM(s) SubCutaneous every 24 hours  folic acid 1 milliGRAM(s) Oral daily  glucagon  Injectable 1 milliGRAM(s) IntraMuscular once  hydroxychloroquine 200 milliGRAM(s) Oral two times a day  insulin lispro (ADMELOG) corrective regimen sliding scale   SubCutaneous three times a day before meals  insulin lispro (ADMELOG) corrective regimen sliding scale   SubCutaneous at bedtime  methylPREDNISolone 8 milliGRAM(s) Oral daily  metoprolol tartrate 50 milliGRAM(s) Oral every 12 hours  mycophenolate mofetil 500 milliGRAM(s) Oral two times a day  piperacillin/tazobactam IVPB.. 3.375 Gram(s) IV Intermittent every 8 hours  rilpivirine 25 milliGRAM(s) Oral with dinner  trimethoprim  160 mG/sulfamethoxazole 800 mG 1 Tablet(s) Oral <User Schedule>  vancomycin  IVPB 1500 milliGRAM(s) IV Intermittent every 12 hours    MEDICATIONS  (PRN):  acetaminophen     Tablet .. 975 milliGRAM(s) Oral every 8 hours PRN Temp greater or equal to 38C (100.4F), Mild Pain (1 - 3)  dextrose Oral Gel 15 Gram(s) Oral once PRN Blood Glucose LESS THAN 70 milliGRAM(s)/deciliter        Vital Signs Last 24 Hrs  T(C): 36.6 (24 Aug 2023 05:50), Max: 37.9 (23 Aug 2023 19:15)  T(F): 97.8 (24 Aug 2023 05:50), Max: 100.3 (23 Aug 2023 19:15)  HR: 78 (24 Aug 2023 05:50) (61 - 114)  BP: 103/72 (24 Aug 2023 05:50) (103/72 - 146/74)  BP(mean): --  RR: 18 (24 Aug 2023 05:50) (18 - 18)  SpO2: 96% (24 Aug 2023 05:50) (93% - 96%)    Parameters below as of 23 Aug 2023 21:27  Patient On (Oxygen Delivery Method): room air        08-23-23 @ 07:01  -  08-24-23 @ 07:00  --------------------------------------------------------  IN: 25 mL / OUT: 0 mL / NET: 25 mL      PHYSICAL EXAM:  Constitutional: alert, NAD  HEENT: Swelling and erythema over b/l portions of face, w/ significant periorobital swelling.   Neck: New blanching warm/hot erythematous rash over anterior neck from mandible to anterior chest to right above nipple-line.   Pulmonary: no respiratory distress and lungs were clear to auscultation bilaterally.   Heart: heart rate was normal, + ejection murmur at apex   Skin: Large blanching, mildly tender, non-purulent/non-fluctuant erythema w/ distinct borders on the R lower back, now possibly enlarged/extending to lateral flank.   Abdomen: normal bowel sounds, soft, non-tender  Neurological: no focal deficits.   Psychiatric: the affect was normal    LABS:                        10.5   6.51  )-----------( 303      ( 24 Aug 2023 05:30 )             35.7     08-24    133<L>  |  102  |  8   ----------------------------<  133<H>  5.1   |  25  |  0.46<L>    Ca    8.9      24 Aug 2023 05:30  Phos  3.4     08-24  Mg     2.3     08-24        Urinalysis Basic - ( 24 Aug 2023 05:30 )    Color: x / Appearance: x / SG: x / pH: x  Gluc: 133 mg/dL / Ketone: x  / Bili: x / Urobili: x   Blood: x / Protein: x / Nitrite: x   Leuk Esterase: x / RBC: x / WBC x   Sq Epi: x / Non Sq Epi: x / Bacteria: x        MICROBIOLOGY:      RADIOLOGY & ADDITIONAL STUDIES:  Reviewed INFECTIOUS DISEASES CONSULT FOLLOW-UP NOTE    INTERVAL HPI/OVERNIGHT EVENTS: T100.3 ~7PM on 8/23, otherwise stable, naeo.     ROS: Feels well, no changes in sensation or pain over rash on back or neck/chest.   Constitutional, eyes, ENT, cardiovascular, respiratory, gastrointestinal, genitourinary, integumentary, neurological, psychiatric and heme/lymph are otherwise negative other than noted above       ANTIBIOTICS/RELEVANT:    MEDICATIONS  (STANDING):  amLODIPine   Tablet 10 milliGRAM(s) Oral every 24 hours  dextrose 5%. 1000 milliLiter(s) (50 mL/Hr) IV Continuous <Continuous>  dextrose 5%. 1000 milliLiter(s) (100 mL/Hr) IV Continuous <Continuous>  dextrose 50% Injectable 12.5 Gram(s) IV Push once  dextrose 50% Injectable 25 Gram(s) IV Push once  dextrose 50% Injectable 25 Gram(s) IV Push once  dolutegravir 50 milliGRAM(s) Oral daily  enoxaparin Injectable 40 milliGRAM(s) SubCutaneous every 24 hours  folic acid 1 milliGRAM(s) Oral daily  glucagon  Injectable 1 milliGRAM(s) IntraMuscular once  hydroxychloroquine 200 milliGRAM(s) Oral two times a day  insulin lispro (ADMELOG) corrective regimen sliding scale   SubCutaneous three times a day before meals  insulin lispro (ADMELOG) corrective regimen sliding scale   SubCutaneous at bedtime  methylPREDNISolone 8 milliGRAM(s) Oral daily  metoprolol tartrate 50 milliGRAM(s) Oral every 12 hours  mycophenolate mofetil 500 milliGRAM(s) Oral two times a day  piperacillin/tazobactam IVPB.. 3.375 Gram(s) IV Intermittent every 8 hours  rilpivirine 25 milliGRAM(s) Oral with dinner  trimethoprim  160 mG/sulfamethoxazole 800 mG 1 Tablet(s) Oral <User Schedule>  vancomycin  IVPB 1500 milliGRAM(s) IV Intermittent every 12 hours    MEDICATIONS  (PRN):  acetaminophen     Tablet .. 975 milliGRAM(s) Oral every 8 hours PRN Temp greater or equal to 38C (100.4F), Mild Pain (1 - 3)  dextrose Oral Gel 15 Gram(s) Oral once PRN Blood Glucose LESS THAN 70 milliGRAM(s)/deciliter        Vital Signs Last 24 Hrs  T(C): 36.6 (24 Aug 2023 05:50), Max: 37.9 (23 Aug 2023 19:15)  T(F): 97.8 (24 Aug 2023 05:50), Max: 100.3 (23 Aug 2023 19:15)  HR: 78 (24 Aug 2023 05:50) (61 - 114)  BP: 103/72 (24 Aug 2023 05:50) (103/72 - 146/74)  BP(mean): --  RR: 18 (24 Aug 2023 05:50) (18 - 18)  SpO2: 96% (24 Aug 2023 05:50) (93% - 96%)    Parameters below as of 23 Aug 2023 21:27  Patient On (Oxygen Delivery Method): room air        08-23-23 @ 07:01  -  08-24-23 @ 07:00  --------------------------------------------------------  IN: 25 mL / OUT: 0 mL / NET: 25 mL      PHYSICAL EXAM:  Constitutional: alert, NAD  HEENT: Swelling and erythema over b/l portions of face, w/ significant periorobital swelling.   Neck: New blanching warm/hot erythematous rash over anterior neck from mandible to anterior chest to right above nipple-line.   Pulmonary: no respiratory distress and lungs were clear to auscultation bilaterally.   Heart: heart rate was normal, + ejection murmur at apex   Skin: Large blanching, mildly tender, non-purulent/non-fluctuant erythema w/ distinct borders on the R lower back, now possibly enlarged/extending to lateral flank.   Abdomen: normal bowel sounds, soft, non-tender  Neurological: no focal deficits.   Psychiatric: the affect was normal    LABS:                        10.5   6.51  )-----------( 303      ( 24 Aug 2023 05:30 )             35.7     08-24    133<L>  |  102  |  8   ----------------------------<  133<H>  5.1   |  25  |  0.46<L>    Ca    8.9      24 Aug 2023 05:30  Phos  3.4     08-24  Mg     2.3     08-24        Urinalysis Basic - ( 24 Aug 2023 05:30 )    Color: x / Appearance: x / SG: x / pH: x  Gluc: 133 mg/dL / Ketone: x  / Bili: x / Urobili: x   Blood: x / Protein: x / Nitrite: x   Leuk Esterase: x / RBC: x / WBC x   Sq Epi: x / Non Sq Epi: x / Bacteria: x        MICROBIOLOGY:      RADIOLOGY & ADDITIONAL STUDIES:  Reviewed

## 2023-08-24 NOTE — PROGRESS NOTE ADULT - PROBLEM SELECTOR PLAN 8
Known hx of Dermatomyositis. Home cellcept and Plaquenil restarted 8/17. s/p 1 dose Home Methylprednisone.    Plan  - c/w cellcept 500mg q12  - c/w plaquenil 200mg q12 Known hx of Dermatomyositis. Home cellcept and Plaquenil restarted 8/17. s/p 1 dose Home Methylprednisone.    Plan  - pulse steroid solumedrol 500 mg IVPB once  - hold medrol 8 mg daily  - c/w cellcept 500mg q12  - c/w plaquenil 200mg q12 Attending Attestation (For Attendings USE Only)...

## 2023-08-24 NOTE — PROGRESS NOTE ADULT - PROBLEM SELECTOR PROBLEM 7
Diabetes mellitus
Dermatomyositis
Diabetes mellitus
Dermatomyositis

## 2023-08-24 NOTE — PROGRESS NOTE ADULT - PROBLEM SELECTOR PLAN 1
Pt complaining of erythematous rash on lower back, unsure of when it first appeared. Per pt, recently in [place], felt a bump on lower back which he showed to local doctor, was told it was a lipoma and was given antibiotics and had an ultrasound. Denies I&D or other interventions. Reports previously feeling pain at site of rash, however improved after taking ?antibiotics in [facility]. Currently denies pain. Pt spiking intermittent fevers, unclear etiology, Tmax 103.6 8/21. WBC nl, BCx NGTD. Rash likely cellulitis, however further workup needed given patients history of HIV and dermatomyositis.   s/p 1000cc NS due to soft BPs  Ultrasound of rash negative for fluid collection  Cefazolin dc'd  - c/w vanco 1500mg IV q12h and zosyn 3.375g IV q8h to treat cellulitis.  Check vanc trough before 4th dose, goal 10-15  - patient takes 16mg methylprednisolone at home. Restarted on 8 mg today.  - f/u surveillance BCx: no growth at day 1 Pt complaining of erythematous rash on lower back, unsure of when it first appeared. Per pt, recently in [place], felt a bump on lower back which he showed to local doctor, was told it was a lipoma and was given antibiotics and had an ultrasound. Denies I&D or other interventions. Reports previously feeling pain at site of rash, however improved after taking ?antibiotics in [facility]. Currently denies pain. Pt spiking intermittent fevers, unclear etiology, Tmax 103.6 8/21. WBC nl, BCx NGTD. Rash likely cellulitis, however further workup needed given patients history of HIV and dermatomyositis.   s/p 1000cc NS due to soft BPs  Ultrasound of rash negative for fluid collection  Cefazolin dc'd  - c/w vanco 1500mg IV q12h and zosyn 3.375g IV q8h to treat cellulitis.  Check vanc trough before 4th dose, goal 10-15  - hold 8 mg today. Clarified with pt that this is his home dose  - f/u surveillance BCx: no growth at day 2

## 2023-08-24 NOTE — PROGRESS NOTE ADULT - PROBLEM SELECTOR PLAN 5
Known hx of HIV. HIV viral load undetectable. Pt started on Bactrim DS. Pt is on Juluca which is Non formulary, thus was started on dolutegravir 50mg daily, rilpivirine 25mg daily.    Plan  - c/w dolutegravir 50mg qd  - c/w rilpivirine 25mg qd w/dinner  - c/w Bactrim 160-800mg 3days/wk (MWF)
Known hx of HTN. Allowed permissive hypertension, w/ Goal SBP < 140. Restarted Home anti hypertensives (On Bisoprolol 15mg, started on Metoprolol 50mg po BID- therapeutic conversion for Bisoprolol 5mg, can uptitrate as tolerated- and amlodipine). TTE with bubble showed: PFO+, BLESSING with moderate LVOT.     Plan  - c/w amlodipine 10mg qd  - c/w metoprolol tartrate 50mg q12
Known hx of HTN. Allowed permissive hypertension, w/ Goal SBP < 140. Restarted Home anti hypertensives (On Bisoprolol 15mg, started on Metoprolol 50mg po BID- therapeutic conversion for Bisoprolol 5mg, can uptitrate as tolerated- and amlodipine). TTE with bubble showed: PFO+, BLESSING with moderate LVOT.     Plan  - c/w amlodipine 10mg qd  - c/w metoprolol tartrate 50mg q12
Known hx of HIV. HIV viral load undetectable. Pt started on Bactrim DS. Pt is on Juluca which is Non formulary, thus was started on dolutegravir 50mg daily, rilpivirine 25mg daily.    Plan  - c/w dolutegravir 50mg qd  - c/w rilpivirine 25mg qd w/dinner  - c/w Bactrim 160-800mg 3days/wk (MWF)

## 2023-08-24 NOTE — PROGRESS NOTE ADULT - PROBLEM SELECTOR PLAN 10
F: N/A  E: replete K<4 and Mg <2  N: DASH/TLC  DVT Prophylaxis: Lovenox  Code Status: Full Code  Dispo: [facility]
F: N/A  E: replete K<4 and Mg <2  N: DASH/TLC  DVT Prophylaxis: Lovenox  Code Status: Full Code  Dispo: [facility]
F: N/A  E: replete K<4 and Mg <2  N: DASH/TLC  DVT Prophylaxis: Lovenox  Code Status: Full Code  Dispo: [facility]
F: N/A  E: replete K<4 and Mg <2  N: DASH/TLC  DVT Prophylaxis: Lovenox  Code Status: Full Code  Dispo: [facility]

## 2023-08-24 NOTE — PROGRESS NOTE ADULT - SUBJECTIVE AND OBJECTIVE BOX
Physical Medicine and Rehabilitation Progress Note :       Patient is a 72y old  Male who presents with a chief complaint of periorbital fracture  s/p fall (23 Aug 2023 19:04)      HPI:                            10.5   6.51  )-----------( 303      ( 24 Aug 2023 05:30 )             35.7       08-24    133<L>  |  102  |  8   ----------------------------<  133<H>  5.1   |  25  |  0.46<L>    Ca    8.9      24 Aug 2023 05:30  Phos  3.4     08-24  Mg     2.3     08-24      Vital Signs Last 24 Hrs  T(C): 36.9 (24 Aug 2023 12:00), Max: 37.9 (23 Aug 2023 19:15)  T(F): 98.5 (24 Aug 2023 12:00), Max: 100.3 (23 Aug 2023 19:15)  HR: 81 (24 Aug 2023 12:00) (78 - 114)  BP: 134/77 (24 Aug 2023 12:00) (103/72 - 146/74)  BP(mean): --  RR: 18 (24 Aug 2023 12:00) (18 - 18)  SpO2: 97% (24 Aug 2023 12:00) (93% - 97%)    Parameters below as of 24 Aug 2023 12:00  Patient On (Oxygen Delivery Method): room air        MEDICATIONS  (STANDING):  amLODIPine   Tablet 10 milliGRAM(s) Oral every 24 hours  dextrose 5%. 1000 milliLiter(s) (50 mL/Hr) IV Continuous <Continuous>  dextrose 5%. 1000 milliLiter(s) (100 mL/Hr) IV Continuous <Continuous>  dextrose 50% Injectable 25 Gram(s) IV Push once  dextrose 50% Injectable 25 Gram(s) IV Push once  dextrose 50% Injectable 12.5 Gram(s) IV Push once  dolutegravir 50 milliGRAM(s) Oral daily  enoxaparin Injectable 40 milliGRAM(s) SubCutaneous every 24 hours  folic acid 1 milliGRAM(s) Oral daily  glucagon  Injectable 1 milliGRAM(s) IntraMuscular once  hydroxychloroquine 200 milliGRAM(s) Oral two times a day  insulin lispro (ADMELOG) corrective regimen sliding scale   SubCutaneous three times a day before meals  insulin lispro (ADMELOG) corrective regimen sliding scale   SubCutaneous at bedtime  methylPREDNISolone 8 milliGRAM(s) Oral daily  methylPREDNISolone sodium succinate IVPB 125 milliGRAM(s) IV Intermittent daily  metoprolol tartrate 50 milliGRAM(s) Oral every 12 hours  mycophenolate mofetil 500 milliGRAM(s) Oral two times a day  piperacillin/tazobactam IVPB.. 3.375 Gram(s) IV Intermittent every 8 hours  rilpivirine 25 milliGRAM(s) Oral with dinner  trimethoprim  160 mG/sulfamethoxazole 800 mG 1 Tablet(s) Oral <User Schedule>  vancomycin  IVPB 1500 milliGRAM(s) IV Intermittent every 12 hours    MEDICATIONS  (PRN):  dextrose Oral Gel 15 Gram(s) Oral once PRN Blood Glucose LESS THAN 70 milliGRAM(s)/deciliter        Functional Status Assessment :       Pain Assessment/Number Scale (0-10) Adult  Presence of Pain: denies pain/discomfort (Rating = 0)  Pain Rating (0-10): Rest: 0 (no pain/absence of nonverbal indicators of pain)  Pain Rating (0-10): Activity: 0 (no pain/absence of nonverbal indicators of pain)    Safety      AM-PAC Functional Assessment: Basic Mobility  Type of Assessment: Daily assessment  Turning from your back to your side while in a flat bed without using bedrails?: 3 = A little assistance  Moving from lying on your back to sitting on the flat side of a flat bed without using bedrails?: 3 = A little assistance  Moving to and from a bed to a chair (including a wheelchair)?: 3 = A little assistance  Standing up from a chair using your arms (e.g. wheelchair or bedside chair)?: 3 = A little assistance  Walking in hospital room?: 3 = A little assistance  Climbing 3-5 steps with a railing?: 3 = A little assistance  Score: 18   Row Comment: Ask the patient "How much help from another person do you currently need? (If the patient hasn't done an activity recently, how much help from another person do you think he/she needs if he/she tried?)    Therapeutic Interventions      Sit-Stand Transfer Training  Transfer Training Sit-to-Stand Transfer: contact guard;  verbal cues;  weight-bearing as tolerated  Transfer Training Stand-to-Sit Transfer: contact guard;  verbal cues;  weight-bearing as tolerated  Sit-to-Stand Transfer Training Transfer Safety Analysis: decreased strength;  impaired balance    Gait Training  Gait Training: verbal cues;  weight-bearing as tolerated   75 feet;  contact guard  Gait Analysis: 2-point gait   decreased consuelo;  decreased hip/knee flexion;  decreased step length;  impaired balance;  decreased strength;  75 feet  Gait Number of Times:: x 2  Type of Rest Type of Rest: standing    Therapeutic Exercise  Therapeutic Exercise Detail: In standing holding onto rail of wall with both hands; bilateral heel raises x 10 reps. Side stepping 10 steps to right and left.Holding onto rail with 1 hand marching in place x 10 reps with emphaisis on lifting knees high with patient requiring min A of 1 for this activity             PM&R Impression : as above    Current Disposition Plan Recommendations :    subacute rehab placement

## 2023-08-25 ENCOUNTER — TRANSCRIPTION ENCOUNTER (OUTPATIENT)
Age: 73
End: 2023-08-25

## 2023-08-25 VITALS
RESPIRATION RATE: 18 BRPM | DIASTOLIC BLOOD PRESSURE: 71 MMHG | HEART RATE: 83 BPM | TEMPERATURE: 98 F | OXYGEN SATURATION: 99 % | SYSTOLIC BLOOD PRESSURE: 148 MMHG

## 2023-08-25 LAB
ALBUMIN SERPL ELPH-MCNC: 2.8 G/DL — LOW (ref 3.3–5)
ALP SERPL-CCNC: 71 U/L — SIGNIFICANT CHANGE UP (ref 40–120)
ALT FLD-CCNC: 30 U/L — SIGNIFICANT CHANGE UP (ref 10–45)
ANION GAP SERPL CALC-SCNC: 7 MMOL/L — SIGNIFICANT CHANGE UP (ref 5–17)
AST SERPL-CCNC: 86 U/L — HIGH (ref 10–40)
BILIRUB SERPL-MCNC: <0.2 MG/DL — SIGNIFICANT CHANGE UP (ref 0.2–1.2)
BUN SERPL-MCNC: 14 MG/DL — SIGNIFICANT CHANGE UP (ref 7–23)
CALCIUM SERPL-MCNC: 9.1 MG/DL — SIGNIFICANT CHANGE UP (ref 8.4–10.5)
CHLORIDE SERPL-SCNC: 105 MMOL/L — SIGNIFICANT CHANGE UP (ref 96–108)
CO2 SERPL-SCNC: 25 MMOL/L — SIGNIFICANT CHANGE UP (ref 22–31)
CREAT SERPL-MCNC: 0.45 MG/DL — LOW (ref 0.5–1.3)
CULTURE RESULTS: SIGNIFICANT CHANGE UP
EGFR: 113 ML/MIN/1.73M2 — SIGNIFICANT CHANGE UP
GLUCOSE BLDC GLUCOMTR-MCNC: 146 MG/DL — HIGH (ref 70–99)
GLUCOSE BLDC GLUCOMTR-MCNC: 163 MG/DL — HIGH (ref 70–99)
GLUCOSE SERPL-MCNC: 165 MG/DL — HIGH (ref 70–99)
HCT VFR BLD CALC: 35.9 % — LOW (ref 39–50)
HGB BLD-MCNC: 10.8 G/DL — LOW (ref 13–17)
MAGNESIUM SERPL-MCNC: 2.2 MG/DL — SIGNIFICANT CHANGE UP (ref 1.6–2.6)
MCHC RBC-ENTMCNC: 25.9 PG — LOW (ref 27–34)
MCHC RBC-ENTMCNC: 30.1 GM/DL — LOW (ref 32–36)
MCV RBC AUTO: 86.1 FL — SIGNIFICANT CHANGE UP (ref 80–100)
NRBC # BLD: 0 /100 WBCS — SIGNIFICANT CHANGE UP (ref 0–0)
PHOSPHATE SERPL-MCNC: 3.6 MG/DL — SIGNIFICANT CHANGE UP (ref 2.5–4.5)
PLATELET # BLD AUTO: 335 K/UL — SIGNIFICANT CHANGE UP (ref 150–400)
POTASSIUM SERPL-MCNC: 4.6 MMOL/L — SIGNIFICANT CHANGE UP (ref 3.5–5.3)
POTASSIUM SERPL-SCNC: 4.6 MMOL/L — SIGNIFICANT CHANGE UP (ref 3.5–5.3)
PROT SERPL-MCNC: 7.1 G/DL — SIGNIFICANT CHANGE UP (ref 6–8.3)
RBC # BLD: 4.17 M/UL — LOW (ref 4.2–5.8)
RBC # FLD: 14.2 % — SIGNIFICANT CHANGE UP (ref 10.3–14.5)
SODIUM SERPL-SCNC: 137 MMOL/L — SIGNIFICANT CHANGE UP (ref 135–145)
SPECIMEN SOURCE: SIGNIFICANT CHANGE UP
WBC # BLD: 5.19 K/UL — SIGNIFICANT CHANGE UP (ref 3.8–10.5)
WBC # FLD AUTO: 5.19 K/UL — SIGNIFICANT CHANGE UP (ref 3.8–10.5)

## 2023-08-25 PROCEDURE — 80307 DRUG TEST PRSMV CHEM ANLYZR: CPT

## 2023-08-25 PROCEDURE — 70450 CT HEAD/BRAIN W/O DYE: CPT

## 2023-08-25 PROCEDURE — 86140 C-REACTIVE PROTEIN: CPT

## 2023-08-25 PROCEDURE — 82962 GLUCOSE BLOOD TEST: CPT

## 2023-08-25 PROCEDURE — 97162 PT EVAL MOD COMPLEX 30 MIN: CPT

## 2023-08-25 PROCEDURE — 83605 ASSAY OF LACTIC ACID: CPT

## 2023-08-25 PROCEDURE — 83874 ASSAY OF MYOGLOBIN: CPT

## 2023-08-25 PROCEDURE — 97110 THERAPEUTIC EXERCISES: CPT

## 2023-08-25 PROCEDURE — 87040 BLOOD CULTURE FOR BACTERIA: CPT

## 2023-08-25 PROCEDURE — 71045 X-RAY EXAM CHEST 1 VIEW: CPT

## 2023-08-25 PROCEDURE — 85652 RBC SED RATE AUTOMATED: CPT

## 2023-08-25 PROCEDURE — 86225 DNA ANTIBODY NATIVE: CPT

## 2023-08-25 PROCEDURE — 84132 ASSAY OF SERUM POTASSIUM: CPT

## 2023-08-25 PROCEDURE — 93306 TTE W/DOPPLER COMPLETE: CPT

## 2023-08-25 PROCEDURE — A9585: CPT

## 2023-08-25 PROCEDURE — 70553 MRI BRAIN STEM W/O & W/DYE: CPT

## 2023-08-25 PROCEDURE — 84156 ASSAY OF PROTEIN URINE: CPT

## 2023-08-25 PROCEDURE — 82085 ASSAY OF ALDOLASE: CPT

## 2023-08-25 PROCEDURE — 0225U NFCT DS DNA&RNA 21 SARSCOV2: CPT

## 2023-08-25 PROCEDURE — 82550 ASSAY OF CK (CPK): CPT

## 2023-08-25 PROCEDURE — 88305 TISSUE EXAM BY PATHOLOGIST: CPT

## 2023-08-25 PROCEDURE — 99232 SBSQ HOSP IP/OBS MODERATE 35: CPT | Mod: GC

## 2023-08-25 PROCEDURE — 97165 OT EVAL LOW COMPLEX 30 MIN: CPT

## 2023-08-25 PROCEDURE — 36415 COLL VENOUS BLD VENIPUNCTURE: CPT

## 2023-08-25 PROCEDURE — 97129 THER IVNTJ 1ST 15 MIN: CPT

## 2023-08-25 PROCEDURE — 84145 PROCALCITONIN (PCT): CPT

## 2023-08-25 PROCEDURE — 99285 EMERGENCY DEPT VISIT HI MDM: CPT | Mod: 25

## 2023-08-25 PROCEDURE — 97530 THERAPEUTIC ACTIVITIES: CPT

## 2023-08-25 PROCEDURE — 86357 NK CELLS TOTAL COUNT: CPT

## 2023-08-25 PROCEDURE — 86235 NUCLEAR ANTIGEN ANTIBODY: CPT

## 2023-08-25 PROCEDURE — 85025 COMPLETE CBC W/AUTO DIFF WBC: CPT

## 2023-08-25 PROCEDURE — 87637 SARSCOV2&INF A&B&RSV AMP PRB: CPT

## 2023-08-25 PROCEDURE — 80202 ASSAY OF VANCOMYCIN: CPT

## 2023-08-25 PROCEDURE — 85027 COMPLETE CBC AUTOMATED: CPT

## 2023-08-25 PROCEDURE — 76882 US LMTD JT/FCL EVL NVASC XTR: CPT

## 2023-08-25 PROCEDURE — 80053 COMPREHEN METABOLIC PANEL: CPT

## 2023-08-25 PROCEDURE — 99239 HOSP IP/OBS DSCHRG MGMT >30: CPT | Mod: GC

## 2023-08-25 PROCEDURE — 84100 ASSAY OF PHOSPHORUS: CPT

## 2023-08-25 PROCEDURE — 84443 ASSAY THYROID STIM HORMONE: CPT

## 2023-08-25 PROCEDURE — 86355 B CELLS TOTAL COUNT: CPT

## 2023-08-25 PROCEDURE — 83036 HEMOGLOBIN GLYCOSYLATED A1C: CPT

## 2023-08-25 PROCEDURE — 82570 ASSAY OF URINE CREATININE: CPT

## 2023-08-25 PROCEDURE — 83735 ASSAY OF MAGNESIUM: CPT

## 2023-08-25 PROCEDURE — 80048 BASIC METABOLIC PNL TOTAL CA: CPT

## 2023-08-25 PROCEDURE — 86359 T CELLS TOTAL COUNT: CPT

## 2023-08-25 PROCEDURE — 87536 HIV-1 QUANT&REVRSE TRNSCRPJ: CPT

## 2023-08-25 PROCEDURE — 36410 VNPNXR 3YR/> PHY/QHP DX/THER: CPT

## 2023-08-25 PROCEDURE — 81001 URINALYSIS AUTO W/SCOPE: CPT

## 2023-08-25 PROCEDURE — 80061 LIPID PANEL: CPT

## 2023-08-25 PROCEDURE — 93005 ELECTROCARDIOGRAM TRACING: CPT

## 2023-08-25 PROCEDURE — 97116 GAIT TRAINING THERAPY: CPT

## 2023-08-25 PROCEDURE — 86360 T CELL ABSOLUTE COUNT/RATIO: CPT

## 2023-08-25 PROCEDURE — 97535 SELF CARE MNGMENT TRAINING: CPT

## 2023-08-25 RX ORDER — BISOPROLOL FUMARATE 10 MG/1
1 TABLET, FILM COATED ORAL
Refills: 0 | DISCHARGE

## 2023-08-25 RX ORDER — CEFPODOXIME PROXETIL 100 MG
2 TABLET ORAL
Qty: 12 | Refills: 0
Start: 2023-08-25 | End: 2023-08-27

## 2023-08-25 RX ORDER — CEFPODOXIME PROXETIL 100 MG
400 TABLET ORAL EVERY 12 HOURS
Refills: 0 | Status: DISCONTINUED | OUTPATIENT
Start: 2023-08-25 | End: 2023-08-25

## 2023-08-25 RX ORDER — VANCOMYCIN HCL 1 G
1500 VIAL (EA) INTRAVENOUS EVERY 12 HOURS
Refills: 0 | Status: DISCONTINUED | OUTPATIENT
Start: 2023-08-25 | End: 2023-08-25

## 2023-08-25 RX ADMIN — DOLUTEGRAVIR SODIUM 50 MILLIGRAM(S): 25 TABLET, FILM COATED ORAL at 12:18

## 2023-08-25 RX ADMIN — Medication 200 MILLIGRAM(S): at 09:37

## 2023-08-25 RX ADMIN — AMLODIPINE BESYLATE 10 MILLIGRAM(S): 2.5 TABLET ORAL at 14:54

## 2023-08-25 RX ADMIN — MYCOPHENOLATE MOFETIL 500 MILLIGRAM(S): 250 CAPSULE ORAL at 09:37

## 2023-08-25 RX ADMIN — PIPERACILLIN AND TAZOBACTAM 25 GRAM(S): 4; .5 INJECTION, POWDER, LYOPHILIZED, FOR SOLUTION INTRAVENOUS at 07:32

## 2023-08-25 RX ADMIN — Medication 400 MILLIGRAM(S): at 14:54

## 2023-08-25 RX ADMIN — Medication 1 TABLET(S): at 07:32

## 2023-08-25 RX ADMIN — Medication 1 TABLET(S): at 14:54

## 2023-08-25 RX ADMIN — Medication 1 MILLIGRAM(S): at 12:18

## 2023-08-25 RX ADMIN — Medication 2: at 09:37

## 2023-08-25 RX ADMIN — Medication 50 MILLIGRAM(S): at 06:32

## 2023-08-25 NOTE — PROGRESS NOTE ADULT - ATTENDING COMMENTS
#Cellulitis, immunosuppression due to drug therapy, dermatomyositis, HIV    Afebrile, Patient feels well, erythema on back unchanged.  It is unclear if he has cellulitis or not - rheum says his back rash is not typical for dermatomyositis; however patient has CK elevation.  Fever resolved on abx.  OK to discharge him with PO abx (Bactrim DS 1 tab q12h and cefpodoxime 400mg PO q12h, end date 8/28).   After 8/29, he should resume home Bactrim (three times/week) for PCP ppx given immunosuppression.   Cont Juluca.  - Patient can follow up with me in 2 week ([address] [place] [address], [phone]), ID office will call patient to schedule     Thank you for your consult.  Please re-consult us or call us with questions.  Case d/w primary team.    Nathalie Fisher MD, MS  Infectious Disease attending  work cell [phone]  For any questions during evening/weekend/holiday, please page ID on call

## 2023-08-25 NOTE — DISCHARGE NOTE NURSING/CASE MANAGEMENT/SOCIAL WORK - NSDCFUADDAPPT_GEN_ALL_CORE_FT
Please bring your Insurance card, Photo ID and Discharge paperwork to the following appointment:    (1) Please follow up with your Neurology Provider, Dr. Thelma Ernst at [address], [address] [place] [address] on 09/06/2023 at 9:00 AM.    Please note that the office will contact you for an earlier appointment once available.    Appointment was scheduled by Ms. MAXIM Richards, Referral Coordinator.            Please follow up with Dr. Jovany Vega, maxillofacial surgery, within 1 week of discharge.     Follow up with your PCP within 2 weeks of discharge.

## 2023-08-25 NOTE — PROGRESS NOTE ADULT - ASSESSMENT
3yo M w/ PMHx HIV on Juluca, HFrEF, Dermatomyositis, & DM2 admitted w/ orbital fractures/ICH, w/ recurrent high fevers possibly 2/2 cellulitis vs CNS etiology, being managed w/ IV abx. Course c/b blanching erythematous over lower back, possibly 2/2 drug reaction iso underlying dermatomyositis.     Rash over anterior neck/chest unchanged and also per pt, this rash consistent w/ previous rashes due to dermatomyositis. Rash on lower back appears stable if not somewhat enlarged.  Still spiking low fevers, recently 100.3F yesterday evening. Still unclear if truly cellulitis. Pt on Vanc & Zosyn, & was re-started on Medrol 8mg qd (half of home dose).     #Cellulitis #Immunosuppression due to drug therapy/HIV # HIV on Juluca  # Dermatomyositis     Recommendations:   **INCOMPLETE NOTE** 3yo M w/ PMHx HIV on Juluca, HFrEF, Dermatomyositis, & DM2 admitted w/ orbital fractures/ICH, w/ recurrent high fevers possibly 2/2 cellulitis vs CNS etiology, being managed w/ IV abx. Course c/b blanching erythematous over lower back, possibly 2/2 drug reaction iso underlying dermatomyositis.     Rash over anterior neck/chest unchanged and also per pt, this rash consistent w/ previous rashes due to dermatomyositis. Rash on lower back appears stable if not somewhat enlarged.  Still spiking low fevers, recently 100.3F yesterday evening. Still unclear if truly cellulitis. Pt on Vanc & Zosyn, & was re-started on Medrol 8mg qd (half of home dose).     #Cellulitis #Immunosuppression due to drug therapy/HIV # HIV on Juluca  # Dermatomyositis     Recommendations:   - Can d/c w/ PO Bactrim DS 1 tab q12h & Cefpodoxime 400mg PO q12h, to end 8/28  - Started 8/29, should resume home Bactrim for PCP ppx given immunosuppression  - c/w Juluca  - Patient to follow up with Dr. booth in 2 weeks ([address] [place] [address], [phone]), ID office will call patient to schedule

## 2023-08-25 NOTE — PROGRESS NOTE ADULT - SUBJECTIVE AND OBJECTIVE BOX
**INCOMPLETE NOTE**    INFECTIOUS DISEASES CONSULT FOLLOW-UP NOTE    INTERVAL HPI/OVERNIGHT EVENTS:      ROS:   Constitutional, eyes, ENT, cardiovascular, respiratory, gastrointestinal, genitourinary, integumentary, neurological, psychiatric and heme/lymph are otherwise negative other than noted above       ANTIBIOTICS/RELEVANT:    MEDICATIONS  (STANDING):  amLODIPine   Tablet 10 milliGRAM(s) Oral every 24 hours  dextrose 5%. 1000 milliLiter(s) (50 mL/Hr) IV Continuous <Continuous>  dextrose 5%. 1000 milliLiter(s) (100 mL/Hr) IV Continuous <Continuous>  dextrose 50% Injectable 25 Gram(s) IV Push once  dextrose 50% Injectable 25 Gram(s) IV Push once  dextrose 50% Injectable 12.5 Gram(s) IV Push once  dolutegravir 50 milliGRAM(s) Oral daily  enoxaparin Injectable 40 milliGRAM(s) SubCutaneous every 24 hours  folic acid 1 milliGRAM(s) Oral daily  glucagon  Injectable 1 milliGRAM(s) IntraMuscular once  hydroxychloroquine 200 milliGRAM(s) Oral two times a day  insulin lispro (ADMELOG) corrective regimen sliding scale   SubCutaneous at bedtime  insulin lispro (ADMELOG) corrective regimen sliding scale   SubCutaneous three times a day before meals  metoprolol tartrate 50 milliGRAM(s) Oral every 12 hours  mycophenolate mofetil 500 milliGRAM(s) Oral two times a day  piperacillin/tazobactam IVPB.. 3.375 Gram(s) IV Intermittent every 8 hours  rilpivirine 25 milliGRAM(s) Oral with dinner  trimethoprim  160 mG/sulfamethoxazole 800 mG 1 Tablet(s) Oral <User Schedule>  vancomycin  IVPB 1500 milliGRAM(s) IV Intermittent every 12 hours    MEDICATIONS  (PRN):  dextrose Oral Gel 15 Gram(s) Oral once PRN Blood Glucose LESS THAN 70 milliGRAM(s)/deciliter        Vital Signs Last 24 Hrs  T(C): 36.6 (25 Aug 2023 06:23), Max: 37.2 (24 Aug 2023 20:34)  T(F): 97.9 (25 Aug 2023 06:23), Max: 99 (24 Aug 2023 20:34)  HR: 83 (25 Aug 2023 06:23) (73 - 93)  BP: 144/79 (25 Aug 2023 06:23) (122/73 - 144/79)  BP(mean): 89 (24 Aug 2023 20:34) (89 - 89)  RR: 17 (25 Aug 2023 06:23) (17 - 18)  SpO2: 96% (25 Aug 2023 06:23) (96% - 97%)    Parameters below as of 25 Aug 2023 06:23  Patient On (Oxygen Delivery Method): room air        PHYSICAL EXAM:  Constitutional: alert, NAD  HEENT: Swelling and erythema over b/l portions of face, w/ significant periorobital swelling.   Neck: New blanching warm/hot erythematous rash over anterior neck from mandible to anterior chest to right above nipple-line.   Pulmonary: no respiratory distress and lungs were clear to auscultation bilaterally.   Heart: heart rate was normal, + ejection murmur at apex   Skin: Large blanching, mildly tender, non-purulent/non-fluctuant erythema w/ distinct borders on the R lower back, now possibly enlarged/extending to lateral flank.   Abdomen: normal bowel sounds, soft, non-tender  Neurological: no focal deficits.   Psychiatric: the affect was normal        LABS:                        10.8   5.19  )-----------( 335      ( 25 Aug 2023 06:57 )             35.9     08-25    137  |  105  |  14  ----------------------------<  165<H>  4.6   |  25  |  0.45<L>    Ca    9.1      25 Aug 2023 06:57  Phos  3.6     08-25  Mg     2.2     08-25    TPro  7.1  /  Alb  2.8<L>  /  TBili  <0.2  /  DBili  x   /  AST  86<H>  /  ALT  30  /  AlkPhos  71  08-25      Urinalysis Basic - ( 25 Aug 2023 06:57 )    Color: x / Appearance: x / SG: x / pH: x  Gluc: 165 mg/dL / Ketone: x  / Bili: x / Urobili: x   Blood: x / Protein: x / Nitrite: x   Leuk Esterase: x / RBC: x / WBC x   Sq Epi: x / Non Sq Epi: x / Bacteria: x        MICROBIOLOGY:      RADIOLOGY & ADDITIONAL STUDIES:  Reviewed INFECTIOUS DISEASES CONSULT FOLLOW-UP NOTE    INTERVAL HPI/OVERNIGHT EVENTS: NAEO      ROS:   Constitutional, eyes, ENT, cardiovascular, respiratory, gastrointestinal, genitourinary, integumentary, neurological, psychiatric and heme/lymph are otherwise negative other than noted above       ANTIBIOTICS/RELEVANT:    MEDICATIONS  (STANDING):  amLODIPine   Tablet 10 milliGRAM(s) Oral every 24 hours  dextrose 5%. 1000 milliLiter(s) (50 mL/Hr) IV Continuous <Continuous>  dextrose 5%. 1000 milliLiter(s) (100 mL/Hr) IV Continuous <Continuous>  dextrose 50% Injectable 25 Gram(s) IV Push once  dextrose 50% Injectable 25 Gram(s) IV Push once  dextrose 50% Injectable 12.5 Gram(s) IV Push once  dolutegravir 50 milliGRAM(s) Oral daily  enoxaparin Injectable 40 milliGRAM(s) SubCutaneous every 24 hours  folic acid 1 milliGRAM(s) Oral daily  glucagon  Injectable 1 milliGRAM(s) IntraMuscular once  hydroxychloroquine 200 milliGRAM(s) Oral two times a day  insulin lispro (ADMELOG) corrective regimen sliding scale   SubCutaneous at bedtime  insulin lispro (ADMELOG) corrective regimen sliding scale   SubCutaneous three times a day before meals  metoprolol tartrate 50 milliGRAM(s) Oral every 12 hours  mycophenolate mofetil 500 milliGRAM(s) Oral two times a day  piperacillin/tazobactam IVPB.. 3.375 Gram(s) IV Intermittent every 8 hours  rilpivirine 25 milliGRAM(s) Oral with dinner  trimethoprim  160 mG/sulfamethoxazole 800 mG 1 Tablet(s) Oral <User Schedule>  vancomycin  IVPB 1500 milliGRAM(s) IV Intermittent every 12 hours    MEDICATIONS  (PRN):  dextrose Oral Gel 15 Gram(s) Oral once PRN Blood Glucose LESS THAN 70 milliGRAM(s)/deciliter        Vital Signs Last 24 Hrs  T(C): 36.6 (25 Aug 2023 06:23), Max: 37.2 (24 Aug 2023 20:34)  T(F): 97.9 (25 Aug 2023 06:23), Max: 99 (24 Aug 2023 20:34)  HR: 83 (25 Aug 2023 06:23) (73 - 93)  BP: 144/79 (25 Aug 2023 06:23) (122/73 - 144/79)  BP(mean): 89 (24 Aug 2023 20:34) (89 - 89)  RR: 17 (25 Aug 2023 06:23) (17 - 18)  SpO2: 96% (25 Aug 2023 06:23) (96% - 97%)    Parameters below as of 25 Aug 2023 06:23  Patient On (Oxygen Delivery Method): room air        PHYSICAL EXAM:  Constitutional: alert, NAD  HEENT: Swelling and erythema over b/l portions of face, w/ significant periorobital swelling.   Neck: New blanching warm/hot erythematous rash over anterior neck from mandible to anterior chest to right above nipple-line.   Pulmonary: no respiratory distress and lungs were clear to auscultation bilaterally.   Heart: heart rate was normal, + ejection murmur at apex   Skin: Large blanching, mildly tender, non-purulent/non-fluctuant erythema w/ distinct borders on the R lower back  Abdomen: normal bowel sounds, soft, non-tender  Neurological: no focal deficits.   Psychiatric: the affect was normal        LABS:                        10.8   5.19  )-----------( 335      ( 25 Aug 2023 06:57 )             35.9     08-25    137  |  105  |  14  ----------------------------<  165<H>  4.6   |  25  |  0.45<L>    Ca    9.1      25 Aug 2023 06:57  Phos  3.6     08-25  Mg     2.2     08-25    TPro  7.1  /  Alb  2.8<L>  /  TBili  <0.2  /  DBili  x   /  AST  86<H>  /  ALT  30  /  AlkPhos  71  08-25      Urinalysis Basic - ( 25 Aug 2023 06:57 )    Color: x / Appearance: x / SG: x / pH: x  Gluc: 165 mg/dL / Ketone: x  / Bili: x / Urobili: x   Blood: x / Protein: x / Nitrite: x   Leuk Esterase: x / RBC: x / WBC x   Sq Epi: x / Non Sq Epi: x / Bacteria: x        MICROBIOLOGY:      RADIOLOGY & ADDITIONAL STUDIES:  Reviewed

## 2023-08-25 NOTE — DISCHARGE NOTE NURSING/CASE MANAGEMENT/SOCIAL WORK - NSDCPEFALRISK_GEN_ALL_CORE
For information on Fall & Injury Prevention, visit: [email or web address] OR  [email or web address] OR  https://www.cdc.gov/steadi/patient.html

## 2023-08-25 NOTE — DISCHARGE NOTE NURSING/CASE MANAGEMENT/SOCIAL WORK - PATIENT PORTAL LINK FT
You can access the FollowMyHealth Patient Portal offered by [facility] by registering at the following website: [email or web address]. By joining [employer]’s FollowMyHealth portal, you will also be able to view your health information using other applications (apps) compatible with our system.

## 2023-08-27 LAB
CULTURE RESULTS: SIGNIFICANT CHANGE UP
SPECIMEN SOURCE: SIGNIFICANT CHANGE UP

## 2023-08-29 ENCOUNTER — APPOINTMENT (OUTPATIENT)
Dept: INFUSION THERAPY | Facility: CLINIC | Age: 73
End: 2023-08-29

## 2023-08-29 ENCOUNTER — OUTPATIENT (OUTPATIENT)
Dept: OUTPATIENT SERVICES | Facility: HOSPITAL | Age: 73
LOS: 1 days | End: 2023-08-29
Payer: MEDICARE

## 2023-08-29 VITALS
OXYGEN SATURATION: 97 % | TEMPERATURE: 99 F | SYSTOLIC BLOOD PRESSURE: 129 MMHG | HEIGHT: 68 IN | HEART RATE: 78 BPM | WEIGHT: 190.04 LBS | RESPIRATION RATE: 18 BRPM | DIASTOLIC BLOOD PRESSURE: 75 MMHG

## 2023-08-29 VITALS
DIASTOLIC BLOOD PRESSURE: 72 MMHG | TEMPERATURE: 98 F | RESPIRATION RATE: 17 BRPM | OXYGEN SATURATION: 98 % | HEART RATE: 74 BPM | SYSTOLIC BLOOD PRESSURE: 126 MMHG

## 2023-08-29 DIAGNOSIS — M33.90 DERMATOPOLYMYOSITIS, UNSPECIFIED, ORGAN INVOLVEMENT UNSPECIFIED: ICD-10-CM

## 2023-08-29 LAB
ALBUMIN SERPL ELPH-MCNC: 3.4 G/DL — SIGNIFICANT CHANGE UP (ref 3.3–5)
ALP SERPL-CCNC: 87 U/L — SIGNIFICANT CHANGE UP (ref 40–120)
ALT FLD-CCNC: 37 U/L — SIGNIFICANT CHANGE UP (ref 10–45)
ANION GAP SERPL CALC-SCNC: 11 MMOL/L — SIGNIFICANT CHANGE UP (ref 5–17)
AST SERPL-CCNC: 90 U/L — HIGH (ref 10–40)
BILIRUB SERPL-MCNC: 0.2 MG/DL — SIGNIFICANT CHANGE UP (ref 0.2–1.2)
BUN SERPL-MCNC: 14 MG/DL — SIGNIFICANT CHANGE UP (ref 7–23)
CALCIUM SERPL-MCNC: 9.4 MG/DL — SIGNIFICANT CHANGE UP (ref 8.4–10.5)
CHLORIDE SERPL-SCNC: 102 MMOL/L — SIGNIFICANT CHANGE UP (ref 96–108)
CK SERPL-CCNC: 1351 U/L — HIGH (ref 30–200)
CO2 SERPL-SCNC: 24 MMOL/L — SIGNIFICANT CHANGE UP (ref 22–31)
CREAT SERPL-MCNC: 0.54 MG/DL — SIGNIFICANT CHANGE UP (ref 0.5–1.3)
EGFR: 107 ML/MIN/1.73M2 — SIGNIFICANT CHANGE UP
GLUCOSE SERPL-MCNC: 108 MG/DL — HIGH (ref 70–99)
HCT VFR BLD CALC: 39.3 % — SIGNIFICANT CHANGE UP (ref 39–50)
HGB BLD-MCNC: 11.6 G/DL — LOW (ref 13–17)
MCHC RBC-ENTMCNC: 25.5 PG — LOW (ref 27–34)
MCHC RBC-ENTMCNC: 29.5 GM/DL — LOW (ref 32–36)
MCV RBC AUTO: 86.4 FL — SIGNIFICANT CHANGE UP (ref 80–100)
NRBC # BLD: 0 /100 WBCS — SIGNIFICANT CHANGE UP (ref 0–0)
PLATELET # BLD AUTO: 400 K/UL — SIGNIFICANT CHANGE UP (ref 150–400)
POTASSIUM SERPL-MCNC: 4.6 MMOL/L — SIGNIFICANT CHANGE UP (ref 3.5–5.3)
POTASSIUM SERPL-SCNC: 4.6 MMOL/L — SIGNIFICANT CHANGE UP (ref 3.5–5.3)
PROT SERPL-MCNC: 7.6 G/DL — SIGNIFICANT CHANGE UP (ref 6–8.3)
RBC # BLD: 4.55 M/UL — SIGNIFICANT CHANGE UP (ref 4.2–5.8)
RBC # FLD: 15 % — HIGH (ref 10.3–14.5)
SODIUM SERPL-SCNC: 137 MMOL/L — SIGNIFICANT CHANGE UP (ref 135–145)
WBC # BLD: 10.67 K/UL — HIGH (ref 3.8–10.5)
WBC # FLD AUTO: 10.67 K/UL — HIGH (ref 3.8–10.5)

## 2023-08-29 PROCEDURE — 96365 THER/PROPH/DIAG IV INF INIT: CPT

## 2023-08-29 PROCEDURE — 80053 COMPREHEN METABOLIC PANEL: CPT

## 2023-08-29 PROCEDURE — 82550 ASSAY OF CK (CPK): CPT

## 2023-08-29 PROCEDURE — 96366 THER/PROPH/DIAG IV INF ADDON: CPT

## 2023-08-29 PROCEDURE — 36415 COLL VENOUS BLD VENIPUNCTURE: CPT

## 2023-08-29 PROCEDURE — 85027 COMPLETE CBC AUTOMATED: CPT

## 2023-08-29 RX ORDER — IMMUNE GLOBULIN (HUMAN) 10 G/100ML
80 INJECTION INTRAVENOUS; SUBCUTANEOUS ONCE
Refills: 0 | Status: COMPLETED | OUTPATIENT
Start: 2023-08-29 | End: 2023-08-29

## 2023-08-29 RX ORDER — ACETAMINOPHEN 500 MG
650 TABLET ORAL ONCE
Refills: 0 | Status: COMPLETED | OUTPATIENT
Start: 2023-08-29 | End: 2023-08-29

## 2023-08-29 RX ORDER — DIPHENHYDRAMINE HCL 50 MG
25 CAPSULE ORAL ONCE
Refills: 0 | Status: COMPLETED | OUTPATIENT
Start: 2023-08-29 | End: 2023-08-29

## 2023-08-29 RX ADMIN — Medication 25 MILLIGRAM(S): at 08:50

## 2023-08-29 RX ADMIN — IMMUNE GLOBULIN (HUMAN) 80 GRAM(S): 10 INJECTION INTRAVENOUS; SUBCUTANEOUS at 13:20

## 2023-08-29 RX ADMIN — IMMUNE GLOBULIN (HUMAN) 80 GRAM(S): 10 INJECTION INTRAVENOUS; SUBCUTANEOUS at 09:00

## 2023-08-29 RX ADMIN — Medication 650 MILLIGRAM(S): at 09:20

## 2023-08-29 RX ADMIN — Medication 650 MILLIGRAM(S): at 08:50

## 2023-08-30 ENCOUNTER — APPOINTMENT (OUTPATIENT)
Dept: INFUSION THERAPY | Facility: CLINIC | Age: 73
End: 2023-08-30

## 2023-08-30 ENCOUNTER — OUTPATIENT (OUTPATIENT)
Dept: OUTPATIENT SERVICES | Facility: HOSPITAL | Age: 73
LOS: 1 days | End: 2023-08-30
Payer: MEDICARE

## 2023-08-30 VITALS
DIASTOLIC BLOOD PRESSURE: 76 MMHG | HEART RATE: 70 BPM | TEMPERATURE: 98 F | OXYGEN SATURATION: 99 % | RESPIRATION RATE: 18 BRPM | SYSTOLIC BLOOD PRESSURE: 122 MMHG

## 2023-08-30 VITALS
SYSTOLIC BLOOD PRESSURE: 118 MMHG | OXYGEN SATURATION: 99 % | TEMPERATURE: 98 F | HEIGHT: 68 IN | WEIGHT: 190.04 LBS | HEART RATE: 76 BPM | DIASTOLIC BLOOD PRESSURE: 74 MMHG | RESPIRATION RATE: 16 BRPM

## 2023-08-30 DIAGNOSIS — M33.90 DERMATOPOLYMYOSITIS, UNSPECIFIED, ORGAN INVOLVEMENT UNSPECIFIED: ICD-10-CM

## 2023-08-30 PROBLEM — I25.10 ATHEROSCLEROTIC HEART DISEASE OF NATIVE CORONARY ARTERY WITHOUT ANGINA PECTORIS: Chronic | Status: ACTIVE | Noted: 2023-08-15

## 2023-08-30 PROCEDURE — 96366 THER/PROPH/DIAG IV INF ADDON: CPT

## 2023-08-30 PROCEDURE — 96365 THER/PROPH/DIAG IV INF INIT: CPT

## 2023-08-30 RX ORDER — DIPHENHYDRAMINE HCL 50 MG
25 CAPSULE ORAL ONCE
Refills: 0 | Status: COMPLETED | OUTPATIENT
Start: 2023-08-30 | End: 2023-08-30

## 2023-08-30 RX ORDER — IMMUNE GLOBULIN (HUMAN) 10 G/100ML
80 INJECTION INTRAVENOUS; SUBCUTANEOUS ONCE
Refills: 0 | Status: COMPLETED | OUTPATIENT
Start: 2023-08-30 | End: 2023-08-30

## 2023-08-30 RX ORDER — ACETAMINOPHEN 500 MG
650 TABLET ORAL ONCE
Refills: 0 | Status: COMPLETED | OUTPATIENT
Start: 2023-08-30 | End: 2023-08-30

## 2023-08-30 RX ADMIN — Medication 25 MILLIGRAM(S): at 09:19

## 2023-08-30 RX ADMIN — Medication 650 MILLIGRAM(S): at 09:23

## 2023-08-30 RX ADMIN — Medication 650 MILLIGRAM(S): at 09:19

## 2023-08-30 RX ADMIN — IMMUNE GLOBULIN (HUMAN) 80 GRAM(S): 10 INJECTION INTRAVENOUS; SUBCUTANEOUS at 13:28

## 2023-08-30 RX ADMIN — IMMUNE GLOBULIN (HUMAN) 200 GRAM(S): 10 INJECTION INTRAVENOUS; SUBCUTANEOUS at 09:23

## 2023-09-06 ENCOUNTER — APPOINTMENT (OUTPATIENT)
Dept: NEUROLOGY | Facility: CLINIC | Age: 73
End: 2023-09-06

## 2023-09-06 ENCOUNTER — NON-APPOINTMENT (OUTPATIENT)
Age: 73
End: 2023-09-06

## 2023-09-06 ENCOUNTER — APPOINTMENT (OUTPATIENT)
Dept: NEUROLOGY | Facility: CLINIC | Age: 73
End: 2023-09-06
Payer: COMMERCIAL

## 2023-09-06 VITALS
HEART RATE: 76 BPM | BODY MASS INDEX: 27.89 KG/M2 | DIASTOLIC BLOOD PRESSURE: 68 MMHG | WEIGHT: 184 LBS | SYSTOLIC BLOOD PRESSURE: 137 MMHG | TEMPERATURE: 98.2 F | HEIGHT: 68 IN | OXYGEN SATURATION: 96 %

## 2023-09-06 DIAGNOSIS — R40.2362 COMA SCALE, BEST MOTOR RESPONSE, OBEYS COMMANDS, AT ARRIVAL TO EMERGENCY DEPARTMENT: ICD-10-CM

## 2023-09-06 DIAGNOSIS — S06.5XAA TRAUMATIC SUBDURAL HEMORRHAGE WITH LOSS OF CONSCIOUSNESS STATUS UNKNOWN, INITIAL ENCOUNTER: ICD-10-CM

## 2023-09-06 DIAGNOSIS — I61.9 NONTRAUMATIC INTRACEREBRAL HEMORRHAGE, UNSPECIFIED: ICD-10-CM

## 2023-09-06 DIAGNOSIS — R40.2142 COMA SCALE, EYES OPEN, SPONTANEOUS, AT ARRIVAL TO EMERGENCY DEPARTMENT: ICD-10-CM

## 2023-09-06 DIAGNOSIS — I11.0 HYPERTENSIVE HEART DISEASE WITH HEART FAILURE: ICD-10-CM

## 2023-09-06 DIAGNOSIS — I50.22 CHRONIC SYSTOLIC (CONGESTIVE) HEART FAILURE: ICD-10-CM

## 2023-09-06 DIAGNOSIS — S02.31XA FRACTURE OF ORBITAL FLOOR, RIGHT SIDE, INITIAL ENCOUNTER FOR CLOSED FRACTURE: ICD-10-CM

## 2023-09-06 DIAGNOSIS — D12.2 BENIGN NEOPLASM OF ASCENDING COLON: ICD-10-CM

## 2023-09-06 DIAGNOSIS — R55 SYNCOPE AND COLLAPSE: ICD-10-CM

## 2023-09-06 DIAGNOSIS — E11.9 TYPE 2 DIABETES MELLITUS WITHOUT COMPLICATIONS: ICD-10-CM

## 2023-09-06 DIAGNOSIS — D12.7 BENIGN NEOPLASM OF RECTOSIGMOID JUNCTION: ICD-10-CM

## 2023-09-06 DIAGNOSIS — Q21.12 PATENT FORAMEN OVALE: ICD-10-CM

## 2023-09-06 DIAGNOSIS — Z20.822 CONTACT WITH AND (SUSPECTED) EXPOSURE TO COVID-19: ICD-10-CM

## 2023-09-06 DIAGNOSIS — E78.5 HYPERLIPIDEMIA, UNSPECIFIED: ICD-10-CM

## 2023-09-06 DIAGNOSIS — Z79.69 LONG TERM (CURRENT) USE OF OTHER IMMUNOMODULATORS AND IMMUNOSUPPRESSANTS: ICD-10-CM

## 2023-09-06 DIAGNOSIS — R04.2 HEMOPTYSIS: ICD-10-CM
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
## 2023-09-06 DIAGNOSIS — S06.89AA OTHER SPECIFIED INTRACRANIAL INJURY WITH LOSS OF CONSCIOUSNESS STATUS UNKNOWN, INITIAL ENCOUNTER: ICD-10-CM

## 2023-09-06 DIAGNOSIS — M33.13 OTHER DERMATOMYOSITIS WITHOUT MYOPATHY: ICD-10-CM

## 2023-09-06 DIAGNOSIS — D84.9 IMMUNODEFICIENCY, UNSPECIFIED: ICD-10-CM

## 2023-09-06 DIAGNOSIS — Z79.52 LONG TERM (CURRENT) USE OF SYSTEMIC STEROIDS: ICD-10-CM

## 2023-09-06 DIAGNOSIS — R40.2252 COMA SCALE, BEST VERBAL RESPONSE, ORIENTED, AT ARRIVAL TO EMERGENCY DEPARTMENT: ICD-10-CM

## 2023-09-06 DIAGNOSIS — I25.10 ATHEROSCLEROTIC HEART DISEASE OF NATIVE CORONARY ARTERY WITHOUT ANGINA PECTORIS: ICD-10-CM

## 2023-09-06 DIAGNOSIS — L03.312 CELLULITIS OF BACK [ANY PART EXCEPT BUTTOCK]: ICD-10-CM

## 2023-09-06 DIAGNOSIS — Z21 ASYMPTOMATIC HUMAN IMMUNODEFICIENCY VIRUS [HIV] INFECTION STATUS: ICD-10-CM
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
## 2023-09-06 DIAGNOSIS — K57.30 DIVERTICULOSIS OF LARGE INTESTINE WITHOUT PERFORATION OR ABSCESS WITHOUT BLEEDING: ICD-10-CM

## 2023-09-06 PROCEDURE — 99203 OFFICE O/P NEW LOW 30 MIN: CPT

## 2023-09-06 NOTE — PHYSICAL EXAM
[FreeTextEntry1] : The patient is alert and oriented x3, follows commands, and is able to participate fully in the history taking. Speech is normal with no evidence of dysarthria. Memory is intact: Immediate recall 3 out of 3, short-term 3 out of 3, remote memory intact. Cranial nerves II through XII intact. Motor exam: Bilateral upper extremities 3/5 (baseline due to hx of dermatomyositis), Bilateral lower extremities 5/5, normal tone. No abnormal movements noted. Sensory exam: Intact to light touch and pinprick. Romberg negative. Coordination and vestibular exam: Finger to nose intact, no evidence of ataxia or nystagmus. Gait: Normal stance and gait. Reflexes: One to 2+ in upper and lower extremities. No pathological reflexes. Downgoing toes.   right periorbital swelling due to right orbital fracture

## 2023-09-06 NOTE — ASSESSMENT
[FreeTextEntry1] : Patient is a 72 year male with PMH of HTN, HLD, DM, CAD, CHF, HIV, Dermatomyositis presenting as a post hospitalization follow up for hemorrhagic stroke in the left basal ganglia, corona radiata, septum pellucidum, and left internal capsule secondary to fall.  Plan - Repeat MRI w/wo contrast Oct 18) - 30 day cardiac monitoring - Continue physical therapy - Counselled on importance of avoiding blood thinners (NSAIDs, Aspirin, fish oils, etc) - Counselled on importance of checking blood pressure daily - Continue to f/u with PCP regarding regular health maintenance and prevention, including routine screening. - Counselled on signs of stroke BEFAST and to call 911 with any new or worsening neurological symptoms. - F/U in 2 months

## 2023-09-06 NOTE — HISTORY OF PRESENT ILLNESS
[FreeTextEntry1] : Patient is a 72 year male with PMH of HTN, HLD, DM, CAD, CHF, HIV, Dermatomyositis presenting as a post hospitalization follow up for hemorrhagic stroke in the left basal ganglia, corona radiata, septum pellucidum, and left internal capsule secondary to fall. Patient was recently admitted to the hospital on 8/15/23 for planned colonoscopy due + Fecal immunochemical test (FIT). Stroke code called on pt after colonoscopy due to a syncopal event and fall hitting his head/face. Pt self transferred from stretcher to bathroom, felt dizzy and fell on his face. FB. SBP: 200 after fall. 's during stroke code. Pt didn't remember the event. During stroke code, patient had b/l leg weakness. Hospital course complicated by patient found to be febrile, infectious w/u unrevealing after pt was persistently having low grade fevers and tachycardic with some periods of confusion. Elevated lactate initially which responded to IVF. ID recommended monitoring patient off antibiotics. Augmentin started on  prophylactically per OMFS as patient had 2 minor episodes of hemoptysis on .   Hospital Labs: A1C 7.6, LDL 21, TSH 1.66 Hospital Imaging: CT Brain stroke protocol: 1.  Two small sites of parallel hyperdensity in the left lentiform nucleus/corona radiata, new since prior from 2023. Hemorrhage or mineralization within a subacute-chronic infarct may have this appearance and attention on short interval follow-up advised. 2.  Trace right tentorial subdural hemorrhage. 3.  Right orbital floor fracture with blood in maxillary sinus. CTA Head: Unremarkable CTA examination of the brain. CTA Neck: No arterial steno-occlusive disease or evidence of dissection. CT Perfusion: Small reported Tmax elevation in left white matter. No territorial deficit. MR Head w/wo con: Acute hemorrhage in the left basal ganglia and corona radiata as well as focal hemorrhage in the septum pellucidum and left external capsule. Restricted diffusion surrounding the area of hemorrhage in the left basal ganglia/corona radiata which is likely from blood products rather than ischemia. No significant mass effect or midline shift. Again demonstrated trace right subdural hematoma and right orbital floor fracture. TTE w/ Bubble: 1. Hyperdynamic left ventricular systolic function.  2. Normal right ventricular size and systolic function.  3. Normal atria.  4. Injection of agitated saline via a peripheral vein reveals bubbles in the left heart within 3 heart beats, most consistent with a patent foramen ovale.  5. Systolic anterior motion of the mitral valve resulting in an LVOT gradient of 40.00 mmHg and moderate LVOT obstruction.  6. Aortic sclerosis without significant stenosis.  7. No evidence of pulmonary hypertension.  8. No pericardial effusion.  9. Compared to the previous TTE performed on 2023, the pulmonary artery systolic pressure is lower.  Patient was discharged on abx to be completed on  and recommended to follow up with oral/maxillofacial surgery for right orbital fracture.   Since discharge, patient denies any new stroke like symptoms and tolerating their daily medications but states that he does feel lethargic with all of the medications. He is scheduled to see Dr. Adams, his rheumatologist, in a week. He otherwise denies any dizziness or headaches. He currently takes Amlodipine 10mg once daily for his blood pressure. Today his BP is: 137/68. He checks his blood pressure at home everyday. Diet: Eats less vegetables but has chicken, eggs, cheese, sausage, bread, fruits, rice, nuts Exercise: Every day does arms and legs. Walks the kitchen back and forth every day. He has physical therapy every other day. Patient works in [employer].

## 2023-09-13 ENCOUNTER — APPOINTMENT (OUTPATIENT)
Dept: RHEUMATOLOGY | Facility: CLINIC | Age: 73
End: 2023-09-13
Payer: COMMERCIAL

## 2023-09-13 ENCOUNTER — LABORATORY RESULT (OUTPATIENT)
Age: 73
End: 2023-09-13

## 2023-09-13 VITALS
TEMPERATURE: 98.3 F | BODY MASS INDEX: 27.74 KG/M2 | HEART RATE: 104 BPM | HEIGHT: 68 IN | WEIGHT: 183 LBS | DIASTOLIC BLOOD PRESSURE: 73 MMHG | SYSTOLIC BLOOD PRESSURE: 166 MMHG | OXYGEN SATURATION: 98 %

## 2023-09-13 PROCEDURE — 99215 OFFICE O/P EST HI 40 MIN: CPT | Mod: 25

## 2023-09-13 PROCEDURE — 36415 COLL VENOUS BLD VENIPUNCTURE: CPT

## 2023-09-13 RX ORDER — METHYLPREDNISOLONE 4 MG/1
4 TABLET ORAL
Qty: 60 | Refills: 0 | Status: ACTIVE | COMMUNITY
Start: 2023-05-19 | End: 1900-01-01

## 2023-09-13 RX ORDER — SULFAMETHOXAZOLE AND TRIMETHOPRIM 800; 160 MG/1; MG/1
800-160 TABLET ORAL DAILY
Qty: 20 | Refills: 5 | Status: ACTIVE | COMMUNITY
Start: 2023-03-10 | End: 1900-01-01

## 2023-09-14 LAB
ALBUMIN SERPL ELPH-MCNC: 3.6 G/DL
ALP BLD-CCNC: 77 U/L
ALT SERPL-CCNC: 25 U/L
ANION GAP SERPL CALC-SCNC: 12 MMOL/L
AST SERPL-CCNC: 81 U/L
BILIRUB SERPL-MCNC: 0.3 MG/DL
BUN SERPL-MCNC: 10 MG/DL
CALCIUM SERPL-MCNC: 9.4 MG/DL
CHLORIDE SERPL-SCNC: 101 MMOL/L
CK SERPL-CCNC: 841 U/L
CO2 SERPL-SCNC: 26 MMOL/L
CREAT SERPL-MCNC: 0.48 MG/DL
EGFR: 110 ML/MIN/1.73M2
GLUCOSE SERPL-MCNC: 100 MG/DL
POTASSIUM SERPL-SCNC: 4.4 MMOL/L
PROT SERPL-MCNC: 7.4 G/DL
SODIUM SERPL-SCNC: 139 MMOL/L

## 2023-09-15 ENCOUNTER — TRANSCRIPTION ENCOUNTER (OUTPATIENT)
Age: 73
End: 2023-09-15

## 2023-09-15 LAB
BASOPHILS # BLD AUTO: 0.08 K/UL
BASOPHILS NFR BLD AUTO: 0.9 %
EOSINOPHIL # BLD AUTO: 0.16 K/UL
EOSINOPHIL NFR BLD AUTO: 1.8 %
HCT VFR BLD CALC: 40.5 %
HGB BLD-MCNC: 11.6 G/DL
LYMPHOCYTES # BLD AUTO: 0.39 K/UL
LYMPHOCYTES NFR BLD AUTO: 4.4 %
MAN DIFF?: NORMAL
MCHC RBC-ENTMCNC: 25 PG
MCHC RBC-ENTMCNC: 28.6 GM/DL
MCV RBC AUTO: 87.3 FL
MONOCYTES # BLD AUTO: 0.47 K/UL
MONOCYTES NFR BLD AUTO: 5.3 %
NEUTROPHILS # BLD AUTO: 7.74 K/UL
NEUTROPHILS NFR BLD AUTO: 87.6 %
PLATELET # BLD AUTO: 273 K/UL
RBC # BLD: 4.64 M/UL
RBC # FLD: 15.9 %
WBC # FLD AUTO: 8.84 K/UL

## 2023-09-18 ENCOUNTER — TRANSCRIPTION ENCOUNTER (OUTPATIENT)
Age: 73
End: 2023-09-18

## 2023-09-26 ENCOUNTER — APPOINTMENT (OUTPATIENT)
Dept: INFUSION THERAPY | Facility: CLINIC | Age: 73
End: 2023-09-26

## 2023-09-26 ENCOUNTER — OUTPATIENT (OUTPATIENT)
Dept: OUTPATIENT SERVICES | Facility: HOSPITAL | Age: 73
LOS: 1 days | End: 2023-09-26
Payer: MEDICARE

## 2023-09-26 VITALS
RESPIRATION RATE: 18 BRPM | OXYGEN SATURATION: 97 % | HEART RATE: 75 BPM | SYSTOLIC BLOOD PRESSURE: 152 MMHG | TEMPERATURE: 99 F | DIASTOLIC BLOOD PRESSURE: 84 MMHG

## 2023-09-26 VITALS
SYSTOLIC BLOOD PRESSURE: 150 MMHG | WEIGHT: 179.9 LBS | OXYGEN SATURATION: 97 % | HEIGHT: 68 IN | DIASTOLIC BLOOD PRESSURE: 80 MMHG | RESPIRATION RATE: 18 BRPM | HEART RATE: 78 BPM

## 2023-09-26 DIAGNOSIS — M33.90 DERMATOPOLYMYOSITIS, UNSPECIFIED, ORGAN INVOLVEMENT UNSPECIFIED: ICD-10-CM

## 2023-09-26 LAB
ALBUMIN SERPL ELPH-MCNC: 4 G/DL — SIGNIFICANT CHANGE UP (ref 3.3–5)
ALP SERPL-CCNC: 73 U/L — SIGNIFICANT CHANGE UP (ref 40–120)
ALT FLD-CCNC: 25 U/L — SIGNIFICANT CHANGE UP (ref 10–45)
ANION GAP SERPL CALC-SCNC: 10 MMOL/L — SIGNIFICANT CHANGE UP (ref 5–17)
AST SERPL-CCNC: 55 U/L — HIGH (ref 10–40)
BILIRUB SERPL-MCNC: 0.4 MG/DL — SIGNIFICANT CHANGE UP (ref 0.2–1.2)
BUN SERPL-MCNC: 18 MG/DL — SIGNIFICANT CHANGE UP (ref 7–23)
CALCIUM SERPL-MCNC: 10.1 MG/DL — SIGNIFICANT CHANGE UP (ref 8.4–10.5)
CHLORIDE SERPL-SCNC: 101 MMOL/L — SIGNIFICANT CHANGE UP (ref 96–108)
CK SERPL-CCNC: 495 U/L — HIGH (ref 30–200)
CO2 SERPL-SCNC: 30 MMOL/L — SIGNIFICANT CHANGE UP (ref 22–31)
CREAT SERPL-MCNC: 0.44 MG/DL — LOW (ref 0.5–1.3)
EGFR: 113 ML/MIN/1.73M2 — SIGNIFICANT CHANGE UP
GLUCOSE SERPL-MCNC: 137 MG/DL — HIGH (ref 70–99)
HCT VFR BLD CALC: 42.2 % — SIGNIFICANT CHANGE UP (ref 39–50)
HGB BLD-MCNC: 12.8 G/DL — LOW (ref 13–17)
MCHC RBC-ENTMCNC: 25.7 PG — LOW (ref 27–34)
MCHC RBC-ENTMCNC: 30.3 GM/DL — LOW (ref 32–36)
MCV RBC AUTO: 84.7 FL — SIGNIFICANT CHANGE UP (ref 80–100)
NRBC # BLD: 0 /100 WBCS — SIGNIFICANT CHANGE UP (ref 0–0)
PLATELET # BLD AUTO: 288 K/UL — SIGNIFICANT CHANGE UP (ref 150–400)
POTASSIUM SERPL-MCNC: 4.3 MMOL/L — SIGNIFICANT CHANGE UP (ref 3.5–5.3)
POTASSIUM SERPL-SCNC: 4.3 MMOL/L — SIGNIFICANT CHANGE UP (ref 3.5–5.3)
PROT SERPL-MCNC: 8.1 G/DL — SIGNIFICANT CHANGE UP (ref 6–8.3)
RBC # BLD: 4.98 M/UL — SIGNIFICANT CHANGE UP (ref 4.2–5.8)
RBC # FLD: 15.8 % — HIGH (ref 10.3–14.5)
SODIUM SERPL-SCNC: 141 MMOL/L — SIGNIFICANT CHANGE UP (ref 135–145)
WBC # BLD: 11.49 K/UL — HIGH (ref 3.8–10.5)
WBC # FLD AUTO: 11.49 K/UL — HIGH (ref 3.8–10.5)

## 2023-09-26 PROCEDURE — 96366 THER/PROPH/DIAG IV INF ADDON: CPT

## 2023-09-26 PROCEDURE — 96365 THER/PROPH/DIAG IV INF INIT: CPT

## 2023-09-26 PROCEDURE — 82550 ASSAY OF CK (CPK): CPT

## 2023-09-26 PROCEDURE — 36415 COLL VENOUS BLD VENIPUNCTURE: CPT

## 2023-09-26 PROCEDURE — 80053 COMPREHEN METABOLIC PANEL: CPT

## 2023-09-26 PROCEDURE — 85027 COMPLETE CBC AUTOMATED: CPT

## 2023-09-26 RX ORDER — IMMUNE GLOBULIN (HUMAN) 10 G/100ML
80 INJECTION INTRAVENOUS; SUBCUTANEOUS ONCE
Refills: 0 | Status: COMPLETED | OUTPATIENT
Start: 2023-09-26 | End: 2023-09-26

## 2023-09-26 RX ADMIN — IMMUNE GLOBULIN (HUMAN) 80 GRAM(S): 10 INJECTION INTRAVENOUS; SUBCUTANEOUS at 09:26

## 2023-09-26 RX ADMIN — IMMUNE GLOBULIN (HUMAN) 80 GRAM(S): 10 INJECTION INTRAVENOUS; SUBCUTANEOUS at 13:05

## 2023-09-27 ENCOUNTER — APPOINTMENT (OUTPATIENT)
Dept: INFUSION THERAPY | Facility: CLINIC | Age: 73
End: 2023-09-27

## 2023-09-27 ENCOUNTER — OUTPATIENT (OUTPATIENT)
Dept: OUTPATIENT SERVICES | Facility: HOSPITAL | Age: 73
LOS: 1 days | End: 2023-09-27
Payer: MEDICARE

## 2023-09-27 VITALS
DIASTOLIC BLOOD PRESSURE: 80 MMHG | TEMPERATURE: 98 F | SYSTOLIC BLOOD PRESSURE: 153 MMHG | RESPIRATION RATE: 18 BRPM | HEART RATE: 77 BPM | OXYGEN SATURATION: 98 %

## 2023-09-27 DIAGNOSIS — M33.90 DERMATOPOLYMYOSITIS, UNSPECIFIED, ORGAN INVOLVEMENT UNSPECIFIED: ICD-10-CM

## 2023-09-27 PROCEDURE — 96365 THER/PROPH/DIAG IV INF INIT: CPT

## 2023-09-27 PROCEDURE — 96366 THER/PROPH/DIAG IV INF ADDON: CPT

## 2023-09-27 RX ORDER — IMMUNE GLOBULIN (HUMAN) 10 G/100ML
80 INJECTION INTRAVENOUS; SUBCUTANEOUS ONCE
Refills: 0 | Status: COMPLETED | OUTPATIENT
Start: 2023-09-27 | End: 2023-09-27

## 2023-09-27 RX ADMIN — IMMUNE GLOBULIN (HUMAN) 200 GRAM(S): 10 INJECTION INTRAVENOUS; SUBCUTANEOUS at 08:38

## 2023-09-27 RX ADMIN — IMMUNE GLOBULIN (HUMAN) 80 GRAM(S): 10 INJECTION INTRAVENOUS; SUBCUTANEOUS at 12:56

## 2023-09-29 ENCOUNTER — TRANSCRIPTION ENCOUNTER (OUTPATIENT)
Age: 73
End: 2023-09-29

## 2023-10-02 ENCOUNTER — TRANSCRIPTION ENCOUNTER (OUTPATIENT)
Age: 73
End: 2023-10-02

## 2023-10-04 ENCOUNTER — TRANSCRIPTION ENCOUNTER (OUTPATIENT)
Age: 73
End: 2023-10-04

## 2023-10-24 ENCOUNTER — TRANSCRIPTION ENCOUNTER (OUTPATIENT)
Age: 73
End: 2023-10-24

## 2023-10-24 ENCOUNTER — APPOINTMENT (OUTPATIENT)
Dept: INFUSION THERAPY | Facility: CLINIC | Age: 73
End: 2023-10-24

## 2023-10-24 ENCOUNTER — OUTPATIENT (OUTPATIENT)
Dept: OUTPATIENT SERVICES | Facility: HOSPITAL | Age: 73
LOS: 1 days | End: 2023-10-24
Payer: MEDICARE

## 2023-10-24 VITALS
RESPIRATION RATE: 18 BRPM | DIASTOLIC BLOOD PRESSURE: 74 MMHG | OXYGEN SATURATION: 99 % | SYSTOLIC BLOOD PRESSURE: 140 MMHG | TEMPERATURE: 97 F | HEART RATE: 70 BPM

## 2023-10-24 VITALS
DIASTOLIC BLOOD PRESSURE: 79 MMHG | OXYGEN SATURATION: 99 % | SYSTOLIC BLOOD PRESSURE: 129 MMHG | HEART RATE: 74 BPM | RESPIRATION RATE: 18 BRPM | TEMPERATURE: 97 F

## 2023-10-24 DIAGNOSIS — M33.90 DERMATOPOLYMYOSITIS, UNSPECIFIED, ORGAN INVOLVEMENT UNSPECIFIED: ICD-10-CM

## 2023-10-24 LAB
ALBUMIN SERPL ELPH-MCNC: 4.1 G/DL — SIGNIFICANT CHANGE UP (ref 3.3–5)
ALBUMIN SERPL ELPH-MCNC: 4.1 G/DL — SIGNIFICANT CHANGE UP (ref 3.3–5)
ALP SERPL-CCNC: 69 U/L — SIGNIFICANT CHANGE UP (ref 40–120)
ALP SERPL-CCNC: 69 U/L — SIGNIFICANT CHANGE UP (ref 40–120)
ALT FLD-CCNC: 21 U/L — SIGNIFICANT CHANGE UP (ref 10–45)
ALT FLD-CCNC: 21 U/L — SIGNIFICANT CHANGE UP (ref 10–45)
ANION GAP SERPL CALC-SCNC: 16 MMOL/L — SIGNIFICANT CHANGE UP (ref 5–17)
ANION GAP SERPL CALC-SCNC: 16 MMOL/L — SIGNIFICANT CHANGE UP (ref 5–17)
AST SERPL-CCNC: 41 U/L — HIGH (ref 10–40)
AST SERPL-CCNC: 41 U/L — HIGH (ref 10–40)
BILIRUB SERPL-MCNC: 0.2 MG/DL — SIGNIFICANT CHANGE UP (ref 0.2–1.2)
BILIRUB SERPL-MCNC: 0.2 MG/DL — SIGNIFICANT CHANGE UP (ref 0.2–1.2)
BUN SERPL-MCNC: 17 MG/DL — SIGNIFICANT CHANGE UP (ref 7–23)
BUN SERPL-MCNC: 17 MG/DL — SIGNIFICANT CHANGE UP (ref 7–23)
CALCIUM SERPL-MCNC: 10.3 MG/DL — SIGNIFICANT CHANGE UP (ref 8.4–10.5)
CALCIUM SERPL-MCNC: 10.3 MG/DL — SIGNIFICANT CHANGE UP (ref 8.4–10.5)
CHLORIDE SERPL-SCNC: 104 MMOL/L — SIGNIFICANT CHANGE UP (ref 96–108)
CHLORIDE SERPL-SCNC: 104 MMOL/L — SIGNIFICANT CHANGE UP (ref 96–108)
CK SERPL-CCNC: 238 U/L — HIGH (ref 30–200)
CK SERPL-CCNC: 238 U/L — HIGH (ref 30–200)
CO2 SERPL-SCNC: 23 MMOL/L — SIGNIFICANT CHANGE UP (ref 22–31)
CO2 SERPL-SCNC: 23 MMOL/L — SIGNIFICANT CHANGE UP (ref 22–31)
CREAT SERPL-MCNC: 0.5 MG/DL — SIGNIFICANT CHANGE UP (ref 0.5–1.3)
CREAT SERPL-MCNC: 0.5 MG/DL — SIGNIFICANT CHANGE UP (ref 0.5–1.3)
EGFR: 108 ML/MIN/1.73M2 — SIGNIFICANT CHANGE UP
EGFR: 108 ML/MIN/1.73M2 — SIGNIFICANT CHANGE UP
GLUCOSE SERPL-MCNC: 127 MG/DL — HIGH (ref 70–99)
GLUCOSE SERPL-MCNC: 127 MG/DL — HIGH (ref 70–99)
HCT VFR BLD CALC: 44.6 % — SIGNIFICANT CHANGE UP (ref 39–50)
HCT VFR BLD CALC: 44.6 % — SIGNIFICANT CHANGE UP (ref 39–50)
HGB BLD-MCNC: 13.2 G/DL — SIGNIFICANT CHANGE UP (ref 13–17)
HGB BLD-MCNC: 13.2 G/DL — SIGNIFICANT CHANGE UP (ref 13–17)
MCHC RBC-ENTMCNC: 25.6 PG — LOW (ref 27–34)
MCHC RBC-ENTMCNC: 25.6 PG — LOW (ref 27–34)
MCHC RBC-ENTMCNC: 29.6 GM/DL — LOW (ref 32–36)
MCHC RBC-ENTMCNC: 29.6 GM/DL — LOW (ref 32–36)
MCV RBC AUTO: 86.4 FL — SIGNIFICANT CHANGE UP (ref 80–100)
MCV RBC AUTO: 86.4 FL — SIGNIFICANT CHANGE UP (ref 80–100)
NRBC # BLD: 0 /100 WBCS — SIGNIFICANT CHANGE UP (ref 0–0)
NRBC # BLD: 0 /100 WBCS — SIGNIFICANT CHANGE UP (ref 0–0)
PLATELET # BLD AUTO: 273 K/UL — SIGNIFICANT CHANGE UP (ref 150–400)
PLATELET # BLD AUTO: 273 K/UL — SIGNIFICANT CHANGE UP (ref 150–400)
POTASSIUM SERPL-MCNC: 4.4 MMOL/L — SIGNIFICANT CHANGE UP (ref 3.5–5.3)
POTASSIUM SERPL-MCNC: 4.4 MMOL/L — SIGNIFICANT CHANGE UP (ref 3.5–5.3)
POTASSIUM SERPL-SCNC: 4.4 MMOL/L — SIGNIFICANT CHANGE UP (ref 3.5–5.3)
POTASSIUM SERPL-SCNC: 4.4 MMOL/L — SIGNIFICANT CHANGE UP (ref 3.5–5.3)
PROT SERPL-MCNC: 8.8 G/DL — HIGH (ref 6–8.3)
PROT SERPL-MCNC: 8.8 G/DL — HIGH (ref 6–8.3)
RBC # BLD: 5.16 M/UL — SIGNIFICANT CHANGE UP (ref 4.2–5.8)
RBC # BLD: 5.16 M/UL — SIGNIFICANT CHANGE UP (ref 4.2–5.8)
RBC # FLD: 15.4 % — HIGH (ref 10.3–14.5)
RBC # FLD: 15.4 % — HIGH (ref 10.3–14.5)
SODIUM SERPL-SCNC: 143 MMOL/L — SIGNIFICANT CHANGE UP (ref 135–145)
SODIUM SERPL-SCNC: 143 MMOL/L — SIGNIFICANT CHANGE UP (ref 135–145)
WBC # BLD: 6.72 K/UL — SIGNIFICANT CHANGE UP (ref 3.8–10.5)
WBC # BLD: 6.72 K/UL — SIGNIFICANT CHANGE UP (ref 3.8–10.5)
WBC # FLD AUTO: 6.72 K/UL — SIGNIFICANT CHANGE UP (ref 3.8–10.5)
WBC # FLD AUTO: 6.72 K/UL — SIGNIFICANT CHANGE UP (ref 3.8–10.5)

## 2023-10-24 PROCEDURE — 96365 THER/PROPH/DIAG IV INF INIT: CPT

## 2023-10-24 PROCEDURE — 82550 ASSAY OF CK (CPK): CPT

## 2023-10-24 PROCEDURE — 36415 COLL VENOUS BLD VENIPUNCTURE: CPT

## 2023-10-24 PROCEDURE — 85027 COMPLETE CBC AUTOMATED: CPT

## 2023-10-24 PROCEDURE — 96366 THER/PROPH/DIAG IV INF ADDON: CPT

## 2023-10-24 PROCEDURE — 80053 COMPREHEN METABOLIC PANEL: CPT

## 2023-10-24 RX ORDER — IMMUNE GLOBULIN (HUMAN) 10 G/100ML
80 INJECTION INTRAVENOUS; SUBCUTANEOUS ONCE
Refills: 0 | Status: COMPLETED | OUTPATIENT
Start: 2023-10-24 | End: 2023-10-24

## 2023-10-24 RX ADMIN — IMMUNE GLOBULIN (HUMAN) 80 GRAM(S): 10 INJECTION INTRAVENOUS; SUBCUTANEOUS at 14:09

## 2023-10-24 RX ADMIN — IMMUNE GLOBULIN (HUMAN) 80 GRAM(S): 10 INJECTION INTRAVENOUS; SUBCUTANEOUS at 09:00

## 2023-10-25 ENCOUNTER — APPOINTMENT (OUTPATIENT)
Dept: INFUSION THERAPY | Facility: CLINIC | Age: 73
End: 2023-10-25

## 2023-10-25 ENCOUNTER — OUTPATIENT (OUTPATIENT)
Dept: OUTPATIENT SERVICES | Facility: HOSPITAL | Age: 73
LOS: 1 days | End: 2023-10-25
Payer: COMMERCIAL

## 2023-10-25 VITALS
OXYGEN SATURATION: 99 % | RESPIRATION RATE: 16 BRPM | TEMPERATURE: 98 F | DIASTOLIC BLOOD PRESSURE: 80 MMHG | HEART RATE: 78 BPM | HEIGHT: 68 IN | SYSTOLIC BLOOD PRESSURE: 132 MMHG | WEIGHT: 173.06 LBS

## 2023-10-25 VITALS
DIASTOLIC BLOOD PRESSURE: 82 MMHG | OXYGEN SATURATION: 98 % | SYSTOLIC BLOOD PRESSURE: 134 MMHG | HEART RATE: 72 BPM | TEMPERATURE: 98 F | RESPIRATION RATE: 17 BRPM

## 2023-10-25 DIAGNOSIS — M33.90 DERMATOPOLYMYOSITIS, UNSPECIFIED, ORGAN INVOLVEMENT UNSPECIFIED: ICD-10-CM

## 2023-10-25 PROCEDURE — 96366 THER/PROPH/DIAG IV INF ADDON: CPT

## 2023-10-25 RX ORDER — IMMUNE GLOBULIN (HUMAN) 10 G/100ML
80 INJECTION INTRAVENOUS; SUBCUTANEOUS ONCE
Refills: 0 | Status: COMPLETED | OUTPATIENT
Start: 2023-10-25 | End: 2023-10-25

## 2023-10-25 RX ADMIN — IMMUNE GLOBULIN (HUMAN) 200 GRAM(S): 10 INJECTION INTRAVENOUS; SUBCUTANEOUS at 08:39

## 2023-10-25 RX ADMIN — IMMUNE GLOBULIN (HUMAN) 80 GRAM(S): 10 INJECTION INTRAVENOUS; SUBCUTANEOUS at 12:30

## 2023-10-27 ENCOUNTER — APPOINTMENT (OUTPATIENT)
Dept: RHEUMATOLOGY | Facility: CLINIC | Age: 73
End: 2023-10-27
Payer: COMMERCIAL

## 2023-10-27 VITALS
DIASTOLIC BLOOD PRESSURE: 73 MMHG | BODY MASS INDEX: 26.07 KG/M2 | WEIGHT: 172 LBS | HEART RATE: 70 BPM | HEIGHT: 68 IN | TEMPERATURE: 98.6 F | OXYGEN SATURATION: 97 % | SYSTOLIC BLOOD PRESSURE: 135 MMHG

## 2023-10-27 DIAGNOSIS — R60.0 LOCALIZED EDEMA: ICD-10-CM

## 2023-10-27 PROCEDURE — 99214 OFFICE O/P EST MOD 30 MIN: CPT

## 2023-10-27 RX ORDER — MYCOPHENOLATE MOFETIL 500 MG/1
500 TABLET ORAL TWICE DAILY
Qty: 360 | Refills: 3 | Status: ACTIVE | COMMUNITY
Start: 2023-04-03 | End: 1900-01-01

## 2023-10-29 ENCOUNTER — RX RENEWAL (OUTPATIENT)
Age: 73
End: 2023-10-29

## 2023-10-29 RX ORDER — DESONIDE 0.5 MG/G
0.05 OINTMENT TOPICAL TWICE DAILY
Qty: 15 | Refills: 0 | Status: ACTIVE | COMMUNITY
Start: 2023-09-13 | End: 1900-01-01

## 2023-10-30 ENCOUNTER — TRANSCRIPTION ENCOUNTER (OUTPATIENT)
Age: 73
End: 2023-10-30

## 2023-10-31 ENCOUNTER — NON-APPOINTMENT (OUTPATIENT)
Age: 73
End: 2023-10-31

## 2023-11-01 ENCOUNTER — TRANSCRIPTION ENCOUNTER (OUTPATIENT)
Age: 73
End: 2023-11-01

## 2023-11-19 ENCOUNTER — RX RENEWAL (OUTPATIENT)
Age: 73
End: 2023-11-19

## 2023-11-19 RX ORDER — HYDROXYCHLOROQUINE SULFATE 200 MG/1
200 TABLET, FILM COATED ORAL TWICE DAILY
Qty: 60 | Refills: 2 | Status: ACTIVE | COMMUNITY
Start: 2023-08-11 | End: 1900-01-01

## 2023-11-21 ENCOUNTER — OUTPATIENT (OUTPATIENT)
Dept: OUTPATIENT SERVICES | Facility: HOSPITAL | Age: 73
LOS: 1 days | End: 2023-11-21
Payer: MEDICARE

## 2023-11-21 ENCOUNTER — APPOINTMENT (OUTPATIENT)
Dept: INFUSION THERAPY | Facility: CLINIC | Age: 73
End: 2023-11-21

## 2023-11-21 VITALS
OXYGEN SATURATION: 99 % | HEIGHT: 68 IN | HEART RATE: 77 BPM | SYSTOLIC BLOOD PRESSURE: 112 MMHG | RESPIRATION RATE: 18 BRPM | TEMPERATURE: 98 F | WEIGHT: 171.96 LBS | DIASTOLIC BLOOD PRESSURE: 72 MMHG

## 2023-11-21 VITALS
DIASTOLIC BLOOD PRESSURE: 74 MMHG | OXYGEN SATURATION: 99 % | SYSTOLIC BLOOD PRESSURE: 114 MMHG | RESPIRATION RATE: 18 BRPM | HEART RATE: 74 BPM | TEMPERATURE: 98 F

## 2023-11-21 DIAGNOSIS — M33.90 DERMATOPOLYMYOSITIS, UNSPECIFIED, ORGAN INVOLVEMENT UNSPECIFIED: ICD-10-CM

## 2023-11-21 LAB
ALBUMIN SERPL ELPH-MCNC: 3 G/DL — LOW (ref 3.3–5)
ALBUMIN SERPL ELPH-MCNC: 3 G/DL — LOW (ref 3.3–5)
ALP SERPL-CCNC: 68 U/L — SIGNIFICANT CHANGE UP (ref 40–120)
ALP SERPL-CCNC: 68 U/L — SIGNIFICANT CHANGE UP (ref 40–120)
ALT FLD-CCNC: 14 U/L — SIGNIFICANT CHANGE UP (ref 10–45)
ALT FLD-CCNC: 14 U/L — SIGNIFICANT CHANGE UP (ref 10–45)
ANION GAP SERPL CALC-SCNC: 8 MMOL/L — SIGNIFICANT CHANGE UP (ref 5–17)
ANION GAP SERPL CALC-SCNC: 8 MMOL/L — SIGNIFICANT CHANGE UP (ref 5–17)
AST SERPL-CCNC: 28 U/L — SIGNIFICANT CHANGE UP (ref 10–40)
AST SERPL-CCNC: 28 U/L — SIGNIFICANT CHANGE UP (ref 10–40)
BILIRUB SERPL-MCNC: 0.5 MG/DL — SIGNIFICANT CHANGE UP (ref 0.2–1.2)
BILIRUB SERPL-MCNC: 0.5 MG/DL — SIGNIFICANT CHANGE UP (ref 0.2–1.2)
BUN SERPL-MCNC: 11 MG/DL — SIGNIFICANT CHANGE UP (ref 7–23)
BUN SERPL-MCNC: 11 MG/DL — SIGNIFICANT CHANGE UP (ref 7–23)
CALCIUM SERPL-MCNC: 10 MG/DL — SIGNIFICANT CHANGE UP (ref 8.4–10.5)
CALCIUM SERPL-MCNC: 10 MG/DL — SIGNIFICANT CHANGE UP (ref 8.4–10.5)
CHLORIDE SERPL-SCNC: 105 MMOL/L — SIGNIFICANT CHANGE UP (ref 96–108)
CHLORIDE SERPL-SCNC: 105 MMOL/L — SIGNIFICANT CHANGE UP (ref 96–108)
CK SERPL-CCNC: 133 U/L — SIGNIFICANT CHANGE UP (ref 30–200)
CK SERPL-CCNC: 133 U/L — SIGNIFICANT CHANGE UP (ref 30–200)
CO2 SERPL-SCNC: 29 MMOL/L — SIGNIFICANT CHANGE UP (ref 22–31)
CO2 SERPL-SCNC: 29 MMOL/L — SIGNIFICANT CHANGE UP (ref 22–31)
CREAT SERPL-MCNC: 0.5 MG/DL — SIGNIFICANT CHANGE UP (ref 0.5–1.3)
CREAT SERPL-MCNC: 0.5 MG/DL — SIGNIFICANT CHANGE UP (ref 0.5–1.3)
EGFR: 108 ML/MIN/1.73M2 — SIGNIFICANT CHANGE UP
EGFR: 108 ML/MIN/1.73M2 — SIGNIFICANT CHANGE UP
GLUCOSE SERPL-MCNC: 123 MG/DL — HIGH (ref 70–99)
GLUCOSE SERPL-MCNC: 123 MG/DL — HIGH (ref 70–99)
HCT VFR BLD CALC: 38.6 % — LOW (ref 39–50)
HCT VFR BLD CALC: 38.6 % — LOW (ref 39–50)
HGB BLD-MCNC: 11.7 G/DL — LOW (ref 13–17)
HGB BLD-MCNC: 11.7 G/DL — LOW (ref 13–17)
MCHC RBC-ENTMCNC: 25.7 PG — LOW (ref 27–34)
MCHC RBC-ENTMCNC: 25.7 PG — LOW (ref 27–34)
MCHC RBC-ENTMCNC: 30.3 GM/DL — LOW (ref 32–36)
MCHC RBC-ENTMCNC: 30.3 GM/DL — LOW (ref 32–36)
MCV RBC AUTO: 84.8 FL — SIGNIFICANT CHANGE UP (ref 80–100)
MCV RBC AUTO: 84.8 FL — SIGNIFICANT CHANGE UP (ref 80–100)
PLATELET # BLD AUTO: 392 K/UL — SIGNIFICANT CHANGE UP (ref 150–400)
PLATELET # BLD AUTO: 392 K/UL — SIGNIFICANT CHANGE UP (ref 150–400)
POTASSIUM SERPL-MCNC: 4.8 MMOL/L — SIGNIFICANT CHANGE UP (ref 3.5–5.3)
POTASSIUM SERPL-MCNC: 4.8 MMOL/L — SIGNIFICANT CHANGE UP (ref 3.5–5.3)
POTASSIUM SERPL-SCNC: 4.8 MMOL/L — SIGNIFICANT CHANGE UP (ref 3.5–5.3)
POTASSIUM SERPL-SCNC: 4.8 MMOL/L — SIGNIFICANT CHANGE UP (ref 3.5–5.3)
PROT SERPL-MCNC: 7.8 G/DL — SIGNIFICANT CHANGE UP (ref 6–8.3)
PROT SERPL-MCNC: 7.8 G/DL — SIGNIFICANT CHANGE UP (ref 6–8.3)
RBC # BLD: 4.55 M/UL — SIGNIFICANT CHANGE UP (ref 4.2–5.8)
RBC # BLD: 4.55 M/UL — SIGNIFICANT CHANGE UP (ref 4.2–5.8)
RBC # FLD: 15.1 % — HIGH (ref 10.3–14.5)
RBC # FLD: 15.1 % — HIGH (ref 10.3–14.5)
SODIUM SERPL-SCNC: 142 MMOL/L — SIGNIFICANT CHANGE UP (ref 135–145)
SODIUM SERPL-SCNC: 142 MMOL/L — SIGNIFICANT CHANGE UP (ref 135–145)
WBC # BLD: 8.1 K/UL — SIGNIFICANT CHANGE UP (ref 3.8–10.5)
WBC # BLD: 8.1 K/UL — SIGNIFICANT CHANGE UP (ref 3.8–10.5)
WBC # FLD AUTO: 8.1 K/UL — SIGNIFICANT CHANGE UP (ref 3.8–10.5)
WBC # FLD AUTO: 8.1 K/UL — SIGNIFICANT CHANGE UP (ref 3.8–10.5)

## 2023-11-21 PROCEDURE — 80053 COMPREHEN METABOLIC PANEL: CPT

## 2023-11-21 PROCEDURE — 85027 COMPLETE CBC AUTOMATED: CPT

## 2023-11-21 PROCEDURE — 96366 THER/PROPH/DIAG IV INF ADDON: CPT

## 2023-11-21 PROCEDURE — 36415 COLL VENOUS BLD VENIPUNCTURE: CPT

## 2023-11-21 PROCEDURE — 96365 THER/PROPH/DIAG IV INF INIT: CPT

## 2023-11-21 PROCEDURE — 82550 ASSAY OF CK (CPK): CPT

## 2023-11-21 RX ORDER — IMMUNE GLOBULIN (HUMAN) 10 G/100ML
80 INJECTION INTRAVENOUS; SUBCUTANEOUS ONCE
Refills: 0 | Status: COMPLETED | OUTPATIENT
Start: 2023-11-21 | End: 2023-11-21

## 2023-11-21 RX ADMIN — IMMUNE GLOBULIN (HUMAN) 80 GRAM(S): 10 INJECTION INTRAVENOUS; SUBCUTANEOUS at 09:00

## 2023-11-21 RX ADMIN — IMMUNE GLOBULIN (HUMAN) 80 GRAM(S): 10 INJECTION INTRAVENOUS; SUBCUTANEOUS at 15:00

## 2023-11-22 ENCOUNTER — OUTPATIENT (OUTPATIENT)
Dept: OUTPATIENT SERVICES | Facility: HOSPITAL | Age: 73
LOS: 1 days | End: 2023-11-22
Payer: MEDICARE

## 2023-11-22 ENCOUNTER — APPOINTMENT (OUTPATIENT)
Dept: INFUSION THERAPY | Facility: CLINIC | Age: 73
End: 2023-11-22

## 2023-11-22 VITALS
WEIGHT: 173.06 LBS | OXYGEN SATURATION: 97 % | HEART RATE: 100 BPM | RESPIRATION RATE: 18 BRPM | SYSTOLIC BLOOD PRESSURE: 149 MMHG | HEIGHT: 68 IN | DIASTOLIC BLOOD PRESSURE: 70 MMHG | TEMPERATURE: 99 F

## 2023-11-22 DIAGNOSIS — M33.90 DERMATOPOLYMYOSITIS, UNSPECIFIED, ORGAN INVOLVEMENT UNSPECIFIED: ICD-10-CM

## 2023-11-22 PROCEDURE — 96366 THER/PROPH/DIAG IV INF ADDON: CPT

## 2023-11-22 PROCEDURE — 96365 THER/PROPH/DIAG IV INF INIT: CPT

## 2023-11-22 RX ORDER — IMMUNE GLOBULIN (HUMAN) 10 G/100ML
80 INJECTION INTRAVENOUS; SUBCUTANEOUS ONCE
Refills: 0 | Status: COMPLETED | OUTPATIENT
Start: 2023-11-22 | End: 2023-11-22

## 2023-11-22 RX ADMIN — IMMUNE GLOBULIN (HUMAN) 80 GRAM(S): 10 INJECTION INTRAVENOUS; SUBCUTANEOUS at 11:00

## 2023-11-22 RX ADMIN — IMMUNE GLOBULIN (HUMAN) 80 GRAM(S): 10 INJECTION INTRAVENOUS; SUBCUTANEOUS at 15:55

## 2023-11-28 ENCOUNTER — NON-APPOINTMENT (OUTPATIENT)
Age: 73
End: 2023-11-28

## 2023-12-01 ENCOUNTER — APPOINTMENT (OUTPATIENT)
Dept: NEUROLOGY | Facility: CLINIC | Age: 73
End: 2023-12-01

## 2023-12-19 ENCOUNTER — OUTPATIENT (OUTPATIENT)
Dept: OUTPATIENT SERVICES | Facility: HOSPITAL | Age: 73
LOS: 1 days | End: 2023-12-19
Payer: COMMERCIAL

## 2023-12-19 ENCOUNTER — APPOINTMENT (OUTPATIENT)
Dept: INFUSION THERAPY | Facility: CLINIC | Age: 73
End: 2023-12-19

## 2023-12-19 VITALS
HEART RATE: 78 BPM | OXYGEN SATURATION: 99 % | SYSTOLIC BLOOD PRESSURE: 125 MMHG | DIASTOLIC BLOOD PRESSURE: 74 MMHG | TEMPERATURE: 98 F | RESPIRATION RATE: 18 BRPM

## 2023-12-19 VITALS
TEMPERATURE: 100 F | DIASTOLIC BLOOD PRESSURE: 76 MMHG | SYSTOLIC BLOOD PRESSURE: 149 MMHG | RESPIRATION RATE: 18 BRPM | OXYGEN SATURATION: 99 % | WEIGHT: 166.89 LBS | HEIGHT: 68 IN | HEART RATE: 88 BPM

## 2023-12-19 DIAGNOSIS — M33.90 DERMATOPOLYMYOSITIS, UNSPECIFIED, ORGAN INVOLVEMENT UNSPECIFIED: ICD-10-CM

## 2023-12-19 LAB
ALBUMIN SERPL ELPH-MCNC: 3.2 G/DL — LOW (ref 3.3–5)
ALBUMIN SERPL ELPH-MCNC: 3.2 G/DL — LOW (ref 3.3–5)
ALP SERPL-CCNC: 72 U/L — SIGNIFICANT CHANGE UP (ref 40–120)
ALP SERPL-CCNC: 72 U/L — SIGNIFICANT CHANGE UP (ref 40–120)
ALT FLD-CCNC: 19 U/L — SIGNIFICANT CHANGE UP (ref 10–45)
ALT FLD-CCNC: 19 U/L — SIGNIFICANT CHANGE UP (ref 10–45)
ANION GAP SERPL CALC-SCNC: 6 MMOL/L — SIGNIFICANT CHANGE UP (ref 5–17)
ANION GAP SERPL CALC-SCNC: 6 MMOL/L — SIGNIFICANT CHANGE UP (ref 5–17)
AST SERPL-CCNC: 29 U/L — SIGNIFICANT CHANGE UP (ref 10–40)
AST SERPL-CCNC: 29 U/L — SIGNIFICANT CHANGE UP (ref 10–40)
BILIRUB SERPL-MCNC: 0.5 MG/DL — SIGNIFICANT CHANGE UP (ref 0.2–1.2)
BILIRUB SERPL-MCNC: 0.5 MG/DL — SIGNIFICANT CHANGE UP (ref 0.2–1.2)
BUN SERPL-MCNC: 14 MG/DL — SIGNIFICANT CHANGE UP (ref 7–23)
BUN SERPL-MCNC: 14 MG/DL — SIGNIFICANT CHANGE UP (ref 7–23)
CALCIUM SERPL-MCNC: 10 MG/DL — SIGNIFICANT CHANGE UP (ref 8.4–10.5)
CALCIUM SERPL-MCNC: 10 MG/DL — SIGNIFICANT CHANGE UP (ref 8.4–10.5)
CHLORIDE SERPL-SCNC: 105 MMOL/L — SIGNIFICANT CHANGE UP (ref 96–108)
CHLORIDE SERPL-SCNC: 105 MMOL/L — SIGNIFICANT CHANGE UP (ref 96–108)
CK SERPL-CCNC: 126 U/L — SIGNIFICANT CHANGE UP (ref 30–200)
CK SERPL-CCNC: 126 U/L — SIGNIFICANT CHANGE UP (ref 30–200)
CO2 SERPL-SCNC: 30 MMOL/L — SIGNIFICANT CHANGE UP (ref 22–31)
CO2 SERPL-SCNC: 30 MMOL/L — SIGNIFICANT CHANGE UP (ref 22–31)
CREAT SERPL-MCNC: 0.5 MG/DL — SIGNIFICANT CHANGE UP (ref 0.5–1.3)
CREAT SERPL-MCNC: 0.5 MG/DL — SIGNIFICANT CHANGE UP (ref 0.5–1.3)
EGFR: 108 ML/MIN/1.73M2 — SIGNIFICANT CHANGE UP
EGFR: 108 ML/MIN/1.73M2 — SIGNIFICANT CHANGE UP
GLUCOSE SERPL-MCNC: 132 MG/DL — HIGH (ref 70–99)
GLUCOSE SERPL-MCNC: 132 MG/DL — HIGH (ref 70–99)
HCT VFR BLD CALC: 41.7 % — SIGNIFICANT CHANGE UP (ref 39–50)
HCT VFR BLD CALC: 41.7 % — SIGNIFICANT CHANGE UP (ref 39–50)
HGB BLD-MCNC: 12.2 G/DL — LOW (ref 13–17)
HGB BLD-MCNC: 12.2 G/DL — LOW (ref 13–17)
MCHC RBC-ENTMCNC: 24.8 PG — LOW (ref 27–34)
MCHC RBC-ENTMCNC: 24.8 PG — LOW (ref 27–34)
MCHC RBC-ENTMCNC: 29.3 GM/DL — LOW (ref 32–36)
MCHC RBC-ENTMCNC: 29.3 GM/DL — LOW (ref 32–36)
MCV RBC AUTO: 84.8 FL — SIGNIFICANT CHANGE UP (ref 80–100)
MCV RBC AUTO: 84.8 FL — SIGNIFICANT CHANGE UP (ref 80–100)
PLATELET # BLD AUTO: 303 K/UL — SIGNIFICANT CHANGE UP (ref 150–400)
PLATELET # BLD AUTO: 303 K/UL — SIGNIFICANT CHANGE UP (ref 150–400)
POTASSIUM SERPL-MCNC: 4.7 MMOL/L — SIGNIFICANT CHANGE UP (ref 3.5–5.3)
POTASSIUM SERPL-MCNC: 4.7 MMOL/L — SIGNIFICANT CHANGE UP (ref 3.5–5.3)
POTASSIUM SERPL-SCNC: 4.7 MMOL/L — SIGNIFICANT CHANGE UP (ref 3.5–5.3)
POTASSIUM SERPL-SCNC: 4.7 MMOL/L — SIGNIFICANT CHANGE UP (ref 3.5–5.3)
PROT SERPL-MCNC: 8.3 G/DL — SIGNIFICANT CHANGE UP (ref 6–8.3)
PROT SERPL-MCNC: 8.3 G/DL — SIGNIFICANT CHANGE UP (ref 6–8.3)
RBC # BLD: 4.92 M/UL — SIGNIFICANT CHANGE UP (ref 4.2–5.8)
RBC # BLD: 4.92 M/UL — SIGNIFICANT CHANGE UP (ref 4.2–5.8)
RBC # FLD: 14.7 % — HIGH (ref 10.3–14.5)
RBC # FLD: 14.7 % — HIGH (ref 10.3–14.5)
SODIUM SERPL-SCNC: 141 MMOL/L — SIGNIFICANT CHANGE UP (ref 135–145)
SODIUM SERPL-SCNC: 141 MMOL/L — SIGNIFICANT CHANGE UP (ref 135–145)
WBC # BLD: 5.4 K/UL — SIGNIFICANT CHANGE UP (ref 3.8–10.5)
WBC # BLD: 5.4 K/UL — SIGNIFICANT CHANGE UP (ref 3.8–10.5)
WBC # FLD AUTO: 5.4 K/UL — SIGNIFICANT CHANGE UP (ref 3.8–10.5)
WBC # FLD AUTO: 5.4 K/UL — SIGNIFICANT CHANGE UP (ref 3.8–10.5)

## 2023-12-19 PROCEDURE — 82550 ASSAY OF CK (CPK): CPT

## 2023-12-19 PROCEDURE — 85027 COMPLETE CBC AUTOMATED: CPT

## 2023-12-19 PROCEDURE — 96365 THER/PROPH/DIAG IV INF INIT: CPT

## 2023-12-19 PROCEDURE — 96366 THER/PROPH/DIAG IV INF ADDON: CPT

## 2023-12-19 PROCEDURE — 36415 COLL VENOUS BLD VENIPUNCTURE: CPT

## 2023-12-19 PROCEDURE — 80053 COMPREHEN METABOLIC PANEL: CPT

## 2023-12-19 RX ORDER — IMMUNE GLOBULIN (HUMAN) 10 G/100ML
80 INJECTION INTRAVENOUS; SUBCUTANEOUS ONCE
Refills: 0 | Status: COMPLETED | OUTPATIENT
Start: 2023-12-19 | End: 2023-12-19

## 2023-12-19 RX ADMIN — IMMUNE GLOBULIN (HUMAN) 200 GRAM(S): 10 INJECTION INTRAVENOUS; SUBCUTANEOUS at 09:33

## 2023-12-19 RX ADMIN — IMMUNE GLOBULIN (HUMAN) 80 GRAM(S): 10 INJECTION INTRAVENOUS; SUBCUTANEOUS at 13:40

## 2023-12-19 NOTE — DISCHARGE INSTRUCTIONS: GENERAL THERAPY - NSAPPTSFOLLOWUP_HEME_A_AMB
[facility]... [facility]...

## 2023-12-19 NOTE — DISCHARGE INSTRUCTIONS: GENERAL THERAPY - NSFACILITYCONTAFTRHOUR_HEME_A_AMB
[facility] ([phone]) [facility] ([phone])

## 2023-12-20 ENCOUNTER — OUTPATIENT (OUTPATIENT)
Dept: OUTPATIENT SERVICES | Facility: HOSPITAL | Age: 73
LOS: 1 days | End: 2023-12-20
Payer: COMMERCIAL

## 2023-12-20 ENCOUNTER — APPOINTMENT (OUTPATIENT)
Dept: INFUSION THERAPY | Facility: CLINIC | Age: 73
End: 2023-12-20

## 2023-12-20 VITALS
HEART RATE: 93 BPM | DIASTOLIC BLOOD PRESSURE: 92 MMHG | OXYGEN SATURATION: 98 % | RESPIRATION RATE: 18 BRPM | TEMPERATURE: 99 F | SYSTOLIC BLOOD PRESSURE: 165 MMHG

## 2023-12-20 DIAGNOSIS — M33.90 DERMATOPOLYMYOSITIS, UNSPECIFIED, ORGAN INVOLVEMENT UNSPECIFIED: ICD-10-CM

## 2023-12-20 PROCEDURE — 96365 THER/PROPH/DIAG IV INF INIT: CPT

## 2023-12-20 PROCEDURE — 96366 THER/PROPH/DIAG IV INF ADDON: CPT

## 2023-12-20 RX ORDER — IMMUNE GLOBULIN (HUMAN) 10 G/100ML
80 INJECTION INTRAVENOUS; SUBCUTANEOUS ONCE
Refills: 0 | Status: COMPLETED | OUTPATIENT
Start: 2023-12-20 | End: 2023-12-20

## 2023-12-20 RX ADMIN — IMMUNE GLOBULIN (HUMAN) 80 GRAM(S): 10 INJECTION INTRAVENOUS; SUBCUTANEOUS at 14:44

## 2023-12-20 RX ADMIN — IMMUNE GLOBULIN (HUMAN) 80 GRAM(S): 10 INJECTION INTRAVENOUS; SUBCUTANEOUS at 13:31

## 2023-12-20 RX ADMIN — IMMUNE GLOBULIN (HUMAN) 200 GRAM(S): 10 INJECTION INTRAVENOUS; SUBCUTANEOUS at 09:07

## 2023-12-20 NOTE — DISCHARGE INSTRUCTIONS: GENERAL THERAPY - NSFACILITYCONTAFTRHOUR_HEME_A_AMB
[facility]fusion [facility] ([phone]) [facility] ([phone])

## 2023-12-20 NOTE — DISCHARGE INSTRUCTIONS: GENERAL THERAPY - NSAPPTSFOLLOWUP_HEME_A_AMB
[facility]fusion [facility]... [facility] Out[facility]...

## 2023-12-20 NOTE — DISCHARGE INSTRUCTIONS: GENERAL THERAPY - PATIENT/SIGNIFICANT OTHER VERBALIZE(S) UNDERSTANDING OF TREATMENT PLAN AND THE POTENTIAL SIDE EFFECTS OF THE MEDICATIONS GIVEN DURING TREATMENT AND/OR MEDICATIONS USED AT HOME TO MANAGE SIDE EFFECTS
Problem: Breastfeeding  Goal: Parent / caregiver verbalizes understanding of benefits of exclusive breastfeeding and breastfeeding techniques  Description: Document on Patient Education Activity  Outcome: Outcome Met, Continue evaluating goal progress toward completion      Statement Selected

## 2024-01-12 RX ORDER — IMMUNE GLOBULIN (HUMAN) 10 G/100ML
80 INJECTION INTRAVENOUS; SUBCUTANEOUS ONCE
Refills: 0 | Status: COMPLETED | OUTPATIENT
Start: 2024-01-22 | End: 2024-01-22

## 2024-01-16 ENCOUNTER — APPOINTMENT (OUTPATIENT)
Dept: INFUSION THERAPY | Facility: CLINIC | Age: 74
End: 2024-01-16

## 2024-01-17 ENCOUNTER — APPOINTMENT (OUTPATIENT)
Dept: INFUSION THERAPY | Facility: CLINIC | Age: 74
End: 2024-01-17

## 2024-01-18 ENCOUNTER — TRANSCRIPTION ENCOUNTER (OUTPATIENT)
Age: 74
End: 2024-01-18

## 2024-01-22 ENCOUNTER — OUTPATIENT (OUTPATIENT)
Dept: OUTPATIENT SERVICES | Facility: HOSPITAL | Age: 74
LOS: 1 days | End: 2024-01-22
Payer: MEDICARE

## 2024-01-22 ENCOUNTER — APPOINTMENT (OUTPATIENT)
Dept: INFUSION THERAPY | Facility: CLINIC | Age: 74
End: 2024-01-22

## 2024-01-22 VITALS
HEART RATE: 88 BPM | HEIGHT: 68 IN | SYSTOLIC BLOOD PRESSURE: 154 MMHG | WEIGHT: 169.09 LBS | TEMPERATURE: 99 F | OXYGEN SATURATION: 98 % | RESPIRATION RATE: 18 BRPM | DIASTOLIC BLOOD PRESSURE: 77 MMHG

## 2024-01-22 DIAGNOSIS — M33.90 DERMATOPOLYMYOSITIS, UNSPECIFIED, ORGAN INVOLVEMENT UNSPECIFIED: ICD-10-CM

## 2024-01-22 LAB
ALBUMIN SERPL ELPH-MCNC: 3.3 G/DL — SIGNIFICANT CHANGE UP (ref 3.3–5)
ALP SERPL-CCNC: 85 U/L — SIGNIFICANT CHANGE UP (ref 40–120)
ALT FLD-CCNC: 16 U/L — SIGNIFICANT CHANGE UP (ref 10–45)
ANION GAP SERPL CALC-SCNC: 8 MMOL/L — SIGNIFICANT CHANGE UP (ref 5–17)
AST SERPL-CCNC: 43 U/L — HIGH (ref 10–40)
BILIRUB SERPL-MCNC: 0.2 MG/DL — SIGNIFICANT CHANGE UP (ref 0.2–1.2)
BUN SERPL-MCNC: 13 MG/DL — SIGNIFICANT CHANGE UP (ref 7–23)
CALCIUM SERPL-MCNC: 9.2 MG/DL — SIGNIFICANT CHANGE UP (ref 8.4–10.5)
CHLORIDE SERPL-SCNC: 102 MMOL/L — SIGNIFICANT CHANGE UP (ref 96–108)
CK SERPL-CCNC: 743 U/L — HIGH (ref 30–200)
CO2 SERPL-SCNC: 27 MMOL/L — SIGNIFICANT CHANGE UP (ref 22–31)
CREAT SERPL-MCNC: 0.43 MG/DL — LOW (ref 0.5–1.3)
EGFR: 113 ML/MIN/1.73M2 — SIGNIFICANT CHANGE UP
GLUCOSE SERPL-MCNC: 125 MG/DL — HIGH (ref 70–99)
HCT VFR BLD CALC: 39.8 % — SIGNIFICANT CHANGE UP (ref 39–50)
HGB BLD-MCNC: 11.9 G/DL — LOW (ref 13–17)
MCHC RBC-ENTMCNC: 24.9 PG — LOW (ref 27–34)
MCHC RBC-ENTMCNC: 29.9 GM/DL — LOW (ref 32–36)
MCV RBC AUTO: 83.4 FL — SIGNIFICANT CHANGE UP (ref 80–100)
NRBC # BLD: 0 /100 WBCS — SIGNIFICANT CHANGE UP (ref 0–0)
PLATELET # BLD AUTO: 260 K/UL — SIGNIFICANT CHANGE UP (ref 150–400)
POTASSIUM SERPL-MCNC: 4.5 MMOL/L — SIGNIFICANT CHANGE UP (ref 3.5–5.3)
POTASSIUM SERPL-SCNC: 4.5 MMOL/L — SIGNIFICANT CHANGE UP (ref 3.5–5.3)
PROT SERPL-MCNC: 8.1 G/DL — SIGNIFICANT CHANGE UP (ref 6–8.3)
RBC # BLD: 4.77 M/UL — SIGNIFICANT CHANGE UP (ref 4.2–5.8)
RBC # FLD: 13.9 % — SIGNIFICANT CHANGE UP (ref 10.3–14.5)
SODIUM SERPL-SCNC: 137 MMOL/L — SIGNIFICANT CHANGE UP (ref 135–145)
WBC # BLD: 6.27 K/UL — SIGNIFICANT CHANGE UP (ref 3.8–10.5)
WBC # FLD AUTO: 6.27 K/UL — SIGNIFICANT CHANGE UP (ref 3.8–10.5)

## 2024-01-22 PROCEDURE — 80053 COMPREHEN METABOLIC PANEL: CPT

## 2024-01-22 PROCEDURE — 36415 COLL VENOUS BLD VENIPUNCTURE: CPT

## 2024-01-22 PROCEDURE — 96365 THER/PROPH/DIAG IV INF INIT: CPT

## 2024-01-22 PROCEDURE — 82550 ASSAY OF CK (CPK): CPT

## 2024-01-22 PROCEDURE — 85027 COMPLETE CBC AUTOMATED: CPT

## 2024-01-22 PROCEDURE — 96366 THER/PROPH/DIAG IV INF ADDON: CPT

## 2024-01-22 RX ADMIN — IMMUNE GLOBULIN (HUMAN) 80 GRAM(S): 10 INJECTION INTRAVENOUS; SUBCUTANEOUS at 17:30

## 2024-01-22 RX ADMIN — IMMUNE GLOBULIN (HUMAN) 80 GRAM(S): 10 INJECTION INTRAVENOUS; SUBCUTANEOUS at 12:30

## 2024-01-23 ENCOUNTER — NON-APPOINTMENT (OUTPATIENT)
Age: 74
End: 2024-01-23

## 2024-01-23 ENCOUNTER — APPOINTMENT (OUTPATIENT)
Dept: INFUSION THERAPY | Facility: CLINIC | Age: 74
End: 2024-01-23

## 2024-01-23 ENCOUNTER — OUTPATIENT (OUTPATIENT)
Dept: OUTPATIENT SERVICES | Facility: HOSPITAL | Age: 74
LOS: 1 days | End: 2024-01-23
Payer: MEDICARE

## 2024-01-23 VITALS
RESPIRATION RATE: 18 BRPM | HEIGHT: 68 IN | TEMPERATURE: 99 F | OXYGEN SATURATION: 98 % | SYSTOLIC BLOOD PRESSURE: 133 MMHG | HEART RATE: 79 BPM | WEIGHT: 169.98 LBS | DIASTOLIC BLOOD PRESSURE: 73 MMHG

## 2024-01-23 DIAGNOSIS — M33.90 DERMATOPOLYMYOSITIS, UNSPECIFIED, ORGAN INVOLVEMENT UNSPECIFIED: ICD-10-CM

## 2024-01-23 PROCEDURE — 96366 THER/PROPH/DIAG IV INF ADDON: CPT

## 2024-01-23 PROCEDURE — 96365 THER/PROPH/DIAG IV INF INIT: CPT

## 2024-01-23 RX ORDER — IMMUNE GLOBULIN (HUMAN) 10 G/100ML
80 INJECTION INTRAVENOUS; SUBCUTANEOUS ONCE
Refills: 0 | Status: COMPLETED | OUTPATIENT
Start: 2024-01-23 | End: 2024-01-23

## 2024-01-23 RX ADMIN — IMMUNE GLOBULIN (HUMAN) 80 GRAM(S): 10 INJECTION INTRAVENOUS; SUBCUTANEOUS at 12:46

## 2024-01-23 RX ADMIN — IMMUNE GLOBULIN (HUMAN) 80 GRAM(S): 10 INJECTION INTRAVENOUS; SUBCUTANEOUS at 08:52

## 2024-02-06 NOTE — ED ADULT NURSE NOTE - PAIN RATING/NUMBER SCALE (0-10): ACTIVITY
PROTOCOL FAILED   Name from pharmacy: JANUVIA 100 MG TABLET         Will file in chart as: JANUVIA 100 MG Oral Tab    Sig: TAKE 1 TABLET BY MOUTH EVERY DAY    Disp: 90 tablet    Refills: 1    Start: 2/4/2024    Class: Normal    Non-formulary For: Type 2 diabetes mellitus with hyperglycemia, unspecified whether long term insulin use (HCC)    Last ordered: 5 months ago (8/10/2023) by Abi Jose MD    Last refill: 11/6/2023    Rx #: [id number]    Diabetic Medication Protocol [id number]/04/2024 07:11 AM   Protocol Details Last HgBA1C < 7.5    HgBA1C procedure resulted in past 6 months    Microalbumin procedure in past 12 months or taking ACE/ARB    Appointment in past 6 or next 3 months      To be filled at: [facility] [address] [address] [phone], [phone]     LOV: 08/10/23 w/ DV   RTC: 09/07/23   RECENT LABS: 02/01/24   FOV: 02/06/24    0

## 2024-02-13 NOTE — PROVIDER CONTACT NOTE (CRITICAL VALUE NOTIFICATION) - NS PROVIDER READ BACK TO LAB
[facility] ambulatory encounter  URGENT CARE OFFICE VISIT      ASSESSMENT AND PLAN    Rib pain on left side  - XR RIBS 2 VIEWS LEFT CHEST 1 VIEW; Future  - meloxicam (MOBIC) 15 MG tablet; Take 1 tablet by mouth daily as needed for Pain. With food  Dispense: 10 tablet; Refill: 0    Hydropneumothorax      - after discussing the results with the patient and trying to called the surgery if they want to see the patient in the clinic.  X-ray showed hydropneumothorax.  I advised the patient to go to the nearest emergency department.  She will think 1st where to go but I have emphasized that she needs to go the nearest emergency department.  -pdmp reviewed  - Over the counter tylenol 500mg to 1000mg every 6 hrs as needed for pain  - Ice for 20 min every 4 to 6 hrs for the first 48 hrs then warm compress after.   - Elevation.  - Avoid using affected part.   - F/u with pcp or urgent care after 10 days for re evaluation  - 10 deep breathing exercise every waking hour            All questions were answered accordingly explained to the patient.    The patient was advised to follow up with primary physician or to recheck with the urgent care clinic sooner if symptoms get worse or if new symptoms appear.    Please understand this is a provisional diagnosis that can and may change. The diagnosis that you are discharged with is based on the symptoms you described and the diagnostic information available today. ED precautions discussed with patient    PREVIOUS LABS        Last Point of Care A1C:  No results found for: \"[id number]\"  Body mass index is 20.2 kg/m².  Glomerular Filtration Rate (no units)   Date Value   06/19/2023 >90         SUBJECTIVE:      Richelle Welch is a 51 year old female     Chief Complaint   Patient presents with   • Other     Fell out of bed Sunday and landed on metal garbage can. Left sided rib pain. Starts in the back and radiates around to the front. Hurts to take a deep breath and cough. Has to sleep  sitting up. Feels like she has fluid filling up in her lungs.        Patient is being seen here for symptoms as stated above    Fell about 2 days ago. Hit metal garbage can on left side of the lower rib area. Pain in the area noted.    -no fever, no chills,   -no sob,       Review of systems:    A review of systems was performed and findings relevant to these symptoms are included in the HPI.         OBJECTIVE:  Physical Exam:    Visit Vitals  /84 (BP Location: RUE - Right upper extremity, Patient Position: Sitting, Cuff Size: Regular)   Pulse 87   Temp 98.6 °F (37 °C) (Oral)   Resp 18   Wt 53.4 kg (117 lb 11.2 oz)   SpO2 96%   BMI 20.20 kg/m²        CONSTITUTIONAL: Well-hydrated, well-nourished female who appears to be in no acute distress. Awake, alert and cooperative.    Chest:  No gross deformity.  No swelling.  No hematoma.  No crepitus.  Tenderness in the left lower rib area          Ulysses Boco MD  Urgent Care  [facility]                   yes

## 2024-02-20 ENCOUNTER — APPOINTMENT (OUTPATIENT)
Dept: INFUSION THERAPY | Facility: CLINIC | Age: 74
End: 2024-02-20

## 2024-02-20 ENCOUNTER — OUTPATIENT (OUTPATIENT)
Dept: OUTPATIENT SERVICES | Facility: HOSPITAL | Age: 74
LOS: 1 days | End: 2024-02-20
Payer: MEDICARE

## 2024-02-20 VITALS
SYSTOLIC BLOOD PRESSURE: 143 MMHG | DIASTOLIC BLOOD PRESSURE: 78 MMHG | OXYGEN SATURATION: 98 % | HEART RATE: 72 BPM | HEIGHT: 68 IN | RESPIRATION RATE: 16 BRPM | WEIGHT: 169.09 LBS | TEMPERATURE: 99 F

## 2024-02-20 VITALS
TEMPERATURE: 98 F | OXYGEN SATURATION: 98 % | RESPIRATION RATE: 16 BRPM | SYSTOLIC BLOOD PRESSURE: 159 MMHG | DIASTOLIC BLOOD PRESSURE: 83 MMHG | HEART RATE: 69 BPM

## 2024-02-20 DIAGNOSIS — M33.90 DERMATOPOLYMYOSITIS, UNSPECIFIED, ORGAN INVOLVEMENT UNSPECIFIED: ICD-10-CM

## 2024-02-20 LAB
ALBUMIN SERPL ELPH-MCNC: 3.8 G/DL — SIGNIFICANT CHANGE UP (ref 3.3–5)
ALP SERPL-CCNC: 80 U/L — SIGNIFICANT CHANGE UP (ref 40–120)
ALT FLD-CCNC: 19 U/L — SIGNIFICANT CHANGE UP (ref 10–45)
ANION GAP SERPL CALC-SCNC: 11 MMOL/L — SIGNIFICANT CHANGE UP (ref 5–17)
AST SERPL-CCNC: 50 U/L — HIGH (ref 10–40)
BILIRUB SERPL-MCNC: 0.2 MG/DL — SIGNIFICANT CHANGE UP (ref 0.2–1.2)
BUN SERPL-MCNC: 20 MG/DL — SIGNIFICANT CHANGE UP (ref 7–23)
CALCIUM SERPL-MCNC: 9.3 MG/DL — SIGNIFICANT CHANGE UP (ref 8.4–10.5)
CHLORIDE SERPL-SCNC: 102 MMOL/L — SIGNIFICANT CHANGE UP (ref 96–108)
CK SERPL-CCNC: 798 U/L — HIGH (ref 30–200)
CO2 SERPL-SCNC: 25 MMOL/L — SIGNIFICANT CHANGE UP (ref 22–31)
CREAT SERPL-MCNC: 0.45 MG/DL — LOW (ref 0.5–1.3)
EGFR: 112 ML/MIN/1.73M2 — SIGNIFICANT CHANGE UP
GLUCOSE SERPL-MCNC: 115 MG/DL — HIGH (ref 70–99)
HCT VFR BLD CALC: 39.9 % — SIGNIFICANT CHANGE UP (ref 39–50)
HGB BLD-MCNC: 11.8 G/DL — LOW (ref 13–17)
MCHC RBC-ENTMCNC: 25.3 PG — LOW (ref 27–34)
MCHC RBC-ENTMCNC: 29.6 GM/DL — LOW (ref 32–36)
MCV RBC AUTO: 85.4 FL — SIGNIFICANT CHANGE UP (ref 80–100)
NRBC # BLD: 0 /100 WBCS — SIGNIFICANT CHANGE UP (ref 0–0)
PLATELET # BLD AUTO: 221 K/UL — SIGNIFICANT CHANGE UP (ref 150–400)
POTASSIUM SERPL-MCNC: 4.2 MMOL/L — SIGNIFICANT CHANGE UP (ref 3.5–5.3)
POTASSIUM SERPL-SCNC: 4.2 MMOL/L — SIGNIFICANT CHANGE UP (ref 3.5–5.3)
PROT SERPL-MCNC: 8.6 G/DL — HIGH (ref 6–8.3)
RBC # BLD: 4.67 M/UL — SIGNIFICANT CHANGE UP (ref 4.2–5.8)
RBC # FLD: 15.1 % — HIGH (ref 10.3–14.5)
SODIUM SERPL-SCNC: 138 MMOL/L — SIGNIFICANT CHANGE UP (ref 135–145)
WBC # BLD: 7.43 K/UL — SIGNIFICANT CHANGE UP (ref 3.8–10.5)
WBC # FLD AUTO: 7.43 K/UL — SIGNIFICANT CHANGE UP (ref 3.8–10.5)

## 2024-02-20 PROCEDURE — 80053 COMPREHEN METABOLIC PANEL: CPT

## 2024-02-20 PROCEDURE — 96366 THER/PROPH/DIAG IV INF ADDON: CPT

## 2024-02-20 PROCEDURE — 36415 COLL VENOUS BLD VENIPUNCTURE: CPT

## 2024-02-20 PROCEDURE — 82550 ASSAY OF CK (CPK): CPT

## 2024-02-20 PROCEDURE — 96365 THER/PROPH/DIAG IV INF INIT: CPT

## 2024-02-20 PROCEDURE — 85027 COMPLETE CBC AUTOMATED: CPT

## 2024-02-20 RX ORDER — IMMUNE GLOBULIN (HUMAN) 10 G/100ML
80 INJECTION INTRAVENOUS; SUBCUTANEOUS ONCE
Refills: 0 | Status: COMPLETED | OUTPATIENT
Start: 2024-02-20 | End: 2024-02-20

## 2024-02-20 RX ADMIN — IMMUNE GLOBULIN (HUMAN) 80 GRAM(S): 10 INJECTION INTRAVENOUS; SUBCUTANEOUS at 09:30

## 2024-02-20 RX ADMIN — IMMUNE GLOBULIN (HUMAN) 80 GRAM(S): 10 INJECTION INTRAVENOUS; SUBCUTANEOUS at 13:45

## 2024-02-21 ENCOUNTER — OUTPATIENT (OUTPATIENT)
Dept: OUTPATIENT SERVICES | Facility: HOSPITAL | Age: 74
LOS: 1 days | End: 2024-02-21
Payer: MEDICARE

## 2024-02-21 ENCOUNTER — APPOINTMENT (OUTPATIENT)
Dept: INFUSION THERAPY | Facility: CLINIC | Age: 74
End: 2024-02-21

## 2024-02-21 VITALS
SYSTOLIC BLOOD PRESSURE: 131 MMHG | HEART RATE: 68 BPM | RESPIRATION RATE: 16 BRPM | TEMPERATURE: 98 F | DIASTOLIC BLOOD PRESSURE: 75 MMHG | OXYGEN SATURATION: 98 %

## 2024-02-21 VITALS
OXYGEN SATURATION: 98 % | RESPIRATION RATE: 16 BRPM | HEIGHT: 68 IN | SYSTOLIC BLOOD PRESSURE: 155 MMHG | TEMPERATURE: 98 F | DIASTOLIC BLOOD PRESSURE: 82 MMHG | HEART RATE: 70 BPM | WEIGHT: 169.09 LBS

## 2024-02-21 DIAGNOSIS — M33.90 DERMATOPOLYMYOSITIS, UNSPECIFIED, ORGAN INVOLVEMENT UNSPECIFIED: ICD-10-CM

## 2024-02-21 PROCEDURE — 96365 THER/PROPH/DIAG IV INF INIT: CPT

## 2024-02-21 PROCEDURE — 96366 THER/PROPH/DIAG IV INF ADDON: CPT

## 2024-02-21 RX ORDER — IMMUNE GLOBULIN (HUMAN) 10 G/100ML
80 INJECTION INTRAVENOUS; SUBCUTANEOUS ONCE
Refills: 0 | Status: COMPLETED | OUTPATIENT
Start: 2024-02-21 | End: 2024-02-21

## 2024-02-21 RX ADMIN — IMMUNE GLOBULIN (HUMAN) 80 GRAM(S): 10 INJECTION INTRAVENOUS; SUBCUTANEOUS at 14:15

## 2024-02-21 RX ADMIN — IMMUNE GLOBULIN (HUMAN) 80 GRAM(S): 10 INJECTION INTRAVENOUS; SUBCUTANEOUS at 09:21

## 2024-03-08 ENCOUNTER — RX RENEWAL (OUTPATIENT)
Age: 74
End: 2024-03-08

## 2024-03-08 RX ORDER — FOLIC ACID 1 MG/1
1 TABLET ORAL DAILY
Qty: 30 | Refills: 11 | Status: ACTIVE | COMMUNITY
Start: 2023-02-10 | End: 1900-01-01

## 2024-03-19 ENCOUNTER — OUTPATIENT (OUTPATIENT)
Dept: OUTPATIENT SERVICES | Facility: HOSPITAL | Age: 74
LOS: 1 days | End: 2024-03-19
Payer: MEDICARE

## 2024-03-19 ENCOUNTER — APPOINTMENT (OUTPATIENT)
Dept: INFUSION THERAPY | Facility: CLINIC | Age: 74
End: 2024-03-19

## 2024-03-19 VITALS
HEART RATE: 73 BPM | SYSTOLIC BLOOD PRESSURE: 156 MMHG | OXYGEN SATURATION: 98 % | TEMPERATURE: 99 F | WEIGHT: 166.89 LBS | RESPIRATION RATE: 16 BRPM | DIASTOLIC BLOOD PRESSURE: 79 MMHG | HEIGHT: 68 IN

## 2024-03-19 VITALS
HEART RATE: 76 BPM | TEMPERATURE: 99 F | OXYGEN SATURATION: 98 % | RESPIRATION RATE: 16 BRPM | SYSTOLIC BLOOD PRESSURE: 130 MMHG | DIASTOLIC BLOOD PRESSURE: 77 MMHG

## 2024-03-19 DIAGNOSIS — M33.90 DERMATOPOLYMYOSITIS, UNSPECIFIED, ORGAN INVOLVEMENT UNSPECIFIED: ICD-10-CM

## 2024-03-19 LAB
ALBUMIN SERPL ELPH-MCNC: 3.9 G/DL — SIGNIFICANT CHANGE UP (ref 3.3–5)
ALP SERPL-CCNC: 75 U/L — SIGNIFICANT CHANGE UP (ref 40–120)
ALT FLD-CCNC: 18 U/L — SIGNIFICANT CHANGE UP (ref 10–45)
ANION GAP SERPL CALC-SCNC: 6 MMOL/L — SIGNIFICANT CHANGE UP (ref 5–17)
AST SERPL-CCNC: 39 U/L — SIGNIFICANT CHANGE UP (ref 10–40)
BILIRUB SERPL-MCNC: 0.2 MG/DL — SIGNIFICANT CHANGE UP (ref 0.2–1.2)
BUN SERPL-MCNC: 16 MG/DL — SIGNIFICANT CHANGE UP (ref 7–23)
CALCIUM SERPL-MCNC: 9.4 MG/DL — SIGNIFICANT CHANGE UP (ref 8.4–10.5)
CHLORIDE SERPL-SCNC: 105 MMOL/L — SIGNIFICANT CHANGE UP (ref 96–108)
CK SERPL-CCNC: 503 U/L — HIGH (ref 30–200)
CO2 SERPL-SCNC: 28 MMOL/L — SIGNIFICANT CHANGE UP (ref 22–31)
CREAT SERPL-MCNC: 0.38 MG/DL — LOW (ref 0.5–1.3)
EGFR: 118 ML/MIN/1.73M2 — SIGNIFICANT CHANGE UP
GLUCOSE SERPL-MCNC: 152 MG/DL — HIGH (ref 70–99)
HCT VFR BLD CALC: 40.9 % — SIGNIFICANT CHANGE UP (ref 39–50)
HGB BLD-MCNC: 12.4 G/DL — LOW (ref 13–17)
MCHC RBC-ENTMCNC: 26.2 PG — LOW (ref 27–34)
MCHC RBC-ENTMCNC: 30.3 GM/DL — LOW (ref 32–36)
MCV RBC AUTO: 86.5 FL — SIGNIFICANT CHANGE UP (ref 80–100)
NRBC # BLD: 0 /100 WBCS — SIGNIFICANT CHANGE UP (ref 0–0)
PLATELET # BLD AUTO: 229 K/UL — SIGNIFICANT CHANGE UP (ref 150–400)
POTASSIUM SERPL-MCNC: 4.2 MMOL/L — SIGNIFICANT CHANGE UP (ref 3.5–5.3)
POTASSIUM SERPL-SCNC: 4.2 MMOL/L — SIGNIFICANT CHANGE UP (ref 3.5–5.3)
PROT SERPL-MCNC: 8.3 G/DL — SIGNIFICANT CHANGE UP (ref 6–8.3)
RBC # BLD: 4.73 M/UL — SIGNIFICANT CHANGE UP (ref 4.2–5.8)
RBC # FLD: 16.1 % — HIGH (ref 10.3–14.5)
SODIUM SERPL-SCNC: 139 MMOL/L — SIGNIFICANT CHANGE UP (ref 135–145)
WBC # BLD: 6.47 K/UL — SIGNIFICANT CHANGE UP (ref 3.8–10.5)
WBC # FLD AUTO: 6.47 K/UL — SIGNIFICANT CHANGE UP (ref 3.8–10.5)

## 2024-03-19 PROCEDURE — 96365 THER/PROPH/DIAG IV INF INIT: CPT

## 2024-03-19 PROCEDURE — 82550 ASSAY OF CK (CPK): CPT

## 2024-03-19 PROCEDURE — 80053 COMPREHEN METABOLIC PANEL: CPT

## 2024-03-19 PROCEDURE — 96366 THER/PROPH/DIAG IV INF ADDON: CPT

## 2024-03-19 PROCEDURE — 85027 COMPLETE CBC AUTOMATED: CPT

## 2024-03-19 PROCEDURE — 36415 COLL VENOUS BLD VENIPUNCTURE: CPT

## 2024-03-19 RX ORDER — IMMUNE GLOBULIN (HUMAN) 10 G/100ML
80 INJECTION INTRAVENOUS; SUBCUTANEOUS ONCE
Refills: 0 | Status: COMPLETED | OUTPATIENT
Start: 2024-03-19 | End: 2024-03-19

## 2024-03-19 RX ADMIN — IMMUNE GLOBULIN (HUMAN) 80 GRAM(S): 10 INJECTION INTRAVENOUS; SUBCUTANEOUS at 09:17

## 2024-03-19 RX ADMIN — IMMUNE GLOBULIN (HUMAN) 80 GRAM(S): 10 INJECTION INTRAVENOUS; SUBCUTANEOUS at 13:10

## 2024-03-20 ENCOUNTER — APPOINTMENT (OUTPATIENT)
Dept: INFUSION THERAPY | Facility: CLINIC | Age: 74
End: 2024-03-20

## 2024-03-20 ENCOUNTER — OUTPATIENT (OUTPATIENT)
Dept: OUTPATIENT SERVICES | Facility: HOSPITAL | Age: 74
LOS: 1 days | End: 2024-03-20
Payer: MEDICARE

## 2024-03-20 VITALS
HEART RATE: 75 BPM | OXYGEN SATURATION: 98 % | TEMPERATURE: 98 F | DIASTOLIC BLOOD PRESSURE: 75 MMHG | HEIGHT: 68 IN | RESPIRATION RATE: 17 BRPM | WEIGHT: 166.89 LBS | SYSTOLIC BLOOD PRESSURE: 161 MMHG

## 2024-03-20 DIAGNOSIS — M33.90 DERMATOPOLYMYOSITIS, UNSPECIFIED, ORGAN INVOLVEMENT UNSPECIFIED: ICD-10-CM

## 2024-03-20 PROCEDURE — 96365 THER/PROPH/DIAG IV INF INIT: CPT

## 2024-03-20 PROCEDURE — 96366 THER/PROPH/DIAG IV INF ADDON: CPT

## 2024-03-20 RX ORDER — IMMUNE GLOBULIN (HUMAN) 10 G/100ML
80 INJECTION INTRAVENOUS; SUBCUTANEOUS ONCE
Refills: 0 | Status: COMPLETED | OUTPATIENT
Start: 2024-03-20 | End: 2024-03-20

## 2024-03-20 RX ADMIN — IMMUNE GLOBULIN (HUMAN) 80 GRAM(S): 10 INJECTION INTRAVENOUS; SUBCUTANEOUS at 14:01

## 2024-03-20 RX ADMIN — IMMUNE GLOBULIN (HUMAN) 200 GRAM(S): 10 INJECTION INTRAVENOUS; SUBCUTANEOUS at 09:01

## 2024-03-20 NOTE — DISCHARGE INSTRUCTIONS: GENERAL THERAPY - DC SYMPTOM 2
Episodes of uncontrolled nausea or vomiting not relieved by anti-nausea medication Detail Level: Detailed Detail Level: Generalized Detail Level: Zone

## 2024-04-16 ENCOUNTER — APPOINTMENT (OUTPATIENT)
Dept: INFUSION THERAPY | Facility: CLINIC | Age: 74
End: 2024-04-16

## 2024-04-16 ENCOUNTER — OUTPATIENT (OUTPATIENT)
Dept: OUTPATIENT SERVICES | Facility: HOSPITAL | Age: 74
LOS: 1 days | End: 2024-04-16
Payer: MEDICARE

## 2024-04-16 VITALS
TEMPERATURE: 98 F | WEIGHT: 166.89 LBS | SYSTOLIC BLOOD PRESSURE: 160 MMHG | RESPIRATION RATE: 18 BRPM | HEIGHT: 68 IN | OXYGEN SATURATION: 98 % | DIASTOLIC BLOOD PRESSURE: 74 MMHG | HEART RATE: 74 BPM

## 2024-04-16 LAB
ALBUMIN SERPL ELPH-MCNC: 4.1 G/DL — SIGNIFICANT CHANGE UP (ref 3.3–5)
ALP SERPL-CCNC: 74 U/L — SIGNIFICANT CHANGE UP (ref 40–120)
ALT FLD-CCNC: 18 U/L — SIGNIFICANT CHANGE UP (ref 10–45)
ANION GAP SERPL CALC-SCNC: 9 MMOL/L — SIGNIFICANT CHANGE UP (ref 5–17)
AST SERPL-CCNC: 34 U/L — SIGNIFICANT CHANGE UP (ref 10–40)
BILIRUB SERPL-MCNC: 0.4 MG/DL — SIGNIFICANT CHANGE UP (ref 0.2–1.2)
BUN SERPL-MCNC: 15 MG/DL — SIGNIFICANT CHANGE UP (ref 7–23)
CALCIUM SERPL-MCNC: 9.7 MG/DL — SIGNIFICANT CHANGE UP (ref 8.4–10.5)
CHLORIDE SERPL-SCNC: 103 MMOL/L — SIGNIFICANT CHANGE UP (ref 96–108)
CK SERPL-CCNC: 400 U/L — HIGH (ref 30–200)
CO2 SERPL-SCNC: 26 MMOL/L — SIGNIFICANT CHANGE UP (ref 22–31)
CREAT SERPL-MCNC: 0.47 MG/DL — LOW (ref 0.5–1.3)
EGFR: 110 ML/MIN/1.73M2 — SIGNIFICANT CHANGE UP
GLUCOSE SERPL-MCNC: 133 MG/DL — HIGH (ref 70–99)
HCT VFR BLD CALC: 39.7 % — SIGNIFICANT CHANGE UP (ref 39–50)
HGB BLD-MCNC: 12.3 G/DL — LOW (ref 13–17)
MCHC RBC-ENTMCNC: 26.3 PG — LOW (ref 27–34)
MCHC RBC-ENTMCNC: 31 GM/DL — LOW (ref 32–36)
MCV RBC AUTO: 85 FL — SIGNIFICANT CHANGE UP (ref 80–100)
NRBC # BLD: 0 /100 WBCS — SIGNIFICANT CHANGE UP (ref 0–0)
PLATELET # BLD AUTO: 238 K/UL — SIGNIFICANT CHANGE UP (ref 150–400)
POTASSIUM SERPL-MCNC: 4.2 MMOL/L — SIGNIFICANT CHANGE UP (ref 3.5–5.3)
POTASSIUM SERPL-SCNC: 4.2 MMOL/L — SIGNIFICANT CHANGE UP (ref 3.5–5.3)
PROT SERPL-MCNC: 8.6 G/DL — HIGH (ref 6–8.3)
RBC # BLD: 4.67 M/UL — SIGNIFICANT CHANGE UP (ref 4.2–5.8)
RBC # FLD: 16.4 % — HIGH (ref 10.3–14.5)
SODIUM SERPL-SCNC: 138 MMOL/L — SIGNIFICANT CHANGE UP (ref 135–145)
WBC # BLD: 6.9 K/UL — SIGNIFICANT CHANGE UP (ref 3.8–10.5)
WBC # FLD AUTO: 6.9 K/UL — SIGNIFICANT CHANGE UP (ref 3.8–10.5)

## 2024-04-16 PROCEDURE — 96366 THER/PROPH/DIAG IV INF ADDON: CPT

## 2024-04-16 PROCEDURE — 96365 THER/PROPH/DIAG IV INF INIT: CPT

## 2024-04-16 PROCEDURE — 82550 ASSAY OF CK (CPK): CPT

## 2024-04-16 PROCEDURE — 85027 COMPLETE CBC AUTOMATED: CPT

## 2024-04-16 PROCEDURE — 80053 COMPREHEN METABOLIC PANEL: CPT

## 2024-04-16 PROCEDURE — 36415 COLL VENOUS BLD VENIPUNCTURE: CPT

## 2024-04-16 RX ORDER — IMMUNE GLOBULIN (HUMAN) 10 G/100ML
80 INJECTION INTRAVENOUS; SUBCUTANEOUS ONCE
Refills: 0 | Status: COMPLETED | OUTPATIENT
Start: 2024-04-16 | End: 2024-04-16

## 2024-04-16 RX ADMIN — IMMUNE GLOBULIN (HUMAN) 80 GRAM(S): 10 INJECTION INTRAVENOUS; SUBCUTANEOUS at 09:41

## 2024-04-16 RX ADMIN — IMMUNE GLOBULIN (HUMAN) 80 GRAM(S): 10 INJECTION INTRAVENOUS; SUBCUTANEOUS at 13:10

## 2024-04-16 NOTE — DISCHARGE INSTRUCTIONS: GENERAL THERAPY - DC SYMPTOM 4
Episodes of diarrhea (more than 3 times a day), or if you are unable to tolerate food or fluids Abdominal Pain, N/V/D (Pediatric)

## 2024-04-17 ENCOUNTER — APPOINTMENT (OUTPATIENT)
Dept: INFUSION THERAPY | Facility: CLINIC | Age: 74
End: 2024-04-17

## 2024-04-17 ENCOUNTER — OUTPATIENT (OUTPATIENT)
Dept: OUTPATIENT SERVICES | Facility: HOSPITAL | Age: 74
LOS: 1 days | End: 2024-04-17
Payer: MEDICARE

## 2024-04-17 VITALS
HEART RATE: 72 BPM | WEIGHT: 167.99 LBS | TEMPERATURE: 98 F | OXYGEN SATURATION: 99 % | DIASTOLIC BLOOD PRESSURE: 77 MMHG | SYSTOLIC BLOOD PRESSURE: 138 MMHG | RESPIRATION RATE: 18 BRPM | HEIGHT: 68 IN

## 2024-04-17 DIAGNOSIS — M33.90 DERMATOPOLYMYOSITIS, UNSPECIFIED, ORGAN INVOLVEMENT UNSPECIFIED: ICD-10-CM

## 2024-04-17 RX ORDER — IMMUNE GLOBULIN (HUMAN) 10 G/100ML
80 INJECTION INTRAVENOUS; SUBCUTANEOUS ONCE
Refills: 0 | Status: COMPLETED | OUTPATIENT
Start: 2024-04-17 | End: 2024-04-17

## 2024-04-17 RX ADMIN — IMMUNE GLOBULIN (HUMAN) 80 GRAM(S): 10 INJECTION INTRAVENOUS; SUBCUTANEOUS at 14:30

## 2024-04-17 RX ADMIN — IMMUNE GLOBULIN (HUMAN) 80 GRAM(S): 10 INJECTION INTRAVENOUS; SUBCUTANEOUS at 09:46

## 2024-04-23 ENCOUNTER — APPOINTMENT (OUTPATIENT)
Dept: RHEUMATOLOGY | Facility: CLINIC | Age: 74
End: 2024-04-23
Payer: MEDICARE

## 2024-04-23 DIAGNOSIS — M33.13 OTHER DERMATOMYOSITIS WITHOUT MYOPATHY: ICD-10-CM

## 2024-04-23 DIAGNOSIS — R21 RASH AND OTHER NONSPECIFIC SKIN ERUPTION: ICD-10-CM

## 2024-04-23 DIAGNOSIS — R74.8 ABNORMAL LEVELS OF OTHER SERUM ENZYMES: ICD-10-CM

## 2024-04-23 PROCEDURE — G2211 COMPLEX E/M VISIT ADD ON: CPT | Mod: NC

## 2024-04-23 PROCEDURE — 99443: CPT | Mod: 93

## 2024-04-23 NOTE — ASSESSMENT
[FreeTextEntry1] : 73-year-old man with history of  dermatomyositis. Patient with biopsy of the skin by dermatologist, as well as muscle biopsy February 6, 2023, consistent with dermatomyositis, myomarker panel with positive MI 2 antibody. Patient currently taking Medrol 4 mg daily, MTX 15 mg qweek, folic acid 1 mg qday, and xeljanz 11 mg daily, and  IVIG every 4-weeks, and continues Bactrim 1 tab three days per week. Patient with CPK trending downward now 400, continues PT as well. Labs reviewed in HIE as well. Continue Bactrim 3 days a week for prophylaxis. Colonoscopy negative. will follow-up in 1 month or sooner pending results.

## 2024-04-23 NOTE — HISTORY OF PRESENT ILLNESS
[FreeTextEntry1] : April 23, 2024 Patient returns for follow up via telephonic visit Discussed with patient.  You have chosen to receive care through the use of tele-media.  Telemedia enables health care providers at different locations to provide safe, effective, and convenient care through the use of technology.  Please note this is a billable encounter.  Patient understands that I cannot perform a physical exam and that patient may need to come to the clinic to complete the assessment.  Patient agreed verbally to understanding the risks and benefits of telemedia as explained. [place] is located in [place], provider is located in [place] Since last visit, patient followed with Dr. Ram at [facility] Started on MTX 15 mg qweek and xeljanz 11 mg qday, now on medrol 4 mg qday Continues Ivig every four weeks Labs reviewed from April 2024 Patient reports improvement in skin and muscle Last CPK, 400  October 27, 2023 Patient returns for follow up of dermatomyositis CPK improving, last  Now on medrol 8 mg qday, will continue to decrease to 4 mg qday next week Continues cellcept 1 gm bid plaquenil 200 mg bid bactrim DS three times per week IvIg every 4 weeks x 2 doses Will restart PT Skin of the face still with erythema and edema, +dry skin likely secondary to topical steroids, dermatology evaluation for nonsteroidal creams as well Decreased strength in both the upper and lower extremities MRI of the abdomen results reviewed, +muscle involvement noted of the lower back  September 13, 2023 Patient returns for follow-up of dermatomyositis Patient recently in the hospital following colonoscopy Had syncopal event, fell, head trauma, subdural hematoma CPK elevated, received IV steroids Last IVIG end of August Treated for cellulitis of the lower back, much improved from previous images on phone  Feels weakness Change in balance  medrol 8 mg daily cellcept 500 mg bid Bactrim three times per week plaquenl 200 bid vitamin b vitamin c IvIg end of August  colonoscopy negative Will have cardiac monitor placed, neurology Repeat MRI next month

## 2024-04-23 NOTE — REVIEW OF SYSTEMS
[Negative] : Heme/Lymph [FreeTextEntry3] : reports periorbital edema in the morning [FreeTextEntry9] : improving [de-identified] : improving

## 2024-05-14 ENCOUNTER — APPOINTMENT (OUTPATIENT)
Dept: INFUSION THERAPY | Facility: CLINIC | Age: 74
End: 2024-05-14

## 2024-05-14 ENCOUNTER — OUTPATIENT (OUTPATIENT)
Dept: OUTPATIENT SERVICES | Facility: HOSPITAL | Age: 74
LOS: 1 days | End: 2024-05-14
Payer: MEDICARE

## 2024-05-14 VITALS
DIASTOLIC BLOOD PRESSURE: 78 MMHG | SYSTOLIC BLOOD PRESSURE: 143 MMHG | RESPIRATION RATE: 18 BRPM | HEART RATE: 66 BPM | TEMPERATURE: 98 F | OXYGEN SATURATION: 98 % | WEIGHT: 167.99 LBS | HEIGHT: 68 IN

## 2024-05-14 VITALS
HEART RATE: 78 BPM | TEMPERATURE: 98 F | SYSTOLIC BLOOD PRESSURE: 130 MMHG | DIASTOLIC BLOOD PRESSURE: 78 MMHG | OXYGEN SATURATION: 99 % | RESPIRATION RATE: 18 BRPM

## 2024-05-14 DIAGNOSIS — M33.90 DERMATOPOLYMYOSITIS, UNSPECIFIED, ORGAN INVOLVEMENT UNSPECIFIED: ICD-10-CM

## 2024-05-14 LAB
ALBUMIN SERPL ELPH-MCNC: 3.7 G/DL — SIGNIFICANT CHANGE UP (ref 3.3–5)
ALBUMIN SERPL ELPH-MCNC: 4 G/DL — SIGNIFICANT CHANGE UP (ref 3.3–5)
ALP SERPL-CCNC: 58 U/L — SIGNIFICANT CHANGE UP (ref 40–120)
ALP SERPL-CCNC: SIGNIFICANT CHANGE UP U/L (ref 40–120)
ALT FLD-CCNC: 21 U/L — SIGNIFICANT CHANGE UP (ref 10–45)
ALT FLD-CCNC: SIGNIFICANT CHANGE UP U/L (ref 10–45)
ANION GAP SERPL CALC-SCNC: 7 MMOL/L — SIGNIFICANT CHANGE UP (ref 5–17)
ANION GAP SERPL CALC-SCNC: 7 MMOL/L — SIGNIFICANT CHANGE UP (ref 5–17)
AST SERPL-CCNC: 23 U/L — SIGNIFICANT CHANGE UP (ref 10–40)
AST SERPL-CCNC: SIGNIFICANT CHANGE UP U/L (ref 10–40)
BILIRUB SERPL-MCNC: 0.3 MG/DL — SIGNIFICANT CHANGE UP (ref 0.2–1.2)
BILIRUB SERPL-MCNC: 0.5 MG/DL — SIGNIFICANT CHANGE UP (ref 0.2–1.2)
BUN SERPL-MCNC: 27 MG/DL — HIGH (ref 7–23)
BUN SERPL-MCNC: 27 MG/DL — HIGH (ref 7–23)
CALCIUM SERPL-MCNC: 9.2 MG/DL — SIGNIFICANT CHANGE UP (ref 8.4–10.5)
CALCIUM SERPL-MCNC: 9.7 MG/DL — SIGNIFICANT CHANGE UP (ref 8.4–10.5)
CHLORIDE SERPL-SCNC: 100 MMOL/L — SIGNIFICANT CHANGE UP (ref 96–108)
CHLORIDE SERPL-SCNC: 101 MMOL/L — SIGNIFICANT CHANGE UP (ref 96–108)
CK SERPL-CCNC: 207 U/L — HIGH (ref 30–200)
CK SERPL-CCNC: SIGNIFICANT CHANGE UP U/L (ref 30–200)
CO2 SERPL-SCNC: 24 MMOL/L — SIGNIFICANT CHANGE UP (ref 22–31)
CO2 SERPL-SCNC: 26 MMOL/L — SIGNIFICANT CHANGE UP (ref 22–31)
CREAT SERPL-MCNC: 0.5 MG/DL — SIGNIFICANT CHANGE UP (ref 0.5–1.3)
CREAT SERPL-MCNC: 0.52 MG/DL — SIGNIFICANT CHANGE UP (ref 0.5–1.3)
EGFR: 107 ML/MIN/1.73M2 — SIGNIFICANT CHANGE UP
EGFR: 108 ML/MIN/1.73M2 — SIGNIFICANT CHANGE UP
GLUCOSE SERPL-MCNC: 110 MG/DL — HIGH (ref 70–99)
GLUCOSE SERPL-MCNC: 91 MG/DL — SIGNIFICANT CHANGE UP (ref 70–99)
HCT VFR BLD CALC: 41.2 % — SIGNIFICANT CHANGE UP (ref 39–50)
HGB BLD-MCNC: 12.4 G/DL — LOW (ref 13–17)
MCHC RBC-ENTMCNC: 26.7 PG — LOW (ref 27–34)
MCHC RBC-ENTMCNC: 30.1 GM/DL — LOW (ref 32–36)
MCV RBC AUTO: 88.8 FL — SIGNIFICANT CHANGE UP (ref 80–100)
NRBC # BLD: 0 /100 WBCS — SIGNIFICANT CHANGE UP (ref 0–0)
PLATELET # BLD AUTO: 221 K/UL — SIGNIFICANT CHANGE UP (ref 150–400)
POTASSIUM SERPL-MCNC: 5.1 MMOL/L — SIGNIFICANT CHANGE UP (ref 3.5–5.3)
POTASSIUM SERPL-MCNC: SIGNIFICANT CHANGE UP MMOL/L (ref 3.5–5.3)
POTASSIUM SERPL-SCNC: 5.1 MMOL/L — SIGNIFICANT CHANGE UP (ref 3.5–5.3)
POTASSIUM SERPL-SCNC: SIGNIFICANT CHANGE UP MMOL/L (ref 3.5–5.3)
PROT SERPL-MCNC: 8.8 G/DL — HIGH (ref 6–8.3)
PROT SERPL-MCNC: 9.4 G/DL — HIGH (ref 6–8.3)
RBC # BLD: 4.64 M/UL — SIGNIFICANT CHANGE UP (ref 4.2–5.8)
RBC # FLD: 15.4 % — HIGH (ref 10.3–14.5)
SODIUM SERPL-SCNC: 131 MMOL/L — LOW (ref 135–145)
SODIUM SERPL-SCNC: 134 MMOL/L — LOW (ref 135–145)
WBC # BLD: 6.33 K/UL — SIGNIFICANT CHANGE UP (ref 3.8–10.5)
WBC # FLD AUTO: 6.33 K/UL — SIGNIFICANT CHANGE UP (ref 3.8–10.5)

## 2024-05-14 PROCEDURE — 96365 THER/PROPH/DIAG IV INF INIT: CPT

## 2024-05-14 PROCEDURE — 82550 ASSAY OF CK (CPK): CPT

## 2024-05-14 PROCEDURE — 96366 THER/PROPH/DIAG IV INF ADDON: CPT

## 2024-05-14 PROCEDURE — 80053 COMPREHEN METABOLIC PANEL: CPT

## 2024-05-14 PROCEDURE — 85027 COMPLETE CBC AUTOMATED: CPT

## 2024-05-14 PROCEDURE — 36415 COLL VENOUS BLD VENIPUNCTURE: CPT

## 2024-05-14 RX ORDER — IMMUNE GLOBULIN (HUMAN) 10 G/100ML
80 INJECTION INTRAVENOUS; SUBCUTANEOUS ONCE
Refills: 0 | Status: COMPLETED | OUTPATIENT
Start: 2024-05-14 | End: 2024-05-14

## 2024-05-14 RX ADMIN — IMMUNE GLOBULIN (HUMAN) 80 GRAM(S): 10 INJECTION INTRAVENOUS; SUBCUTANEOUS at 13:31

## 2024-05-14 RX ADMIN — IMMUNE GLOBULIN (HUMAN) 80 GRAM(S): 10 INJECTION INTRAVENOUS; SUBCUTANEOUS at 09:36

## 2024-05-15 ENCOUNTER — APPOINTMENT (OUTPATIENT)
Dept: INFUSION THERAPY | Facility: CLINIC | Age: 74
End: 2024-05-15

## 2024-05-15 ENCOUNTER — OUTPATIENT (OUTPATIENT)
Dept: OUTPATIENT SERVICES | Facility: HOSPITAL | Age: 74
LOS: 1 days | End: 2024-05-15
Payer: MEDICARE

## 2024-05-15 VITALS
OXYGEN SATURATION: 98 % | WEIGHT: 167.99 LBS | TEMPERATURE: 98 F | SYSTOLIC BLOOD PRESSURE: 132 MMHG | HEIGHT: 68 IN | HEART RATE: 65 BPM | RESPIRATION RATE: 18 BRPM | DIASTOLIC BLOOD PRESSURE: 72 MMHG

## 2024-05-15 VITALS
SYSTOLIC BLOOD PRESSURE: 142 MMHG | OXYGEN SATURATION: 98 % | HEART RATE: 62 BPM | DIASTOLIC BLOOD PRESSURE: 72 MMHG | TEMPERATURE: 98 F | RESPIRATION RATE: 18 BRPM

## 2024-05-15 DIAGNOSIS — M33.90 DERMATOPOLYMYOSITIS, UNSPECIFIED, ORGAN INVOLVEMENT UNSPECIFIED: ICD-10-CM

## 2024-05-15 PROCEDURE — 96366 THER/PROPH/DIAG IV INF ADDON: CPT

## 2024-05-15 PROCEDURE — 36415 COLL VENOUS BLD VENIPUNCTURE: CPT

## 2024-05-15 PROCEDURE — 96365 THER/PROPH/DIAG IV INF INIT: CPT

## 2024-05-15 RX ORDER — IMMUNE GLOBULIN (HUMAN) 10 G/100ML
80 INJECTION INTRAVENOUS; SUBCUTANEOUS ONCE
Refills: 0 | Status: COMPLETED | OUTPATIENT
Start: 2024-05-15 | End: 2024-05-15

## 2024-05-15 RX ADMIN — IMMUNE GLOBULIN (HUMAN) 80 GRAM(S): 10 INJECTION INTRAVENOUS; SUBCUTANEOUS at 12:50

## 2024-05-15 RX ADMIN — IMMUNE GLOBULIN (HUMAN) 80 GRAM(S): 10 INJECTION INTRAVENOUS; SUBCUTANEOUS at 08:55

## 2024-06-11 ENCOUNTER — OUTPATIENT (OUTPATIENT)
Dept: OUTPATIENT SERVICES | Facility: HOSPITAL | Age: 74
LOS: 1 days | End: 2024-06-11
Payer: MEDICARE

## 2024-06-11 ENCOUNTER — APPOINTMENT (OUTPATIENT)
Dept: INFUSION THERAPY | Facility: CLINIC | Age: 74
End: 2024-06-11

## 2024-06-11 VITALS
HEART RATE: 70 BPM | DIASTOLIC BLOOD PRESSURE: 79 MMHG | OXYGEN SATURATION: 98 % | TEMPERATURE: 98 F | RESPIRATION RATE: 18 BRPM | SYSTOLIC BLOOD PRESSURE: 159 MMHG

## 2024-06-11 VITALS
HEIGHT: 68 IN | HEART RATE: 66 BPM | OXYGEN SATURATION: 97 % | WEIGHT: 166.89 LBS | TEMPERATURE: 98 F | DIASTOLIC BLOOD PRESSURE: 73 MMHG | SYSTOLIC BLOOD PRESSURE: 138 MMHG | RESPIRATION RATE: 18 BRPM

## 2024-06-11 DIAGNOSIS — M33.90 DERMATOPOLYMYOSITIS, UNSPECIFIED, ORGAN INVOLVEMENT UNSPECIFIED: ICD-10-CM

## 2024-06-11 LAB
ALBUMIN SERPL ELPH-MCNC: 4.2 G/DL — SIGNIFICANT CHANGE UP (ref 3.3–5)
ALP SERPL-CCNC: 63 U/L — SIGNIFICANT CHANGE UP (ref 40–120)
ALT FLD-CCNC: 16 U/L — SIGNIFICANT CHANGE UP (ref 10–45)
ANION GAP SERPL CALC-SCNC: 10 MMOL/L — SIGNIFICANT CHANGE UP (ref 5–17)
AST SERPL-CCNC: 24 U/L — SIGNIFICANT CHANGE UP (ref 10–40)
BILIRUB SERPL-MCNC: 0.2 MG/DL — SIGNIFICANT CHANGE UP (ref 0.2–1.2)
BUN SERPL-MCNC: 16 MG/DL — SIGNIFICANT CHANGE UP (ref 7–23)
CALCIUM SERPL-MCNC: 9.5 MG/DL — SIGNIFICANT CHANGE UP (ref 8.4–10.5)
CHLORIDE SERPL-SCNC: 103 MMOL/L — SIGNIFICANT CHANGE UP (ref 96–108)
CK SERPL-CCNC: 202 U/L — HIGH (ref 30–200)
CO2 SERPL-SCNC: 25 MMOL/L — SIGNIFICANT CHANGE UP (ref 22–31)
CREAT SERPL-MCNC: 0.48 MG/DL — LOW (ref 0.5–1.3)
EGFR: 110 ML/MIN/1.73M2 — SIGNIFICANT CHANGE UP
GLUCOSE SERPL-MCNC: 104 MG/DL — HIGH (ref 70–99)
HCT VFR BLD CALC: 38.8 % — LOW (ref 39–50)
HGB BLD-MCNC: 12.3 G/DL — LOW (ref 13–17)
MCHC RBC-ENTMCNC: 27.5 PG — SIGNIFICANT CHANGE UP (ref 27–34)
MCHC RBC-ENTMCNC: 31.7 GM/DL — LOW (ref 32–36)
MCV RBC AUTO: 86.6 FL — SIGNIFICANT CHANGE UP (ref 80–100)
NRBC # BLD: 0 /100 WBCS — SIGNIFICANT CHANGE UP (ref 0–0)
PLATELET # BLD AUTO: 213 K/UL — SIGNIFICANT CHANGE UP (ref 150–400)
POTASSIUM SERPL-MCNC: 4.4 MMOL/L — SIGNIFICANT CHANGE UP (ref 3.5–5.3)
POTASSIUM SERPL-SCNC: 4.4 MMOL/L — SIGNIFICANT CHANGE UP (ref 3.5–5.3)
PROT SERPL-MCNC: 8.7 G/DL — HIGH (ref 6–8.3)
RBC # BLD: 4.48 M/UL — SIGNIFICANT CHANGE UP (ref 4.2–5.8)
RBC # FLD: 14.7 % — HIGH (ref 10.3–14.5)
SODIUM SERPL-SCNC: 138 MMOL/L — SIGNIFICANT CHANGE UP (ref 135–145)
WBC # BLD: 6.6 K/UL — SIGNIFICANT CHANGE UP (ref 3.8–10.5)
WBC # FLD AUTO: 6.6 K/UL — SIGNIFICANT CHANGE UP (ref 3.8–10.5)

## 2024-06-11 PROCEDURE — 96366 THER/PROPH/DIAG IV INF ADDON: CPT

## 2024-06-11 PROCEDURE — 36415 COLL VENOUS BLD VENIPUNCTURE: CPT

## 2024-06-11 PROCEDURE — 82550 ASSAY OF CK (CPK): CPT

## 2024-06-11 PROCEDURE — 80053 COMPREHEN METABOLIC PANEL: CPT

## 2024-06-11 PROCEDURE — 85027 COMPLETE CBC AUTOMATED: CPT

## 2024-06-11 PROCEDURE — 96365 THER/PROPH/DIAG IV INF INIT: CPT

## 2024-06-11 RX ORDER — IMMUNE GLOBULIN (HUMAN) 10 G/100ML
80 INJECTION INTRAVENOUS; SUBCUTANEOUS ONCE
Refills: 0 | Status: COMPLETED | OUTPATIENT
Start: 2024-06-11 | End: 2024-06-11

## 2024-06-11 RX ADMIN — IMMUNE GLOBULIN (HUMAN) 200 GRAM(S): 10 INJECTION INTRAVENOUS; SUBCUTANEOUS at 11:30

## 2024-06-11 RX ADMIN — IMMUNE GLOBULIN (HUMAN) 80 GRAM(S): 10 INJECTION INTRAVENOUS; SUBCUTANEOUS at 15:10

## 2024-06-12 ENCOUNTER — APPOINTMENT (OUTPATIENT)
Dept: INFUSION THERAPY | Facility: CLINIC | Age: 74
End: 2024-06-12

## 2024-06-12 ENCOUNTER — OUTPATIENT (OUTPATIENT)
Dept: OUTPATIENT SERVICES | Facility: HOSPITAL | Age: 74
LOS: 1 days | End: 2024-06-12
Payer: MEDICARE

## 2024-06-12 VITALS
DIASTOLIC BLOOD PRESSURE: 77 MMHG | HEIGHT: 68 IN | WEIGHT: 169.09 LBS | OXYGEN SATURATION: 98 % | HEART RATE: 70 BPM | RESPIRATION RATE: 17 BRPM | SYSTOLIC BLOOD PRESSURE: 150 MMHG | TEMPERATURE: 98 F

## 2024-06-12 DIAGNOSIS — M33.90 DERMATOPOLYMYOSITIS, UNSPECIFIED, ORGAN INVOLVEMENT UNSPECIFIED: ICD-10-CM

## 2024-06-12 PROCEDURE — 96365 THER/PROPH/DIAG IV INF INIT: CPT

## 2024-06-12 PROCEDURE — 96366 THER/PROPH/DIAG IV INF ADDON: CPT

## 2024-06-12 RX ORDER — IMMUNE GLOBULIN (HUMAN) 10 G/100ML
80 INJECTION INTRAVENOUS; SUBCUTANEOUS ONCE
Refills: 0 | Status: COMPLETED | OUTPATIENT
Start: 2024-06-12 | End: 2024-06-12

## 2024-06-12 RX ADMIN — IMMUNE GLOBULIN (HUMAN) 200 GRAM(S): 10 INJECTION INTRAVENOUS; SUBCUTANEOUS at 11:15

## 2024-06-12 RX ADMIN — IMMUNE GLOBULIN (HUMAN) 80 GRAM(S): 10 INJECTION INTRAVENOUS; SUBCUTANEOUS at 14:33

## 2024-07-11 ENCOUNTER — APPOINTMENT (OUTPATIENT)
Dept: INFUSION THERAPY | Facility: CLINIC | Age: 74
End: 2024-07-11

## 2024-07-11 ENCOUNTER — OUTPATIENT (OUTPATIENT)
Dept: OUTPATIENT SERVICES | Facility: HOSPITAL | Age: 74
LOS: 1 days | End: 2024-07-11
Payer: MEDICARE

## 2024-07-11 VITALS
HEART RATE: 59 BPM | DIASTOLIC BLOOD PRESSURE: 68 MMHG | RESPIRATION RATE: 16 BRPM | OXYGEN SATURATION: 98 % | HEIGHT: 68 IN | SYSTOLIC BLOOD PRESSURE: 130 MMHG | TEMPERATURE: 98 F | WEIGHT: 169.98 LBS

## 2024-07-11 DIAGNOSIS — M33.90 DERMATOPOLYMYOSITIS, UNSPECIFIED, ORGAN INVOLVEMENT UNSPECIFIED: ICD-10-CM

## 2024-07-11 LAB
ALBUMIN SERPL ELPH-MCNC: 3.2 G/DL — LOW (ref 3.3–5)
ALP SERPL-CCNC: 50 U/L — SIGNIFICANT CHANGE UP (ref 40–120)
ALT FLD-CCNC: 13 U/L — SIGNIFICANT CHANGE UP (ref 10–45)
ANION GAP SERPL CALC-SCNC: 7 MMOL/L — SIGNIFICANT CHANGE UP (ref 5–17)
AST SERPL-CCNC: 27 U/L — SIGNIFICANT CHANGE UP (ref 10–40)
BILIRUB SERPL-MCNC: 0.5 MG/DL — SIGNIFICANT CHANGE UP (ref 0.2–1.2)
BUN SERPL-MCNC: 17 MG/DL — SIGNIFICANT CHANGE UP (ref 7–23)
CALCIUM SERPL-MCNC: 10 MG/DL — SIGNIFICANT CHANGE UP (ref 8.4–10.5)
CHLORIDE SERPL-SCNC: 109 MMOL/L — HIGH (ref 96–108)
CK SERPL-CCNC: 227 U/L — HIGH (ref 30–200)
CO2 SERPL-SCNC: 28 MMOL/L — SIGNIFICANT CHANGE UP (ref 22–31)
CREAT SERPL-MCNC: 0.6 MG/DL — SIGNIFICANT CHANGE UP (ref 0.5–1.3)
EGFR: 103 ML/MIN/1.73M2 — SIGNIFICANT CHANGE UP
GLUCOSE SERPL-MCNC: 123 MG/DL — HIGH (ref 70–99)
HCT VFR BLD CALC: 35.9 % — LOW (ref 39–50)
HGB BLD-MCNC: 11.7 G/DL — LOW (ref 13–17)
MCHC RBC-ENTMCNC: 27.6 PG — SIGNIFICANT CHANGE UP (ref 27–34)
MCHC RBC-ENTMCNC: 32.6 GM/DL — SIGNIFICANT CHANGE UP (ref 32–36)
MCV RBC AUTO: 84.7 FL — SIGNIFICANT CHANGE UP (ref 80–100)
PLATELET # BLD AUTO: 210 K/UL — SIGNIFICANT CHANGE UP (ref 150–400)
POTASSIUM SERPL-MCNC: 5 MMOL/L — SIGNIFICANT CHANGE UP (ref 3.5–5.3)
POTASSIUM SERPL-SCNC: 5 MMOL/L — SIGNIFICANT CHANGE UP (ref 3.5–5.3)
PROT SERPL-MCNC: 8.1 G/DL — SIGNIFICANT CHANGE UP (ref 6–8.3)
RBC # BLD: 4.24 M/UL — SIGNIFICANT CHANGE UP (ref 4.2–5.8)
RBC # FLD: 14.9 % — HIGH (ref 10.3–14.5)
SODIUM SERPL-SCNC: 144 MMOL/L — SIGNIFICANT CHANGE UP (ref 135–145)
WBC # BLD: 5 K/UL — SIGNIFICANT CHANGE UP (ref 3.8–10.5)
WBC # FLD AUTO: 5 K/UL — SIGNIFICANT CHANGE UP (ref 3.8–10.5)

## 2024-07-11 PROCEDURE — 85027 COMPLETE CBC AUTOMATED: CPT

## 2024-07-11 PROCEDURE — 36415 COLL VENOUS BLD VENIPUNCTURE: CPT

## 2024-07-11 PROCEDURE — 80053 COMPREHEN METABOLIC PANEL: CPT

## 2024-07-11 PROCEDURE — 96366 THER/PROPH/DIAG IV INF ADDON: CPT

## 2024-07-11 PROCEDURE — 82550 ASSAY OF CK (CPK): CPT

## 2024-07-11 PROCEDURE — 96365 THER/PROPH/DIAG IV INF INIT: CPT

## 2024-07-11 RX ORDER — IMMUNE GLOBULIN (HUMAN) 10 G/100ML
80 INJECTION INTRAVENOUS; SUBCUTANEOUS ONCE
Refills: 0 | Status: COMPLETED | OUTPATIENT
Start: 2024-07-11 | End: 2024-07-11

## 2024-07-11 RX ADMIN — IMMUNE GLOBULIN (HUMAN) 80 GRAM(S): 10 INJECTION INTRAVENOUS; SUBCUTANEOUS at 09:08

## 2024-07-11 RX ADMIN — IMMUNE GLOBULIN (HUMAN) 80 GRAM(S): 10 INJECTION INTRAVENOUS; SUBCUTANEOUS at 12:25

## 2024-07-12 ENCOUNTER — OUTPATIENT (OUTPATIENT)
Dept: OUTPATIENT SERVICES | Facility: HOSPITAL | Age: 74
LOS: 1 days | End: 2024-07-12
Payer: MEDICARE

## 2024-07-12 ENCOUNTER — APPOINTMENT (OUTPATIENT)
Dept: INFUSION THERAPY | Facility: CLINIC | Age: 74
End: 2024-07-12

## 2024-07-12 VITALS
OXYGEN SATURATION: 99 % | TEMPERATURE: 98 F | DIASTOLIC BLOOD PRESSURE: 73 MMHG | RESPIRATION RATE: 16 BRPM | HEART RATE: 60 BPM | WEIGHT: 169.98 LBS | SYSTOLIC BLOOD PRESSURE: 138 MMHG | HEIGHT: 68 IN

## 2024-07-12 VITALS
DIASTOLIC BLOOD PRESSURE: 65 MMHG | RESPIRATION RATE: 16 BRPM | HEART RATE: 62 BPM | OXYGEN SATURATION: 100 % | SYSTOLIC BLOOD PRESSURE: 140 MMHG | TEMPERATURE: 98 F

## 2024-07-12 DIAGNOSIS — M33.90 DERMATOPOLYMYOSITIS, UNSPECIFIED, ORGAN INVOLVEMENT UNSPECIFIED: ICD-10-CM

## 2024-07-12 PROCEDURE — 96413 CHEMO IV INFUSION 1 HR: CPT

## 2024-07-12 PROCEDURE — 96415 CHEMO IV INFUSION ADDL HR: CPT

## 2024-07-12 RX ORDER — IMMUNE GLOBULIN (HUMAN) 10 G/100ML
80 INJECTION INTRAVENOUS; SUBCUTANEOUS ONCE
Refills: 0 | Status: COMPLETED | OUTPATIENT
Start: 2024-07-12 | End: 2024-07-12

## 2024-07-12 RX ADMIN — IMMUNE GLOBULIN (HUMAN) 80 GRAM(S): 10 INJECTION INTRAVENOUS; SUBCUTANEOUS at 08:30

## 2024-07-12 RX ADMIN — IMMUNE GLOBULIN (HUMAN) 80 GRAM(S): 10 INJECTION INTRAVENOUS; SUBCUTANEOUS at 12:47

## 2024-08-19 ENCOUNTER — APPOINTMENT (OUTPATIENT)
Dept: INFUSION THERAPY | Facility: CLINIC | Age: 74
End: 2024-08-19

## 2024-08-19 ENCOUNTER — OUTPATIENT (OUTPATIENT)
Dept: OUTPATIENT SERVICES | Facility: HOSPITAL | Age: 74
LOS: 1 days | End: 2024-08-19
Payer: MEDICARE

## 2024-08-19 VITALS
RESPIRATION RATE: 18 BRPM | HEART RATE: 78 BPM | WEIGHT: 169.98 LBS | HEIGHT: 68 IN | TEMPERATURE: 98 F | DIASTOLIC BLOOD PRESSURE: 78 MMHG | SYSTOLIC BLOOD PRESSURE: 140 MMHG | OXYGEN SATURATION: 99 %

## 2024-08-19 DIAGNOSIS — M33.90 DERMATOPOLYMYOSITIS, UNSPECIFIED, ORGAN INVOLVEMENT UNSPECIFIED: ICD-10-CM

## 2024-08-19 LAB
ALBUMIN SERPL ELPH-MCNC: 3.5 G/DL — SIGNIFICANT CHANGE UP (ref 3.3–5)
ALP SERPL-CCNC: 73 U/L — SIGNIFICANT CHANGE UP (ref 40–120)
ALT FLD-CCNC: 19 U/L — SIGNIFICANT CHANGE UP (ref 10–45)
ANION GAP SERPL CALC-SCNC: 7 MMOL/L — SIGNIFICANT CHANGE UP (ref 5–17)
AST SERPL-CCNC: 29 U/L — SIGNIFICANT CHANGE UP (ref 10–40)
BILIRUB SERPL-MCNC: 0.5 MG/DL — SIGNIFICANT CHANGE UP (ref 0.2–1.2)
BUN SERPL-MCNC: 18 MG/DL — SIGNIFICANT CHANGE UP (ref 7–23)
CALCIUM SERPL-MCNC: 9.7 MG/DL — SIGNIFICANT CHANGE UP (ref 8.4–10.5)
CHLORIDE SERPL-SCNC: 107 MMOL/L — SIGNIFICANT CHANGE UP (ref 96–108)
CK SERPL-CCNC: 196 U/L — SIGNIFICANT CHANGE UP (ref 30–200)
CO2 SERPL-SCNC: 25 MMOL/L — SIGNIFICANT CHANGE UP (ref 22–31)
CREAT SERPL-MCNC: 0.6 MG/DL — SIGNIFICANT CHANGE UP (ref 0.5–1.3)
CRP SERPL-MCNC: 6.8 MG/L — HIGH (ref 0–4)
EGFR: 103 ML/MIN/1.73M2 — SIGNIFICANT CHANGE UP
GLUCOSE SERPL-MCNC: 115 MG/DL — HIGH (ref 70–99)
HCT VFR BLD CALC: 35.8 % — LOW (ref 39–50)
HGB BLD-MCNC: 11.5 G/DL — LOW (ref 13–17)
MCHC RBC-ENTMCNC: 27.1 PG — SIGNIFICANT CHANGE UP (ref 27–34)
MCHC RBC-ENTMCNC: 32.1 GM/DL — SIGNIFICANT CHANGE UP (ref 32–36)
MCV RBC AUTO: 84.4 FL — SIGNIFICANT CHANGE UP (ref 80–100)
PLATELET # BLD AUTO: 223 K/UL — SIGNIFICANT CHANGE UP (ref 150–400)
POTASSIUM SERPL-MCNC: 4.5 MMOL/L — SIGNIFICANT CHANGE UP (ref 3.5–5.3)
POTASSIUM SERPL-SCNC: 4.5 MMOL/L — SIGNIFICANT CHANGE UP (ref 3.5–5.3)
PROT SERPL-MCNC: 8.4 G/DL — HIGH (ref 6–8.3)
RBC # BLD: 4.24 M/UL — SIGNIFICANT CHANGE UP (ref 4.2–5.8)
RBC # FLD: 15.1 % — HIGH (ref 10.3–14.5)
SODIUM SERPL-SCNC: 139 MMOL/L — SIGNIFICANT CHANGE UP (ref 135–145)
WBC # BLD: 5.9 K/UL — SIGNIFICANT CHANGE UP (ref 3.8–10.5)
WBC # FLD AUTO: 5.9 K/UL — SIGNIFICANT CHANGE UP (ref 3.8–10.5)

## 2024-08-19 PROCEDURE — 80053 COMPREHEN METABOLIC PANEL: CPT

## 2024-08-19 PROCEDURE — 36415 COLL VENOUS BLD VENIPUNCTURE: CPT

## 2024-08-19 PROCEDURE — 96365 THER/PROPH/DIAG IV INF INIT: CPT

## 2024-08-19 PROCEDURE — 82550 ASSAY OF CK (CPK): CPT

## 2024-08-19 PROCEDURE — 86140 C-REACTIVE PROTEIN: CPT

## 2024-08-19 PROCEDURE — 96366 THER/PROPH/DIAG IV INF ADDON: CPT

## 2024-08-19 PROCEDURE — 85027 COMPLETE CBC AUTOMATED: CPT

## 2024-08-19 RX ORDER — IMMUNE GLOBULIN (HUMAN) 10 G/100ML
80 INJECTION INTRAVENOUS; SUBCUTANEOUS ONCE
Refills: 0 | Status: COMPLETED | OUTPATIENT
Start: 2024-08-19 | End: 2024-08-19

## 2024-08-19 RX ADMIN — IMMUNE GLOBULIN (HUMAN) 80 GRAM(S): 10 INJECTION INTRAVENOUS; SUBCUTANEOUS at 08:58

## 2024-08-19 RX ADMIN — IMMUNE GLOBULIN (HUMAN) 80 GRAM(S): 10 INJECTION INTRAVENOUS; SUBCUTANEOUS at 14:00

## 2024-08-20 ENCOUNTER — APPOINTMENT (OUTPATIENT)
Dept: INFUSION THERAPY | Facility: CLINIC | Age: 74
End: 2024-08-20

## 2024-08-20 ENCOUNTER — OUTPATIENT (OUTPATIENT)
Dept: OUTPATIENT SERVICES | Facility: HOSPITAL | Age: 74
LOS: 1 days | End: 2024-08-20
Payer: MEDICARE

## 2024-08-20 VITALS
HEART RATE: 63 BPM | OXYGEN SATURATION: 99 % | DIASTOLIC BLOOD PRESSURE: 79 MMHG | SYSTOLIC BLOOD PRESSURE: 135 MMHG | TEMPERATURE: 99 F | RESPIRATION RATE: 16 BRPM

## 2024-08-20 VITALS
HEIGHT: 68 IN | TEMPERATURE: 98 F | RESPIRATION RATE: 16 BRPM | WEIGHT: 171.08 LBS | HEART RATE: 59 BPM | OXYGEN SATURATION: 100 % | SYSTOLIC BLOOD PRESSURE: 132 MMHG | DIASTOLIC BLOOD PRESSURE: 72 MMHG

## 2024-08-20 DIAGNOSIS — M33.90 DERMATOPOLYMYOSITIS, UNSPECIFIED, ORGAN INVOLVEMENT UNSPECIFIED: ICD-10-CM

## 2024-08-20 PROCEDURE — 96365 THER/PROPH/DIAG IV INF INIT: CPT

## 2024-08-20 PROCEDURE — 96366 THER/PROPH/DIAG IV INF ADDON: CPT

## 2024-08-20 RX ORDER — IMMUNE GLOBULIN (HUMAN) 10 G/100ML
80 INJECTION INTRAVENOUS; SUBCUTANEOUS ONCE
Refills: 0 | Status: COMPLETED | OUTPATIENT
Start: 2024-08-20 | End: 2024-08-20

## 2024-08-20 RX ADMIN — IMMUNE GLOBULIN (HUMAN) 80 GRAM(S): 10 INJECTION INTRAVENOUS; SUBCUTANEOUS at 09:10

## 2024-08-20 RX ADMIN — IMMUNE GLOBULIN (HUMAN) 80 GRAM(S): 10 INJECTION INTRAVENOUS; SUBCUTANEOUS at 13:50

## 2024-09-17 ENCOUNTER — OUTPATIENT (OUTPATIENT)
Dept: OUTPATIENT SERVICES | Facility: HOSPITAL | Age: 74
LOS: 1 days | End: 2024-09-17
Payer: MEDICARE

## 2024-09-17 ENCOUNTER — APPOINTMENT (OUTPATIENT)
Dept: INFUSION THERAPY | Facility: CLINIC | Age: 74
End: 2024-09-17

## 2024-09-17 VITALS
TEMPERATURE: 98 F | SYSTOLIC BLOOD PRESSURE: 127 MMHG | HEART RATE: 56 BPM | HEIGHT: 68 IN | RESPIRATION RATE: 16 BRPM | OXYGEN SATURATION: 98 % | DIASTOLIC BLOOD PRESSURE: 64 MMHG | WEIGHT: 171.96 LBS

## 2024-09-17 DIAGNOSIS — M33.90 DERMATOPOLYMYOSITIS, UNSPECIFIED, ORGAN INVOLVEMENT UNSPECIFIED: ICD-10-CM

## 2024-09-17 LAB
ALBUMIN SERPL ELPH-MCNC: 3.9 G/DL — SIGNIFICANT CHANGE UP (ref 3.3–5)
ALP SERPL-CCNC: 89 U/L — SIGNIFICANT CHANGE UP (ref 40–120)
ALT FLD-CCNC: 14 U/L — SIGNIFICANT CHANGE UP (ref 10–45)
ANION GAP SERPL CALC-SCNC: 9 MMOL/L — SIGNIFICANT CHANGE UP (ref 5–17)
AST SERPL-CCNC: 20 U/L — SIGNIFICANT CHANGE UP (ref 10–40)
BILIRUB SERPL-MCNC: 0.2 MG/DL — SIGNIFICANT CHANGE UP (ref 0.2–1.2)
BUN SERPL-MCNC: 20 MG/DL — SIGNIFICANT CHANGE UP (ref 7–23)
CALCIUM SERPL-MCNC: 9.5 MG/DL — SIGNIFICANT CHANGE UP (ref 8.4–10.5)
CHLORIDE SERPL-SCNC: 106 MMOL/L — SIGNIFICANT CHANGE UP (ref 96–108)
CK SERPL-CCNC: 206 U/L — HIGH (ref 30–200)
CO2 SERPL-SCNC: 24 MMOL/L — SIGNIFICANT CHANGE UP (ref 22–31)
CREAT SERPL-MCNC: 0.59 MG/DL — SIGNIFICANT CHANGE UP (ref 0.5–1.3)
EGFR: 102 ML/MIN/1.73M2 — SIGNIFICANT CHANGE UP
GLUCOSE SERPL-MCNC: 127 MG/DL — HIGH (ref 70–99)
HCT VFR BLD CALC: 34.7 % — LOW (ref 39–50)
HGB BLD-MCNC: 10.6 G/DL — LOW (ref 13–17)
MCHC RBC-ENTMCNC: 26.7 PG — LOW (ref 27–34)
MCHC RBC-ENTMCNC: 30.5 GM/DL — LOW (ref 32–36)
MCV RBC AUTO: 87.4 FL — SIGNIFICANT CHANGE UP (ref 80–100)
NRBC # BLD: 0 /100 WBCS — SIGNIFICANT CHANGE UP (ref 0–0)
PLATELET # BLD AUTO: 234 K/UL — SIGNIFICANT CHANGE UP (ref 150–400)
POTASSIUM SERPL-MCNC: 4.9 MMOL/L — SIGNIFICANT CHANGE UP (ref 3.5–5.3)
POTASSIUM SERPL-SCNC: 4.9 MMOL/L — SIGNIFICANT CHANGE UP (ref 3.5–5.3)
PROT SERPL-MCNC: 8.7 G/DL — HIGH (ref 6–8.3)
RBC # BLD: 3.97 M/UL — LOW (ref 4.2–5.8)
RBC # FLD: 14.2 % — SIGNIFICANT CHANGE UP (ref 10.3–14.5)
SODIUM SERPL-SCNC: 139 MMOL/L — SIGNIFICANT CHANGE UP (ref 135–145)
WBC # BLD: 4.58 K/UL — SIGNIFICANT CHANGE UP (ref 3.8–10.5)
WBC # FLD AUTO: 4.58 K/UL — SIGNIFICANT CHANGE UP (ref 3.8–10.5)

## 2024-09-17 PROCEDURE — 96365 THER/PROPH/DIAG IV INF INIT: CPT

## 2024-09-17 PROCEDURE — 80053 COMPREHEN METABOLIC PANEL: CPT

## 2024-09-17 PROCEDURE — 82550 ASSAY OF CK (CPK): CPT

## 2024-09-17 PROCEDURE — 85027 COMPLETE CBC AUTOMATED: CPT

## 2024-09-17 PROCEDURE — 96366 THER/PROPH/DIAG IV INF ADDON: CPT

## 2024-09-17 PROCEDURE — 36415 COLL VENOUS BLD VENIPUNCTURE: CPT

## 2024-09-17 RX ORDER — IMMUNE GLOBULIN (HUMAN) 10 G/100ML
80 INJECTION INTRAVENOUS; SUBCUTANEOUS ONCE
Refills: 0 | Status: COMPLETED | OUTPATIENT
Start: 2024-09-17 | End: 2024-09-17

## 2024-09-17 RX ADMIN — IMMUNE GLOBULIN (HUMAN) 80 GRAM(S): 10 INJECTION INTRAVENOUS; SUBCUTANEOUS at 12:40

## 2024-09-17 RX ADMIN — IMMUNE GLOBULIN (HUMAN) 200 GRAM(S): 10 INJECTION INTRAVENOUS; SUBCUTANEOUS at 08:37

## 2024-09-18 ENCOUNTER — OUTPATIENT (OUTPATIENT)
Dept: OUTPATIENT SERVICES | Facility: HOSPITAL | Age: 74
LOS: 1 days | End: 2024-09-18
Payer: MEDICARE

## 2024-09-18 ENCOUNTER — APPOINTMENT (OUTPATIENT)
Dept: INFUSION THERAPY | Facility: CLINIC | Age: 74
End: 2024-09-18

## 2024-09-18 VITALS
OXYGEN SATURATION: 96 % | HEART RATE: 62 BPM | SYSTOLIC BLOOD PRESSURE: 160 MMHG | RESPIRATION RATE: 18 BRPM | DIASTOLIC BLOOD PRESSURE: 78 MMHG | TEMPERATURE: 98 F

## 2024-09-18 VITALS
DIASTOLIC BLOOD PRESSURE: 73 MMHG | WEIGHT: 171.96 LBS | OXYGEN SATURATION: 99 % | HEIGHT: 68 IN | SYSTOLIC BLOOD PRESSURE: 127 MMHG | RESPIRATION RATE: 18 BRPM | HEART RATE: 60 BPM | TEMPERATURE: 98 F

## 2024-09-18 DIAGNOSIS — M33.90 DERMATOPOLYMYOSITIS, UNSPECIFIED, ORGAN INVOLVEMENT UNSPECIFIED: ICD-10-CM

## 2024-09-18 PROCEDURE — 96366 THER/PROPH/DIAG IV INF ADDON: CPT

## 2024-09-18 PROCEDURE — 96365 THER/PROPH/DIAG IV INF INIT: CPT

## 2024-09-18 RX ORDER — IMMUNE GLOBULIN (HUMAN) 10 G/100ML
80 INJECTION INTRAVENOUS; SUBCUTANEOUS ONCE
Refills: 0 | Status: COMPLETED | OUTPATIENT
Start: 2024-09-18 | End: 2024-09-18

## 2024-09-18 RX ADMIN — IMMUNE GLOBULIN (HUMAN) 80 GRAM(S): 10 INJECTION INTRAVENOUS; SUBCUTANEOUS at 09:47

## 2024-09-18 RX ADMIN — IMMUNE GLOBULIN (HUMAN) 80 GRAM(S): 10 INJECTION INTRAVENOUS; SUBCUTANEOUS at 13:54

## 2024-09-18 NOTE — DISCHARGE INSTRUCTIONS: GENERAL THERAPY - NSAPPTSFOLLOWUP_HEME_A_AMB
[facility]...
[facility]...

## 2024-09-18 NOTE — DISCHARGE INSTRUCTIONS: GENERAL THERAPY - NSFACILITYCONTAFTRHOUR_HEME_A_AMB
[facility] ([phone])
[facility] ([phone])

## 2024-10-23 ENCOUNTER — APPOINTMENT (OUTPATIENT)
Dept: INFUSION THERAPY | Facility: CLINIC | Age: 74
End: 2024-10-23

## 2024-10-24 ENCOUNTER — APPOINTMENT (OUTPATIENT)
Dept: INFUSION THERAPY | Facility: CLINIC | Age: 74
End: 2024-10-24

## 2025-07-28 NOTE — ED ADULT NURSE NOTE - CAS TRG GENERAL NORM CIRC DET
Patient Quality Outreach    Patient is due for the following:   Physical Well Child Check    Action(s) Taken:   Schedule a Well Child Check 8/13/25 at 9am W/     Type of outreach:    Phone, spoke to patient/parent. Mother    Questions for provider review:    None         Garth URBINA,JAROD  Chart routed to None.        
Strong peripheral pulses

## (undated) DEVICE — PREP CHLORAPREP HI-LITE ORANGE 26ML

## (undated) DEVICE — SUT MONOCRYL 4-0 27" PS-2 UNDYED

## (undated) DEVICE — DRAPE SURGICAL #1010

## (undated) DEVICE — NDL HYPO SAFE 25G X 1.5" (ORANGE)

## (undated) DEVICE — ELCTR STRYKER NEPTUNE SMOKE EVACUATION PENCIL (GREEN)

## (undated) DEVICE — TAPE SILK 3"

## (undated) DEVICE — APPLICATOR Q TIP 6" WOOD STEM

## (undated) DEVICE — PACK UPPER BODY

## (undated) DEVICE — GAUZE SPONGE 12PLY DMT MT 8X4

## (undated) DEVICE — FRCP POLARIS NON-STICK BP 0.5MMX7IN DISP

## (undated) DEVICE — DRAPE IOBAN 13" X 13"

## (undated) DEVICE — VENODYNE/SCD SLEEVE CALF MEDIUM

## (undated) DEVICE — BLADE SCALPEL SAFETYLOCK #11

## (undated) DEVICE — SUT VICRYL 3-0 18" SH (POP-OFF)

## (undated) DEVICE — SUT SILK 2-0 30" SH

## (undated) DEVICE — DRSG TEGADERM 2.5X3"

## (undated) DEVICE — SNARE CAPTIVATOR II RND COLD 10MM

## (undated) DEVICE — MARKING PEN W RULER

## (undated) DEVICE — POLY TRAP ETRAP

## (undated) DEVICE — WARMING BLANKET FULL ADULT

## (undated) DEVICE — DRSG MASTISOL

## (undated) DEVICE — FORCEP RADIAL JAW 4 JUMBO W NDL 2.8MM 3.2MM 240CM ORANGE DISP

## (undated) DEVICE — DRSG DERMABOND 0.7ML